# Patient Record
Sex: MALE | Race: OTHER | HISPANIC OR LATINO
[De-identification: names, ages, dates, MRNs, and addresses within clinical notes are randomized per-mention and may not be internally consistent; named-entity substitution may affect disease eponyms.]

---

## 2017-09-15 ENCOUNTER — APPOINTMENT (OUTPATIENT)
Dept: VASCULAR SURGERY | Facility: CLINIC | Age: 67
End: 2017-09-15
Payer: COMMERCIAL

## 2017-09-15 PROCEDURE — 93923 UPR/LXTR ART STDY 3+ LVLS: CPT

## 2017-09-15 PROCEDURE — 99213 OFFICE O/P EST LOW 20 MIN: CPT | Mod: 25

## 2017-09-27 NOTE — ASU PATIENT PROFILE, ADULT - PMH
CAD (coronary artery disease)    DM (diabetes mellitus)    Essential hypertension  Hypertension  PAD (peripheral artery disease)

## 2017-09-28 ENCOUNTER — APPOINTMENT (OUTPATIENT)
Dept: VASCULAR SURGERY | Facility: HOSPITAL | Age: 67
End: 2017-09-28

## 2017-09-28 ENCOUNTER — OUTPATIENT (OUTPATIENT)
Dept: OUTPATIENT SERVICES | Facility: HOSPITAL | Age: 67
LOS: 1 days | Discharge: HOME | End: 2017-09-28
Payer: COMMERCIAL

## 2017-09-28 VITALS
RESPIRATION RATE: 18 BRPM | SYSTOLIC BLOOD PRESSURE: 142 MMHG | HEART RATE: 58 BPM | OXYGEN SATURATION: 100 % | HEIGHT: 63 IN | DIASTOLIC BLOOD PRESSURE: 80 MMHG | TEMPERATURE: 98 F

## 2017-09-28 VITALS — DIASTOLIC BLOOD PRESSURE: 67 MMHG | RESPIRATION RATE: 19 BRPM | HEART RATE: 54 BPM | SYSTOLIC BLOOD PRESSURE: 122 MMHG

## 2017-09-28 DIAGNOSIS — Z41.9 ENCOUNTER FOR PROCEDURE FOR PURPOSES OTHER THAN REMEDYING HEALTH STATE, UNSPECIFIED: Chronic | ICD-10-CM

## 2017-09-28 DIAGNOSIS — Z98.89 OTHER SPECIFIED POSTPROCEDURAL STATES: Chronic | ICD-10-CM

## 2017-09-28 PROCEDURE — 76000 FLUOROSCOPY <1 HR PHYS/QHP: CPT

## 2017-09-28 PROCEDURE — C1894: CPT

## 2017-09-28 PROCEDURE — 36246 INS CATH ABD/L-EXT ART 2ND: CPT | Mod: 59,GC

## 2017-09-28 PROCEDURE — C1769: CPT

## 2017-09-28 PROCEDURE — C1887: CPT

## 2017-09-28 PROCEDURE — 75625 CONTRAST EXAM ABDOMINL AORTA: CPT | Mod: 26,GC

## 2017-09-28 PROCEDURE — 36247 INS CATH ABD/L-EXT ART 3RD: CPT | Mod: GC

## 2017-09-28 PROCEDURE — 75710 ARTERY X-RAYS ARM/LEG: CPT | Mod: 26,GC

## 2017-09-28 PROCEDURE — 36140 INTRO NDL ICATH UPR/LXTR ART: CPT | Mod: 59,GC

## 2017-09-28 PROCEDURE — 36246 INS CATH ABD/L-EXT ART 2ND: CPT | Mod: LT

## 2017-09-28 RX ORDER — VALSARTAN 80 MG/1
40 TABLET ORAL ONCE
Qty: 0 | Refills: 0 | Status: COMPLETED | OUTPATIENT
Start: 2017-09-28 | End: 2017-09-28

## 2017-09-28 RX ORDER — ACETAMINOPHEN 500 MG
650 TABLET ORAL EVERY 6 HOURS
Qty: 0 | Refills: 0 | Status: DISCONTINUED | OUTPATIENT
Start: 2017-09-28 | End: 2017-09-28

## 2017-09-28 RX ORDER — NIFEDIPINE 30 MG
30 TABLET, EXTENDED RELEASE 24 HR ORAL ONCE
Qty: 0 | Refills: 0 | Status: DISCONTINUED | OUTPATIENT
Start: 2017-09-28 | End: 2017-09-28

## 2017-09-28 RX ORDER — ONDANSETRON 8 MG/1
4 TABLET, FILM COATED ORAL ONCE
Qty: 0 | Refills: 0 | Status: DISCONTINUED | OUTPATIENT
Start: 2017-09-28 | End: 2017-09-28

## 2017-09-28 RX ORDER — HYDRALAZINE HCL 50 MG
10 TABLET ORAL ONCE
Qty: 0 | Refills: 0 | Status: COMPLETED | OUTPATIENT
Start: 2017-09-28 | End: 2017-09-28

## 2017-09-28 RX ADMIN — VALSARTAN 40 MILLIGRAM(S): 80 TABLET ORAL at 16:16

## 2017-09-28 RX ADMIN — Medication 10 MILLIGRAM(S): at 16:56

## 2017-09-28 NOTE — PROGRESS NOTE ADULT - SUBJECTIVE AND OBJECTIVE BOX
Sheath Pull    No heparin given. 5 F sheath pulled at 1217, direct pressure was held for a total of 40 minutes. Post pull there was no hematoma, no discoloration, no bleeding. BL palpable DP and PT pulses.

## 2017-09-28 NOTE — BRIEF OPERATIVE NOTE - OPERATION/FINDINGS
Left lower extremity angiogram with 5F sheath in R groin. Findings: Patent Aorta, patent bilateral JOHNATHAN/EIA/IIA. Patent L CFA and profunda femoris, L SFA occluded at its origin, reconstitutes at above knee popliteal artery (P1). P2, P3, AT, and TP trunk patent, but PT and peroneal occluded. Single vessel runoff (L AT) to foot. No intervention performed. Diagnostic left lower extremity angiogram with 5F sheath in R groin. Findings: Patent Aorta, patent bilateral JOHNATHAN/EIA/IIA. Patent L CFA and profunda femoris, L SFA occluded 1 cm distal to origin, reconstitutes at above knee popliteal artery. P2, P3, AT, TP trunk, and peroneal patent, but PT occluded. 2 vessel runoff (L AT and peroneal) to foot. No intervention performed.

## 2017-09-28 NOTE — BRIEF OPERATIVE NOTE - PROCEDURE
<<-----Click on this checkbox to enter Procedure Angiogram  09/28/2017  left lower extremity  Active  GANAND

## 2017-10-02 DIAGNOSIS — I70.212 ATHEROSCLEROSIS OF NATIVE ARTERIES OF EXTREMITIES WITH INTERMITTENT CLAUDICATION, LEFT LEG: ICD-10-CM

## 2017-10-02 DIAGNOSIS — I10 ESSENTIAL (PRIMARY) HYPERTENSION: ICD-10-CM

## 2017-10-02 DIAGNOSIS — F17.200 NICOTINE DEPENDENCE, UNSPECIFIED, UNCOMPLICATED: ICD-10-CM

## 2017-10-02 DIAGNOSIS — E11.9 TYPE 2 DIABETES MELLITUS WITHOUT COMPLICATIONS: ICD-10-CM

## 2017-11-01 NOTE — PATIENT PROFILE ADULT. - PSH
Elective surgery  right leg angiogram with bypass  july 2016  History of hernia repair  june 2014  S/P femoral-femoral bypass surgery  left 2016

## 2017-11-02 ENCOUNTER — INPATIENT (INPATIENT)
Facility: HOSPITAL | Age: 67
LOS: 0 days | Discharge: HOME | DRG: 254 | End: 2017-11-03
Attending: SURGERY | Admitting: SURGERY
Payer: COMMERCIAL

## 2017-11-02 ENCOUNTER — APPOINTMENT (OUTPATIENT)
Dept: VASCULAR SURGERY | Facility: HOSPITAL | Age: 67
End: 2017-11-02

## 2017-11-02 VITALS
SYSTOLIC BLOOD PRESSURE: 131 MMHG | HEART RATE: 64 BPM | OXYGEN SATURATION: 96 % | WEIGHT: 143.08 LBS | HEIGHT: 61 IN | RESPIRATION RATE: 18 BRPM | TEMPERATURE: 98 F | DIASTOLIC BLOOD PRESSURE: 61 MMHG

## 2017-11-02 DIAGNOSIS — I73.9 PERIPHERAL VASCULAR DISEASE, UNSPECIFIED: ICD-10-CM

## 2017-11-02 DIAGNOSIS — Z98.89 OTHER SPECIFIED POSTPROCEDURAL STATES: Chronic | ICD-10-CM

## 2017-11-02 DIAGNOSIS — Z95.828 PRESENCE OF OTHER VASCULAR IMPLANTS AND GRAFTS: Chronic | ICD-10-CM

## 2017-11-02 DIAGNOSIS — Z41.9 ENCOUNTER FOR PROCEDURE FOR PURPOSES OTHER THAN REMEDYING HEALTH STATE, UNSPECIFIED: Chronic | ICD-10-CM

## 2017-11-02 LAB
ANION GAP SERPL CALC-SCNC: 15 MMOL/L — SIGNIFICANT CHANGE UP (ref 5–17)
APTT BLD: 32.3 SEC — SIGNIFICANT CHANGE UP (ref 27.5–37.4)
BUN SERPL-MCNC: 17 MG/DL — SIGNIFICANT CHANGE UP (ref 7–23)
CALCIUM SERPL-MCNC: 8.8 MG/DL — SIGNIFICANT CHANGE UP (ref 8.4–10.5)
CHLORIDE SERPL-SCNC: 98 MMOL/L — SIGNIFICANT CHANGE UP (ref 96–108)
CO2 SERPL-SCNC: 24 MMOL/L — SIGNIFICANT CHANGE UP (ref 22–31)
CREAT SERPL-MCNC: 0.9 MG/DL — SIGNIFICANT CHANGE UP (ref 0.5–1.3)
GLUCOSE BLDC GLUCOMTR-MCNC: 113 MG/DL — HIGH (ref 70–99)
GLUCOSE BLDC GLUCOMTR-MCNC: 119 MG/DL — HIGH (ref 70–99)
GLUCOSE BLDC GLUCOMTR-MCNC: 122 MG/DL — HIGH (ref 70–99)
GLUCOSE BLDC GLUCOMTR-MCNC: 92 MG/DL — SIGNIFICANT CHANGE UP (ref 70–99)
GLUCOSE SERPL-MCNC: 132 MG/DL — HIGH (ref 70–99)
HCT VFR BLD CALC: 35.6 % — LOW (ref 39–50)
HGB BLD-MCNC: 12.1 G/DL — LOW (ref 13–17)
INR BLD: 1.13 — SIGNIFICANT CHANGE UP (ref 0.88–1.16)
MCHC RBC-ENTMCNC: 29.2 PG — SIGNIFICANT CHANGE UP (ref 27–34)
MCHC RBC-ENTMCNC: 34 G/DL — SIGNIFICANT CHANGE UP (ref 32–36)
MCV RBC AUTO: 86 FL — SIGNIFICANT CHANGE UP (ref 80–100)
PLATELET # BLD AUTO: 178 K/UL — SIGNIFICANT CHANGE UP (ref 150–400)
POTASSIUM SERPL-MCNC: 4.1 MMOL/L — SIGNIFICANT CHANGE UP (ref 3.5–5.3)
POTASSIUM SERPL-SCNC: 4.1 MMOL/L — SIGNIFICANT CHANGE UP (ref 3.5–5.3)
PROTHROM AB SERPL-ACNC: 12.6 SEC — SIGNIFICANT CHANGE UP (ref 9.8–12.7)
RBC # BLD: 4.14 M/UL — LOW (ref 4.2–5.8)
RBC # FLD: 13.8 % — SIGNIFICANT CHANGE UP (ref 10.3–16.9)
SODIUM SERPL-SCNC: 137 MMOL/L — SIGNIFICANT CHANGE UP (ref 135–145)
WBC # BLD: 17.5 K/UL — HIGH (ref 3.8–10.5)
WBC # FLD AUTO: 17.5 K/UL — HIGH (ref 3.8–10.5)

## 2017-11-02 PROCEDURE — 75710 ARTERY X-RAYS ARM/LEG: CPT | Mod: 26,GC,59

## 2017-11-02 PROCEDURE — 93010 ELECTROCARDIOGRAM REPORT: CPT

## 2017-11-02 PROCEDURE — 36140 INTRO NDL ICATH UPR/LXTR ART: CPT | Mod: 59,GC

## 2017-11-02 PROCEDURE — 35656 BPG FEMORAL-POPLITEAL: CPT | Mod: 80,GC

## 2017-11-02 PROCEDURE — 35656 BPG FEMORAL-POPLITEAL: CPT | Mod: GC

## 2017-11-02 RX ORDER — DEXTROSE 50 % IN WATER 50 %
25 SYRINGE (ML) INTRAVENOUS ONCE
Qty: 0 | Refills: 0 | Status: DISCONTINUED | OUTPATIENT
Start: 2017-11-02 | End: 2017-11-03

## 2017-11-02 RX ORDER — LABETALOL HCL 100 MG
200 TABLET ORAL
Qty: 0 | Refills: 0 | Status: DISCONTINUED | OUTPATIENT
Start: 2017-11-02 | End: 2017-11-03

## 2017-11-02 RX ORDER — NIFEDIPINE 30 MG
90 TABLET, EXTENDED RELEASE 24 HR ORAL AT BEDTIME
Qty: 0 | Refills: 0 | Status: DISCONTINUED | OUTPATIENT
Start: 2017-11-02 | End: 2017-11-03

## 2017-11-02 RX ORDER — DEXTROSE 50 % IN WATER 50 %
12.5 SYRINGE (ML) INTRAVENOUS ONCE
Qty: 0 | Refills: 0 | Status: DISCONTINUED | OUTPATIENT
Start: 2017-11-02 | End: 2017-11-03

## 2017-11-02 RX ORDER — CEFAZOLIN SODIUM 1 G
1000 VIAL (EA) INJECTION EVERY 8 HOURS
Qty: 0 | Refills: 0 | Status: DISCONTINUED | OUTPATIENT
Start: 2017-11-02 | End: 2017-11-03

## 2017-11-02 RX ORDER — DEXTROSE 50 % IN WATER 50 %
1 SYRINGE (ML) INTRAVENOUS ONCE
Qty: 0 | Refills: 0 | Status: DISCONTINUED | OUTPATIENT
Start: 2017-11-02 | End: 2017-11-03

## 2017-11-02 RX ORDER — DOCUSATE SODIUM 100 MG
100 CAPSULE ORAL THREE TIMES A DAY
Qty: 0 | Refills: 0 | Status: DISCONTINUED | OUTPATIENT
Start: 2017-11-02 | End: 2017-11-03

## 2017-11-02 RX ORDER — ASPIRIN/CALCIUM CARB/MAGNESIUM 324 MG
81 TABLET ORAL DAILY
Qty: 0 | Refills: 0 | Status: DISCONTINUED | OUTPATIENT
Start: 2017-11-02 | End: 2017-11-03

## 2017-11-02 RX ORDER — GLUCAGON INJECTION, SOLUTION 0.5 MG/.1ML
1 INJECTION, SOLUTION SUBCUTANEOUS ONCE
Qty: 0 | Refills: 0 | Status: DISCONTINUED | OUTPATIENT
Start: 2017-11-02 | End: 2017-11-03

## 2017-11-02 RX ORDER — ONDANSETRON 8 MG/1
4 TABLET, FILM COATED ORAL EVERY 6 HOURS
Qty: 0 | Refills: 0 | Status: DISCONTINUED | OUTPATIENT
Start: 2017-11-02 | End: 2017-11-03

## 2017-11-02 RX ORDER — MORPHINE SULFATE 50 MG/1
4 CAPSULE, EXTENDED RELEASE ORAL EVERY 4 HOURS
Qty: 0 | Refills: 0 | Status: DISCONTINUED | OUTPATIENT
Start: 2017-11-02 | End: 2017-11-03

## 2017-11-02 RX ORDER — SODIUM CHLORIDE 9 MG/ML
1000 INJECTION, SOLUTION INTRAVENOUS
Qty: 0 | Refills: 0 | Status: DISCONTINUED | OUTPATIENT
Start: 2017-11-02 | End: 2017-11-02

## 2017-11-02 RX ORDER — SODIUM CHLORIDE 9 MG/ML
1000 INJECTION, SOLUTION INTRAVENOUS
Qty: 0 | Refills: 0 | Status: DISCONTINUED | OUTPATIENT
Start: 2017-11-02 | End: 2017-11-03

## 2017-11-02 RX ORDER — INSULIN LISPRO 100/ML
VIAL (ML) SUBCUTANEOUS
Qty: 0 | Refills: 0 | Status: DISCONTINUED | OUTPATIENT
Start: 2017-11-02 | End: 2017-11-03

## 2017-11-02 RX ADMIN — SODIUM CHLORIDE 85 MILLILITER(S): 9 INJECTION, SOLUTION INTRAVENOUS at 13:06

## 2017-11-02 RX ADMIN — Medication 90 MILLIGRAM(S): at 21:58

## 2017-11-02 RX ADMIN — Medication 200 MILLIGRAM(S): at 19:44

## 2017-11-02 RX ADMIN — Medication 100 MILLIGRAM(S): at 21:45

## 2017-11-02 NOTE — H&P PST ADULT - HISTORY OF PRESENT ILLNESS
66 yo M with PMH HTN, HLD, DM, CAD s/p RLE fem-pop bypass in 2016 who presents with LLE claudication here for femoral to popliteal a. bypass.

## 2017-11-02 NOTE — H&P PST ADULT - PROBLEM SELECTOR PLAN 1
Admit to vascular  IVF   Pain control - Morphine PRN  Zofran PRN Nausea  continue home meds  F/u post op labs  neurovascular checks

## 2017-11-02 NOTE — BRIEF OPERATIVE NOTE - OPERATION/FINDINGS
Procedure: LLE CFA-AK Pop bypass with 6mm PTFE    Findings: Completion angio showed no proximal or distal technical defect in the anastamoses with AT runoff to foot    Triphasic DP signal at end of case

## 2017-11-02 NOTE — PROGRESS NOTE ADULT - SUBJECTIVE AND OBJECTIVE BOX
Procedure:   LLE CFA-AK pop bypass with 6mm PTFE      Diagnosis/Indication: PVD     Surgeon: Alfreda     S: Pt has no complaints. Denies lower extremity pain.  has not voided     O:  T(C): 36.7 (11-02-17 @ 11:30), Max: 36.7 (11-02-17 @ 11:30)  T(F): 98 (11-02-17 @ 11:30), Max: 98 (11-02-17 @ 11:30)  HR: 63 (11-02-17 @ 12:45) (60 - 84)  BP: 111/63 (11-02-17 @ 12:45) (107/63 - 114/73)  RR: 16 (11-02-17 @ 12:45) (16 - 18)  SpO2: 96% (11-02-17 @ 12:45) (96% - 100%)  Wt(kg): --                        12.1   17.5  )-----------( 178      ( 02 Nov 2017 11:48 )             35.6     11-02    137  |  98  |  17  ----------------------------<  132<H>  4.1   |  24  |  0.90    Ca    8.8      02 Nov 2017 11:48        Gen:  NAD AAO x3   C/V:  RRR  Pulm:  non-labored, CTABL   Abd: soft non -tender   Extremities:  LLE warm well perfused,   DP 2+  palpable,   PT monophasic   < from: 12 Lead ECG (11.02.17 @ 12:02) >    Ventricular Rate 65 BPM    Atrial Rate 65 BPM    P-R Interval 162 ms    QRS Duration 142 ms    Q-T Interval 448 ms    QTC Calculation(Bezet) 465 ms    P Axis 50 degrees    R Axis 46 degrees    T Axis 25 degrees    Diagnosis Line Normal sinus rhythm  Right bundle branch block    Confirmed by Brannon Thompson ([id number]) on 11/2/2017 1:08:30 PM    < end of copied text >      A/P: 67yMale s/p above procedure  Diet: Advance as tolerated    IV: LR maintenance   Pain/nausea control  DVT ppx: ASA  Dispo plan: OOB with PT POD#1

## 2017-11-03 ENCOUNTER — TRANSCRIPTION ENCOUNTER (OUTPATIENT)
Age: 67
End: 2017-11-03

## 2017-11-03 VITALS — TEMPERATURE: 99 F

## 2017-11-03 LAB
ANION GAP SERPL CALC-SCNC: 13 MMOL/L — SIGNIFICANT CHANGE UP (ref 5–17)
BUN SERPL-MCNC: 15 MG/DL — SIGNIFICANT CHANGE UP (ref 7–23)
CALCIUM SERPL-MCNC: 9 MG/DL — SIGNIFICANT CHANGE UP (ref 8.4–10.5)
CHLORIDE SERPL-SCNC: 93 MMOL/L — LOW (ref 96–108)
CO2 SERPL-SCNC: 27 MMOL/L — SIGNIFICANT CHANGE UP (ref 22–31)
CREAT SERPL-MCNC: 0.84 MG/DL — SIGNIFICANT CHANGE UP (ref 0.5–1.3)
GLUCOSE BLDC GLUCOMTR-MCNC: 132 MG/DL — HIGH (ref 70–99)
GLUCOSE BLDC GLUCOMTR-MCNC: 151 MG/DL — HIGH (ref 70–99)
GLUCOSE SERPL-MCNC: 151 MG/DL — HIGH (ref 70–99)
HBA1C BLD-MCNC: 6.2 % — HIGH (ref 4–5.6)
HCT VFR BLD CALC: 35 % — LOW (ref 39–50)
HGB BLD-MCNC: 11.9 G/DL — LOW (ref 13–17)
MAGNESIUM SERPL-MCNC: 1.8 MG/DL — SIGNIFICANT CHANGE UP (ref 1.6–2.6)
MCHC RBC-ENTMCNC: 29 PG — SIGNIFICANT CHANGE UP (ref 27–34)
MCHC RBC-ENTMCNC: 34 G/DL — SIGNIFICANT CHANGE UP (ref 32–36)
MCV RBC AUTO: 85.4 FL — SIGNIFICANT CHANGE UP (ref 80–100)
PHOSPHATE SERPL-MCNC: 3.6 MG/DL — SIGNIFICANT CHANGE UP (ref 2.5–4.5)
PLATELET # BLD AUTO: 187 K/UL — SIGNIFICANT CHANGE UP (ref 150–400)
POTASSIUM SERPL-MCNC: 3.6 MMOL/L — SIGNIFICANT CHANGE UP (ref 3.5–5.3)
POTASSIUM SERPL-SCNC: 3.6 MMOL/L — SIGNIFICANT CHANGE UP (ref 3.5–5.3)
RBC # BLD: 4.1 M/UL — LOW (ref 4.2–5.8)
RBC # FLD: 13.8 % — SIGNIFICANT CHANGE UP (ref 10.3–16.9)
SODIUM SERPL-SCNC: 133 MMOL/L — LOW (ref 135–145)
WBC # BLD: 14.4 K/UL — HIGH (ref 3.8–10.5)
WBC # FLD AUTO: 14.4 K/UL — HIGH (ref 3.8–10.5)

## 2017-11-03 PROCEDURE — 97161 PT EVAL LOW COMPLEX 20 MIN: CPT

## 2017-11-03 PROCEDURE — 85610 PROTHROMBIN TIME: CPT

## 2017-11-03 PROCEDURE — C1768: CPT

## 2017-11-03 PROCEDURE — 84100 ASSAY OF PHOSPHORUS: CPT

## 2017-11-03 PROCEDURE — 83735 ASSAY OF MAGNESIUM: CPT

## 2017-11-03 PROCEDURE — 85730 THROMBOPLASTIN TIME PARTIAL: CPT

## 2017-11-03 PROCEDURE — 36415 COLL VENOUS BLD VENIPUNCTURE: CPT

## 2017-11-03 PROCEDURE — 93005 ELECTROCARDIOGRAM TRACING: CPT

## 2017-11-03 PROCEDURE — 80048 BASIC METABOLIC PNL TOTAL CA: CPT

## 2017-11-03 PROCEDURE — 82962 GLUCOSE BLOOD TEST: CPT

## 2017-11-03 PROCEDURE — 83036 HEMOGLOBIN GLYCOSYLATED A1C: CPT

## 2017-11-03 PROCEDURE — 76000 FLUOROSCOPY <1 HR PHYS/QHP: CPT

## 2017-11-03 PROCEDURE — 85027 COMPLETE CBC AUTOMATED: CPT

## 2017-11-03 RX ORDER — POTASSIUM CHLORIDE 20 MEQ
40 PACKET (EA) ORAL ONCE
Qty: 0 | Refills: 0 | Status: COMPLETED | OUTPATIENT
Start: 2017-11-03 | End: 2017-11-03

## 2017-11-03 RX ORDER — DEXTROSE 50 % IN WATER 50 %
50 SYRINGE (ML) INTRAVENOUS
Qty: 0 | Refills: 0 | COMMUNITY
Start: 2017-11-03

## 2017-11-03 RX ORDER — ACETAMINOPHEN 500 MG
650 TABLET ORAL ONCE
Qty: 0 | Refills: 0 | Status: COMPLETED | OUTPATIENT
Start: 2017-11-03 | End: 2017-11-03

## 2017-11-03 RX ORDER — MAGNESIUM SULFATE 500 MG/ML
1 VIAL (ML) INJECTION ONCE
Qty: 0 | Refills: 0 | Status: COMPLETED | OUTPATIENT
Start: 2017-11-03 | End: 2017-11-03

## 2017-11-03 RX ORDER — DEXTROSE 50 % IN WATER 50 %
1 SYRINGE (ML) INTRAVENOUS
Qty: 0 | Refills: 0 | COMMUNITY
Start: 2017-11-03

## 2017-11-03 RX ORDER — ASPIRIN/CALCIUM CARB/MAGNESIUM 324 MG
1 TABLET ORAL
Qty: 30 | Refills: 0
Start: 2017-11-03 | End: 2017-12-03

## 2017-11-03 RX ORDER — DEXTROSE 50 % IN WATER 50 %
25 SYRINGE (ML) INTRAVENOUS
Qty: 0 | Refills: 0 | COMMUNITY
Start: 2017-11-03

## 2017-11-03 RX ORDER — HEPARIN SODIUM 5000 [USP'U]/ML
5000 INJECTION INTRAVENOUS; SUBCUTANEOUS EVERY 8 HOURS
Qty: 0 | Refills: 0 | Status: DISCONTINUED | OUTPATIENT
Start: 2017-11-03 | End: 2017-11-03

## 2017-11-03 RX ADMIN — Medication 2: at 06:30

## 2017-11-03 RX ADMIN — Medication 200 MILLIGRAM(S): at 05:55

## 2017-11-03 RX ADMIN — Medication 100 GRAM(S): at 08:33

## 2017-11-03 RX ADMIN — Medication 81 MILLIGRAM(S): at 12:31

## 2017-11-03 RX ADMIN — Medication 100 MILLIGRAM(S): at 05:56

## 2017-11-03 RX ADMIN — Medication 650 MILLIGRAM(S): at 13:19

## 2017-11-03 RX ADMIN — Medication 40 MILLIEQUIVALENT(S): at 08:33

## 2017-11-03 RX ADMIN — Medication 650 MILLIGRAM(S): at 14:30

## 2017-11-03 RX ADMIN — Medication 100 MILLIGRAM(S): at 05:55

## 2017-11-03 NOTE — PROGRESS NOTE ADULT - SUBJECTIVE AND OBJECTIVE BOX
o/n: passed TO 900c  11/2: s/p LLE fem-ak pop bypass, POC ok      66 yo M with PAD and left leg severe claudication, s/p left fem-AK pop bypass with PTFE  -Pain control  -Bedrest post op, OOB and PT eval on POD#1  -ASA  -Home meds  -consistent carb diet  -HSQ on POD#1  -AM labs o/n: passed   11/2: s/p LLE fem-ak pop bypass, POC ok    SUBJECTIVE: Patient seen and examined bedside by chief resident. No acute events overnight. Denies fever, chills, nausea, emesis, chest pain, dyspnea, abdominal pain.     aspirin enteric coated 81 milliGRAM(s) Oral daily  ceFAZolin   IVPB 1000 milliGRAM(s) IV Intermittent every 8 hours  heparin  Injectable 5000 Unit(s) SubCutaneous every 8 hours  labetalol 200 milliGRAM(s) Oral two times a day  NIFEdipine XL 90 milliGRAM(s) Oral at bedtime      Vital Signs Last 24 Hrs  T(C): 36.6 (03 Nov 2017 08:27), Max: 37.7 (03 Nov 2017 06:10)  T(F): 97.8 (03 Nov 2017 08:27), Max: 99.9 (03 Nov 2017 06:10)  HR: 64 (03 Nov 2017 08:30) (56 - 84)  BP: 117/58 (03 Nov 2017 08:30) (107/63 - 167/77)  BP(mean): --  RR: 16 (03 Nov 2017 08:30) (16 - 18)  SpO2: 97% (03 Nov 2017 08:30) (91% - 100%)  I&O's Detail    02 Nov 2017 07:01  -  03 Nov 2017 07:00  --------------------------------------------------------  IN:    IV PiggyBack: 100 mL    lactated ringers.: 765 mL    Oral Fluid: 120 mL  Total IN: 985 mL    OUT:    Voided: 1850 mL  Total OUT: 1850 mL    Total NET: -865 mL      03 Nov 2017 07:01  -  03 Nov 2017 08:45  --------------------------------------------------------  IN:    Oral Fluid: 180 mL  Total IN: 180 mL    OUT:  Total OUT: 0 mL    Total NET: 180 mL          General: NAD, resting comfortably in bed  Pulm: Nonlabored breathing, no respiratory distress  Extrem: WWP, no edema, L 2+DP, incision c/d/i        LABS:                        11.9   14.4  )-----------( 187      ( 03 Nov 2017 07:06 )             35.0     11-03    133<L>  |  93<L>  |  15  ----------------------------<  151<H>  3.6   |  27  |  0.84    Ca    9.0      03 Nov 2017 07:06  Phos  3.6     11-03  Mg     1.8     11-03      PT/INR - ( 02 Nov 2017 11:48 )   PT: 12.6 sec;   INR: 1.13          PTT - ( 02 Nov 2017 11:48 )  PTT:32.3 sec      RADIOLOGY & ADDITIONAL STUDIES:

## 2017-11-03 NOTE — DISCHARGE NOTE ADULT - MEDICATION SUMMARY - MEDICATIONS TO TAKE
I will START or STAY ON the medications listed below when I get home from the hospital:    Aspir 81 oral delayed release tablet  -- 1 tab(s) by mouth once a day  -- Indication: For circulation    metFORMIN 500 mg oral tablet  -- 1  by mouth 2 times a day  -- Indication: For DM    Edarbyclor 40 mg-12.5 mg oral tablet  -- 1 tab(s) by mouth once a day  -- Indication: For HTN    labetalol 200 mg oral tablet  -- 1 tab(s) by mouth 2 times a day  -- Indication: For HTN    NIFEdipine 30 mg oral tablet, extended release  -- 90 milligram(s) by mouth once a day (at bedtime)  -- Indication: For HTN

## 2017-11-03 NOTE — DISCHARGE NOTE ADULT - CARE PROVIDER_API CALL
Varsha Gant), Surgery; Vascular Surgery  [address]  [address]  [place], [place] [address]  Phone: [phone]  Fax: [phone]

## 2017-11-03 NOTE — PROGRESS NOTE ADULT - ASSESSMENT
68 yo M with PAD and left leg severe claudication, s/p left fem-AK pop bypass with PTFE  -Pain control  -Bedrest post op, OOB and PT eval on POD#1  -ASA  -HSQ  -Home meds  -consistent carb diet  -AM labs

## 2017-11-03 NOTE — DISCHARGE NOTE ADULT - PLAN OF CARE
Follow up with  in 1-2 weeks. Call the office at  to schedule your appointment. You may shower; soap and water over incision sites. Do not scrub. Pat dry when done. No tub bathing or swimming until cleared. Keep incision sites out of the sun as scars will darken. Ambulate as tolerated, but no heavy lifting (>10lbs) or strenuous exercise. You may resume diabetic diet. You should be urinating at least 3-4x per day. Call the office if you experience increasing pain, leg numbness/tingling, nausea, fever/chill.    Please start taking a baby Aspirin 81mg daily Incision healing, return to normal activity

## 2017-11-03 NOTE — DISCHARGE NOTE ADULT - PATIENT PORTAL LINK FT
“You can access the [employer] Patient Portal, offered by [facility], by registering with the following website: [email or web address]”

## 2017-11-06 DIAGNOSIS — I10 ESSENTIAL (PRIMARY) HYPERTENSION: ICD-10-CM

## 2017-11-06 DIAGNOSIS — Z86.73 PERSONAL HISTORY OF TRANSIENT ISCHEMIC ATTACK (TIA), AND CEREBRAL INFARCTION WITHOUT RESIDUAL DEFICITS: ICD-10-CM

## 2017-11-06 DIAGNOSIS — Z79.84 LONG TERM (CURRENT) USE OF ORAL HYPOGLYCEMIC DRUGS: ICD-10-CM

## 2017-11-06 DIAGNOSIS — I70.212 ATHEROSCLEROSIS OF NATIVE ARTERIES OF EXTREMITIES WITH INTERMITTENT CLAUDICATION, LEFT LEG: ICD-10-CM

## 2017-11-06 DIAGNOSIS — I25.10 ATHEROSCLEROTIC HEART DISEASE OF NATIVE CORONARY ARTERY WITHOUT ANGINA PECTORIS: ICD-10-CM

## 2017-11-06 DIAGNOSIS — E11.9 TYPE 2 DIABETES MELLITUS WITHOUT COMPLICATIONS: ICD-10-CM

## 2017-11-06 DIAGNOSIS — F17.210 NICOTINE DEPENDENCE, CIGARETTES, UNCOMPLICATED: ICD-10-CM

## 2017-11-17 ENCOUNTER — APPOINTMENT (OUTPATIENT)
Dept: VASCULAR SURGERY | Facility: CLINIC | Age: 67
End: 2017-11-17
Payer: COMMERCIAL

## 2017-11-17 PROCEDURE — 99024 POSTOP FOLLOW-UP VISIT: CPT

## 2017-11-17 PROCEDURE — 93926 LOWER EXTREMITY STUDY: CPT | Mod: 79

## 2018-05-25 ENCOUNTER — APPOINTMENT (OUTPATIENT)
Dept: VASCULAR SURGERY | Facility: CLINIC | Age: 68
End: 2018-05-25
Payer: COMMERCIAL

## 2018-05-25 PROCEDURE — 93925 LOWER EXTREMITY STUDY: CPT

## 2018-05-25 PROCEDURE — 99213 OFFICE O/P EST LOW 20 MIN: CPT | Mod: 25

## 2019-07-19 ENCOUNTER — APPOINTMENT (OUTPATIENT)
Dept: VASCULAR SURGERY | Facility: CLINIC | Age: 69
End: 2019-07-19
Payer: COMMERCIAL

## 2019-07-19 PROCEDURE — 93925 LOWER EXTREMITY STUDY: CPT

## 2019-07-19 PROCEDURE — 99213 OFFICE O/P EST LOW 20 MIN: CPT

## 2019-07-19 NOTE — HISTORY OF PRESENT ILLNESS
[FreeTextEntry1] : patient with severe PVD who had biliateral fem pop bypasses comes in for routine check\par he can walk as much as he wishes\par no claudicaiton \par no neurologic issues\par

## 2019-07-19 NOTE — PHYSICAL EXAM
[2+] : left 2+ [No Rash or Lesion] : No rash or lesion [Calm] : calm [JVD] : no jugular venous distention  [Right Carotid Bruit] : no bruit heard over the right carotid [Left Carotid Bruit] : no bruit heard over the left carotid [Ankle Swelling (On Exam)] : not present [Varicose Veins Of Lower Extremities] : not present [] : not present [de-identified] : pleasant

## 2020-03-13 ENCOUNTER — TRANSCRIPTION ENCOUNTER (OUTPATIENT)
Age: 70
End: 2020-03-13

## 2020-03-14 ENCOUNTER — INPATIENT (INPATIENT)
Facility: HOSPITAL | Age: 70
LOS: 2 days | Discharge: HOME | DRG: 253 | End: 2020-03-17
Attending: SURGERY | Admitting: SURGERY
Payer: COMMERCIAL

## 2020-03-14 VITALS
WEIGHT: 160.06 LBS | TEMPERATURE: 99 F | HEART RATE: 80 BPM | DIASTOLIC BLOOD PRESSURE: 86 MMHG | SYSTOLIC BLOOD PRESSURE: 183 MMHG | RESPIRATION RATE: 18 BRPM | OXYGEN SATURATION: 100 %

## 2020-03-14 DIAGNOSIS — Z95.828 PRESENCE OF OTHER VASCULAR IMPLANTS AND GRAFTS: Chronic | ICD-10-CM

## 2020-03-14 DIAGNOSIS — Z41.9 ENCOUNTER FOR PROCEDURE FOR PURPOSES OTHER THAN REMEDYING HEALTH STATE, UNSPECIFIED: Chronic | ICD-10-CM

## 2020-03-14 DIAGNOSIS — Z98.89 OTHER SPECIFIED POSTPROCEDURAL STATES: Chronic | ICD-10-CM

## 2020-03-14 LAB
ALBUMIN SERPL ELPH-MCNC: 4.3 G/DL — SIGNIFICANT CHANGE UP (ref 3.3–5)
ALP SERPL-CCNC: 118 U/L — SIGNIFICANT CHANGE UP (ref 40–120)
ALT FLD-CCNC: 13 U/L — SIGNIFICANT CHANGE UP (ref 10–45)
ANION GAP SERPL CALC-SCNC: 13 MMOL/L — SIGNIFICANT CHANGE UP (ref 5–17)
ANION GAP SERPL CALC-SCNC: 13 MMOL/L — SIGNIFICANT CHANGE UP (ref 5–17)
APTT BLD: 34.9 SEC — SIGNIFICANT CHANGE UP (ref 27.5–36.3)
APTT BLD: 41.8 SEC — HIGH (ref 27.5–36.3)
AST SERPL-CCNC: 14 U/L — SIGNIFICANT CHANGE UP (ref 10–40)
BASOPHILS # BLD AUTO: 0.1 K/UL — SIGNIFICANT CHANGE UP (ref 0–0.2)
BASOPHILS NFR BLD AUTO: 0.6 % — SIGNIFICANT CHANGE UP (ref 0–2)
BILIRUB SERPL-MCNC: 0.2 MG/DL — SIGNIFICANT CHANGE UP (ref 0.2–1.2)
BUN SERPL-MCNC: 11 MG/DL — SIGNIFICANT CHANGE UP (ref 7–23)
BUN SERPL-MCNC: 9 MG/DL — SIGNIFICANT CHANGE UP (ref 7–23)
CALCIUM SERPL-MCNC: 9.1 MG/DL — SIGNIFICANT CHANGE UP (ref 8.4–10.5)
CALCIUM SERPL-MCNC: 9.4 MG/DL — SIGNIFICANT CHANGE UP (ref 8.4–10.5)
CHLORIDE SERPL-SCNC: 98 MMOL/L — SIGNIFICANT CHANGE UP (ref 96–108)
CHLORIDE SERPL-SCNC: 99 MMOL/L — SIGNIFICANT CHANGE UP (ref 96–108)
CO2 SERPL-SCNC: 26 MMOL/L — SIGNIFICANT CHANGE UP (ref 22–31)
CO2 SERPL-SCNC: 27 MMOL/L — SIGNIFICANT CHANGE UP (ref 22–31)
CREAT SERPL-MCNC: 0.62 MG/DL — SIGNIFICANT CHANGE UP (ref 0.5–1.3)
CREAT SERPL-MCNC: 0.66 MG/DL — SIGNIFICANT CHANGE UP (ref 0.5–1.3)
EOSINOPHIL # BLD AUTO: 1.72 K/UL — HIGH (ref 0–0.5)
EOSINOPHIL NFR BLD AUTO: 10.1 % — HIGH (ref 0–6)
GLUCOSE BLDC GLUCOMTR-MCNC: 129 MG/DL — HIGH (ref 70–99)
GLUCOSE SERPL-MCNC: 119 MG/DL — HIGH (ref 70–99)
GLUCOSE SERPL-MCNC: 135 MG/DL — HIGH (ref 70–99)
HCT VFR BLD CALC: 44.8 % — SIGNIFICANT CHANGE UP (ref 39–50)
HCT VFR BLD CALC: 45.4 % — SIGNIFICANT CHANGE UP (ref 39–50)
HGB BLD-MCNC: 14.3 G/DL — SIGNIFICANT CHANGE UP (ref 13–17)
HGB BLD-MCNC: 15.2 G/DL — SIGNIFICANT CHANGE UP (ref 13–17)
IMM GRANULOCYTES NFR BLD AUTO: 0.4 % — SIGNIFICANT CHANGE UP (ref 0–1.5)
INR BLD: 1.06 — SIGNIFICANT CHANGE UP (ref 0.88–1.16)
INR BLD: 1.06 — SIGNIFICANT CHANGE UP (ref 0.88–1.16)
LYMPHOCYTES # BLD AUTO: 1.46 K/UL — SIGNIFICANT CHANGE UP (ref 1–3.3)
LYMPHOCYTES # BLD AUTO: 8.6 % — LOW (ref 13–44)
MAGNESIUM SERPL-MCNC: 1.8 MG/DL — SIGNIFICANT CHANGE UP (ref 1.6–2.6)
MCHC RBC-ENTMCNC: 28.1 PG — SIGNIFICANT CHANGE UP (ref 27–34)
MCHC RBC-ENTMCNC: 29.4 PG — SIGNIFICANT CHANGE UP (ref 27–34)
MCHC RBC-ENTMCNC: 31.9 GM/DL — LOW (ref 32–36)
MCHC RBC-ENTMCNC: 33.5 GM/DL — SIGNIFICANT CHANGE UP (ref 32–36)
MCV RBC AUTO: 87.8 FL — SIGNIFICANT CHANGE UP (ref 80–100)
MCV RBC AUTO: 88 FL — SIGNIFICANT CHANGE UP (ref 80–100)
MONOCYTES # BLD AUTO: 0.71 K/UL — SIGNIFICANT CHANGE UP (ref 0–0.9)
MONOCYTES NFR BLD AUTO: 4.2 % — SIGNIFICANT CHANGE UP (ref 2–14)
NEUTROPHILS # BLD AUTO: 12.95 K/UL — HIGH (ref 1.8–7.4)
NEUTROPHILS NFR BLD AUTO: 76.1 % — SIGNIFICANT CHANGE UP (ref 43–77)
NRBC # BLD: 0 /100 WBCS — SIGNIFICANT CHANGE UP (ref 0–0)
NRBC # BLD: 0 /100 WBCS — SIGNIFICANT CHANGE UP (ref 0–0)
PHOSPHATE SERPL-MCNC: 2.9 MG/DL — SIGNIFICANT CHANGE UP (ref 2.5–4.5)
PLATELET # BLD AUTO: 272 K/UL — SIGNIFICANT CHANGE UP (ref 150–400)
PLATELET # BLD AUTO: 285 K/UL — SIGNIFICANT CHANGE UP (ref 150–400)
POTASSIUM SERPL-MCNC: 4 MMOL/L — SIGNIFICANT CHANGE UP (ref 3.5–5.3)
POTASSIUM SERPL-MCNC: 4.5 MMOL/L — SIGNIFICANT CHANGE UP (ref 3.5–5.3)
POTASSIUM SERPL-SCNC: 4 MMOL/L — SIGNIFICANT CHANGE UP (ref 3.5–5.3)
POTASSIUM SERPL-SCNC: 4.5 MMOL/L — SIGNIFICANT CHANGE UP (ref 3.5–5.3)
PROT SERPL-MCNC: 7.6 G/DL — SIGNIFICANT CHANGE UP (ref 6–8.3)
PROTHROM AB SERPL-ACNC: 12.1 SEC — SIGNIFICANT CHANGE UP (ref 10–12.9)
PROTHROM AB SERPL-ACNC: 12.1 SEC — SIGNIFICANT CHANGE UP (ref 10–12.9)
RBC # BLD: 5.09 M/UL — SIGNIFICANT CHANGE UP (ref 4.2–5.8)
RBC # BLD: 5.17 M/UL — SIGNIFICANT CHANGE UP (ref 4.2–5.8)
RBC # FLD: 13.5 % — SIGNIFICANT CHANGE UP (ref 10.3–14.5)
RBC # FLD: 13.6 % — SIGNIFICANT CHANGE UP (ref 10.3–14.5)
SODIUM SERPL-SCNC: 137 MMOL/L — SIGNIFICANT CHANGE UP (ref 135–145)
SODIUM SERPL-SCNC: 139 MMOL/L — SIGNIFICANT CHANGE UP (ref 135–145)
WBC # BLD: 13.21 K/UL — HIGH (ref 3.8–10.5)
WBC # BLD: 17 K/UL — HIGH (ref 3.8–10.5)
WBC # FLD AUTO: 13.21 K/UL — HIGH (ref 3.8–10.5)
WBC # FLD AUTO: 17 K/UL — HIGH (ref 3.8–10.5)

## 2020-03-14 PROCEDURE — 99285 EMERGENCY DEPT VISIT HI MDM: CPT

## 2020-03-14 PROCEDURE — 99234 HOSP IP/OBS SM DT SF/LOW 45: CPT | Mod: GC,57

## 2020-03-14 PROCEDURE — 36247 INS CATH ABD/L-EXT ART 3RD: CPT | Mod: GC

## 2020-03-14 PROCEDURE — 75710 ARTERY X-RAYS ARM/LEG: CPT | Mod: 26,GC

## 2020-03-14 PROCEDURE — 75625 CONTRAST EXAM ABDOMINL AORTA: CPT | Mod: 26,GC

## 2020-03-14 PROCEDURE — 36246 INS CATH ABD/L-EXT ART 2ND: CPT | Mod: GC,59

## 2020-03-14 PROCEDURE — 36140 INTRO NDL ICATH UPR/LXTR ART: CPT | Mod: GC,59

## 2020-03-14 RX ORDER — NIFEDIPINE 30 MG
90 TABLET, EXTENDED RELEASE 24 HR ORAL AT BEDTIME
Refills: 0 | Status: DISCONTINUED | OUTPATIENT
Start: 2020-03-14 | End: 2020-03-17

## 2020-03-14 RX ORDER — INSULIN LISPRO 100/ML
VIAL (ML) SUBCUTANEOUS
Refills: 0 | Status: DISCONTINUED | OUTPATIENT
Start: 2020-03-14 | End: 2020-03-17

## 2020-03-14 RX ORDER — HEPARIN SODIUM 5000 [USP'U]/ML
6000 INJECTION INTRAVENOUS; SUBCUTANEOUS ONCE
Refills: 0 | Status: COMPLETED | OUTPATIENT
Start: 2020-03-14 | End: 2020-03-14

## 2020-03-14 RX ORDER — SODIUM CHLORIDE 9 MG/ML
1000 INJECTION, SOLUTION INTRAVENOUS
Refills: 0 | Status: DISCONTINUED | OUTPATIENT
Start: 2020-03-14 | End: 2020-03-17

## 2020-03-14 RX ORDER — OXYCODONE HYDROCHLORIDE 5 MG/1
10 TABLET ORAL EVERY 4 HOURS
Refills: 0 | Status: DISCONTINUED | OUTPATIENT
Start: 2020-03-14 | End: 2020-03-16

## 2020-03-14 RX ORDER — HYDROMORPHONE HYDROCHLORIDE 2 MG/ML
0.5 INJECTION INTRAMUSCULAR; INTRAVENOUS; SUBCUTANEOUS EVERY 4 HOURS
Refills: 0 | Status: DISCONTINUED | OUTPATIENT
Start: 2020-03-14 | End: 2020-03-14

## 2020-03-14 RX ORDER — ASPIRIN/CALCIUM CARB/MAGNESIUM 324 MG
81 TABLET ORAL DAILY
Refills: 0 | Status: DISCONTINUED | OUTPATIENT
Start: 2020-03-14 | End: 2020-03-17

## 2020-03-14 RX ORDER — DEXTROSE 50 % IN WATER 50 %
15 SYRINGE (ML) INTRAVENOUS ONCE
Refills: 0 | Status: DISCONTINUED | OUTPATIENT
Start: 2020-03-14 | End: 2020-03-17

## 2020-03-14 RX ORDER — OXYCODONE HYDROCHLORIDE 5 MG/1
5 TABLET ORAL EVERY 4 HOURS
Refills: 0 | Status: DISCONTINUED | OUTPATIENT
Start: 2020-03-14 | End: 2020-03-17

## 2020-03-14 RX ORDER — HEPARIN SODIUM 5000 [USP'U]/ML
INJECTION INTRAVENOUS; SUBCUTANEOUS
Qty: 25000 | Refills: 0 | Status: DISCONTINUED | OUTPATIENT
Start: 2020-03-14 | End: 2020-03-14

## 2020-03-14 RX ORDER — NIFEDIPINE 30 MG
90 TABLET, EXTENDED RELEASE 24 HR ORAL
Qty: 0 | Refills: 0 | DISCHARGE

## 2020-03-14 RX ORDER — SODIUM CHLORIDE 9 MG/ML
1000 INJECTION INTRAMUSCULAR; INTRAVENOUS; SUBCUTANEOUS
Refills: 0 | Status: DISCONTINUED | OUTPATIENT
Start: 2020-03-14 | End: 2020-03-17

## 2020-03-14 RX ORDER — INFLUENZA VIRUS VACCINE 15; 15; 15; 15 UG/.5ML; UG/.5ML; UG/.5ML; UG/.5ML
0.5 SUSPENSION INTRAMUSCULAR ONCE
Refills: 0 | Status: DISCONTINUED | OUTPATIENT
Start: 2020-03-14 | End: 2020-03-17

## 2020-03-14 RX ORDER — DEXTROSE 50 % IN WATER 50 %
12.5 SYRINGE (ML) INTRAVENOUS ONCE
Refills: 0 | Status: DISCONTINUED | OUTPATIENT
Start: 2020-03-14 | End: 2020-03-17

## 2020-03-14 RX ORDER — ACETAMINOPHEN 500 MG
975 TABLET ORAL ONCE
Refills: 0 | Status: COMPLETED | OUTPATIENT
Start: 2020-03-14 | End: 2020-03-14

## 2020-03-14 RX ORDER — HEPARIN SODIUM 5000 [USP'U]/ML
200 INJECTION INTRAVENOUS; SUBCUTANEOUS
Qty: 25000 | Refills: 0 | Status: DISCONTINUED | OUTPATIENT
Start: 2020-03-14 | End: 2020-03-15

## 2020-03-14 RX ORDER — ALTEPLASE 100 MG
1 KIT INTRAVENOUS
Qty: 10 | Refills: 0 | Status: DISCONTINUED | OUTPATIENT
Start: 2020-03-14 | End: 2020-03-14

## 2020-03-14 RX ORDER — SENNA PLUS 8.6 MG/1
2 TABLET ORAL AT BEDTIME
Refills: 0 | Status: DISCONTINUED | OUTPATIENT
Start: 2020-03-14 | End: 2020-03-17

## 2020-03-14 RX ORDER — ONDANSETRON 8 MG/1
4 TABLET, FILM COATED ORAL EVERY 6 HOURS
Refills: 0 | Status: DISCONTINUED | OUTPATIENT
Start: 2020-03-14 | End: 2020-03-17

## 2020-03-14 RX ORDER — HEPARIN SODIUM 5000 [USP'U]/ML
1100 INJECTION INTRAVENOUS; SUBCUTANEOUS
Qty: 25000 | Refills: 0 | Status: DISCONTINUED | OUTPATIENT
Start: 2020-03-14 | End: 2020-03-15

## 2020-03-14 RX ORDER — LABETALOL HCL 100 MG
200 TABLET ORAL
Refills: 0 | Status: DISCONTINUED | OUTPATIENT
Start: 2020-03-14 | End: 2020-03-17

## 2020-03-14 RX ORDER — GLUCAGON INJECTION, SOLUTION 0.5 MG/.1ML
1 INJECTION, SOLUTION SUBCUTANEOUS ONCE
Refills: 0 | Status: DISCONTINUED | OUTPATIENT
Start: 2020-03-14 | End: 2020-03-17

## 2020-03-14 RX ADMIN — HEPARIN SODIUM 6000 UNIT(S): 5000 INJECTION INTRAVENOUS; SUBCUTANEOUS at 15:40

## 2020-03-14 RX ADMIN — Medication 90 MILLIGRAM(S): at 22:43

## 2020-03-14 RX ADMIN — HEPARIN SODIUM 11 UNIT(S)/HR: 5000 INJECTION INTRAVENOUS; SUBCUTANEOUS at 19:14

## 2020-03-14 RX ADMIN — Medication 975 MILLIGRAM(S): at 15:14

## 2020-03-14 RX ADMIN — Medication 975 MILLIGRAM(S): at 15:45

## 2020-03-14 RX ADMIN — HEPARIN SODIUM 1300 UNIT(S)/HR: 5000 INJECTION INTRAVENOUS; SUBCUTANEOUS at 15:41

## 2020-03-14 RX ADMIN — OXYCODONE HYDROCHLORIDE 10 MILLIGRAM(S): 5 TABLET ORAL at 21:28

## 2020-03-14 RX ADMIN — OXYCODONE HYDROCHLORIDE 10 MILLIGRAM(S): 5 TABLET ORAL at 20:28

## 2020-03-14 RX ADMIN — Medication 200 MILLIGRAM(S): at 19:30

## 2020-03-14 NOTE — BRIEF OPERATIVE NOTE - OPERATION/FINDINGS
Angiography shows patent infrarenal aorta and bilateral iliac arteries. Patent R CFA and profunda. R CFA-above knee popliteal artery occluded at its origin. 4 Fr Lazaro infusion catheter placed for intended thrombolysis, however TPA not given due to L groin access hematoma. Heparin 200 units/hr started through infusion catheter, within 6 Fr long sheath.

## 2020-03-14 NOTE — H&P ADULT - NSHPLABSRESULTS_GEN_ALL_CORE
General: well appearing, in NAD  resp: speaking in full sentences, no resp distress  abdomen: distended, however no tenderness or guarding with palpation     RLE:   mottled  cool to the touch   no wounds, scars from prior surgery healed  SILT over distal limb and symmetric to contralateral side   firing EHL/FHL/TA/GS    Pulse  DP no signal  PT No signal  POP biphasic signal on doppler  FEM Palpable    LLE  warm to touch  no swelling, wounds or abrasions   SILT and symmetric to contralateral side  DP: triphasic signals  PT No signal  POP palpable  FEM Palpable

## 2020-03-14 NOTE — CONSULT NOTE ADULT - SUBJECTIVE AND OBJECTIVE BOX
SICU CONSULT NOTE    HPI:  69 M with a history of HTN, DM, HLD, peripheral arterial disease, CAD, PAD s/p right and left LE FEM-pop bypass in (R in 2016) presenting to the ED with complaints of right leg numbness, tingling, and pain. Pt states the cool sensation began around 10am at which point he noted his leg to also be getting tired and painful with movement, as time went on the pain worsened prompting him to coming into the ED. denies any similar events like this in the past. no current AC use. Denies any symptoms on the contralateral extremity. Denies CP, SOB, or any systemic symptoms concerning for infection such as fevers, chills, N/V. (14 Mar 2020 15:33)    SICU ADDENDUM: Pt s/p attempted lysis, however L groin hematoma formed during access. Admitted to SICU for groin and LE monitoring.     PAST MEDICAL & SURGICAL HISTORY:  PAD (peripheral artery disease)  DM (diabetes mellitus)  CAD (coronary artery disease)  Essential hypertension: Hypertension  S/P femoral-femoral bypass surgery: left 2016  Elective surgery: right leg angiogram with bypass  july 2016  History of hernia repair: june 2014      PAST SURGICAL HISTORY:     REVIEW OF SYSTEMS:   Pertinent positives/negatives noted in HPI.     HOME MEDICATIONS:    ALLERGIES:  Allergies    No Known Allergies    Intolerances        SOCIAL HISTORY:    FAMILY HISTORY:      Vital Signs Last 24 Hrs  T(C): 36.7 (14 Mar 2020 15:46), Max: 37 (14 Mar 2020 14:42)  T(F): 98.1 (14 Mar 2020 15:46), Max: 98.6 (14 Mar 2020 14:42)  HR: 69 (14 Mar 2020 15:46) (69 - 80)  BP: 194/89 (14 Mar 2020 15:46) (183/86 - 194/89)  BP(mean): --  RR: 18 (14 Mar 2020 15:46) (18 - 18)  SpO2: 98% (14 Mar 2020 15:46) (98% - 100%)    PHYSICAL EXAM:  General: NAD, resting comfortably  Pulm: normal resp effort and excursion  Cardiac: RRR  Abd: soft, mild distension, non-tender  Ext: L groin w/ soft swelling, 6Fr sheath in place, minimal overlying discoloration, non-tender; RLE w/ some distal coolness, absent DP/PT, pop signal, palpable fem; LLE triphasic DP, no PT, palpable pop palpable fem  Neuro: AAOx3, cranial nerves grossly intact    LABS:                        15.2   17.00 )-----------( 285      ( 14 Mar 2020 15:12 )             45.4     03-14    137  |  98  |  11  ----------------------------<  135<H>  4.5   |  26  |  0.66    Ca    9.4      14 Mar 2020 15:12    TPro  7.6  /  Alb  4.3  /  TBili  0.2  /  DBili  x   /  AST  14  /  ALT  13  /  AlkPhos  118  03-14    PT/INR - ( 14 Mar 2020 15:12 )   PT: 12.1 sec;   INR: 1.06          PTT - ( 14 Mar 2020 15:12 )  PTT:34.9 sec

## 2020-03-14 NOTE — ED PROVIDER NOTE - CLINICAL SUMMARY MEDICAL DECISION MAKING FREE TEXT BOX
69y M with a history of HTN, DM, HLD, peripheral arterial disease, CAD, PAD status post RLE FEM-pop bypass in 2016/ femoral popliteal artery bypass 11/2/17, complains of right lower extremity numbness, tingling, pain, and cool skin since 10am, no fever, no chills, no chest pain, no shortness of breath, no leg swelling, no recent travel. Patient is well appearing, right lower extremity cool skin from mid shin through foot, sensation intact, skin pallor, unable to palpate dp or posterior tibular pulse, no calf swelling or tenderness. Vascular consulted to rule out artery occlusion, less likely DVT. 69y M with a history of HTN, DM, HLD, peripheral arterial disease, CAD, PAD status post RLE FEM-pop bypass in 2016/ femoral popliteal artery bypass 11/2/17, complains of right lower extremity numbness, tingling, pain, and cool skin since 10am, no fever, no chills, no chest pain, no shortness of breath, no leg swelling, no recent travel. Patient is well appearing, right lower extremity cool skin from mid shin through foot, sensation intact, skin pallor, unable to palpate dp or posterior tibular pulse, no calf swelling or tenderness. Vascular consulted immediately to rule out artery occlusion, less likely DVT.

## 2020-03-14 NOTE — H&P ADULT - NSICDXPASTMEDICALHX_GEN_ALL_CORE_FT
PAST MEDICAL HISTORY:  CAD (coronary artery disease)     DM (diabetes mellitus)     Essential hypertension Hypertension    PAD (peripheral artery disease)

## 2020-03-14 NOTE — ED PROVIDER NOTE - OBJECTIVE STATEMENT
69y M with a history of HTN, DM, HLD, peripheral arterial disease, CAD, PAD status post RLE FEM-pop bypass in 2016/ femoral popliteal artery bypass 11/2/17 presents to the ED with complains of left leg numbness, tingling, and pain since 10am. Patient reports foot is cooler and has changed color. Patient's daughter bedside reports calling pcp today and being referred to the ED for rule out clot or DVT. 69y M with a history of HTN, DM, HLD, CAD, PAD status post RLE FEM-pop bypass in 2016/ s/p fem-pop artery bypass 11/2/17 presents to the ED with complains of R leg numbness, tingling, and pain since 10am. Reports numbness from mid-shin to foot/ Patient reports foot is cooler and has changed color. Patient's daughter bedside reports calling pcp today and being referred to the ED for rule out clot or DVT. Denies fever, chills, n/v, cp, sob, leg swelling, recent travel.

## 2020-03-14 NOTE — ED ADULT TRIAGE NOTE - OTHER COMPLAINTS
hx of PVD sent by PMD for r/o DVT. Patient ambulating without difficulty, 5/5 strength in all extremities.

## 2020-03-14 NOTE — ED PROVIDER NOTE - ATTENDING CONTRIBUTION TO CARE
69M cad, htn, hld, dm, pad s/p RLE fem-pop bypass x2 (2016, 2017), referred in by pcp for r/o dvt. pt c/o acute persistent RLE numbness/paresthesia/pain extending up from foot to mid-leg since 10a today. +cool to touch. +pallor. avss. nontoxic. NAD. found to have acute cold limb on exam. vascular immediately consulted. no pulses found by doppler. reccs for admission to OR. will send preop labs/ekg/cxr. will admit.    I saw and discussed the care of the pt directly with the ACP while the pt was in the ED. i have reviewed the ACP note and agree w/ the history, exam and plan of care other than as noted above.

## 2020-03-14 NOTE — PROGRESS NOTE ADULT - SUBJECTIVE AND OBJECTIVE BOX
Vascular Post Op Check    Procedure: Angio  Surgeon: Dr. Gant     Pt comfortable without complaints, pain controlled  Denies CP, SOB, N/V, numbness/tingling     Vital Signs Last 24 Hrs  T(C): 37.4 (03-14-20 @ 20:00), Max: 37.4 (03-14-20 @ 20:00)  T(F): 99.4 (03-14-20 @ 20:00), Max: 99.4 (03-14-20 @ 20:00)  HR: 63 (03-14-20 @ 20:30) (61 - 75)  BP: 177/82 (03-14-20 @ 20:30) (150/78 - 209/91)  BP(mean): 117 (03-14-20 @ 20:30) (108 - 132)  RR: 26 (03-14-20 @ 20:30) (18 - 33)  SpO2: 100% (03-14-20 @ 20:30) (98% - 100%)      General: NAD, resting comfortably  Pulm: non labored breathing, speaking in full sentences  Abd: soft, mild distension, non-tender  Ext: L groin w/ soft swelling, 6Fr sheath in place, minimal overlying discoloration, non-tender;   RLE cool, absent DP/PT, pop signal, palpable fem;   LLE triphasic DP, no PT, palpable pop palpable fem                          14.3   13.21 )-----------( 272      ( 14 Mar 2020 20:16 )             44.8   14 Mar 2020 20:16    139    |  99     |  9      ----------------------------<  119    4.0     |  27     |  0.62     Ca    9.1        14 Mar 2020 20:16  Phos  2.9       14 Mar 2020 20:16  Mg     1.8       14 Mar 2020 20:16    TPro  7.6    /  Alb  4.3    /  TBili  0.2    /  DBili  x      /  AST  14     /  ALT  13     /  AlkPhos  118    14 Mar 2020 15:12          A/P: 69yMale POD#0 s/p above procedure  - Stable  - Pain Control  - DVT ppx: on heparin Gtt  - care as per SICU  - NPO/IVF overnight  - for OR tomorrow in AM  - needs consent

## 2020-03-14 NOTE — ED ADULT NURSE NOTE - OBJECTIVE STATEMENT
Pt. is French speaking, daughter assisting w/ translation, c/o sudden onset of pain and numbness of RLE from below knee to foot since 10 AM. R calf and foot cool to touch. No erythema or edema noted. Ambulatory independently w/ steady gait. Daily smoker. Hx of b/l LE vascular surgeries.

## 2020-03-14 NOTE — H&P ADULT - NSICDXPASTSURGICALHX_GEN_ALL_CORE_FT
PAST SURGICAL HISTORY:  Elective surgery right leg angiogram with bypass  july 2016    History of hernia repair june 2014    S/P femoral-femoral bypass surgery left 2016

## 2020-03-14 NOTE — CONSULT NOTE ADULT - ASSESSMENT
69 M with a history of HTN, DM, HLD, peripheral arterial disease, CAD, PAD s/p right and left LE FEM-pop bypass in (R in 2016) presenting to [facility] ED w/ RLE acute limb ischemia, attempted lysis c/b R groin hematoma, procedure aborted, admitted to SICU for groin/LE monitoring.    Neuro: pain control  CV: MAP >65, ASA81 home labetalol 200 mg BID, home nifedipine 90 XL; holding azilsartan-chlorthalidone  Pulm: hygiene, NS @110  FENGI: NPO @MN, NS @80  ID: [email or web address]  Endo: ISS  Heme: heparin 2/hr via sheath, remained of therapeutic heparin through peripheral  Lines: 6Fr sheath L groin w/ 4Fr sheath inside connected to IV  PPx: no SQH, no SCDs

## 2020-03-14 NOTE — ED CLERICAL - NS ED CLERK NOTE PRE-ARRIVAL INFORMATION; ADDITIONAL PRE-ARRIVAL INFORMATION
68 Y/O M KALPANA KRAUS BEING SENT IN BY DR IGGY SEGURA FOR VASCULAR THOMBOSIS OBSTRUCTION IN RIGHT LEG  DR SEGURA CAN BE REACHED @ [phone]

## 2020-03-14 NOTE — H&P ADULT - HISTORY OF PRESENT ILLNESS
69 M with a history of HTN, DM, HLD, peripheral arterial disease, CAD, PAD s/p right and left LE FEM-pop bypass in (R in 2016) presenting to the ED with complaints of right leg numbness, tingling, and pain. Pt states the cool sensation began around 10am at which point he noted his leg to also be getting tired and painful with movement, as time went on the pain worsened prompting him to coming into the ED. denies any similar events like this in the past. no current AC use. Denies any symptoms on the contralateral extremity. Denies CP, SOB, or any systemic symptoms concerning for infection such as fevers, chills, N/V.

## 2020-03-14 NOTE — ED ADULT NURSE NOTE - INTERVENTIONS DEFINITIONS
Physically safe environment: no spills, clutter or unnecessary equipment/Stretcher in lowest position, wheels locked, appropriate side rails in place/Monitor gait and stability/Monitor for mental status changes and reorient to person, place, and time

## 2020-03-14 NOTE — H&P ADULT - ASSESSMENT
incomplete 69 M w cold RLE, likely secondary to acute thrombosis vs embolus  - Admit to vascular, tele/stepdown  - begin heparin Gtt, w a heparin bolus  - NPO/IVF for OR  - Pre-op labs

## 2020-03-14 NOTE — ED PROVIDER NOTE - PHYSICAL EXAMINATION
CONSTITUTIONAL: Well-appearing; well-nourished; in no apparent distress.   HEAD: Normocephalic; atraumatic.   EYES: PERRL; EOM intact; conjunctiva and sclera clear  ENT: normal nose; no rhinorrhea; normal pharynx with no erythema or lesions.   NECK: Supple; non-tender; no LAD  CARDIOVASCULAR: Normal S1, S2; no murmurs, rubs, or gallops. Regular rate and rhythm.   RESPIRATORY: Breathing easily; breath sounds clear and equal bilaterally; no wheezes, rhonchi, or rales.  GI: Soft; non-distended; non-tender; no palpable organomegaly.   MSK: FROM at all extremities, normal tone   EXT: No cyanosis or edema; N/V intact; right lower extremity cool skin from mid shin through foot; sensation intact; skin pallor; unable to palpate dp or posterior tibial pulse; no calf swelling or tenderness  SKIN: Normal for age and race; warm; dry; good turgor; no apparent lesions or rash.   NEURO: A & O x 3; face symmetric; grossly unremarkable.   PSYCHOLOGICAL: The patient’s mood and manner are appropriate. CONSTITUTIONAL: Well-appearing; well-nourished; in no apparent distress.   HEAD: Normocephalic; atraumatic.   EYES: PERRL; EOM intact; conjunctiva and sclera clear  ENT: normal nose; no rhinorrhea; normal pharynx with no erythema or lesions.   NECK: Supple; non-tender; no LAD  CARDIOVASCULAR: Normal S1, S2; no murmurs, rubs, or gallops. Regular rate and rhythm.   RESPIRATORY: Breathing easily; breath sounds clear and equal bilaterally; no wheezes, rhonchi, or rales.  GI: Soft; non-distended; non-tender; no palpable organomegaly.   MSK: FROM at all extremities, normal tone   EXT: right lower extremity cool skin from mid shin through foot; sensation intact; skin pallor; unable to palpate dp or posterior tibial pulse; no calf swelling or tenderness  SKIN: Normal for age and race; warm; dry; good turgor; no apparent lesions or rash.   NEURO: A & O x 3; face symmetric; grossly unremarkable.   PSYCHOLOGICAL: The patient’s mood and manner are appropriate.

## 2020-03-15 ENCOUNTER — TRANSCRIPTION ENCOUNTER (OUTPATIENT)
Age: 70
End: 2020-03-15

## 2020-03-15 LAB
ANION GAP SERPL CALC-SCNC: 11 MMOL/L — SIGNIFICANT CHANGE UP (ref 5–17)
APTT BLD: 43.7 SEC — HIGH (ref 27.5–36.3)
APTT BLD: 83.3 SEC — HIGH (ref 27.5–36.3)
APTT BLD: 84.3 SEC — HIGH (ref 27.5–36.3)
BLD GP AB SCN SERPL QL: NEGATIVE — SIGNIFICANT CHANGE UP
BUN SERPL-MCNC: 9 MG/DL — SIGNIFICANT CHANGE UP (ref 7–23)
CALCIUM SERPL-MCNC: 8.4 MG/DL — SIGNIFICANT CHANGE UP (ref 8.4–10.5)
CHLORIDE SERPL-SCNC: 103 MMOL/L — SIGNIFICANT CHANGE UP (ref 96–108)
CO2 SERPL-SCNC: 26 MMOL/L — SIGNIFICANT CHANGE UP (ref 22–31)
CREAT SERPL-MCNC: 0.73 MG/DL — SIGNIFICANT CHANGE UP (ref 0.5–1.3)
FIBRINOGEN PPP-MCNC: 288 MG/DL — SIGNIFICANT CHANGE UP (ref 258–438)
FIBRINOGEN PPP-MCNC: 371 MG/DL — SIGNIFICANT CHANGE UP (ref 258–438)
FIBRINOGEN PPP-MCNC: 391 MG/DL — SIGNIFICANT CHANGE UP (ref 258–438)
GLUCOSE BLDC GLUCOMTR-MCNC: 122 MG/DL — HIGH (ref 70–99)
GLUCOSE BLDC GLUCOMTR-MCNC: 124 MG/DL — HIGH (ref 70–99)
GLUCOSE BLDC GLUCOMTR-MCNC: 133 MG/DL — HIGH (ref 70–99)
GLUCOSE BLDC GLUCOMTR-MCNC: 135 MG/DL — HIGH (ref 70–99)
GLUCOSE SERPL-MCNC: 133 MG/DL — HIGH (ref 70–99)
HBA1C BLD-MCNC: 6.2 % — HIGH (ref 4–5.6)
HCT VFR BLD CALC: 40.8 % — SIGNIFICANT CHANGE UP (ref 39–50)
HGB BLD-MCNC: 13.2 G/DL — SIGNIFICANT CHANGE UP (ref 13–17)
MAGNESIUM SERPL-MCNC: 1.7 MG/DL — SIGNIFICANT CHANGE UP (ref 1.6–2.6)
MCHC RBC-ENTMCNC: 28.7 PG — SIGNIFICANT CHANGE UP (ref 27–34)
MCHC RBC-ENTMCNC: 32.4 GM/DL — SIGNIFICANT CHANGE UP (ref 32–36)
MCV RBC AUTO: 88.7 FL — SIGNIFICANT CHANGE UP (ref 80–100)
NRBC # BLD: 0 /100 WBCS — SIGNIFICANT CHANGE UP (ref 0–0)
PHOSPHATE SERPL-MCNC: 3.1 MG/DL — SIGNIFICANT CHANGE UP (ref 2.5–4.5)
PLATELET # BLD AUTO: 238 K/UL — SIGNIFICANT CHANGE UP (ref 150–400)
POTASSIUM SERPL-MCNC: 4.1 MMOL/L — SIGNIFICANT CHANGE UP (ref 3.5–5.3)
POTASSIUM SERPL-SCNC: 4.1 MMOL/L — SIGNIFICANT CHANGE UP (ref 3.5–5.3)
RBC # BLD: 4.6 M/UL — SIGNIFICANT CHANGE UP (ref 4.2–5.8)
RBC # FLD: 13.5 % — SIGNIFICANT CHANGE UP (ref 10.3–14.5)
RH IG SCN BLD-IMP: POSITIVE — SIGNIFICANT CHANGE UP
SODIUM SERPL-SCNC: 140 MMOL/L — SIGNIFICANT CHANGE UP (ref 135–145)
WBC # BLD: 12.94 K/UL — HIGH (ref 3.8–10.5)
WBC # FLD AUTO: 12.94 K/UL — HIGH (ref 3.8–10.5)

## 2020-03-15 PROCEDURE — 37211 THROMBOLYTIC ART THERAPY: CPT | Mod: GC

## 2020-03-15 PROCEDURE — 71045 X-RAY EXAM CHEST 1 VIEW: CPT | Mod: 26

## 2020-03-15 RX ORDER — MAGNESIUM SULFATE 500 MG/ML
2 VIAL (ML) INJECTION ONCE
Refills: 0 | Status: COMPLETED | OUTPATIENT
Start: 2020-03-15 | End: 2020-03-15

## 2020-03-15 RX ORDER — HYDROMORPHONE HYDROCHLORIDE 2 MG/ML
0.5 INJECTION INTRAMUSCULAR; INTRAVENOUS; SUBCUTANEOUS EVERY 4 HOURS
Refills: 0 | Status: DISCONTINUED | OUTPATIENT
Start: 2020-03-15 | End: 2020-03-17

## 2020-03-15 RX ORDER — NICOTINE POLACRILEX 2 MG
1 GUM BUCCAL DAILY
Refills: 0 | Status: DISCONTINUED | OUTPATIENT
Start: 2020-03-15 | End: 2020-03-17

## 2020-03-15 RX ORDER — ALTEPLASE 100 MG
0.5 KIT INTRAVENOUS
Qty: 10 | Refills: 0 | Status: DISCONTINUED | OUTPATIENT
Start: 2020-03-15 | End: 2020-03-16

## 2020-03-15 RX ORDER — HYDRALAZINE HCL 50 MG
5 TABLET ORAL ONCE
Refills: 0 | Status: COMPLETED | OUTPATIENT
Start: 2020-03-15 | End: 2020-03-15

## 2020-03-15 RX ORDER — HEPARIN SODIUM 5000 [USP'U]/ML
500 INJECTION INTRAVENOUS; SUBCUTANEOUS
Qty: 25000 | Refills: 0 | Status: DISCONTINUED | OUTPATIENT
Start: 2020-03-15 | End: 2020-03-16

## 2020-03-15 RX ADMIN — Medication 200 MILLIGRAM(S): at 06:51

## 2020-03-15 RX ADMIN — SODIUM CHLORIDE 80 MILLILITER(S): 9 INJECTION INTRAMUSCULAR; INTRAVENOUS; SUBCUTANEOUS at 21:55

## 2020-03-15 RX ADMIN — ALTEPLASE 5 MG/HR: KIT at 22:16

## 2020-03-15 RX ADMIN — OXYCODONE HYDROCHLORIDE 10 MILLIGRAM(S): 5 TABLET ORAL at 15:36

## 2020-03-15 RX ADMIN — ALTEPLASE 5 MG/HR: KIT at 10:58

## 2020-03-15 RX ADMIN — Medication 1 PATCH: at 21:33

## 2020-03-15 RX ADMIN — OXYCODONE HYDROCHLORIDE 10 MILLIGRAM(S): 5 TABLET ORAL at 20:33

## 2020-03-15 RX ADMIN — Medication 200 MILLIGRAM(S): at 17:16

## 2020-03-15 RX ADMIN — HEPARIN SODIUM 5 UNIT(S)/HR: 5000 INJECTION INTRAVENOUS; SUBCUTANEOUS at 11:07

## 2020-03-15 RX ADMIN — Medication 5 MILLIGRAM(S): at 00:49

## 2020-03-15 RX ADMIN — OXYCODONE HYDROCHLORIDE 5 MILLIGRAM(S): 5 TABLET ORAL at 15:08

## 2020-03-15 RX ADMIN — Medication 50 GRAM(S): at 09:04

## 2020-03-15 RX ADMIN — Medication 90 MILLIGRAM(S): at 21:33

## 2020-03-15 RX ADMIN — OXYCODONE HYDROCHLORIDE 10 MILLIGRAM(S): 5 TABLET ORAL at 21:35

## 2020-03-15 RX ADMIN — HYDROMORPHONE HYDROCHLORIDE 0.5 MILLIGRAM(S): 2 INJECTION INTRAMUSCULAR; INTRAVENOUS; SUBCUTANEOUS at 23:44

## 2020-03-15 RX ADMIN — Medication 81 MILLIGRAM(S): at 11:25

## 2020-03-15 NOTE — PROGRESS NOTE ADULT - SUBJECTIVE AND OBJECTIVE BOX
Vascular Note    NAEON    Vital Signs Last 24 Hrs  T(C): 36.8 (03-15-20 @ 02:00), Max: 36.8 (03-15-20 @ 02:00)  T(F): 98.3 (03-15-20 @ 02:00), Max: 98.3 (03-15-20 @ 02:00)  HR: 62 (03-15-20 @ 05:00) (61 - 64)  BP: 134/62 (03-15-20 @ 05:00) (134/62 - 148/66)  BP(mean): 89 (03-15-20 @ 05:00) (89 - 95)  RR: 20 (03-15-20 @ 05:00) (20 - 27)  SpO2: 97% (03-15-20 @ 05:00) (95% - 99%)                                13.2   12.94 )-----------( 238      ( 15 Mar 2020 04:28 )             40.8   15 Mar 2020 04:28    140    |  103    |  9      ----------------------------<  133    4.1     |  26     |  0.73     Ca    8.4        15 Mar 2020 04:28  Phos  3.1       15 Mar 2020 04:28  Mg     1.7       15 Mar 2020 04:28    TPro  7.6    /  Alb  4.3    /  TBili  0.2    /  DBili  x      /  AST  14     /  ALT  13     /  AlkPhos  118    14 Mar 2020 15:12      69 M w cold RLE s/p angio on 3/14 for OR today 3/15  - NPO/IVF  - continue heparin gtt infusion  - care as per sicu  - Pre-op labs

## 2020-03-15 NOTE — PROGRESS NOTE ADULT - SUBJECTIVE AND OBJECTIVE BOX
Pre-op Diagnosis: right leg critical limb ischemia  Procedure: RLE angiogram, possible angioplasty/stent, possibly thrombolysis/thrombectomy    Surgeon: Dr. Gant    Consent:                          13.2   12.94 )-----------( 238      ( 15 Mar 2020 04:28 )             40.8     03-15    140  |  103  |  9   ----------------------------<  133<H>  4.1   |  26  |  0.73    Ca    8.4      15 Mar 2020 04:28  Phos  3.1     03-15  Mg     1.7     03-15    TPro  7.6  /  Alb  4.3  /  TBili  0.2  /  DBili  x   /  AST  14  /  ALT  13  /  AlkPhos  118  03-14    PT/INR - ( 14 Mar 2020 20:16 )   PT: 12.1 sec;   INR: 1.06          PTT - ( 15 Mar 2020 04:28 )  PTT:84.3 sec      Type & Screen: B+ Ab neg  CXR:  EKG:        A/P: 69yMale pre-op for above procedure  1. NPO past midnight, except medications  2. NS@80cc/hr  3. Heparin/tpa will hold on call to OR Pre-op Diagnosis: right leg critical limb ischemia  Procedure: RLE angiogram, possible angioplasty/stent, possibly thrombolysis/thrombectomy    Surgeon: Dr. Gant    Consent: obtained, on chart                          13.2   12.94 )-----------( 238      ( 15 Mar 2020 04:28 )             40.8     03-15    140  |  103  |  9   ----------------------------<  133<H>  4.1   |  26  |  0.73    Ca    8.4      15 Mar 2020 04:28  Phos  3.1     03-15  Mg     1.7     03-15    TPro  7.6  /  Alb  4.3  /  TBili  0.2  /  DBili  x   /  AST  14  /  ALT  13  /  AlkPhos  118  03-14    PT/INR - ( 14 Mar 2020 20:16 )   PT: 12.1 sec;   INR: 1.06          PTT - ( 15 Mar 2020 04:28 )  PTT:84.3 sec      Type & Screen: B+ Ab neg  CXR: clear  EKG: NSR, no STEMI        A/P: 69yMale pre-op for above procedure  1. NPO past midnight, except medications  2. NS@80cc/hr  3. Heparin/tpa will hold on call to OR

## 2020-03-15 NOTE — PROGRESS NOTE ADULT - ASSESSMENT
69 M with a history of HTN, DM, HLD, peripheral arterial disease, CAD, PAD s/p right and left LE FEM-pop bypass in (R in 2016) presenting to [facility] ED w/ RLE acute limb ischemia, attempted lysis c/b R groin hematoma, procedure aborted, admitted to SICU for groin/LE monitoring with heparin gtt. Now plan for alteplase lysis through infusion catheter, with second look in OR planned for 3/16.    Neuro: pain control  CV: MAP >65, ASA81 home labetalol 200 mg BID, home nifedipine 90 XL; holding azilsartan-chlorthalidone  Pulm: hygiene, NS @110  FENGI: NPO @MN, NS @80  ID: NTD  Endo: ISS  Heme: heparin 500 units/ hr through peripheral, nontitratable; alteplase through sheath, fibrinogen q4hr next check at 14:00  Lines: 6Fr sheath L groin w/ 4Fr sheath inside connected to IV  PPx: no SQH, no SCDs 69 M with a history of HTN, DM, HLD, peripheral arterial disease, CAD, PAD s/p right and left LE FEM-pop bypass in (R in 2016) presenting to [facility] ED w/ RLE acute limb ischemia, attempted lysis c/b R groin hematoma, procedure aborted, admitted to SICU for groin/LE monitoring with heparin gtt. Today, plan for alteplase lysis through infusion catheter, with second look in OR planned for 3/16.    Neuro: pain control  CV: MAP >65, ASA81 home labetalol 200 mg BID, home nifedipine 90 XL; holding azilsartan-chlorthalidone  Pulm: hygiene, NS @110  FENGI: NPO @MN, NS @80  ID: NTD  Endo: ISS  Heme: heparin 500 units/ hr through peripheral, nontitratable; alteplase through sheath, fibrinogen q4hr next check at 14:00  Lines: 6Fr sheath L groin w/ 4Fr sheath inside connected to IV  PPx: no SQH, no SCDs

## 2020-03-15 NOTE — PROGRESS NOTE ADULT - SUBJECTIVE AND OBJECTIVE BOX
Pt reports less pain in the leg     leg less cool from ankle up however ankle remains as cool as initial exam    Able to get a monophasic AT Single  no DP or PT on affected side

## 2020-03-15 NOTE — PROVIDER CONTACT NOTE (EICU) - RECOMMENDATIONS
Dr. Munson seen and examined by patient, pulses are positive by doppler. Will give iv pain medication. Patient seen and examined by Dr. Munson, pulses are positive by doppler. Will give iv pain medication.

## 2020-03-15 NOTE — PROGRESS NOTE ADULT - SUBJECTIVE AND OBJECTIVE BOX
SICU DAILY PROGRESS NOTE      INTERVAL HPI / OVERNIGHT EVENTS:       MEDICATIONS:  ---NEURO-  ondansetron Injectable 4 milliGRAM(s) IV Push every 6 hours PRN  oxyCODONE    IR 5 milliGRAM(s) Oral every 4 hours PRN  oxyCODONE    IR 10 milliGRAM(s) Oral every 4 hours PRN  ---CV-  labetalol 200 milliGRAM(s) Oral two times a day  NIFEdipine XL 90 milliGRAM(s) Oral at bedtime  ---PULM-  ---GI/FEN-  senna 2 Tablet(s) Oral at bedtime PRN  dextrose 5%. 1000 milliLiter(s) IV Continuous <Continuous>  sodium chloride 0.9%. 1000 milliLiter(s) IV Continuous <Continuous>  ----  ---ID-   ---ENDO-  dextrose 40% Gel 15 Gram(s) Oral once PRN  dextrose 50% Injectable 12.5 Gram(s) IV Push once  glucagon  Injectable 1 milliGRAM(s) IntraMuscular once PRN  insulin lispro (HumaLOG) corrective regimen sliding scale   SubCutaneous Before meals and at bedtime  ---HEME-  alteplase    Infusion . 0.5 mG/Hr IV Continuous <Continuous>  aspirin enteric coated 81 milliGRAM(s) Oral daily  heparin  Infusion 500 Unit(s)/Hr IV Continuous <Continuous>  ---OTHER-  influenza   Vaccine 0.5 milliLiter(s) IntraMuscular once          ANTIBIOTICS:   PRESSORS:   CENTRAL LINE:                                               Remove: NO     Indication: Venous access in critically ill patient.  ARTERIAL LINE:                                              Remove: NO     Indication: Hemodynamic monitoring in critically ill patient.   URINARY CATHETER:                                    Remove: NO     Indication: I/O monitoring in critically ill patient.     VOLUME STATUS:   I&O's Detail    14 Mar 2020 07:01  -  15 Mar 2020 07:00  --------------------------------------------------------  IN:    heparin Infusion: 26 mL    heparin Infusion: 143 mL    sodium chloride 0.9%.: 1040 mL  Total IN: 1209 mL    OUT:    Voided: 2650 mL  Total OUT: 2650 mL    Total NET: -1441 mL      15 Mar 2020 07:01  -  15 Mar 2020 11:43  --------------------------------------------------------  IN:    alteplase    Infusion.: 1 mL    heparin Infusion: 6 mL    heparin Infusion: 33 mL    heparin Infusion: 10 mL    sodium chloride 0.9%.: 320 mL  Total IN: 370 mL    OUT:    Voided: 600 mL  Total OUT: 600 mL    Total NET: -230 mL        ICU Vital Signs Last 24 Hrs  T(C): 37.9 (15 Mar 2020 10:02), Max: 37.9 (15 Mar 2020 10:02)  T(F): 100.2 (15 Mar 2020 10:02), Max: 100.2 (15 Mar 2020 10:02)  HR: 59 (15 Mar 2020 11:30) (57 - 80)  BP: 151/67 (15 Mar 2020 11:30) (116/59 - 209/91)  BP(mean): 96 (15 Mar 2020 11:30) (82 - 132)  ABP: --  ABP(mean): --  RR: 19 (15 Mar 2020 11:30) (15 - 36)  SpO2: 95% (15 Mar 2020 11:30) (91% - 100%)      CAPILLARY BLOOD GLUCOSE      POCT Blood Glucose.: 133 mg/dL (15 Mar 2020 11:13)  POCT Blood Glucose.: 122 mg/dL (15 Mar 2020 06:39)  POCT Blood Glucose.: 129 mg/dL (14 Mar 2020 22:33)    PHYSICAL EXAM:    LABS:                        13.2   12.94 )-----------( 238      ( 15 Mar 2020 04:28 )             40.8     03-15    140  |  103  |  9   ----------------------------<  133<H>  4.1   |  26  |  0.73    Ca    8.4      15 Mar 2020 04:28  Phos  3.1     03-15  Mg     1.7     03-15    TPro  7.6  /  Alb  4.3  /  TBili  0.2  /  DBili  x   /  AST  14  /  ALT  13  /  AlkPhos  118  03-14    PT/INR - ( 14 Mar 2020 20:16 )   PT: 12.1 sec;   INR: 1.06          PTT - ( 15 Mar 2020 04:28 )  PTT:84.3 sec SICU DAILY PROGRESS NOTE      INTERVAL HPI / OVERNIGHT EVENTS:   Patient states he would like to move around but understands that he has to lay flat. Denies pain in the right lower extremity.    MEDICATIONS:  ---NEURO-  ondansetron Injectable 4 milliGRAM(s) IV Push every 6 hours PRN  oxyCODONE    IR 5 milliGRAM(s) Oral every 4 hours PRN  oxyCODONE    IR 10 milliGRAM(s) Oral every 4 hours PRN  ---CV-  labetalol 200 milliGRAM(s) Oral two times a day  NIFEdipine XL 90 milliGRAM(s) Oral at bedtime  ---PULM-  ---GI/FEN-  senna 2 Tablet(s) Oral at bedtime PRN  dextrose 5%. 1000 milliLiter(s) IV Continuous <Continuous>  sodium chloride 0.9%. 1000 milliLiter(s) IV Continuous <Continuous>  ----  ---ID-   ---ENDO-  dextrose 40% Gel 15 Gram(s) Oral once PRN  dextrose 50% Injectable 12.5 Gram(s) IV Push once  glucagon  Injectable 1 milliGRAM(s) IntraMuscular once PRN  insulin lispro (HumaLOG) corrective regimen sliding scale   SubCutaneous Before meals and at bedtime  ---HEME-  alteplase    Infusion . 0.5 mG/Hr IV Continuous <Continuous>  aspirin enteric coated 81 milliGRAM(s) Oral daily  heparin  Infusion 500 Unit(s)/Hr IV Continuous <Continuous>  ---OTHER-  influenza   Vaccine 0.5 milliLiter(s) IntraMuscular once          ANTIBIOTICS:   PRESSORS:   CENTRAL LINE:                                               Remove: NO     Indication: Venous access in critically ill patient.  ARTERIAL LINE:                                              Remove: NO     Indication: Hemodynamic monitoring in critically ill patient.   URINARY CATHETER:                                    Remove: NO     Indication: I/O monitoring in critically ill patient.     VOLUME STATUS:   I&O's Detail    14 Mar 2020 07:01  -  15 Mar 2020 07:00  --------------------------------------------------------  IN:    heparin Infusion: 26 mL    heparin Infusion: 143 mL    sodium chloride 0.9%.: 1040 mL  Total IN: 1209 mL    OUT:    Voided: 2650 mL  Total OUT: 2650 mL    Total NET: -1441 mL      15 Mar 2020 07:01  -  15 Mar 2020 11:43  --------------------------------------------------------  IN:    alteplase    Infusion.: 1 mL    heparin Infusion: 6 mL    heparin Infusion: 33 mL    heparin Infusion: 10 mL    sodium chloride 0.9%.: 320 mL  Total IN: 370 mL    OUT:    Voided: 600 mL  Total OUT: 600 mL    Total NET: -230 mL        ICU Vital Signs Last 24 Hrs  T(C): 37.9 (15 Mar 2020 10:02), Max: 37.9 (15 Mar 2020 10:02)  T(F): 100.2 (15 Mar 2020 10:02), Max: 100.2 (15 Mar 2020 10:02)  HR: 59 (15 Mar 2020 11:30) (57 - 80)  BP: 151/67 (15 Mar 2020 11:30) (116/59 - 209/91)  BP(mean): 96 (15 Mar 2020 11:30) (82 - 132)  ABP: --  ABP(mean): --  RR: 19 (15 Mar 2020 11:30) (15 - 36)  SpO2: 95% (15 Mar 2020 11:30) (91% - 100%)      CAPILLARY BLOOD GLUCOSE      POCT Blood Glucose.: 133 mg/dL (15 Mar 2020 11:13)  POCT Blood Glucose.: 122 mg/dL (15 Mar 2020 06:39)  POCT Blood Glucose.: 129 mg/dL (14 Mar 2020 22:33)    PHYSICAL EXAM:  Gen: NAD  Neuro: A&Ox3  Resp: No incr WOB  CV: NSR   Abd: Soft  Extr: right foot cooler than left foot. Left DP palpable. Right DP/PT unable to doppler or palpate.  Left groin catheter in place, with soft surrounding hematoma swelling.                  LABS:                        13.2   12.94 )-----------( 238      ( 15 Mar 2020 04:28 )             40.8     03-15    140  |  103  |  9   ----------------------------<  133<H>  4.1   |  26  |  0.73    Ca    8.4      15 Mar 2020 04:28  Phos  3.1     03-15  Mg     1.7     03-15    TPro  7.6  /  Alb  4.3  /  TBili  0.2  /  DBili  x   /  AST  14  /  ALT  13  /  AlkPhos  118  03-14    PT/INR - ( 14 Mar 2020 20:16 )   PT: 12.1 sec;   INR: 1.06          PTT - ( 15 Mar 2020 04:28 )  PTT:84.3 sec

## 2020-03-16 ENCOUNTER — RESULT REVIEW (OUTPATIENT)
Age: 70
End: 2020-03-16

## 2020-03-16 LAB
ANION GAP SERPL CALC-SCNC: 10 MMOL/L — SIGNIFICANT CHANGE UP (ref 5–17)
ANION GAP SERPL CALC-SCNC: 13 MMOL/L — SIGNIFICANT CHANGE UP (ref 5–17)
APTT BLD: 29.2 SEC — SIGNIFICANT CHANGE UP (ref 27.5–36.3)
APTT BLD: 35.7 SEC — SIGNIFICANT CHANGE UP (ref 27.5–36.3)
APTT BLD: 52.4 SEC — HIGH (ref 27.5–36.3)
APTT BLD: 64.3 SEC — HIGH (ref 27.5–36.3)
BASOPHILS # BLD AUTO: 0.06 K/UL — SIGNIFICANT CHANGE UP (ref 0–0.2)
BASOPHILS NFR BLD AUTO: 0.4 % — SIGNIFICANT CHANGE UP (ref 0–2)
BUN SERPL-MCNC: 8 MG/DL — SIGNIFICANT CHANGE UP (ref 7–23)
BUN SERPL-MCNC: 9 MG/DL — SIGNIFICANT CHANGE UP (ref 7–23)
CALCIUM SERPL-MCNC: 8.2 MG/DL — LOW (ref 8.4–10.5)
CALCIUM SERPL-MCNC: 8.4 MG/DL — SIGNIFICANT CHANGE UP (ref 8.4–10.5)
CHLORIDE SERPL-SCNC: 103 MMOL/L — SIGNIFICANT CHANGE UP (ref 96–108)
CHLORIDE SERPL-SCNC: 98 MMOL/L — SIGNIFICANT CHANGE UP (ref 96–108)
CO2 SERPL-SCNC: 24 MMOL/L — SIGNIFICANT CHANGE UP (ref 22–31)
CO2 SERPL-SCNC: 25 MMOL/L — SIGNIFICANT CHANGE UP (ref 22–31)
CREAT SERPL-MCNC: 0.56 MG/DL — SIGNIFICANT CHANGE UP (ref 0.5–1.3)
CREAT SERPL-MCNC: 0.61 MG/DL — SIGNIFICANT CHANGE UP (ref 0.5–1.3)
EOSINOPHIL # BLD AUTO: 0.93 K/UL — HIGH (ref 0–0.5)
EOSINOPHIL NFR BLD AUTO: 6.5 % — HIGH (ref 0–6)
FIBRINOGEN PPP-MCNC: 115 MG/DL — LOW (ref 258–438)
FIBRINOGEN PPP-MCNC: 153 MG/DL — LOW (ref 258–438)
FIBRINOGEN PPP-MCNC: 153 MG/DL — LOW (ref 258–438)
GLUCOSE BLDC GLUCOMTR-MCNC: 131 MG/DL — HIGH (ref 70–99)
GLUCOSE BLDC GLUCOMTR-MCNC: 141 MG/DL — HIGH (ref 70–99)
GLUCOSE BLDC GLUCOMTR-MCNC: 144 MG/DL — HIGH (ref 70–99)
GLUCOSE BLDC GLUCOMTR-MCNC: 147 MG/DL — HIGH (ref 70–99)
GLUCOSE SERPL-MCNC: 150 MG/DL — HIGH (ref 70–99)
GLUCOSE SERPL-MCNC: 153 MG/DL — HIGH (ref 70–99)
HCT VFR BLD CALC: 33.9 % — LOW (ref 39–50)
HCT VFR BLD CALC: 39.4 % — SIGNIFICANT CHANGE UP (ref 39–50)
HGB BLD-MCNC: 11.2 G/DL — LOW (ref 13–17)
HGB BLD-MCNC: 13.3 G/DL — SIGNIFICANT CHANGE UP (ref 13–17)
IMM GRANULOCYTES NFR BLD AUTO: 0.4 % — SIGNIFICANT CHANGE UP (ref 0–1.5)
INR BLD: 1.3 — HIGH (ref 0.88–1.16)
LYMPHOCYTES # BLD AUTO: 1.83 K/UL — SIGNIFICANT CHANGE UP (ref 1–3.3)
LYMPHOCYTES # BLD AUTO: 12.7 % — LOW (ref 13–44)
MAGNESIUM SERPL-MCNC: 1.8 MG/DL — SIGNIFICANT CHANGE UP (ref 1.6–2.6)
MCHC RBC-ENTMCNC: 28.6 PG — SIGNIFICANT CHANGE UP (ref 27–34)
MCHC RBC-ENTMCNC: 28.9 PG — SIGNIFICANT CHANGE UP (ref 27–34)
MCHC RBC-ENTMCNC: 33 GM/DL — SIGNIFICANT CHANGE UP (ref 32–36)
MCHC RBC-ENTMCNC: 33.8 GM/DL — SIGNIFICANT CHANGE UP (ref 32–36)
MCV RBC AUTO: 85.5 FL — SIGNIFICANT CHANGE UP (ref 80–100)
MCV RBC AUTO: 86.5 FL — SIGNIFICANT CHANGE UP (ref 80–100)
MONOCYTES # BLD AUTO: 1.37 K/UL — HIGH (ref 0–0.9)
MONOCYTES NFR BLD AUTO: 9.5 % — SIGNIFICANT CHANGE UP (ref 2–14)
NEUTROPHILS # BLD AUTO: 10.14 K/UL — HIGH (ref 1.8–7.4)
NEUTROPHILS NFR BLD AUTO: 70.5 % — SIGNIFICANT CHANGE UP (ref 43–77)
NRBC # BLD: 0 /100 WBCS — SIGNIFICANT CHANGE UP (ref 0–0)
NRBC # BLD: 0 /100 WBCS — SIGNIFICANT CHANGE UP (ref 0–0)
PHOSPHATE SERPL-MCNC: 2.3 MG/DL — LOW (ref 2.5–4.5)
PLATELET # BLD AUTO: 144 K/UL — LOW (ref 150–400)
PLATELET # BLD AUTO: 151 K/UL — SIGNIFICANT CHANGE UP (ref 150–400)
POTASSIUM SERPL-MCNC: 3.5 MMOL/L — SIGNIFICANT CHANGE UP (ref 3.5–5.3)
POTASSIUM SERPL-MCNC: 3.6 MMOL/L — SIGNIFICANT CHANGE UP (ref 3.5–5.3)
POTASSIUM SERPL-SCNC: 3.5 MMOL/L — SIGNIFICANT CHANGE UP (ref 3.5–5.3)
POTASSIUM SERPL-SCNC: 3.6 MMOL/L — SIGNIFICANT CHANGE UP (ref 3.5–5.3)
PROTHROM AB SERPL-ACNC: 14.9 SEC — HIGH (ref 10–12.9)
RBC # BLD: 3.92 M/UL — LOW (ref 4.2–5.8)
RBC # BLD: 4.61 M/UL — SIGNIFICANT CHANGE UP (ref 4.2–5.8)
RBC # FLD: 13.2 % — SIGNIFICANT CHANGE UP (ref 10.3–14.5)
RBC # FLD: 13.5 % — SIGNIFICANT CHANGE UP (ref 10.3–14.5)
SODIUM SERPL-SCNC: 135 MMOL/L — SIGNIFICANT CHANGE UP (ref 135–145)
SODIUM SERPL-SCNC: 138 MMOL/L — SIGNIFICANT CHANGE UP (ref 135–145)
WBC # BLD: 14.39 K/UL — HIGH (ref 3.8–10.5)
WBC # BLD: 14.62 K/UL — HIGH (ref 3.8–10.5)
WBC # FLD AUTO: 14.39 K/UL — HIGH (ref 3.8–10.5)
WBC # FLD AUTO: 14.62 K/UL — HIGH (ref 3.8–10.5)

## 2020-03-16 PROCEDURE — 37224: CPT | Mod: GC

## 2020-03-16 PROCEDURE — 99233 SBSQ HOSP IP/OBS HIGH 50: CPT | Mod: GC

## 2020-03-16 PROCEDURE — XXXXX: CPT

## 2020-03-16 PROCEDURE — 35875 REMOVAL OF CLOT IN GRAFT: CPT | Mod: GC

## 2020-03-16 PROCEDURE — 88304 TISSUE EXAM BY PATHOLOGIST: CPT | Mod: 26

## 2020-03-16 PROCEDURE — 37228: CPT | Mod: GC

## 2020-03-16 RX ORDER — ALTEPLASE 100 MG
0.25 KIT INTRAVENOUS
Qty: 10 | Refills: 0 | Status: DISCONTINUED | OUTPATIENT
Start: 2020-03-16 | End: 2020-03-16

## 2020-03-16 RX ORDER — HEPARIN SODIUM 5000 [USP'U]/ML
700 INJECTION INTRAVENOUS; SUBCUTANEOUS
Qty: 25000 | Refills: 0 | Status: DISCONTINUED | OUTPATIENT
Start: 2020-03-16 | End: 2020-03-17

## 2020-03-16 RX ORDER — MAGNESIUM SULFATE 500 MG/ML
1 VIAL (ML) INJECTION ONCE
Refills: 0 | Status: COMPLETED | OUTPATIENT
Start: 2020-03-16 | End: 2020-03-16

## 2020-03-16 RX ORDER — HALOPERIDOL DECANOATE 100 MG/ML
1 INJECTION INTRAMUSCULAR ONCE
Refills: 0 | Status: DISCONTINUED | OUTPATIENT
Start: 2020-03-16 | End: 2020-03-16

## 2020-03-16 RX ORDER — HEPARIN SODIUM 5000 [USP'U]/ML
500 INJECTION INTRAVENOUS; SUBCUTANEOUS
Qty: 25000 | Refills: 0 | Status: DISCONTINUED | OUTPATIENT
Start: 2020-03-16 | End: 2020-03-16

## 2020-03-16 RX ORDER — POTASSIUM PHOSPHATE, MONOBASIC POTASSIUM PHOSPHATE, DIBASIC 236; 224 MG/ML; MG/ML
30 INJECTION, SOLUTION INTRAVENOUS ONCE
Refills: 0 | Status: COMPLETED | OUTPATIENT
Start: 2020-03-16 | End: 2020-03-16

## 2020-03-16 RX ORDER — HALOPERIDOL DECANOATE 100 MG/ML
1 INJECTION INTRAMUSCULAR ONCE
Refills: 0 | Status: COMPLETED | OUTPATIENT
Start: 2020-03-16 | End: 2020-03-16

## 2020-03-16 RX ORDER — HYDRALAZINE HCL 50 MG
5 TABLET ORAL ONCE
Refills: 0 | Status: COMPLETED | OUTPATIENT
Start: 2020-03-16 | End: 2020-03-16

## 2020-03-16 RX ORDER — HEPARIN SODIUM 5000 [USP'U]/ML
1300 INJECTION INTRAVENOUS; SUBCUTANEOUS
Qty: 25000 | Refills: 0 | Status: DISCONTINUED | OUTPATIENT
Start: 2020-03-16 | End: 2020-03-16

## 2020-03-16 RX ORDER — HEPARIN SODIUM 5000 [USP'U]/ML
1400 INJECTION INTRAVENOUS; SUBCUTANEOUS
Qty: 25000 | Refills: 0 | Status: DISCONTINUED | OUTPATIENT
Start: 2020-03-16 | End: 2020-03-16

## 2020-03-16 RX ADMIN — HEPARIN SODIUM 13 UNIT(S)/HR: 5000 INJECTION INTRAVENOUS; SUBCUTANEOUS at 05:00

## 2020-03-16 RX ADMIN — HEPARIN SODIUM 5 UNIT(S)/HR: 5000 INJECTION INTRAVENOUS; SUBCUTANEOUS at 03:37

## 2020-03-16 RX ADMIN — HEPARIN SODIUM 7 UNIT(S)/HR: 5000 INJECTION INTRAVENOUS; SUBCUTANEOUS at 23:12

## 2020-03-16 RX ADMIN — Medication 200 MILLIGRAM(S): at 17:39

## 2020-03-16 RX ADMIN — POTASSIUM PHOSPHATE, MONOBASIC POTASSIUM PHOSPHATE, DIBASIC 83.33 MILLIMOLE(S): 236; 224 INJECTION, SOLUTION INTRAVENOUS at 13:10

## 2020-03-16 RX ADMIN — Medication 90 MILLIGRAM(S): at 21:19

## 2020-03-16 RX ADMIN — Medication 1 PATCH: at 06:18

## 2020-03-16 RX ADMIN — Medication 5 MILLIGRAM(S): at 02:55

## 2020-03-16 RX ADMIN — HYDROMORPHONE HYDROCHLORIDE 0.5 MILLIGRAM(S): 2 INJECTION INTRAMUSCULAR; INTRAVENOUS; SUBCUTANEOUS at 00:00

## 2020-03-16 RX ADMIN — HEPARIN SODIUM 5 UNIT(S)/HR: 5000 INJECTION INTRAVENOUS; SUBCUTANEOUS at 17:36

## 2020-03-16 RX ADMIN — Medication 1 PATCH: at 18:47

## 2020-03-16 RX ADMIN — OXYCODONE HYDROCHLORIDE 10 MILLIGRAM(S): 5 TABLET ORAL at 03:07

## 2020-03-16 RX ADMIN — Medication 1 PATCH: at 13:21

## 2020-03-16 RX ADMIN — Medication 81 MILLIGRAM(S): at 13:21

## 2020-03-16 RX ADMIN — OXYCODONE HYDROCHLORIDE 10 MILLIGRAM(S): 5 TABLET ORAL at 02:20

## 2020-03-16 RX ADMIN — SODIUM CHLORIDE 80 MILLILITER(S): 9 INJECTION INTRAMUSCULAR; INTRAVENOUS; SUBCUTANEOUS at 13:10

## 2020-03-16 RX ADMIN — Medication 100 GRAM(S): at 07:23

## 2020-03-16 RX ADMIN — Medication 200 MILLIGRAM(S): at 05:09

## 2020-03-16 NOTE — CHART NOTE - NSCHARTNOTEFT_GEN_A_CORE
6F sheath removed from left groin (CFA). Pressure held for 20 mins. Hemostasis achieved at 153-, groin soft, no hematoma. At 1730, if groins soft, start hep gtt at 500 U/hr, titrate up slowly by 200 U/hr every 4 hrs until therapeutic. Strict flat bedrest until 1930, then okay for head of bed up to 30 degrees, but remain in bed rest of the day.

## 2020-03-16 NOTE — BRIEF OPERATIVE NOTE - NSICDXBRIEFPOSTOP_GEN_ALL_CORE_FT
POST-OP DIAGNOSIS:  Ischemia of lower extremity 14-Mar-2020 17:32:40  Zuly Santos
POST-OP DIAGNOSIS:  Ischemia of lower extremity 14-Mar-2020 17:32:40  Zuly Santos

## 2020-03-16 NOTE — BRIEF OPERATIVE NOTE - OPERATION/FINDINGS
First, angiography was completed of right lower extremity using catheter that was in place in right groin. Angiography showed occlusion still present at right below knee popliteal artery. Cutdown at left femoral bypass was then completed, with arterectomy in order to complete Jayme balloon embolectomy. Clot was retrieved. Angiogram was again completed, showing still small stenotic lesion below knee popliteal. Angioplasty was then completed at this region, first with Lutonix 5.0 x 80 mm balloon and next by Cook 3 mm x 8 cm balloon. Completion angiogram revealed good runoff of AT. Cut down closed with vicryl and skin staples. Post op, biphasic doppler signal of AT above R ankle present. First, angiography was completed of right lower extremity using catheter that was in place in left groin. Angiography showed occlusion still present at right below knee popliteal artery. Cutdown at right femoral bypass was then completed, with arterectomy in order to complete Jayme balloon embolectomy. Clot was retrieved. Angiogram was again completed, showing still small stenotic lesion below knee popliteal. Angioplasty was then completed at this region, first with Lutonix 5.0 x 80 mm balloon and next by Cook 3 mm x 8 cm balloon. Completion angiogram revealed good runoff of AT. Cut down closed with vicryl and skin staples. Post op, biphasic doppler signal of AT above R ankle present.

## 2020-03-16 NOTE — PROGRESS NOTE ADULT - SUBJECTIVE AND OBJECTIVE BOX
Pt seen and examined at bedside, no acute event overnight, haemodynamically stable. Denies fever, chills, nausea, vomiting or diarrhoea. Denies pain in right leg.   MEDICATIONS  (STANDING):  aspirin enteric coated 81 milliGRAM(s) Oral daily  dextrose 5%. 1000 milliLiter(s) (50 mL/Hr) IV Continuous <Continuous>  dextrose 50% Injectable 12.5 Gram(s) IV Push once  heparin  Infusion. 1400 Unit(s)/Hr (14 mL/Hr) IV Continuous <Continuous>  influenza   Vaccine 0.5 milliLiter(s) IntraMuscular once  insulin lispro (HumaLOG) corrective regimen sliding scale   SubCutaneous Before meals and at bedtime  labetalol 200 milliGRAM(s) Oral two times a day  nicotine -   7 mG/24Hr(s) Patch 1 patch Transdermal daily  NIFEdipine XL 90 milliGRAM(s) Oral at bedtime  potassium phosphate IVPB 30 milliMole(s) IV Intermittent once  sodium chloride 0.9%. 1000 milliLiter(s) (80 mL/Hr) IV Continuous <Continuous>    MEDICATIONS  (PRN):  dextrose 40% Gel 15 Gram(s) Oral once PRN Blood Glucose LESS THAN 70 milliGRAM(s)/deciliter  glucagon  Injectable 1 milliGRAM(s) IntraMuscular once PRN Glucose LESS THAN 70 milligrams/deciliter  HYDROmorphone  Injectable 0.5 milliGRAM(s) IV Push every 4 hours PRN Severe Pain (7 - 10)  ondansetron Injectable 4 milliGRAM(s) IV Push every 6 hours PRN Nausea  oxyCODONE    IR 5 milliGRAM(s) Oral every 4 hours PRN Moderate Pain (4 - 6)  senna 2 Tablet(s) Oral at bedtime PRN Constipation      Parr:	  [ ] None	[ ] Daily Parr Order Placed	   Indication:	  [ ] Strict I and O's    [ ] Obstruction     [ ] Incontinence + Stage 3 or 4 Decubitus  Central Line:  [ ] None	   [ ]  Medication / TPN Administration     Drips:     ICU Vital Signs Last 24 Hrs  T(C): 37.1 (16 Mar 2020 09:00), Max: 38.1 (15 Mar 2020 20:35)  T(F): 98.7 (16 Mar 2020 09:00), Max: 100.6 (15 Mar 2020 20:35)  HR: 79 (16 Mar 2020 09:00) (57 - 92)  BP: 132/61 (16 Mar 2020 09:00) (132/61 - 194/85)  BP(mean): 88 (16 Mar 2020 09:00) (88 - 145)  ABP: --  ABP(mean): --  RR: 20 (16 Mar 2020 09:00) (17 - 38)  SpO2: 94% (16 Mar 2020 09:00) (90% - 100%)      PHYSICAL EXAM:  Gen: NAD  Neuro: A&Ox3  Resp: No incr WOB  CV: NSR   Abd: Soft  Extr: right foot cooler than left foot. Left DP palpable. Right DP/PT unable to doppler or palpate. AT biphasic.   Left groin catheter in place, with soft surrounding hematoma swelling.    Lines/tubes/drains:    Vent settings:      I&O's Summary    15 Mar 2020 07:01  -  16 Mar 2020 07:00  --------------------------------------------------------  IN: 1868.5 mL / OUT: 3800 mL / NET: -1931.5 mL    16 Mar 2020 07:01  -  16 Mar 2020 10:43  --------------------------------------------------------  IN: 193 mL / OUT: 0 mL / NET: 193 mL        LABS:                        13.3   14.62 )-----------( 151      ( 16 Mar 2020 05:49 )             39.4     03-16    135  |  98  |  8   ----------------------------<  153<H>  3.5   |  24  |  0.56    Ca    8.4      16 Mar 2020 05:49  Phos  2.3     03-16  Mg     1.8     03-16    TPro  7.6  /  Alb  4.3  /  TBili  0.2  /  DBili  x   /  AST  14  /  ALT  13  /  AlkPhos  118  03-14    PT/INR - ( 14 Mar 2020 20:16 )   PT: 12.1 sec;   INR: 1.06          PTT - ( 16 Mar 2020 08:39 )  PTT:52.4 sec    CAPILLARY BLOOD GLUCOSE      POCT Blood Glucose.: 141 mg/dL (16 Mar 2020 06:11)  POCT Blood Glucose.: 135 mg/dL (15 Mar 2020 21:25)  POCT Blood Glucose.: 124 mg/dL (15 Mar 2020 16:03)  POCT Blood Glucose.: 133 mg/dL (15 Mar 2020 11:13)    LIVER FUNCTIONS - ( 14 Mar 2020 15:12 )  Alb: 4.3 g/dL / Pro: 7.6 g/dL / ALK PHOS: 118 U/L / ALT: 13 U/L / AST: 14 U/L / GGT: x             Cultures:    RADIOLOGY & ADDITIONAL STUDIES:

## 2020-03-16 NOTE — PROGRESS NOTE ADULT - ASSESSMENT
69 M with a history of HTN, DM, HLD, peripheral arterial disease, CAD, PAD s/p right and left LE FEM-pop bypass in (R in 2016) presenting to [facility] ED w/ RLE acute limb ischemia, attempted lysis c/b R groin hematoma, procedure aborted, admitted to SICU for groin/LE monitoring with heparin gtt. Today, plan for alteplase lysis through infusion catheter, with second look in OR planned for 3/16.    Neuro: pain control  CV: MAP >65, ASA81 home labetalol 200 mg BID, home nifedipine 90 XL; holding azilsartan-chlorthalidone  Pulm: hygiene, NS @110  FENGI: NPO @MN, NS @80  ID: NTD  Endo: ISS  Heme: TPA stopped, heparin through sheath.   Lines: 6Fr sheath L groin w/ 4Fr sheath inside connected to IV  PPx: no SQH, no SCDs

## 2020-03-16 NOTE — BRIEF OPERATIVE NOTE - NSICDXBRIEFPROCEDURE_GEN_ALL_CORE_FT
PROCEDURES:  Vascular surgery procedure 14-Mar-2020 17:32:00 RLE angiogram and placement of infusion catheter Zuly Santos
PROCEDURES:  Angioplasty, femoropopliteal 16-Mar-2020 13:13:02 below knee popliteal Payton Nickerson  Thrombectomy, artery, popliteal 16-Mar-2020 13:12:05  Payton Nickerson  Angiogram, follow-up 16-Mar-2020 13:09:55  Payton Nickerson

## 2020-03-16 NOTE — PROVIDER CONTACT NOTE (OTHER) - RECOMMENDATIONS
Patient is assessed, sheathe is broken. Bleeding noted by MD. Vascular to come and reassess the patient.

## 2020-03-16 NOTE — PRE-OP CHECKLIST - RESPIRATORY RATE (BREATHS/MIN)
SUBJECTIVE:   Rosalio Odonnell is a 32 year old male who presents to clinic today for the following health issues:      Anxiety Follow-Up    Status since last visit: Worsened     Other associated symptoms: zap in the head     Complicating factors:   Significant life event: No   Current substance abuse: None  Depression symptoms: No  ANGELINA-7 SCORE 4/7/2017 4/13/2017 2/20/2018   Total Score 3 4 4       ANGELINA-7    Amount of exercise or physical activity: 6-7 days/week for an average of 30-45 minutes    Problems taking medications regularly: No    Medication side effects: none    Diet: regular (no restrictions)          Concern -  Dack green color Stool   Onset:  X 2 days         Generalized anxiety disorder  Paresthesias :saw show on glioblastoma then did weight lifting, started with left neck tightness then paresthesias right forehead on and off most of day, concern about glioblastoma then worry, insomnia.  Now not sleeping well despite 10mg melatonin plus diphenhydramine in setting of long-standing mostly controlled generalized anxiety.         Problem list, Medication list, Allergies, and Medical/Social/Surgical histories reviewed in [facility] and updated as appropriate.  Labs reviewed in [facility]  BP Readings from Last 3 Encounters:   09/18/18 156/78   02/20/18 139/78   04/07/17 130/84    Wt Readings from Last 3 Encounters:   09/18/18 212 lb (96.2 kg)   02/20/18 227 lb (103 kg)   04/07/17 223 lb (101.2 kg)                  Patient Active Problem List   Diagnosis     Generalized anxiety disorder     CARDIOVASCULAR SCREENING; LDL GOAL LESS THAN 160     History reviewed. No pertinent surgical history.    Social History   Substance Use Topics     Smoking status: Never Smoker     Smokeless tobacco: Never Used     Alcohol use Yes      Comment: rare     History reviewed. No pertinent family history.      Current Outpatient Prescriptions   Medication Sig Dispense Refill     atomoxetine (STRATTERA) 40 MG capsule Take 1 capsule (40 mg) by  mouth daily (Patient not taking: Reported on 9/18/2018) 14 capsule 0     atomoxetine (STRATTERA) 80 MG capsule Take 1 capsule (80 mg) by mouth daily (Patient not taking: Reported on 9/18/2018) 90 capsule 3     LORazepam (ATIVAN) 0.5 MG tablet 1-2 tablets daily as needed for anxiety and sleep 14 tablet 0     Allergies   Allergen Reactions     Amoxicillin Rash     Recent Labs   Lab Test  01/23/17   1649  11/11/15   1311  02/10/14   1514   LDL   --   69  79   HDL   --   60  59   TRIG   --   106  53   TSH  2.21   --   2.27        ROS:  Constitutional, HEENT, cardiovascular, pulmonary, GI, , musculoskeletal, neuro, skin, endocrine and psych systems are negative, except as otherwise noted.        OBJECTIVE:  /78  Pulse 108  Temp 98.2  F (36.8  C) (Oral)  Resp 18  Wt 212 lb (96.2 kg)  SpO2 99%  BMI 29.99 kg/m2    EXAM:  GENERAL APPEARANCE: healthy, alert and no distress  MENTAL STATUS EXAM  Appearance: appropriate  Attitude: cooperative  Behavior: normal  Eyre Contact: normal  Speech: normal  Orientation: oreinted to person , place, time and situation  Mood:  admits no sadness but anxiety most of time  Affect: Mood Congruient  Thought Process: clear  Suicidal Ideation: reports thoughts, no intention  Hallucination: no    Cn 2-12 observed/intact.   Symmetric arm/face movements and normal gait.     ASSESSMENT AND PLAN  1. Generalized anxiety disorder  Lorazepam 1-2 pills daily as needed for sleep and anxiety. Sedating, do not drive after taking or mix with alcohol in your system.  Reassurance given symptoms not consistent with any particular known disease.  No red flag symptoms for brain tumor (nausea, emesis, headache, focal deficit.     Follow up one week as planned for physical and can decide if we need to continue the lorazepam and/or do a brain scan.     - LORazepam (ATIVAN) 0.5 MG tablet; 1-2 tablets daily as needed for anxiety and sleep  Dispense: 14 tablet; Refill: 0    2. Paresthesias  Can occur with  "increased anxiety or stress or muscle tightness and considered \"normal.\"             MYCHART FOR ON-LINE CARE(VISITS), LABS, REFILLS, MESSAGING, ETC [email or web address] , [phone]    E-VISIT: click \"on-line care, then request e-visit\".  E-visits work well for following up on issues we have discussed in clinic previously which may need new prescriptions, new prescriptions or substantial discussion. These are always done by me (Dr. Wegener).     ONCARE VISIT:  [email or web address]  - we treat nearly 50 common conditions through on-care.  These are done in an hour by on-call staff.     RADIOLOGY:  [facility]:  [phone]   [facility]: [phone]    Mammogram and Colonoscopy [phone]    Smoking Cessation: [email or web address], [phone] ([phone])      CONSUMER PRICE LINE for estimates of test costs:  [phone]           " 20

## 2020-03-16 NOTE — PROGRESS NOTE ADULT - SUBJECTIVE AND OBJECTIVE BOX
Vascular Note    Pt comfortable without complaints, pain controlled  Denies CP, SOB, N/V, numbness/tingling     Vital Signs Last 24 Hrs  T(C): 37.1 (03-16-20 @ 05:00), Max: 37.1 (03-16-20 @ 05:00)  T(F): 98.7 (03-16-20 @ 05:00), Max: 98.7 (03-16-20 @ 05:00)  HR: 70 (03-16-20 @ 06:00) (70 - 90)  BP: 153/70 (03-16-20 @ 06:00) (153/70 - 194/85)  BP(mean): 100 (03-16-20 @ 06:00) (100 - 127)  RR: 27 (03-16-20 @ 06:00) (24 - 35)  SpO2: 92% (03-16-20 @ 06:00) (92% - 100%)    General: NAD, resting comfortably  Pulm: non labored breathing, speaking in full sentences  Abd: soft, mild distension, non-tender  Ext: L groin w/ soft swelling increase in size from previous exam, 6Fr sheath in place, minimal overlying discoloration, non-tender; some bleeding noted at access site   RLE cool, mono DP, biphasic AT, palpable fem;                           13.3   14.62 )-----------( 151      ( 16 Mar 2020 05:49 )             39.4   16 Mar 2020 05:49    135    |  98     |  8      ----------------------------<  153    3.5     |  24     |  0.56     Ca    8.4        16 Mar 2020 05:49  Phos  2.3       16 Mar 2020 05:49  Mg     1.8       16 Mar 2020 05:49    TPro  7.6    /  Alb  4.3    /  TBili  0.2    /  DBili  x      /  AST  14     /  ALT  13     /  AlkPhos  118    14 Mar 2020 15:12      69 M w cold RLE s/p angio on 3/14 for OR today 3/16  - NPO/IVF  - D/c tpa   - for OR this AM  - Pre-op lab Vascular Note    Pt comfortable without complaints, pain controlled  Denies CP, SOB, N/V, numbness/tingling     Vital Signs Last 24 Hrs  T(C): 37.1 (03-16-20 @ 05:00), Max: 37.1 (03-16-20 @ 05:00)  T(F): 98.7 (03-16-20 @ 05:00), Max: 98.7 (03-16-20 @ 05:00)  HR: 70 (03-16-20 @ 06:00) (70 - 90)  BP: 153/70 (03-16-20 @ 06:00) (153/70 - 194/85)  BP(mean): 100 (03-16-20 @ 06:00) (100 - 127)  RR: 27 (03-16-20 @ 06:00) (24 - 35)  SpO2: 92% (03-16-20 @ 06:00) (92% - 100%)    General: NAD, resting comfortably  Pulm: non labored breathing, speaking in full sentences  Abd: soft, mild distension, non-tender  Ext: L groin w/ soft swelling increase in size from previous exam, 6Fr sheath in place, minimal overlying discoloration, non-tender; some bleeding noted at access site   RLE cool, mono DP, biphasic AT, palpable fem;                           13.3   14.62 )-----------( 151      ( 16 Mar 2020 05:49 )             39.4   16 Mar 2020 05:49    135    |  98     |  8      ----------------------------<  153    3.5     |  24     |  0.56     Ca    8.4        16 Mar 2020 05:49  Phos  2.3       16 Mar 2020 05:49  Mg     1.8       16 Mar 2020 05:49    TPro  7.6    /  Alb  4.3    /  TBili  0.2    /  DBili  x      /  AST  14     /  ALT  13     /  AlkPhos  118    14 Mar 2020 15:12        ---------------------------------------------------------------------------  I personally reviewed: [  ]EKG   [  ]CXR    [  ] CT    [  ]Other  ---------------------------------------------------------------------------  PLEASE CHECK WHEN PRESENT:     [  ]Heart Failure     [  ] Acute     [  ] Acute on Chronic     [  ] Chronic  -------------------------------------------------------------------     [  ]Diastolic [HFpEF]     [  ]Systolic [HFrEF]     [  ]Combined [HFpEF & HFrEF]     [  ]Other:  -------------------------------------------------------------------  [ ] Respiratory failure  [ ] Acute cor pulmonale  [ ] Asthma/COPD Exacerbation  [ ] Pleural effusion  [ ] Aspiration pneumonia  -------------------------------------------------------------------  [  ]JAVID     [  ]ATN     [  ]Reneal Medullary Necrosis     [  ]Renal Cortical Necrosis     [  ]Other Pathological Lesions:    [  ]CKD 1  [  ]CKD 2  [  ]CKD 3  [  ]CKD 4  [  ]CKD 5  [  ]Other  -------------------------------------------------------------------  [x]Diabetes  [  ] Diabetic PVD Ulcer  [  ] Neuropathic ulcer to DM  [  ] Diabetes with Nephropathy  [  ] Osteomyelitis due to diabetes  --------------------------------------------------------------------  [  ]Malnutrition: See Nutrition Note  [  ]Cachexia  [  ]Other:   [  ]Supplement Ordered:  [  ]Morbid Obesity (BMI >=40]  ---------------------------------------------------------------------  [ ] Sepsis/severe sepsis/septic shock  [ ] UTI  [ ] Pneumonia  -----------------------------------------------------------------------  [ ] Acidosis/alkalosis  [ ] Fluid overload  [ ] Hypokalemia  [ ] Hyperkalemia  [ ] Hypomagnesemia  [ ] Hypophosphatemia  [ ] Hyperphosphatemia  ------------------------------------------------------------------------  [ ] Acute blood loss anemia  [ ] Post op blood loss anemia  [ ] Iron deficiency anemia  [ ] Anemia due to chronic disease  [ ] Hypercoagulable state  ----------------------------------------------------------------------  [ ] Cerebral infarction  [ ] Transient ischemia attack  [ ] Encephalopathy            69 M w cold RLE s/p angio on 3/14 for OR today 3/16  - NPO/IVF  - D/c tpa   - for OR this AM  - Pre-op lab Unna Boot Text: An Unna boot was placed to help immobilize the limb and facilitate more rapid healing.

## 2020-03-16 NOTE — PROVIDER CONTACT NOTE (OTHER) - REASON
left groin looks bigger, no bleeding, soft to touch and pulses are palpable. BP on the higher side. left groin looks bigger, no bleeding, soft to touch and pulses are palpable. BP on the higher side. patient is still restless intermittently

## 2020-03-16 NOTE — PROGRESS NOTE ADULT - PROVIDER SPECIALTY LIST ADULT
SICU Go for blood test as directed. Because your dose is based on the INR blood test it is very important that you get your blood tested on the date and time that you are scheduled and keep your healthcare providers appointments.   Please follow up with your doctor within 3 days of discharge to schedule your next blood test.

## 2020-03-17 ENCOUNTER — TRANSCRIPTION ENCOUNTER (OUTPATIENT)
Age: 70
End: 2020-03-17

## 2020-03-17 VITALS
SYSTOLIC BLOOD PRESSURE: 134 MMHG | HEART RATE: 74 BPM | RESPIRATION RATE: 21 BRPM | OXYGEN SATURATION: 97 % | DIASTOLIC BLOOD PRESSURE: 60 MMHG

## 2020-03-17 LAB
ANION GAP SERPL CALC-SCNC: 11 MMOL/L — SIGNIFICANT CHANGE UP (ref 5–17)
APTT BLD: 28.4 SEC — SIGNIFICANT CHANGE UP (ref 27.5–36.3)
APTT BLD: 61 SEC — HIGH (ref 27.5–36.3)
BUN SERPL-MCNC: 16 MG/DL — SIGNIFICANT CHANGE UP (ref 7–23)
CALCIUM SERPL-MCNC: 7.8 MG/DL — LOW (ref 8.4–10.5)
CHLORIDE SERPL-SCNC: 103 MMOL/L — SIGNIFICANT CHANGE UP (ref 96–108)
CO2 SERPL-SCNC: 23 MMOL/L — SIGNIFICANT CHANGE UP (ref 22–31)
CREAT SERPL-MCNC: 0.72 MG/DL — SIGNIFICANT CHANGE UP (ref 0.5–1.3)
GLUCOSE BLDC GLUCOMTR-MCNC: 140 MG/DL — HIGH (ref 70–99)
GLUCOSE BLDC GLUCOMTR-MCNC: 197 MG/DL — HIGH (ref 70–99)
GLUCOSE SERPL-MCNC: 108 MG/DL — HIGH (ref 70–99)
HCT VFR BLD CALC: 27.6 % — LOW (ref 39–50)
HCT VFR BLD CALC: 28.4 % — LOW (ref 39–50)
HGB BLD-MCNC: 9.1 G/DL — LOW (ref 13–17)
HGB BLD-MCNC: 9.3 G/DL — LOW (ref 13–17)
INR BLD: 1.21 — HIGH (ref 0.88–1.16)
INR BLD: 1.22 — HIGH (ref 0.88–1.16)
MAGNESIUM SERPL-MCNC: 2 MG/DL — SIGNIFICANT CHANGE UP (ref 1.6–2.6)
MCHC RBC-ENTMCNC: 28.9 PG — SIGNIFICANT CHANGE UP (ref 27–34)
MCHC RBC-ENTMCNC: 29.1 PG — SIGNIFICANT CHANGE UP (ref 27–34)
MCHC RBC-ENTMCNC: 32.7 GM/DL — SIGNIFICANT CHANGE UP (ref 32–36)
MCHC RBC-ENTMCNC: 33 GM/DL — SIGNIFICANT CHANGE UP (ref 32–36)
MCV RBC AUTO: 88.2 FL — SIGNIFICANT CHANGE UP (ref 80–100)
MCV RBC AUTO: 88.2 FL — SIGNIFICANT CHANGE UP (ref 80–100)
NRBC # BLD: 0 /100 WBCS — SIGNIFICANT CHANGE UP (ref 0–0)
NRBC # BLD: 0 /100 WBCS — SIGNIFICANT CHANGE UP (ref 0–0)
PHOSPHATE SERPL-MCNC: 2.8 MG/DL — SIGNIFICANT CHANGE UP (ref 2.5–4.5)
PLATELET # BLD AUTO: 112 K/UL — LOW (ref 150–400)
PLATELET # BLD AUTO: 113 K/UL — LOW (ref 150–400)
POTASSIUM SERPL-MCNC: 3.7 MMOL/L — SIGNIFICANT CHANGE UP (ref 3.5–5.3)
POTASSIUM SERPL-SCNC: 3.7 MMOL/L — SIGNIFICANT CHANGE UP (ref 3.5–5.3)
PROTHROM AB SERPL-ACNC: 13.9 SEC — HIGH (ref 10–12.9)
PROTHROM AB SERPL-ACNC: 14 SEC — HIGH (ref 10–12.9)
RBC # BLD: 3.13 M/UL — LOW (ref 4.2–5.8)
RBC # BLD: 3.22 M/UL — LOW (ref 4.2–5.8)
RBC # FLD: 13.6 % — SIGNIFICANT CHANGE UP (ref 10.3–14.5)
RBC # FLD: 13.7 % — SIGNIFICANT CHANGE UP (ref 10.3–14.5)
SODIUM SERPL-SCNC: 137 MMOL/L — SIGNIFICANT CHANGE UP (ref 135–145)
WBC # BLD: 12.15 K/UL — HIGH (ref 3.8–10.5)
WBC # BLD: 13.51 K/UL — HIGH (ref 3.8–10.5)
WBC # FLD AUTO: 12.15 K/UL — HIGH (ref 3.8–10.5)
WBC # FLD AUTO: 13.51 K/UL — HIGH (ref 3.8–10.5)

## 2020-03-17 RX ORDER — HEPARIN SODIUM 5000 [USP'U]/ML
1300 INJECTION INTRAVENOUS; SUBCUTANEOUS
Qty: 25000 | Refills: 0 | Status: DISCONTINUED | OUTPATIENT
Start: 2020-03-17 | End: 2020-03-17

## 2020-03-17 RX ORDER — HEPARIN SODIUM 5000 [USP'U]/ML
900 INJECTION INTRAVENOUS; SUBCUTANEOUS
Qty: 25000 | Refills: 0 | Status: DISCONTINUED | OUTPATIENT
Start: 2020-03-17 | End: 2020-03-17

## 2020-03-17 RX ORDER — RIVAROXABAN 15 MG-20MG
1 KIT ORAL
Qty: 42 | Refills: 0
Start: 2020-03-17 | End: 2020-04-06

## 2020-03-17 RX ORDER — SODIUM,POTASSIUM PHOSPHATES 278-250MG
2 POWDER IN PACKET (EA) ORAL ONCE
Refills: 0 | Status: COMPLETED | OUTPATIENT
Start: 2020-03-17 | End: 2020-03-17

## 2020-03-17 RX ORDER — CALCIUM CARBONATE 500(1250)
1 TABLET ORAL ONCE
Refills: 0 | Status: COMPLETED | OUTPATIENT
Start: 2020-03-17 | End: 2020-03-17

## 2020-03-17 RX ORDER — SODIUM,POTASSIUM PHOSPHATES 278-250MG
1 POWDER IN PACKET (EA) ORAL
Refills: 0 | Status: DISCONTINUED | OUTPATIENT
Start: 2020-03-17 | End: 2020-03-17

## 2020-03-17 RX ORDER — RIVAROXABAN 15 MG-20MG
15 KIT ORAL
Refills: 0 | Status: DISCONTINUED | OUTPATIENT
Start: 2020-03-17 | End: 2020-03-17

## 2020-03-17 RX ADMIN — HEPARIN SODIUM 13 UNIT(S)/HR: 5000 INJECTION INTRAVENOUS; SUBCUTANEOUS at 05:17

## 2020-03-17 RX ADMIN — Medication 200 MILLIGRAM(S): at 05:17

## 2020-03-17 RX ADMIN — Medication 1 TABLET(S): at 05:17

## 2020-03-17 RX ADMIN — Medication 1 PATCH: at 06:26

## 2020-03-17 RX ADMIN — RIVAROXABAN 15 MILLIGRAM(S): KIT at 10:45

## 2020-03-17 RX ADMIN — HEPARIN SODIUM 9 UNIT(S)/HR: 5000 INJECTION INTRAVENOUS; SUBCUTANEOUS at 03:58

## 2020-03-17 RX ADMIN — Medication 2: at 12:15

## 2020-03-17 RX ADMIN — Medication 1 PATCH: at 12:14

## 2020-03-17 RX ADMIN — Medication 81 MILLIGRAM(S): at 12:14

## 2020-03-17 RX ADMIN — Medication 2 PACKET(S): at 06:16

## 2020-03-17 RX ADMIN — Medication 1 PATCH: at 12:21

## 2020-03-17 NOTE — DISCHARGE NOTE PROVIDER - CARE PROVIDER_API CALL
Varsha Gant)  Surgery; Vascular Surgery  [address], [address] [place] [address]  Phone: [phone]  Fax: [phone]  Follow Up Time:

## 2020-03-17 NOTE — PROGRESS NOTE ADULT - SUBJECTIVE AND OBJECTIVE BOX
Pt seen and examined at bedside with attending, no acute event overnight, haemodynamically stable. Denies fever, chills, nausea, vomiting or diarrhoea. Denies pain in LE. Tolerating regular diet, passing flatus and stools.    MEDICATIONS  (STANDING):  aspirin enteric coated 81 milliGRAM(s) Oral daily  dextrose 5%. 1000 milliLiter(s) (50 mL/Hr) IV Continuous <Continuous>  dextrose 50% Injectable 12.5 Gram(s) IV Push once  heparin  Infusion 1300 Unit(s)/Hr (13 mL/Hr) IV Continuous <Continuous>  influenza   Vaccine 0.5 milliLiter(s) IntraMuscular once  insulin lispro (HumaLOG) corrective regimen sliding scale   SubCutaneous Before meals and at bedtime  labetalol 200 milliGRAM(s) Oral two times a day  nicotine -   7 mG/24Hr(s) Patch 1 patch Transdermal daily  NIFEdipine XL 90 milliGRAM(s) Oral at bedtime    MEDICATIONS  (PRN):  dextrose 40% Gel 15 Gram(s) Oral once PRN Blood Glucose LESS THAN 70 milliGRAM(s)/deciliter  glucagon  Injectable 1 milliGRAM(s) IntraMuscular once PRN Glucose LESS THAN 70 milligrams/deciliter  HYDROmorphone  Injectable 0.5 milliGRAM(s) IV Push every 4 hours PRN Severe Pain (7 - 10)  ondansetron Injectable 4 milliGRAM(s) IV Push every 6 hours PRN Nausea  oxyCODONE    IR 5 milliGRAM(s) Oral every 4 hours PRN Moderate Pain (4 - 6)  senna 2 Tablet(s) Oral at bedtime PRN Constipation      Parr:	  [ ] None	[ ] Daily Parr Order Placed	   Indication:	  [ ] Strict I and O's    [ ] Obstruction     [ ] Incontinence + Stage 3 or 4 Decubitus  Central Line:  [ ] None	   [ ]  Medication / TPN Administration     Drips:     ICU Vital Signs Last 24 Hrs  T(C): 37.1 (17 Mar 2020 05:34), Max: 37.5 (17 Mar 2020 01:02)  T(F): 98.7 (17 Mar 2020 05:34), Max: 99.5 (17 Mar 2020 01:02)  HR: 75 (17 Mar 2020 09:00) (66 - 88)  BP: 107/57 (17 Mar 2020 09:00) (107/57 - 164/66)  BP(mean): 75 (17 Mar 2020 09:00) (75 - 120)  ABP: --  ABP(mean): --  RR: 20 (17 Mar 2020 09:00) (10 - 40)  SpO2: 99% (17 Mar 2020 09:00) (91% - 99%)      Physical Exam:  General: NAD  HEENT: NC/AT, EOMI, PERRLA, normal hearing, no oral lesions, neck supple w/o LAD  Pulmonary: Nonlabored breathing, no respiratory distress, CTA-B  Cardiovascular: NSR, no murmurs  Abdominal: soft, NT/ND, +BS, no organomegaly  Extremities: RLE: DP triphasic, AT triphasic. warm. LLE: DP Palpable. warm. B/l groin: Haematoma unchanged .  Neuro: A/O x3, CNs II-XII grossly intact, normal motor/sensation, no focal deficits  Pulses: palpable distal pulses    Lines/tubes/drains:    Vent settings:      I&O's Summary    16 Mar 2020 07:01  -  17 Mar 2020 07:00  --------------------------------------------------------  IN: 2001 mL / OUT: 1200 mL / NET: 801 mL    17 Mar 2020 07:01  -  17 Mar 2020 09:39  --------------------------------------------------------  IN: 213 mL / OUT: 0 mL / NET: 213 mL        LABS:                        9.3    12.15 )-----------( 113      ( 17 Mar 2020 03:02 )             28.4     03-17    137  |  103  |  16  ----------------------------<  108<H>  3.7   |  23  |  0.72    Ca    7.8<L>      17 Mar 2020 03:02  Phos  2.8     03-17  Mg     2.0     03-17      PT/INR - ( 17 Mar 2020 03:02 )   PT: 14.0 sec;   INR: 1.22          PTT - ( 17 Mar 2020 03:02 )  PTT:28.4 sec    CAPILLARY BLOOD GLUCOSE      POCT Blood Glucose.: 140 mg/dL (17 Mar 2020 06:14)  POCT Blood Glucose.: 144 mg/dL (16 Mar 2020 21:18)  POCT Blood Glucose.: 131 mg/dL (16 Mar 2020 16:34)  POCT Blood Glucose.: 147 mg/dL (16 Mar 2020 13:09)        Cultures:    RADIOLOGY & ADDITIONAL STUDIES:

## 2020-03-17 NOTE — DISCHARGE NOTE PROVIDER - NSDCACTIVITY_GEN_ALL_CORE
Do not drive or operate machinery/No heavy lifting/straining/Sex allowed/Showering allowed/Walking - Outdoors allowed/Stairs allowed/Walking - Indoors allowed

## 2020-03-17 NOTE — PROGRESS NOTE ADULT - SUBJECTIVE AND OBJECTIVE BOX
SUBJECTIVE:   O/N: FRANSISCA, AFVSS, remains on heparin gtt, hemoglobin overnight 9.3 [11.2]  Patient examined at bedside. No issues overnight. No complaints of lower extremity pain, paresthesias, weakness, or sensory loss. Also denies chest pain, shortness of breath, dizziness, headaches, vision changes, fevers, chills, rigors, nausea, or vomiting.         Vitals - Range in last 12h  T(F): , Max: 99.5 (03-17-20 @ 01:02)  HR:  (66 - 87)  BP:  (107/57 - 144/66)  BP(mean):  (75 - 95)  ABP: --  ABP(mean): --  RR:  (10 - 23)  SpO2:  (92% - 99%)  CVP(mm Hg): --  CVP(cm H2O): --  CO: --  CI: --  PA: --  PA(mean): --  PA(direct): --  PCWP: --    Vitals - Most Recent  T(F): 98.7 (03-17-20 @ 05:34)  HR: 75 (03-17-20 @ 09:00)  BP: 107/57 (03-17-20 @ 09:00)  RR: 20 (03-17-20 @ 09:00)  SpO2: 99% (03-17-20 @ 09:00)  I&O's Detail    16 Mar 2020 07:01  -  17 Mar 2020 07:00  --------------------------------------------------------  IN:    heparin Infusion: 13 mL    heparin Infusion: 32 mL    heparin Infusion: 37 mL    heparin Infusion: 13 mL    heparin Infusion: 26 mL    IV PiggyBack: 600 mL    sodium chloride 0.9%: 1280 mL  Total IN: 2001 mL    OUT:    Voided: 1200 mL  Total OUT: 1200 mL    Total NET: 801 mL      17 Mar 2020 07:01  -  17 Mar 2020 10:07  --------------------------------------------------------  IN:    heparin Infusion: 13 mL    Oral Fluid: 200 mL  Total IN: 213 mL    OUT:  Total OUT: 0 mL    Total NET: 213 mL            Physical Exam  Gen:  NAD, resting comfortably  Pulm:  no respiratory distress, nonlabored breathing  Abd: soft, NT/ND, groins soft  Extrem: warm and well-perfused, no motor sensory deficits, non edematous  Vasc:   - RLE: triphasic DP (1+ palpable yesterday when sbp > 120)        LABS:                        9.3    12.15 )-----------( 113      ( 17 Mar 2020 03:02 )             28.4     03-17    137  |  103  |  16  ----------------------------<  108<H>  3.7   |  23  |  0.72    Ca    7.8<L>      17 Mar 2020 03:02  Phos  2.8     03-17  Mg     2.0     03-17        PT/INR - ( 17 Mar 2020 03:02 )   PT: 14.0 sec;   INR: 1.22          PTT - ( 17 Mar 2020 03:02 )  PTT:28.4 sec        MEDICATIONS  (STANDING):  aspirin enteric coated 81 milliGRAM(s) Oral daily  dextrose 5%. 1000 milliLiter(s) (50 mL/Hr) IV Continuous <Continuous>  dextrose 50% Injectable 12.5 Gram(s) IV Push once  heparin  Infusion 1300 Unit(s)/Hr (13 mL/Hr) IV Continuous <Continuous>  influenza   Vaccine 0.5 milliLiter(s) IntraMuscular once  insulin lispro (HumaLOG) corrective regimen sliding scale   SubCutaneous Before meals and at bedtime  labetalol 200 milliGRAM(s) Oral two times a day  nicotine -   7 mG/24Hr(s) Patch 1 patch Transdermal daily  NIFEdipine XL 90 milliGRAM(s) Oral at bedtime    MEDICATIONS  (PRN):  dextrose 40% Gel 15 Gram(s) Oral once PRN Blood Glucose LESS THAN 70 milliGRAM(s)/deciliter  glucagon  Injectable 1 milliGRAM(s) IntraMuscular once PRN Glucose LESS THAN 70 milligrams/deciliter  HYDROmorphone  Injectable 0.5 milliGRAM(s) IV Push every 4 hours PRN Severe Pain (7 - 10)  ondansetron Injectable 4 milliGRAM(s) IV Push every 6 hours PRN Nausea  oxyCODONE    IR 5 milliGRAM(s) Oral every 4 hours PRN Moderate Pain (4 - 6)  senna 2 Tablet(s) Oral at bedtime PRN Constipation      RADIOLOGY & ADDITIONAL STUDIES:

## 2020-03-17 NOTE — DISCHARGE NOTE PROVIDER - NSDCCPCAREPLAN_GEN_ALL_CORE_FT
PRINCIPAL DISCHARGE DIAGNOSIS  Diagnosis: Arterial occlusion  Assessment and Plan of Treatment:       SECONDARY DISCHARGE DIAGNOSES  Diagnosis: PAD (peripheral artery disease)  Assessment and Plan of Treatment:     Diagnosis: HTN (hypertension)  Assessment and Plan of Treatment:     Diagnosis: CAD in native artery  Assessment and Plan of Treatment:     Diagnosis: DM (diabetes mellitus)  Assessment and Plan of Treatment:

## 2020-03-17 NOTE — DISCHARGE NOTE PROVIDER - HOSPITAL COURSE
69 M with a history of HTN, DM, HLD, peripheral arterial disease, CAD, PAD s/p right and left LE FEM-pop bypass in (R in 2016) presented to the ED with complaints of acute onset right leg numbness, tingling, and pain, and taken to the OR urgently for acute limb ischemia, underwent RLE angiogram and found to have occluded CFA-AK pop bypass at the origin. Attempted thrombolysis but procedure complicated by L groin access hematoma, and tpa not given. Infused w/ heparin after aborted procedure and taken back to OR 3/16 for 2nd look, and underwent femoral cutdown and iram embolectomy, clot evacuated from bypass. Subsequent angiogram showed small stenotic lesion of the BK pop, and this area was balloon angioplastied x2 after which completion angio revealed good runoff of AT. Patient had lightly palpable DP of the R foot (triphasic when BPs soft < 120). Patient's post operative course was uneventful, he remained on heparin until HOD 3, after which he was transition to PO anticoagulation, Xarelto 15 BID.  He was tolerating diet, passing trial of void, and pain well-controlled. On day of discharge patient was stable to be d/c'd home.

## 2020-03-17 NOTE — PROGRESS NOTE ADULT - ASSESSMENT
69 M with a history of HTN, DM, HLD, peripheral arterial disease, CAD, PAD s/p right and left LE FEM-pop bypass in (R in 2016) presenting to [facility] ED w/ RLE acute limb ischemia, attempted lysis c/b R groin hematoma, procedure aborted, admitted to SICU for groin/LE monitoring with heparin gtt. POD1 s/p RLE thrombectomy.     Neuro: pain control  CV: MAP >65, ASA81 home labetalol 200 mg BID, home nifedipine 90 XL; holding azilsartan-chlorthalidone  Pulm: hygiene   FENGI: Regular diet  ID: NTD  Endo: ISS  Heme: Eliquis once heparin gtt therapeutic   PPx: no SCDs 69 M with a history of HTN, DM, HLD, peripheral arterial disease, CAD, PAD s/p right and left LE FEM-pop bypass in (R in 2016) presenting to [facility] ED w/ RLE acute limb ischemia, attempted lysis c/b R groin hematoma, procedure aborted, admitted to SICU for groin/LE monitoring with heparin gtt. POD1 s/p RLE thrombectomy.     Neuro: pain control  CV: MAP >65, ASA81 home labetalol 200 mg BID, home nifedipine 90 XL; holding azilsartan-chlorthalidone  Pulm: hygiene   FENGI: Regular diet  ID: NTD  Endo: ISS  Heme: xarelto once heparin gtt therapeutic   PPx: no SCDs

## 2020-03-17 NOTE — DISCHARGE NOTE PROVIDER - NSDCCPTREATMENT_GEN_ALL_CORE_FT
PRINCIPAL PROCEDURE  Procedure: Thrombectomy, artery, popliteal  Findings and Treatment:       SECONDARY PROCEDURE  Procedure: Angioplasty, femoropopliteal  Findings and Treatment: below knee popliteal

## 2020-03-17 NOTE — DISCHARGE NOTE NURSING/CASE MANAGEMENT/SOCIAL WORK - PATIENT PORTAL LINK FT
You can access the FollowMyHealth Patient Portal offered by [facility] by registering at the following website: [email or web address]. By joining [facility] FollowMyHealth portal, you will also be able to view your health information using other applications (apps) compatible with our system.

## 2020-03-17 NOTE — PROGRESS NOTE ADULT - REASON FOR ADMISSION
Right cold limb

## 2020-03-17 NOTE — DISCHARGE NOTE PROVIDER - NSDCMRMEDTOKEN_GEN_ALL_CORE_FT
aspirin 81 mg oral delayed release tablet: 1 tab(s) orally once a day  azilsartan-chlorthalidone 40 mg-12.5 mg oral tablet: 1 tab(s) orally once a day  Chantix: tab(s) orally once a day  labetalol 200 mg oral tablet: 1 tab(s) orally 2 times a day  metFORMIN 500 mg oral tablet: 1  orally 2 times a day  Multiple Vitamins oral tablet: 1 tab(s) orally once a day  NIFEdipine 90 mg oral tablet, extended release: 1 tab(s) orally once a day  rivaroxaban 15 mg oral tablet: 1 tab(s) orally 2 times a day (with meals) MDD:2

## 2020-03-17 NOTE — DISCHARGE NOTE PROVIDER - NSDCFUADDINST_GEN_ALL_CORE_FT
Follow-up with Dr. Gant in 1-2 weeks in office at [phone].     Wound care:  - Remove dressings to shower with soap and water. Dry well. Re apply dressing needed. After 48 hours, leave open to air. If incision has mild/moderate serous drainage, may apply dry gauze to incision with paper tape.     Please call your doctor if you have a fever of over 101.9 or swelling/bleeding at wound. Do NOT bathe, swim, or hot tub until you are cleared by your doctor.

## 2020-03-19 LAB — SURGICAL PATHOLOGY STUDY: SIGNIFICANT CHANGE UP

## 2020-03-24 PROCEDURE — 86850 RBC ANTIBODY SCREEN: CPT

## 2020-03-24 PROCEDURE — 83036 HEMOGLOBIN GLYCOSYLATED A1C: CPT

## 2020-03-24 PROCEDURE — 76000 FLUOROSCOPY <1 HR PHYS/QHP: CPT

## 2020-03-24 PROCEDURE — 84100 ASSAY OF PHOSPHORUS: CPT

## 2020-03-24 PROCEDURE — 85025 COMPLETE CBC W/AUTO DIFF WBC: CPT

## 2020-03-24 PROCEDURE — 83735 ASSAY OF MAGNESIUM: CPT

## 2020-03-24 PROCEDURE — C1769: CPT

## 2020-03-24 PROCEDURE — C1887: CPT

## 2020-03-24 PROCEDURE — 96374 THER/PROPH/DIAG INJ IV PUSH: CPT

## 2020-03-24 PROCEDURE — 86901 BLOOD TYPING SEROLOGIC RH(D): CPT

## 2020-03-24 PROCEDURE — 85730 THROMBOPLASTIN TIME PARTIAL: CPT

## 2020-03-24 PROCEDURE — C1894: CPT

## 2020-03-24 PROCEDURE — 80048 BASIC METABOLIC PNL TOTAL CA: CPT

## 2020-03-24 PROCEDURE — 85027 COMPLETE CBC AUTOMATED: CPT

## 2020-03-24 PROCEDURE — 82962 GLUCOSE BLOOD TEST: CPT

## 2020-03-24 PROCEDURE — 71045 X-RAY EXAM CHEST 1 VIEW: CPT

## 2020-03-24 PROCEDURE — C1757: CPT

## 2020-03-24 PROCEDURE — 80053 COMPREHEN METABOLIC PANEL: CPT

## 2020-03-24 PROCEDURE — 36415 COLL VENOUS BLD VENIPUNCTURE: CPT

## 2020-03-24 PROCEDURE — 88304 TISSUE EXAM BY PATHOLOGIST: CPT

## 2020-03-24 PROCEDURE — 85384 FIBRINOGEN ACTIVITY: CPT

## 2020-03-24 PROCEDURE — C2623: CPT

## 2020-03-24 PROCEDURE — 85610 PROTHROMBIN TIME: CPT

## 2020-03-24 PROCEDURE — C1751: CPT

## 2020-03-24 PROCEDURE — C1889: CPT

## 2020-03-24 PROCEDURE — C1725: CPT

## 2020-03-24 PROCEDURE — 99285 EMERGENCY DEPT VISIT HI MDM: CPT | Mod: 25

## 2020-03-31 DIAGNOSIS — E78.5 HYPERLIPIDEMIA, UNSPECIFIED: ICD-10-CM
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
## 2020-03-31 DIAGNOSIS — I70.301 UNSPECIFIED ATHEROSCLEROSIS OF UNSPECIFIED TYPE OF BYPASS GRAFT(S) OF THE EXTREMITIES, RIGHT LEG: ICD-10-CM

## 2020-03-31 DIAGNOSIS — I10 ESSENTIAL (PRIMARY) HYPERTENSION: ICD-10-CM

## 2020-03-31 DIAGNOSIS — T82.868A THROMBOSIS DUE TO VASCULAR PROSTHETIC DEVICES, IMPLANTS AND GRAFTS, INITIAL ENCOUNTER: ICD-10-CM

## 2020-03-31 DIAGNOSIS — Z79.82 LONG TERM (CURRENT) USE OF ASPIRIN: ICD-10-CM

## 2020-03-31 DIAGNOSIS — E83.39 OTHER DISORDERS OF PHOSPHORUS METABOLISM: ICD-10-CM

## 2020-03-31 DIAGNOSIS — I25.10 ATHEROSCLEROTIC HEART DISEASE OF NATIVE CORONARY ARTERY WITHOUT ANGINA PECTORIS: ICD-10-CM

## 2020-03-31 DIAGNOSIS — Z79.84 LONG TERM (CURRENT) USE OF ORAL HYPOGLYCEMIC DRUGS: ICD-10-CM
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
## 2020-03-31 DIAGNOSIS — E11.51 TYPE 2 DIABETES MELLITUS WITH DIABETIC PERIPHERAL ANGIOPATHY WITHOUT GANGRENE: ICD-10-CM
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
## 2020-03-31 DIAGNOSIS — L76.32 POSTPROCEDURAL HEMATOMA OF SKIN AND SUBCUTANEOUS TISSUE FOLLOWING OTHER PROCEDURE: ICD-10-CM

## 2020-03-31 DIAGNOSIS — I99.8 OTHER DISORDER OF CIRCULATORY SYSTEM: ICD-10-CM

## 2020-06-13 ENCOUNTER — INPATIENT (INPATIENT)
Facility: HOSPITAL | Age: 70
LOS: 15 days | Discharge: HOME | DRG: 329 | End: 2020-06-29
Attending: SURGERY | Admitting: SURGERY
Payer: MEDICARE

## 2020-06-13 VITALS
SYSTOLIC BLOOD PRESSURE: 170 MMHG | DIASTOLIC BLOOD PRESSURE: 70 MMHG | WEIGHT: 164.91 LBS | TEMPERATURE: 98 F | OXYGEN SATURATION: 100 % | RESPIRATION RATE: 18 BRPM | HEIGHT: 63 IN | HEART RATE: 108 BPM

## 2020-06-13 DIAGNOSIS — R79.89 OTHER SPECIFIED ABNORMAL FINDINGS OF BLOOD CHEMISTRY: ICD-10-CM

## 2020-06-13 DIAGNOSIS — I25.10 ATHEROSCLEROTIC HEART DISEASE OF NATIVE CORONARY ARTERY WITHOUT ANGINA PECTORIS: ICD-10-CM

## 2020-06-13 DIAGNOSIS — K62.5 HEMORRHAGE OF ANUS AND RECTUM: ICD-10-CM

## 2020-06-13 DIAGNOSIS — Z29.9 ENCOUNTER FOR PROPHYLACTIC MEASURES, UNSPECIFIED: ICD-10-CM

## 2020-06-13 DIAGNOSIS — Z98.89 OTHER SPECIFIED POSTPROCEDURAL STATES: Chronic | ICD-10-CM

## 2020-06-13 DIAGNOSIS — Z95.828 PRESENCE OF OTHER VASCULAR IMPLANTS AND GRAFTS: Chronic | ICD-10-CM

## 2020-06-13 DIAGNOSIS — I10 ESSENTIAL (PRIMARY) HYPERTENSION: ICD-10-CM

## 2020-06-13 DIAGNOSIS — D64.9 ANEMIA, UNSPECIFIED: ICD-10-CM

## 2020-06-13 DIAGNOSIS — I73.9 PERIPHERAL VASCULAR DISEASE, UNSPECIFIED: ICD-10-CM

## 2020-06-13 DIAGNOSIS — E11.9 TYPE 2 DIABETES MELLITUS WITHOUT COMPLICATIONS: ICD-10-CM

## 2020-06-13 DIAGNOSIS — Z41.9 ENCOUNTER FOR PROCEDURE FOR PURPOSES OTHER THAN REMEDYING HEALTH STATE, UNSPECIFIED: Chronic | ICD-10-CM

## 2020-06-13 DIAGNOSIS — R63.8 OTHER SYMPTOMS AND SIGNS CONCERNING FOOD AND FLUID INTAKE: ICD-10-CM

## 2020-06-13 LAB
APTT BLD: 28.2 SEC — SIGNIFICANT CHANGE UP (ref 27.5–36.3)
BLD GP AB SCN SERPL QL: NEGATIVE — SIGNIFICANT CHANGE UP
GLUCOSE BLDC GLUCOMTR-MCNC: 168 MG/DL — HIGH (ref 70–99)
INR BLD: 1.09 — SIGNIFICANT CHANGE UP (ref 0.88–1.16)
OB PNL STL: NEGATIVE — SIGNIFICANT CHANGE UP
PROCALCITONIN SERPL-MCNC: 0.06 NG/ML — SIGNIFICANT CHANGE UP (ref 0.02–0.1)
PROTHROM AB SERPL-ACNC: 12.5 SEC — SIGNIFICANT CHANGE UP (ref 10–12.9)
RH IG SCN BLD-IMP: POSITIVE — SIGNIFICANT CHANGE UP

## 2020-06-13 PROCEDURE — 99292 CRITICAL CARE ADDL 30 MIN: CPT | Mod: 25

## 2020-06-13 PROCEDURE — 70450 CT HEAD/BRAIN W/O DYE: CPT | Mod: 26,59

## 2020-06-13 PROCEDURE — 93010 ELECTROCARDIOGRAM REPORT: CPT

## 2020-06-13 PROCEDURE — 99291 CRITICAL CARE FIRST HOUR: CPT

## 2020-06-13 PROCEDURE — 0042T: CPT

## 2020-06-13 PROCEDURE — 70498 CT ANGIOGRAPHY NECK: CPT | Mod: 26

## 2020-06-13 PROCEDURE — 70496 CT ANGIOGRAPHY HEAD: CPT | Mod: 26

## 2020-06-13 PROCEDURE — 71045 X-RAY EXAM CHEST 1 VIEW: CPT | Mod: 26

## 2020-06-13 RX ORDER — PANTOPRAZOLE SODIUM 20 MG/1
40 TABLET, DELAYED RELEASE ORAL EVERY 12 HOURS
Refills: 0 | Status: DISCONTINUED | OUTPATIENT
Start: 2020-06-14 | End: 2020-06-21

## 2020-06-13 RX ORDER — INSULIN LISPRO 100/ML
VIAL (ML) SUBCUTANEOUS
Refills: 0 | Status: DISCONTINUED | OUTPATIENT
Start: 2020-06-13 | End: 2020-06-23

## 2020-06-13 RX ORDER — DEXTROSE 50 % IN WATER 50 %
12.5 SYRINGE (ML) INTRAVENOUS ONCE
Refills: 0 | Status: DISCONTINUED | OUTPATIENT
Start: 2020-06-13 | End: 2020-06-23

## 2020-06-13 RX ORDER — DEXTROSE 50 % IN WATER 50 %
25 SYRINGE (ML) INTRAVENOUS ONCE
Refills: 0 | Status: DISCONTINUED | OUTPATIENT
Start: 2020-06-13 | End: 2020-06-23

## 2020-06-13 RX ORDER — SODIUM CHLORIDE 9 MG/ML
1000 INJECTION INTRAMUSCULAR; INTRAVENOUS; SUBCUTANEOUS ONCE
Refills: 0 | Status: COMPLETED | OUTPATIENT
Start: 2020-06-13 | End: 2020-06-13

## 2020-06-13 RX ORDER — GLUCAGON INJECTION, SOLUTION 0.5 MG/.1ML
1 INJECTION, SOLUTION SUBCUTANEOUS ONCE
Refills: 0 | Status: DISCONTINUED | OUTPATIENT
Start: 2020-06-13 | End: 2020-06-23

## 2020-06-13 RX ORDER — DEXTROSE 50 % IN WATER 50 %
15 SYRINGE (ML) INTRAVENOUS ONCE
Refills: 0 | Status: DISCONTINUED | OUTPATIENT
Start: 2020-06-13 | End: 2020-06-23

## 2020-06-13 RX ORDER — SODIUM CHLORIDE 9 MG/ML
1000 INJECTION, SOLUTION INTRAVENOUS
Refills: 0 | Status: DISCONTINUED | OUTPATIENT
Start: 2020-06-13 | End: 2020-06-23

## 2020-06-13 RX ORDER — PANTOPRAZOLE SODIUM 20 MG/1
80 TABLET, DELAYED RELEASE ORAL ONCE
Refills: 0 | Status: COMPLETED | OUTPATIENT
Start: 2020-06-13 | End: 2020-06-13

## 2020-06-13 RX ADMIN — SODIUM CHLORIDE 1000 MILLILITER(S): 9 INJECTION INTRAMUSCULAR; INTRAVENOUS; SUBCUTANEOUS at 14:47

## 2020-06-13 RX ADMIN — PANTOPRAZOLE SODIUM 80 MILLIGRAM(S): 20 TABLET, DELAYED RELEASE ORAL at 16:08

## 2020-06-13 RX ADMIN — Medication 2: at 22:18

## 2020-06-13 NOTE — H&P ADULT - PROBLEM SELECTOR PLAN 1
Patient reported melanotic stool 3-4 days ago, though now with reported brown stool. Rectal exam performed on admission revealed brown stool in rectal vault. Hgb 4.3 on admission, likely secondary to GIB. GI consulted and plan for EGD/CS on Monday. NPO after midnight on 6/14.  - Maintain active T/S, two large bore IVs, PPI  - Transfuse pRBC, current plan is for transfusion of 3 units, has received 1 unit at time of writing  - Will escalate care to MICU if HD unstable or if Hgb fails to respond

## 2020-06-13 NOTE — ED PROVIDER NOTE - CARE PLAN
Principal Discharge DX:	Rectal bleeding  Secondary Diagnosis:	Anemia  Secondary Diagnosis:	Confusion

## 2020-06-13 NOTE — CONSULT NOTE ADULT - SUBJECTIVE AND OBJECTIVE BOX
ICU INITIAL CONSULT NOTE     69 M with PMH of HTN, DM, HLD,PAD, CAD, PAD s/p right and left LE FEM-pop bypass in (R in 2016), recent admission for occluded CFA-AK pop bypass s/p Jayme balloon embolectomy and on xarelto and aspirin (3/2020) who present today c/o cool hands, diarrhea, dizziness, weakness, and blood in stool for 4 days.  Pt is Syriac speaking, poor historian, repeating that diarrhea has resolved and fixated on his yellow hands. Was able to say had about two loose bowel movements with very dark bloody stool that filled up bowl. Upon arrival to ED, patient was found to be anemic w/ H/HCT 4.2/13.6 and, and during ED team's examination, patient's mental status changed, and stroke code was initiated for AMS. Last known normal was 14:41, CTH w/ no acute hemorrhage for stroke, but reveal chronic R basal ganglia lacunar infarct previously seen in 2014, NIHSS 0, and TPA not given due to current treatment on xarelto, and severe anemia. ICU INITIAL CONSULT NOTE     69 M with PMH of HTN, DM, HLD,PAD, CAD, PAD s/p right and left LE FEM-pop bypass in (R in 2016), recent admission for occluded CFA-AK pop bypass s/p Jayme balloon embolectomy and on xarelto and aspirin (3/2020) who present today c/o cool hands, diarrhea, dizziness, weakness, and blood in stool for 4 days, last three days before presentation.  Pt is Paraguayan speaking, poor historian, repeating that diarrhea has resolved and fixated on his yellow hands. Hx obtained via daughter Kerry ([phone] who lives with him). Was able to say had about two loose bowel movements with very dark bloody stool that filled up bowl, denied ever bleeding without BM).  Pt has possibly not been taking Xarelto for past several weeks because all his Xarelto bottles were empty. Upon arrival to ED, patient was found to be anemic Hb 4.2 (last Hb 9 in March 2020). During ED exam, found to be acutely lethargic and stroke code called for AMS. Per neuro note: last known normal 2:41pm, no acute changes in CTH, chronic R basal ganglia infarct, no TPA given. At time of my interview, Pt alert and oriented to self and hospital, not date, thinks Gerson is president. Daughter reports worsening memory last several years. Reports that his dizziness and weakness has resolved, never had nausea, vomiting or abdominal pain, changes to urine. Denies fevers, chills, cough, SoB, changes in exercise tolerance, no sick contacts.     EKG: NSR, RBBB (known) ST depressions in I, II, V1-V6 (new)     Meds  Metformin 500 BID  Edarbyclor 40/12.5  Nifedipiine 90 Labetolol 200   Xarelto 15 mg daily       Patient is a 70y old  Male who presents with a chief complaint of GIB (13 Jun 2020 17:36)      Home Medications:  aspirin 81 mg oral delayed release tablet: 1 tab(s) orally once a day (14 Mar 2020 16:20)  azilsartan-chlorthalidone 40 mg-12.5 mg oral tablet: 1 tab(s) orally once a day (14 Mar 2020 16:20)  Chantix: tab(s) orally once a day (14 Mar 2020 16:21)  labetalol 200 mg oral tablet: 1 tab(s) orally 2 times a day (14 Mar 2020 16:20)  metFORMIN 500 mg oral tablet: 1  orally 2 times a day (14 Mar 2020 16:20)  Multiple Vitamins oral tablet: 1 tab(s) orally once a day (14 Mar 2020 16:20)  NIFEdipine 90 mg oral tablet, extended release: 1 tab(s) orally once a day (14 Mar 2020 16:20)      SOCIAL HX:     Smoking          ETOH/Illicit drugs          Occupation    PAST MEDICAL & SURGICAL HISTORY:  PAD (peripheral artery disease)  DM (diabetes mellitus)  CAD (coronary artery disease)  Essential hypertension: Hypertension  S/P femoral-femoral bypass surgery: left 2016  Elective surgery: right leg angiogram with bypass  july 2016  History of hernia repair: june 2014      FAMILY HISTORY:  :    No known cardiovascular or pulmonary family history     ROS:  See HPI     PHYSICAL EXAM    ICU Vital Signs Last 24 Hrs  T(C): 36.7 (13 Jun 2020 13:46), Max: 36.7 (13 Jun 2020 13:46)  T(F): 98.1 (13 Jun 2020 13:46), Max: 98.1 (13 Jun 2020 13:46)  HR: 92 (13 Jun 2020 16:11) (92 - 108)  BP: 117/66 (13 Jun 2020 16:11) (117/66 - 170/70)  BP(mean): --  ABP: --  ABP(mean): --  RR: 18 (13 Jun 2020 16:11) (18 - 18)  SpO2: 100% (13 Jun 2020 16:11) (100% - 100%)      General: Not in distress  HEENT: pale sclera, slight pale, moist mucous membranes         Lymphatic system: No LN  Lungs: Bilateral BS  Cardiovascular: Regular  Gastrointestinal: obese, soft, nontender to palpation BSx4, no rebound or guarding no stool in rectal vault   Musculoskeletal: No clubbing.  Moves all extremities.    Skin: Warm.  Intact  Neurological: No motor or sensory deficit, strength 5/5 in upper and lower extremity, following all commands   Pulses 2+ in lower extremity and symmetric, warm, no edema        LABS:                          4.2    14.45 )-----------( 186      ( 13 Jun 2020 14:59 )             13.6                                               06-13    139  |  101  |  14  ----------------------------<  253<H>  4.1   |  21<L>  |  0.92    Ca    8.4      13 Jun 2020 14:59  Mg     1.8     06-13    TPro  5.9<L>  /  Alb  3.5  /  TBili  0.2  /  DBili  x   /  AST  15  /  ALT  11  /  AlkPhos  81  06-13      PT/INR - ( 13 Jun 2020 14:59 )   PT: 12.5 sec;   INR: 1.09          PTT - ( 13 Jun 2020 14:59 )  PTT:28.2 sec                                           CARDIAC MARKERS ( 13 Jun 2020 14:59 )  x     / 0.02 ng/mL / 78 U/L / x     / 4.6 ng/mL                                            LIVER FUNCTIONS - ( 13 Jun 2020 14:59 )  Alb: 3.5 g/dL / Pro: 5.9 g/dL / ALK PHOS: 81 U/L / ALT: 11 U/L / AST: 15 U/L / GGT: x                                                                                                                                           CXR:    ECHO:    MEDICATIONS  (STANDING):    MEDICATIONS  (PRN): ICU INITIAL CONSULT NOTE     69 M with PMH of HTN, DM, HLD,PAD, CAD, PAD s/p right and left LE FEM-pop bypass in (R in 2016), recent admission for occluded CFA-AK pop bypass s/p Jayme balloon embolectomy and on xarelto and aspirin (3/2020) who present today c/o cool hands, diarrhea, dizziness, weakness, and blood in stool for 4 days, last three days before presentation.  Pt is Panamanian speaking, poor historian, repeating that diarrhea has resolved and fixated on his yellow hands. Hx obtained via daughter Kerry ([phone] who lives with him). Was able to say had about two loose bowel movements with very dark bloody stool that filled up bowl, denied ever bleeding without BM).  Pt has possibly not been taking Xarelto for past several weeks because all his Xarelto bottles were empty. Upon arrival to ED, patient was found to be anemic Hb 4.2 (last Hb 9 in March 2020). During ED exam, found to be acutely lethargic and stroke code called for AMS. Per neuro note: last known normal 2:41pm, no acute changes in CTH, chronic R basal ganglia infarct, no TPA given. At time of my interview, Pt alert and oriented to self and hospital, not date, thinks Gerson is president. Daughter reports worsening memory last several years. Reports that his dizziness and weakness has resolved, never had CP/pressure, nausea, vomiting or abdominal pain, changes to urine. Denies fevers, chills, cough, SoB, changes in exercise tolerance, no sick contacts.     EKG: NSR, RBBB (known) ST depressions in I, II, V1-V6 (new); ST elevation in III    Meds  Metformin 500 BID  Edarbyclor 40/12.5  Nifedipiine 90 Labetolol 200   Xarelto 15 mg daily       Patient is a 70y old  Male who presents with a chief complaint of GIB (13 Jun 2020 17:36)      Home Medications:  aspirin 81 mg oral delayed release tablet: 1 tab(s) orally once a day (14 Mar 2020 16:20)  azilsartan-chlorthalidone 40 mg-12.5 mg oral tablet: 1 tab(s) orally once a day (14 Mar 2020 16:20)  Chantix: tab(s) orally once a day (14 Mar 2020 16:21)  labetalol 200 mg oral tablet: 1 tab(s) orally 2 times a day (14 Mar 2020 16:20)  metFORMIN 500 mg oral tablet: 1  orally 2 times a day (14 Mar 2020 16:20)  Multiple Vitamins oral tablet: 1 tab(s) orally once a day (14 Mar 2020 16:20)  NIFEdipine 90 mg oral tablet, extended release: 1 tab(s) orally once a day (14 Mar 2020 16:20)      SOCIAL HX:     Smoking          ETOH/Illicit drugs          Occupation    PAST MEDICAL & SURGICAL HISTORY:  PAD (peripheral artery disease)  DM (diabetes mellitus)  CAD (coronary artery disease)  Essential hypertension: Hypertension  S/P femoral-femoral bypass surgery: left 2016  Elective surgery: right leg angiogram with bypass  july 2016  History of hernia repair: june 2014      FAMILY HISTORY:  :    No known cardiovascular or pulmonary family history     ROS:  See HPI     PHYSICAL EXAM    ICU Vital Signs Last 24 Hrs  T(C): 36.7 (13 Jun 2020 13:46), Max: 36.7 (13 Jun 2020 13:46)  T(F): 98.1 (13 Jun 2020 13:46), Max: 98.1 (13 Jun 2020 13:46)  HR: 92 (13 Jun 2020 16:11) (92 - 108)  BP: 117/66 (13 Jun 2020 16:11) (117/66 - 170/70)  BP(mean): --  ABP: --  ABP(mean): --  RR: 18 (13 Jun 2020 16:11) (18 - 18)  SpO2: 100% (13 Jun 2020 16:11) (100% - 100%)      General: Not in distress  HEENT: pale sclera, slight pale, moist mucous membranes         Lymphatic system: No LN  Lungs: Bilateral BS  Cardiovascular: Regular  Gastrointestinal: obese, soft, nontender to palpation BSx4, no rebound or guarding no stool in rectal vault   Musculoskeletal: No clubbing.  Moves all extremities.    Skin: Warm.  Intact  Neurological: No motor or sensory deficit, strength 5/5 in upper and lower extremity, following all commands   Pulses 2+ in lower extremity and symmetric, warm, no edema        LABS:                          4.2    14.45 )-----------( 186      ( 13 Jun 2020 14:59 )             13.6                                               06-13    139  |  101  |  14  ----------------------------<  253<H>  4.1   |  21<L>  |  0.92    Ca    8.4      13 Jun 2020 14:59  Mg     1.8     06-13    TPro  5.9<L>  /  Alb  3.5  /  TBili  0.2  /  DBili  x   /  AST  15  /  ALT  11  /  AlkPhos  81  06-13      PT/INR - ( 13 Jun 2020 14:59 )   PT: 12.5 sec;   INR: 1.09          PTT - ( 13 Jun 2020 14:59 )  PTT:28.2 sec                                           CARDIAC MARKERS ( 13 Jun 2020 14:59 )  x     / 0.02 ng/mL / 78 U/L / x     / 4.6 ng/mL                                            LIVER FUNCTIONS - ( 13 Jun 2020 14:59 )  Alb: 3.5 g/dL / Pro: 5.9 g/dL / ALK PHOS: 81 U/L / ALT: 11 U/L / AST: 15 U/L / GGT: x                                                                                                                                           CXR:    ECHO:    MEDICATIONS  (STANDING):    MEDICATIONS  (PRN):

## 2020-06-13 NOTE — H&P ADULT - NSHPPHYSICALEXAM_GEN_ALL_CORE
VITAL SIGNS:  T(C): 36.7 (06-13-20 @ 13:46), Max: 36.7 (06-13-20 @ 13:46)  T(F): 98.1 (06-13-20 @ 13:46), Max: 98.1 (06-13-20 @ 13:46)  HR: 73 (06-13-20 @ 19:57) (73 - 108)  BP: 108/55 (06-13-20 @ 19:57) (108/55 - 170/70)  RR: 17 (06-13-20 @ 19:57) (17 - 18)  SpO2: 93% (06-13-20 @ 19:57) (93% - 100%)    PHYSICAL EXAM:  Constitutional: WDWN resting comfortably in bed; NAD  Head: NC/AT  Eyes: PERRL, EOMI, anicteric sclera  ENT: no nasal discharge; uvula midline, no oropharyngeal erythema or exudates; MMM  Neck: supple; no JVD or thyromegaly  Respiratory: CTA B/L; no W/R/R, no retractions  Cardiac: +S1/S2; RRR; no M/R/G; PMI non-displaced  Gastrointestinal: abdomen soft, NT/ND; normal BSx4 quadrants   Extremities: WWP, no clubbing or cyanosis; no peripheral edema  Musculoskeletal: NROM x4; no joint swelling, tenderness or erythema  Vascular: 2+ radial pulses  Dermatologic: skin warm, dry and intact; no rashes, wounds, or scars  Neurologic: AAOx3; CNII-XII grossly intact; no focal deficits  Psychiatric: affect and characteristics of appearance, verbalizations, behaviors are appropriate VITAL SIGNS:  T(C): 36.7 (06-13-20 @ 13:46), Max: 36.7 (06-13-20 @ 13:46)  T(F): 98.1 (06-13-20 @ 13:46), Max: 98.1 (06-13-20 @ 13:46)  HR: 73 (06-13-20 @ 19:57) (73 - 108)  BP: 108/55 (06-13-20 @ 19:57) (108/55 - 170/70)  RR: 17 (06-13-20 @ 19:57) (17 - 18)  SpO2: 93% (06-13-20 @ 19:57) (93% - 100%)    PHYSICAL EXAM:  Constitutional: WDWN resting comfortably in bed; NAD  Head: NC/AT  Eyes: PERRL, EOMI, anicteric sclera, pale eye lids  ENT: no nasal discharge; uvula midline, no oropharyngeal erythema or exudates; MMM  Neck: supple; no JVD or thyromegaly  Respiratory: CTA B/L; no W/R/R, no retractions  Cardiac: +S1/S2; RRR; no M/R/G; PMI non-displaced  Gastrointestinal: abdomen soft, NT/ND; normal BSx4 quadrants   Extremities: WWP, no clubbing or cyanosis; no peripheral edema  Musculoskeletal: NROM x4; no joint swelling, tenderness or erythema  Vascular: 2+ radial pulses, good cap refill  Dermatologic: skin warm, dry and intact; no rashes, wounds, or scars.  Mild pallor of palms  Neurologic: AAOx1-2 (self and hospital), poor memory; CNII-XII grossly intact; no focal deficits  Psychiatric: affect and characteristics of appearance, verbalizations, behaviors are appropriate

## 2020-06-13 NOTE — H&P ADULT - NSHPLABSRESULTS_GEN_ALL_CORE
.  LABS:                         4.2    14.45 )-----------( 186      ( 13 Jun 2020 14:59 )             13.6     06-13    139  |  101  |  14  ----------------------------<  253<H>  4.1   |  21<L>  |  0.92    Ca    8.4      13 Jun 2020 14:59  Mg     1.8     06-13    TPro  5.9<L>  /  Alb  3.5  /  TBili  0.2  /  DBili  x   /  AST  15  /  ALT  11  /  AlkPhos  81  06-13    PT/INR - ( 13 Jun 2020 14:59 )   PT: 12.5 sec;   INR: 1.09          PTT - ( 13 Jun 2020 14:59 )  PTT:28.2 sec    CARDIAC MARKERS ( 13 Jun 2020 14:59 )  x     / 0.02 ng/mL / 78 U/L / x     / 4.6 ng/mL            RADIOLOGY, EKG & ADDITIONAL TESTS: CT head with and without contrast performed and reviewed. EKG reviewed LABS:                         4.2    14.45 )-----------( 186      ( 13 Jun 2020 14:59 )             13.6     06-13    139  |  101  |  14  ----------------------------<  253<H>  4.1   |  21<L>  |  0.92    Ca    8.4      13 Jun 2020 14:59  Mg     1.8     06-13    TPro  5.9<L>  /  Alb  3.5  /  TBili  0.2  /  DBili  x   /  AST  15  /  ALT  11  /  AlkPhos  81  06-13    PT/INR - ( 13 Jun 2020 14:59 )   PT: 12.5 sec;   INR: 1.09          PTT - ( 13 Jun 2020 14:59 )  PTT:28.2 sec    CARDIAC MARKERS ( 13 Jun 2020 14:59 )  x     / 0.02 ng/mL / 78 U/L / x     / 4.6 ng/mL            RADIOLOGY, EKG & ADDITIONAL TESTS: CT head with and without contrast performed and reviewed. EKG reviewed

## 2020-06-13 NOTE — ED ADULT NURSE NOTE - CHIEF COMPLAINT QUOTE
Patient complaining of diarrhea, weakness, dizziness, and BRB in stool for four days.  Patient denies any CP, SOB, N/V, cough, fevers or any other complaints at this time.  PMD Dr. Mcgrath [phone].

## 2020-06-13 NOTE — CONSULT NOTE ADULT - ASSESSMENT
69 M with PMH of HTN, DM, HLD,PAD, CAD, PAD s/p right and left LE FEM-pop bypass in (R in 2016), recent admission for occluded CFA-AK pop bypass s/p Jayme balloon embolectomy and on xarelto and aspirin (3/2020) who present today with symptomatic anemia with Hb 4.2. VSS but may be masked by labetolol. Found to have new diffuse ST depressions with Trop T0.02 without complaints of chest pain, low suspicion for ACS. Admitted GIB in the setting of Xarelto.     #Symptomatic Anemia   Pt presents with Hb 4.2 (last Hb 9 in March 2020 after bypass surgery, baseline 11-15) with complaints of dizziness (denies CP/pressure/SoB but likely poor history). Extremely poor historian and difficult to distinguish upper vs lower GIB but likely describing melena, no stool in rectal vault    Prep for case on Monday   Clears today   Start split dose golytlely sunday night for Procedure tomorrow   Covid swab now for procedure (low suspicion) 69 M with PMH of HTN, DM, HLD, CAD, PAD s/p right and left LE FEM-pop bypass in (R in 2016), recent admission for occluded CFA-AK pop bypass s/p Jayme balloon embolectomy and on xarelto and aspirin (3/2020) who present today with symptomatic anemia with Hb 4.2. VSS but may be masked by labetolol. Found to have new diffuse ST depressions with Trop T0.02 without complaints of chest pain, low suspicion for ACS. Admitted GIB in the setting of Xarelto.   NEUROLOGY   #AMS   Brief episode of AMS, stroke code called. Now resolved. Suspect 2/2 anemia. Currently AOx2-3, alert and interactive but poor historian Suspect dementia (daugher reports sundowning)   -monitor neuro status q4hr for AMS/sundowning. Hold sedating agents     CARDIOVASCULAR   #Symptomatic Anemia   Pt presents with Hb 4.2 (last Hb 9 in March 2020 after bypass surgery, baseline 11-15) with complaints of dizziness (denies CP/pressure/SoB but likely poor historian). Difficult to distinguish upper vs lower GIB but likely describing melena, no stool in rectal vault, VSS but suspect masked by labetolol. Lethargy resolved.  S/p 1L NS and plan 3u pRBC   -Trend Hb after 3u pRBC, goal is Hb >8 due to known CAD/PAD    #Demand ischemia and troponinemia   EKG with diffuse ST depressions in anterolateral leads, Trop 0.02 in setting of acute/subacute GIB. Denies CP.   -EKG in AM for resolution of ST depressions     #HTN   Soft pressures in ED.   -Hold labetolol and nifedipine for now     #PAD/CAD  S/p right and left LE FEM-pop bypass in (R in 2016), recent admission for occluded CFA-AK pop bypass s/p Jayme balloon embolectomy and on xarelto and aspirin (3/2020), however is possible Pt has not been taking his Xarelto for past weeks. LE with grafts intact, pulses 2+ and warm  -Consult Vascular surgery in AM for AC recs in setting of GIB  -Inquire why not on statin     GASTROINTESTINAL   #Lower GIB   Suspect lower GIB (melena, denies n/v/epigastric pain) however very poor historian. Dr Arroyo for GI consulted in ED. COVID negative and cleared for procedure (low suspicion)   -Clears today, plan for split dose golytly Sunday evening and NPO at midnight for scope Monday   -Protonix 40mg IVP BID    NEPHROLOGY   #No active issues. BUN 14/.9 (baseline .56-.72). Denies changes to UOP  -Trend BMP and UOP     ENDOCRINE   #NIDDM   -On metformin at home. MISS while admitted     HEMATOLOGY   #Chronic leukocytosis 14.45 (previously 23-17). Denies fevers or localizing complaints. Does not meet SIRS. Doubt infected.   -F/u pro-allyson     Fluids: s/p 1L in ED   DVT proph: hold  Code: FULL  Dispo: non covid tele 69 M with PMH of HTN, DM, HLD, CAD, PAD s/p right and left LE FEM-pop bypass in (R in 2016), recent admission for occluded CFA-AK pop bypass s/p Jayme balloon embolectomy and on xarelto and aspirin (3/2020) who present today with symptomatic anemia with Hb 4.2. VSS but may be masked by labetolol. Found to have new diffuse ST depressions with Trop T0.02 without complaints of chest pain, low suspicion for ACS. Admitted GIB in the setting of Xarelto.   NEUROLOGY   #AMS   Brief episode of AMS, stroke code called. Now resolved. Suspect 2/2 anemia. Currently AOx2-3, alert and interactive but poor historian Suspect dementia (daugher reports sundowning)   -monitor neuro status q4hr for AMS/sundowning. Hold sedating agents     CARDIOVASCULAR   #Symptomatic Anemia   Pt presents with Hb 4.2 (last Hb 9 in March 2020 after bypass surgery, baseline 11-15) with complaints of dizziness (denies CP/pressure/SoB but likely poor historian). Difficult to distinguish upper vs lower GIB but likely describing melena, no stool in rectal vault, VSS but suspect masked by labetolol. Lethargy resolved.  S/p 1L NS and plan 3u pRBC   -Trend Hb after 3u pRBC, goal is Hb >8 due to known CAD/PAD    #Demand ischemia and troponinemia   EKG with diffuse ST depressions in anterolateral leads, Trop 0.02 in setting of acute/subacute GIB. Denies CP.   -EKG in AM for resolution of ST depressions   -Trend Cardiac enz to peak    #HTN   Soft pressures in ED.   -Hold labetolol and nifedipine for now     #PAD/CAD  S/p right and left LE FEM-pop bypass in (R in 2016), recent admission for occluded CFA-AK pop bypass s/p Jayme balloon embolectomy and on xarelto and aspirin (3/2020), however is possible Pt has not been taking his Xarelto for past weeks. LE with grafts intact, pulses 2+ and warm  -Consult Vascular surgery in AM for AC recs in setting of GIB  -Inquire why not on statin     GASTROINTESTINAL   #Lower GIB   Suspect lower GIB (melena, denies n/v/epigastric pain) however very poor historian. Dr Arroyo for GI consulted in ED. COVID negative and cleared for procedure (low suspicion)   -Clears today, plan for split dose golytly Sunday evening and NPO at midnight for scope Monday   -Protonix 40mg IVP BID    NEPHROLOGY   #No active issues. BUN 14/.9 (baseline .56-.72). Denies changes to UOP  -Trend BMP and UOP     ENDOCRINE   #NIDDM   -On metformin at home. MISS while admitted     HEMATOLOGY   #Chronic leukocytosis 14.45 (previously 23-17). Denies fevers or localizing complaints. Does not meet SIRS. Doubt infected.   -F/u pro-allyson     Fluids: s/p 1L in ED   DVT proph: hold  Code: FULL  Dispo: non covid tele 69 M with PMH of HTN, DM, HLD, CAD, PAD s/p right and left LE FEM-pop bypass in (R in 2016), recent admission for occluded CFA-AK pop bypass s/p Jayme balloon embolectomy and on xarelto and aspirin (3/2020) who present today with symptomatic anemia with Hb 4.2. VSS but may be masked by labetolol. Found to have new diffuse ST depressions with Trop T0.02 without complaints of chest pain, low suspicion for ACS. Admitted GIB in the setting of Xarelto.   NEUROLOGY   #AMS   Brief episode of AMS, stroke code called. Now resolved. Suspect 2/2 anemia. Currently AOx2-3, alert and interactive but poor historian Suspect dementia (anueler reports sundowning)   -monitor neuro status q4hr for AMS/sundowning. Hold sedating agents   -F/u stroke consult recs     CARDIOVASCULAR   #Symptomatic Anemia   Pt presents with Hb 4.2 (last Hb 9 in March 2020 after bypass surgery, baseline 11-15) with complaints of dizziness (denies CP/pressure/SoB but likely poor historian). Difficult to distinguish upper vs lower GIB but likely describing melena, no stool in rectal vault, VSS but suspect masked by labetolol. Lethargy resolved.  S/p 1L NS and plan 3u pRBC   -Trend Hb after 3u pRBC, goal is Hb >8 due to known CAD/PAD    #Demand ischemia and troponinemia   EKG with diffuse ST depressions in anterolateral leads, Trop 0.02 in setting of acute/subacute GIB. Denies CP.   -EKG in AM for resolution of ST depressions   -Trend Cardiac enz to peak    #HTN   Soft pressures in ED.   -Hold labetolol and nifedipine for now     #PAD/CAD  S/p right and left LE FEM-pop bypass in (R in 2016), recent admission for occluded CFA-AK pop bypass s/p Jayme balloon embolectomy and on xarelto and aspirin (3/2020), however is possible Pt has not been taking his Xarelto for past weeks. LE with grafts intact, pulses 2+ and warm  -Consult Vascular surgery in AM for AC recs in setting of GIB  -Inquire why not on statin     GASTROINTESTINAL   #Lower GIB   Suspect lower GIB (melena, denies n/v/epigastric pain) however very poor historian. Dr Arroyo for GI consulted in ED. COVID negative and cleared for procedure (low suspicion)   -Clears today, plan for split dose golytly Sunday evening and NPO at midnight for scope Monday   -Protonix 40mg IVP BID    NEPHROLOGY   #No active issues. BUN 14/.9 (baseline .56-.72). Denies changes to UOP  -Trend BMP and UOP, apply condom cath for strict i/o    ENDOCRINE   #NIDDM   -On metformin at home. MISS while admitted     HEMATOLOGY   #Chronic leukocytosis 14.45 (previously 23-17). Denies fevers or localizing complaints. Does not meet SIRS. Doubt infected.   -F/u pro-allyson     Fluids: s/p 1L in ED   DVT proph: hold  Code: FULL  Dispo: non covid tele

## 2020-06-13 NOTE — CONSULT NOTE ADULT - SUBJECTIVE AND OBJECTIVE BOX
Neurology Stroke Progress Note:    Review of Systems:    MEDICATIONS  (STANDING):  pantoprazole  Injectable 80 milliGRAM(s) IV Push Once    Allergies  No Known Allergies    Vital Signs Last 24 Hrs  T(C): 36.7 (13 Jun 2020 13:46), Max: 36.7 (13 Jun 2020 13:46)  T(F): 98.1 (13 Jun 2020 13:46), Max: 98.1 (13 Jun 2020 13:46)  HR: 108 (13 Jun 2020 13:46) (108 - 108)  BP: 170/70 (13 Jun 2020 13:46) (170/70 - 170/70)  BP(mean): --  RR: 18 (13 Jun 2020 13:46) (18 - 18)  SpO2: 100% (13 Jun 2020 13:46) (100% - 100%)    Physical exam:      LABS:                        4.2    14.45 )-----------( 186      ( 13 Jun 2020 14:59 )             13.6     06-13    139  |  101  |  14  ----------------------------<  253<H>  4.1   |  21<L>  |  0.92    Ca    8.4      13 Jun 2020 14:59  Mg     1.8     06-13    TPro  5.9<L>  /  Alb  3.5  /  TBili  0.2  /  DBili  x   /  AST  15  /  ALT  11  /  AlkPhos  81  06-13    PT/INR - ( 13 Jun 2020 14:59 )   PT: 12.5 sec;   INR: 1.09       PTT - ( 13 Jun 2020 14:59 )  PTT:28.2 sec Neurology Stroke Progress Note:  69 M with PMH of HTN, DM, HLD,PAD, CAD, PAD s/p right and left LE FEM-pop bypass in (R in 2016), recent admission for occluded CFA-AK pop bypass s/p Jayme balloon embolectomy and on xarelto (3/2020) who present today c/o cool hands, diarrhea, dizziness, weakness, and blood in stool for 4 days.  Upon arrival to ED, patient was found to be anemic w/ H/HCT 4.2/13.6 and, and during ED team's examination, patient's mental status changed, and stroke code was initiated for AMS. Last known normal was 14:41, CTH w/ no acute hemorrhage for stroke, but reveal chronic R basal ganglia lacunar infarct previously seen in 2014. NIHSS 1 (visual loss).     Review of Systems:  CARDIOVASCULAR:  No chest pain, chest pressure or chest discomfort. No palpitations or edema.  RESPIRATORY:  No shortness of breath, cough or sputum.  GASTROINTESTINAL:  +blood in stool for 4 days.  GENITOURINARY: No Burning on urination.   NEUROLOGICAL:  see HPI    MEDICATIONS  (STANDING):  pantoprazole  Injectable 80 milliGRAM(s) IV Push Once    Allergies  No Known Allergies    Vital Signs Last 24 Hrs  T(C): 36.7 (13 Jun 2020 13:46), Max: 36.7 (13 Jun 2020 13:46)  T(F): 98.1 (13 Jun 2020 13:46), Max: 98.1 (13 Jun 2020 13:46)  HR: 108 (13 Jun 2020 13:46) (108 - 108)  BP: 170/70 (13 Jun 2020 13:46) (170/70 - 170/70)  BP(mean): --  RR: 18 (13 Jun 2020 13:46) (18 - 18)  SpO2: 100% (13 Jun 2020 13:46) (100% - 100%)    Physical exam:  Neurologic:     -Mental Status: Alert and oriented to name, and hospital Disoriented to date. Speech is fluent. Unable to name or repeat phrases repetition, and comprehension, no dysarthria. Recent and remote memory intact. Follows commands. Attention/concentration intact. Fund of knowledge appropriate.     -Cranial Nerves:          II: Visual fields are full to confrontation.          III, IV, VI: EOMI without nystagmus. PERRL b/l          V:  Facial sensation V1-V3 equal and intact           VII: Face is symmetric with normal eye closure and smile          VIII: Hearing is bilaterally intact to finger rub          IX, X: Uvula is midline and soft palate rises symmetrically          XI: Head turning and shoulder shrug are intact.          XII: Tongue protrudes midline     -Motor: Normal bulk and tone. Strength bilateral upper extremity 5/5, bilateral lower extremities 5/5.                     No pronator drift. Rapid alternating movements intact and symmetric     -Sensation: Intact to light touch bilaterally. No neglect or extinction on double simultaneous testing.     -Coordination: No dysmetria on finger-to-nose and heel-to-shin bilaterally     -Reflexes: Downgoing toes bilaterally      -Gait: Narrow gait and steady    LABS:                        4.2    14.45 )-----------( 186      ( 13 Jun 2020 14:59 )             13.6     06-13    139  |  101  |  14  ----------------------------<  253<H>  4.1   |  21<L>  |  0.92    Ca    8.4      13 Jun 2020 14:59  Mg     1.8     06-13    TPro  5.9<L>  /  Alb  3.5  /  TBili  0.2  /  DBili  x   /  AST  15  /  ALT  11  /  AlkPhos  81  06-13    PT/INR - ( 13 Jun 2020 14:59 )   PT: 12.5 sec;   INR: 1.09       PTT - ( 13 Jun 2020 14:59 )  PTT:28.2 sec Neurology Stroke Progress Note:  69 M with PMH of HTN, DM, HLD,PAD, CAD, PAD s/p right and left LE FEM-pop bypass in (R in 2016), recent admission for occluded CFA-AK pop bypass s/p Jayme balloon embolectomy and on xarelto (3/2020) who present today c/o cool hands, diarrhea, dizziness, weakness, and blood in stool for 4 days.  Upon arrival to ED, patient was found to be anemic w/ H/HCT 4.2/13.6 and, and during ED team's examination, patient's mental status changed, and stroke code was initiated for AMS. Last known normal was 14:41, CTH w/ no acute hemorrhage for stroke, but reveal chronic R basal ganglia lacunar infarct previously seen in 2014, NIHSS 0, and TPA not given due to current treatment on xarelto, and severe anemia.     Review of Systems:  CARDIOVASCULAR:  No chest pain, chest pressure or chest discomfort. No palpitations or edema.  RESPIRATORY:  No shortness of breath, cough or sputum.  GASTROINTESTINAL:  +blood in stool for 4 days.  GENITOURINARY: No Burning on urination.   NEUROLOGICAL:  see HPI    MEDICATIONS  (STANDING):  pantoprazole  Injectable 80 milliGRAM(s) IV Push Once    Allergies  No Known Allergies    Vital Signs Last 24 Hrs  T(C): 36.7 (13 Jun 2020 13:46), Max: 36.7 (13 Jun 2020 13:46)  T(F): 98.1 (13 Jun 2020 13:46), Max: 98.1 (13 Jun 2020 13:46)  HR: 108 (13 Jun 2020 13:46) (108 - 108)  BP: 170/70 (13 Jun 2020 13:46) (170/70 - 170/70)  BP(mean): --  RR: 18 (13 Jun 2020 13:46) (18 - 18)  SpO2: 100% (13 Jun 2020 13:46) (100% - 100%)    Physical exam:  Neurologic:     -Mental Status: Alert and oriented to name, and hospital, Disoriented to date. Pleasantly confused. Speech is fluent, unable to name or repeat phrases and comprehension not intact. Follow some simple commands intermittently.      -Cranial Nerves:          III, IV, VI: EOMI without nystagmus. PERRL b/l          V:  Does not follow during exam          VII: No facial asymmetry at rest          VIII: Hearing is bilaterally intact to finger rub          IX, X: Uvula is midline and soft palate rises symmetrically          XI: Head turning and shoulder shrug are intact.          XII: Tongue protrudes midline     -Motor: Normal bulk and tone. Strength bilateral upper extremity 5/5, bilateral lower extremities 5/5.                     No pronator drift.      -Sensation: Unable to assess     -Coordination: No dysmetria on finger-to-nose bilaterally    LABS:                        4.2    14.45 )-----------( 186      ( 13 Jun 2020 14:59 )             13.6     06-13    139  |  101  |  14  ----------------------------<  253<H>  4.1   |  21<L>  |  0.92    Ca    8.4      13 Jun 2020 14:59  Mg     1.8     06-13    TPro  5.9<L>  /  Alb  3.5  /  TBili  0.2  /  DBili  x   /  AST  15  /  ALT  11  /  AlkPhos  81  06-13    PT/INR - ( 13 Jun 2020 14:59 )   PT: 12.5 sec;   INR: 1.09       PTT - ( 13 Jun 2020 14:59 )  PTT:28.2 sec

## 2020-06-13 NOTE — H&P ADULT - PROBLEM SELECTOR PLAN 8
DVT ppx - Holding given possible GIB  GI ppx - PPI Patient with history of HTN  - Holding labatalol and CCB given suspected GIB  - Restart as indicated

## 2020-06-13 NOTE — ED PROVIDER NOTE - CLINICAL SUMMARY MEDICAL DECISION MAKING FREE TEXT BOX
60y M with a history of HTN, diabetes, CAD, PAD with complains of generalized weakness, fatigue, and bloody diarrhea 4x days, no fever, no chills, no nausea, no vomiting. Patient is pale otherwise normal vital signs, abdomen soft nontender, no yusuf blood on rectal exam. Plan to send occult blood, check labs, and reassess. 60y M with a history of HTN, diabetes, CAD, PAD with complains of generalized weakness, fatigue, and bloody diarrhea 4x days, no fever, no chills, no nausea, no vomiting. Patient is pale otherwise normal vital signs, abdomen soft nontender, no yusuf blood on rectal exam. Plan to send occult blood, check labs, and reassess, r/o GI bleed r/o anemia 70y M with a history of HTN, diabetes, CAD, PAD with complains of generalized weakness, fatigue, and bloody diarrhea 4x days, no fever, no chills, no nausea, no vomiting. Patient is pale otherwise normal vital signs, abdomen soft nontender, no yusuf blood on rectal exam. Plan to send occult blood, check labs, and reassess, r/o GI bleed r/o anemia

## 2020-06-13 NOTE — H&P ADULT - HISTORY OF PRESENT ILLNESS
69M PMH of HTN, DM, HLD, PAD, CAD, PAD s/p right and left LE FEM-pop bypass in (R in 2016), recent admission for occluded CFA-AK pop bypass s/p Jayme balloon embolectomy (on xarelto and aspirin (3/2020)) who present today c/o cool hands, diarrhea, dizziness, weakness, and blood in stool for 4 days, last three days before presentation.  Pt is Scottish speaking, poor historian, repeating that diarrhea has resolved and fixated on new onset of yellow discoloration of his hands. Hx obtained via daughter Kerry ([phone] lives with pt). Was able to say had about two loose bowel movements with very dark bloody stool that filled up bowl, denied ever bleeding without BM).  Pt has possibly not been taking Xarelto for past several weeks because all his Xarelto bottles were empty. Upon arrival to ED, patient was found to be anemic Hb 4.2 (last Hb 9 in March 2020). During ED exam, found to be acutely lethargic and stroke code called for AMS. Per neuro note: last known normal 2:41pm, no acute changes in CTH, chronic R basal ganglia infarct, no TPA given. Upon CC team exam, pt alert and oriented to self and hospital, not date, thinks Gerson is president. Daughter reports worsening memory last several years. Reports that his dizziness and weakness has resolved, never had CP/pressure, nausea, vomiting or abdominal pain, changes to urine. Denies fevers, chills, cough, SoB, changes in exercise tolerance, no sick contacts.     ED course: /70, , RR 18, T 98.1, SpO2 100% ORA. Labs WBC 14.45, Hgb 4.2, Hct 13.6 Plt 186. On BMP Na 139, K 4.1, Cl 101, CO2 21, bicarb 17, BUN 14, Cr 0.92, . CT w/w/o contrast performed and revealed no intracranial pathology. EKG: NSR, RBBB (known) ST depressions in I, II, V1-V6 (new); ST elevation in III. COVID negative. [facility] and [facility].

## 2020-06-13 NOTE — ED ADULT NURSE NOTE - NSIMPLEMENTINTERV_GEN_ALL_ED
Implemented All Universal Safety Interventions:  [place] to call system. Call bell, personal items and telephone within reach. Instruct patient to call for assistance. Room bathroom lighting operational. Non-slip footwear when patient is off stretcher. Physically safe environment: no spills, clutter or unnecessary equipment. Stretcher in lowest position, wheels locked, appropriate side rails in place.

## 2020-06-13 NOTE — ED PROVIDER NOTE - PHYSICAL EXAMINATION
CONSTITUTIONAL: Well-appearing; well-nourished; in no apparent distress.   HEAD: Normocephalic; atraumatic.   EYES: PERRL; EOM intact; conjunctiva and sclera clear  ENT: normal nose; no rhinorrhea; normal pharynx with no erythema or lesions.   NECK: Supple; non-tender; no LAD  CARDIOVASCULAR: Normal S1, S2; no murmurs, rubs, or gallops. Regular rate and rhythm.   RESPIRATORY: Breathing easily; breath sounds clear and equal bilaterally; no wheezes, rhonchi, or rales.  GI: Soft; non-distended; non-tender; no palpable organomegaly.   RECTAL: scan brown stool, no yusuf blood.  MSK: FROM at all extremities, normal tone   EXT: No cyanosis or edema; N/V intact  SKIN: Normal for age and race; warm; dry; good turgor; no apparent lesions or rash. Pale coloring.  NEURO: A & O x 3; face symmetric; grossly unremarkable.   PSYCHOLOGICAL: The patient’s mood and manner are appropriate. CONSTITUTIONAL: Well-appearing; well-nourished; in no apparent distress.   HEAD: Normocephalic; atraumatic.   EYES: PERRL; EOM intact; conjunctiva and sclera clear  ENT: normal nose; no rhinorrhea; normal pharynx with no erythema or lesions.   NECK: Supple; non-tender; no LAD  CARDIOVASCULAR: Normal S1, S2; no murmurs, rubs, or gallops. Regular rate and rhythm.   RESPIRATORY: Breathing easily; breath sounds clear and equal bilaterally; no wheezes, rhonchi, or rales.  GI: Soft; non-distended; non-tender; no palpable organomegaly.   RECTAL: scant brown stool, no yusuf blood.  MSK: FROM at all extremities, normal tone   EXT: No cyanosis or edema; N/V intact  SKIN: Normal for age and race; warm; dry; good turgor; no apparent lesions or rash. Pale coloring.  NEURO: A & O x 3; face symmetric; grossly unremarkable.   PSYCHOLOGICAL: The patient’s mood and manner are appropriate.

## 2020-06-13 NOTE — H&P ADULT - ATTENDING COMMENTS
see CCM consult.    Severe anemia presumed blood loss GI bleed --- GI consult pending    Admit to telemetry for cardiac monitoring of apparent demand ischemia with elevated troponin and ST depressions on ekg

## 2020-06-13 NOTE — H&P ADULT - PROBLEM SELECTOR PLAN 9
F - currently receiving pRBCs  E - replete PRN  N - clears today, NPO after midnight tomorrow for c-scope/EGD on monday  A - Tele/stepdown    FULL CODE DVT ppx - Holding given possible GIB  GI ppx - PPI

## 2020-06-13 NOTE — H&P ADULT - PROBLEM SELECTOR PLAN 2
Management as above, anemia likely represents anemia of chronic disease with superimposed GIB  - Mgmt as above Management as above, anemia likely represents anemia of chronic disease with superimposed GIB  - Mgmt as above  - goal Hgb > 8 given CAD

## 2020-06-13 NOTE — H&P ADULT - PROBLEM SELECTOR PLAN 10
F - currently receiving pRBCs  E - replete PRN  N - clears today, NPO after midnight tomorrow for c-scope/EGD on monday  A - Tele/stepdown    FULL CODE

## 2020-06-13 NOTE — ED ADULT TRIAGE NOTE - CHIEF COMPLAINT QUOTE
Patient complaining of diarrhea, weakness, dizziness, and BRB in stool for four days.  Patient denies any CP, SOB, N/V, cough, fevers or any other complaints at this time. Patient complaining of diarrhea, weakness, dizziness, and BRB in stool for four days.  Patient denies any CP, SOB, N/V, cough, fevers or any other complaints at this time.  PMD Dr. Mcgrath [phone].

## 2020-06-13 NOTE — CONSULT NOTE ADULT - ASSESSMENT
69 M with PMH of HTN, DM, HLD,PAD, CAD, PAD s/p right and left LE FEM-pop bypass in (R in 2016), recent admission for occluded CFA-AK pop bypass s/p Jayme balloon embolectomy and on xarelto (3/2020) who present today c/o cool hands, diarrhea, dizziness, weakness, and blood in stool for 4 days. Stroke code initiated for acute change in mental status, CTH with no acute hemorrhage, infarct or mass effect (chronic R BG stroke 2014). Most likely acute change in mental status due to hemodynamic instability (H/HCT 4.2/13.6).    Recommendations:  - Manage anemia (hydration and transfusion)  - Will sign off, please call with questions

## 2020-06-13 NOTE — H&P ADULT - PROBLEM SELECTOR PLAN 4
Longstanding history of DM, on metformin at home, though unclear if patient is compliant with meds at home  - While hospitalized, ISS and diabetic diet, will add basal bolus as indicated  - A1c likely inaccurate in setting of recent GIB and subsequent transfusion, therefore would not obtain this test at this time Mildly elevated troponin 0.02, EKG with RBBB (known) ST depressions in I, II, V1-V6 (new); ST elevation in III. Likely demand ischemia in the setting of anemia and volume depletion  - no active chest pain  - trend trops and repeat EKG Mildly elevated troponin 0.02, EKG with RBBB (known) ST depressions in I, II, V1-V6 (new); ST elevation in III. Likely demand ischemia in the setting of anemia and volume depletion  - no active chest pain  - trend trops and repeat EKG    #Leukocytosis  - WBC 14 on admission, could be stress response to GI bleed but unclear etiology.  Afebrile and no clear infectious source.  Patient has had chronically elevated WBC on prior admissions with baseline 13-17.  - trend CBC

## 2020-06-13 NOTE — H&P ADULT - ASSESSMENT
69M PMH of HTN, DM, HLD, PAD, CAD, PAD s/p right and left LE FEM-pop bypass in (R in 2016), recent admission for occluded CFA-AK pop bypass s/p Jayme balloon embolectomy (on xarelto and aspirin (3/2020)) who present today c/o cool hands, diarrhea, dizziness, weakness, and blood in stool for 4 days, last three days before presentation.

## 2020-06-13 NOTE — ED PROVIDER NOTE - PROGRESS NOTE DETAILS
Patient's hemoglobin came back 4.2, spoke with daughter and she said there is no known history of anemia, and has never had a transfusion. States patient can consent on his own, nurse rojas asked consent and patient was confused and was a&o 1x, which was acutely different from previous encounter. Daughter spoke with patient and said he sounded different and not himself and is usually orientated and knows where he lives. Finger stick of 184 and stroke code was called. Patient is confused and could not consent for blood, called the daughter edward back and consented on the patient's behalf. spoke with  Dr. Mcgrath pts pmd, requesting Dr. Arroyo for GI. Spoke with Dr. Arroyo, aware of patient. ICU consulted.

## 2020-06-13 NOTE — H&P ADULT - PROBLEM SELECTOR PLAN 7
Patient with history of HTN  - Holding labatalol and CCB given suspected GIB  - Restart as indicated H/o PAD w. bypass procedure, takes NOAC, ASA and labatalol at home  - Hold all meds given recent GIB with subsequent severe anemia  - Restart PRN following improvement of symptoms  - Consult vascular in AM

## 2020-06-13 NOTE — ED ADULT NURSE NOTE - OBJECTIVE STATEMENT
Pt is a 69 y/o male came in to ED for evaluation of diarrhea with dark stool. Pt is aox4, Mongolian speaking. Pt reports having diarrhea a few days ago with dark stool that have now improved, Pt expressed concern that his hands are very pale.

## 2020-06-13 NOTE — H&P ADULT - PROBLEM SELECTOR PLAN 3
AMS prompted CVA evaluation upon admission. CT w/w/o contrast of the head and neck performed with no pathology noted.  - NTD at this time

## 2020-06-13 NOTE — H&P ADULT - NSHPREVIEWOFSYSTEMS_GEN_ALL_CORE
REVIEW OF SYSTEMS    General:	Appears at normal state, no acute complaints  Skin/Breast: Endorses discoloration of hands, denies coolness or numbness in extremities  Ophthalmologic: Denies visual changes  ENMT: Denies rhinorrhea, denies sore throat, denies cough  Respiratory and Thorax: Denies SOB, denies tachypnea, denies disproportionate PERERA  Cardiovascular: Denies chest pain or palpitations, denies syncope or near syncopal episodes  Gastrointestinal: Denies nausea or vomiting, denies diarrhea or constipation, reports several days of melanotic stool, however denies BRBPR  Genitourinary: Denies dysuria or hematuria  Musculoskeletal: Denies weakness or fatigue, denies myalgia  Neurological: Denies syncope, denies loss of coordination, denies recent falls  Psychiatric: Denies altered mood, denies depression  Hematology/Lymphatics: Denies recent bruising or overt blood loss  Endocrine: Denies polydipsia, polyuria or polyphagia, denies flushing  Allergic/Immunologic: Denies rhinorrhea or pruritis

## 2020-06-13 NOTE — H&P ADULT - PROBLEM SELECTOR PLAN 5
H/o CAD, takes NOAC, ASA and labatalol at home  - Hold all meds given recent GIB with subsequent severe anemia  - Restart PRN following improvement of symptoms Longstanding history of DM, on metformin at home, though unclear if patient is compliant with meds at home  - While hospitalized, ISS and diabetic diet, will add basal bolus as indicated  - A1c likely inaccurate in setting of recent GIB and subsequent transfusion, therefore would not obtain this test at this time

## 2020-06-13 NOTE — H&P ADULT - PROBLEM SELECTOR PLAN 6
H/o PAD w. bypass procedure, takes NOAC, ASA and labatalol at home  - Hold all meds given recent GIB with subsequent severe anemia  - Restart PRN following improvement of symptoms  - Consult vascular in AM H/o CAD, takes NOAC, ASA and labatalol at home  - Hold all meds given recent GIB with subsequent severe anemia  - Restart PRN following improvement of symptoms

## 2020-06-13 NOTE — ED PROVIDER NOTE - OBJECTIVE STATEMENT
70y M translation provided by daughter over the phone, with a history of hypertension, diabetes, hyperlipidemia, CAD, status post right and left lower extremity fem pop artery bypass, status post embolectomy march 2020 from excluded CFA presents with complains of 4x days of generalized weakness and diarrhea. Patient reports 2x episodes of diarrhea per day and bright red blood in stool, no diarrhea this morning and patient feels better. Patient is complaining of feeling weak and has "white color on his hands" as per daughter edward ([phone]) patient has had off coloring and more weakness over the past few days. Denies abdominal pain, nausea, vomiting, rectal pain, fever, chills, chest pain, shortness of breath, or dizziness. Patient is on daily aspirin and Xarelto. Denies daily alcohol or NSAID use. 70y M translation provided by daughter over the phone, with a history of hypertension, diabetes, hyperlipidemia, CAD, status post right and left lower extremity fem pop artery bypass, status post embolectomy march 2020 from excluded CFA presents with complains of 4x days of generalized weakness and diarrhea. Patient reports 2x episodes of diarrhea per day and bright red blood in stool, no diarrhea this morning and patient states he feels better. Patient is complaining of feeling weak and has "white color on his hands" as per daughter edward ([phone]) patient has had off coloring and more weakness over the past few days. Denies abdominal pain, nausea, vomiting, rectal pain, fever, chills, chest pain, shortness of breath, or dizziness. Patient is on daily aspirin and Xarelto but pts daughter thinks he has not been taking it for 2 weeks because her ran out. Denies daily alcohol or NSAID use.

## 2020-06-13 NOTE — ED PROVIDER NOTE - ATTENDING CONTRIBUTION TO CARE
70y M translation provided by daughter over the phone, with a history of hypertension, diabetes, hyperlipidemia, CAD, status post right and left lower extremity fem pop artery bypass, status post embolectomy march 2020 from excluded CFA presents with complains of 4x days of generalized weakness and diarrhea. Patient reports 2x episodes of diarrhea per day and bright red blood in stool, no diarrhea this morning and patient states he feels better. Patient is complaining of feeling weak and has "white color on his hands" as per daughter edward ([phone]) patient has had off coloring and more weakness over the past few days. Denies abdominal pain, nausea, vomiting, rectal pain, fever, chills, chest pain, shortness of breath, or dizziness. Patient is on daily aspirin and Xarelto but pts daughter thinks he has not been taking it for 2 weeks because he ran out. Denies daily alcohol or NSAID use.  VSS. Plan to send occult blood, check labs, and reassess, r/o GI bleed r/o anemia. Pt pale, AAO, NAD, RRR, CTA b/l, abd: soft and NT, rectal exam with no yusuf blood per PA exam. Labs/ studies noted.   Patient's hemoglobin came back 4.2, spoke with daughter and she said there is no known history of anemia, and has never had a transfusion. States patient can consent on his own.   While in ED patient was acutely confused and was a&o 1x, which was acutely different from previous encounter. Daughter spoke with patient and said he sounded different and not himself and is usually orientated and knows where he lives. Finger stick of 184 and stroke code was called. CTs noted and with no acute findings. Per stroke team pt should be hydrated and transfused. Blood transfusion initiated in ED. MICU consulted and pt admitted to [facility].

## 2020-06-14 LAB
ANION GAP SERPL CALC-SCNC: 10 MMOL/L — SIGNIFICANT CHANGE UP (ref 5–17)
BASOPHILS # BLD AUTO: 0.09 K/UL — SIGNIFICANT CHANGE UP (ref 0–0.2)
BASOPHILS NFR BLD AUTO: 0.8 % — SIGNIFICANT CHANGE UP (ref 0–2)
BUN SERPL-MCNC: 10 MG/DL — SIGNIFICANT CHANGE UP (ref 7–23)
CALCIUM SERPL-MCNC: 8.2 MG/DL — LOW (ref 8.4–10.5)
CHLORIDE SERPL-SCNC: 104 MMOL/L — SIGNIFICANT CHANGE UP (ref 96–108)
CK MB CFR SERPL CALC: 3 NG/ML — SIGNIFICANT CHANGE UP (ref 0–6.7)
CK MB CFR SERPL CALC: 3.6 NG/ML — SIGNIFICANT CHANGE UP (ref 0–6.7)
CK SERPL-CCNC: 73 U/L — SIGNIFICANT CHANGE UP (ref 30–200)
CK SERPL-CCNC: 77 U/L — SIGNIFICANT CHANGE UP (ref 30–200)
CO2 SERPL-SCNC: 26 MMOL/L — SIGNIFICANT CHANGE UP (ref 22–31)
CREAT SERPL-MCNC: 0.83 MG/DL — SIGNIFICANT CHANGE UP (ref 0.5–1.3)
EOSINOPHIL # BLD AUTO: 0.4 K/UL — SIGNIFICANT CHANGE UP (ref 0–0.5)
EOSINOPHIL NFR BLD AUTO: 3.6 % — SIGNIFICANT CHANGE UP (ref 0–6)
GLUCOSE BLDC GLUCOMTR-MCNC: 135 MG/DL — HIGH (ref 70–99)
GLUCOSE BLDC GLUCOMTR-MCNC: 137 MG/DL — HIGH (ref 70–99)
GLUCOSE BLDC GLUCOMTR-MCNC: 143 MG/DL — HIGH (ref 70–99)
GLUCOSE BLDC GLUCOMTR-MCNC: 144 MG/DL — HIGH (ref 70–99)
GLUCOSE BLDC GLUCOMTR-MCNC: 156 MG/DL — HIGH (ref 70–99)
GLUCOSE SERPL-MCNC: 132 MG/DL — HIGH (ref 70–99)
HCT VFR BLD CALC: 22.8 % — LOW (ref 39–50)
HCT VFR BLD CALC: 23.7 % — LOW (ref 39–50)
HCT VFR BLD CALC: 27.6 % — LOW (ref 39–50)
HCV AB S/CO SERPL IA: 0.09 S/CO — SIGNIFICANT CHANGE UP
HCV AB SERPL-IMP: SIGNIFICANT CHANGE UP
HGB BLD-MCNC: 7.6 G/DL — LOW (ref 13–17)
HGB BLD-MCNC: 7.8 G/DL — LOW (ref 13–17)
HGB BLD-MCNC: 8.9 G/DL — LOW (ref 13–17)
IMM GRANULOCYTES NFR BLD AUTO: 0.6 % — SIGNIFICANT CHANGE UP (ref 0–1.5)
LYMPHOCYTES # BLD AUTO: 1.6 K/UL — SIGNIFICANT CHANGE UP (ref 1–3.3)
LYMPHOCYTES # BLD AUTO: 14.4 % — SIGNIFICANT CHANGE UP (ref 13–44)
MAGNESIUM SERPL-MCNC: 1.8 MG/DL — SIGNIFICANT CHANGE UP (ref 1.6–2.6)
MCHC RBC-ENTMCNC: 28.3 PG — SIGNIFICANT CHANGE UP (ref 27–34)
MCHC RBC-ENTMCNC: 29 PG — SIGNIFICANT CHANGE UP (ref 27–34)
MCHC RBC-ENTMCNC: 29.1 PG — SIGNIFICANT CHANGE UP (ref 27–34)
MCHC RBC-ENTMCNC: 32.2 GM/DL — SIGNIFICANT CHANGE UP (ref 32–36)
MCHC RBC-ENTMCNC: 32.9 GM/DL — SIGNIFICANT CHANGE UP (ref 32–36)
MCHC RBC-ENTMCNC: 33.3 GM/DL — SIGNIFICANT CHANGE UP (ref 32–36)
MCV RBC AUTO: 87.4 FL — SIGNIFICANT CHANGE UP (ref 80–100)
MCV RBC AUTO: 87.9 FL — SIGNIFICANT CHANGE UP (ref 80–100)
MCV RBC AUTO: 88.1 FL — SIGNIFICANT CHANGE UP (ref 80–100)
MONOCYTES # BLD AUTO: 0.96 K/UL — HIGH (ref 0–0.9)
MONOCYTES NFR BLD AUTO: 8.6 % — SIGNIFICANT CHANGE UP (ref 2–14)
NEUTROPHILS # BLD AUTO: 8 K/UL — HIGH (ref 1.8–7.4)
NEUTROPHILS NFR BLD AUTO: 72 % — SIGNIFICANT CHANGE UP (ref 43–77)
NRBC # BLD: 0 /100 WBCS — SIGNIFICANT CHANGE UP (ref 0–0)
PHOSPHATE SERPL-MCNC: 2.8 MG/DL — SIGNIFICANT CHANGE UP (ref 2.5–4.5)
PLATELET # BLD AUTO: 163 K/UL — SIGNIFICANT CHANGE UP (ref 150–400)
PLATELET # BLD AUTO: 168 K/UL — SIGNIFICANT CHANGE UP (ref 150–400)
PLATELET # BLD AUTO: 178 K/UL — SIGNIFICANT CHANGE UP (ref 150–400)
POTASSIUM SERPL-MCNC: 3.5 MMOL/L — SIGNIFICANT CHANGE UP (ref 3.5–5.3)
POTASSIUM SERPL-SCNC: 3.5 MMOL/L — SIGNIFICANT CHANGE UP (ref 3.5–5.3)
RBC # BLD: 2.61 M/UL — LOW (ref 4.2–5.8)
RBC # BLD: 2.69 M/UL — LOW (ref 4.2–5.8)
RBC # BLD: 3.14 M/UL — LOW (ref 4.2–5.8)
RBC # FLD: 15 % — HIGH (ref 10.3–14.5)
RBC # FLD: 15.4 % — HIGH (ref 10.3–14.5)
RBC # FLD: 15.8 % — HIGH (ref 10.3–14.5)
SODIUM SERPL-SCNC: 140 MMOL/L — SIGNIFICANT CHANGE UP (ref 135–145)
TROPONIN T SERPL-MCNC: 0.04 NG/ML — CRITICAL HIGH (ref 0–0.01)
TROPONIN T SERPL-MCNC: 0.05 NG/ML — CRITICAL HIGH (ref 0–0.01)
TROPONIN T SERPL-MCNC: 0.05 NG/ML — CRITICAL HIGH (ref 0–0.01)
WBC # BLD: 10.53 K/UL — HIGH (ref 3.8–10.5)
WBC # BLD: 11.12 K/UL — HIGH (ref 3.8–10.5)
WBC # BLD: 13 K/UL — HIGH (ref 3.8–10.5)
WBC # FLD AUTO: 10.53 K/UL — HIGH (ref 3.8–10.5)
WBC # FLD AUTO: 11.12 K/UL — HIGH (ref 3.8–10.5)
WBC # FLD AUTO: 13 K/UL — HIGH (ref 3.8–10.5)

## 2020-06-14 PROCEDURE — 71045 X-RAY EXAM CHEST 1 VIEW: CPT | Mod: 26

## 2020-06-14 PROCEDURE — 93010 ELECTROCARDIOGRAM REPORT: CPT

## 2020-06-14 RX ORDER — POTASSIUM CHLORIDE 20 MEQ
40 PACKET (EA) ORAL ONCE
Refills: 0 | Status: COMPLETED | OUTPATIENT
Start: 2020-06-14 | End: 2020-06-14

## 2020-06-14 RX ORDER — MAGNESIUM SULFATE 500 MG/ML
2 VIAL (ML) INJECTION ONCE
Refills: 0 | Status: COMPLETED | OUTPATIENT
Start: 2020-06-14 | End: 2020-06-14

## 2020-06-14 RX ORDER — SOD SULF/SODIUM/NAHCO3/KCL/PEG
4000 SOLUTION, RECONSTITUTED, ORAL ORAL ONCE
Refills: 0 | Status: COMPLETED | OUTPATIENT
Start: 2020-06-14 | End: 2020-06-14

## 2020-06-14 RX ADMIN — PANTOPRAZOLE SODIUM 40 MILLIGRAM(S): 20 TABLET, DELAYED RELEASE ORAL at 05:24

## 2020-06-14 RX ADMIN — Medication 4000 MILLILITER(S): at 18:00

## 2020-06-14 RX ADMIN — Medication 50 GRAM(S): at 16:07

## 2020-06-14 RX ADMIN — PANTOPRAZOLE SODIUM 40 MILLIGRAM(S): 20 TABLET, DELAYED RELEASE ORAL at 18:00

## 2020-06-14 RX ADMIN — Medication 40 MILLIEQUIVALENT(S): at 16:07

## 2020-06-14 RX ADMIN — Medication 2: at 12:14

## 2020-06-14 NOTE — PROGRESS NOTE ADULT - PROBLEM SELECTOR PLAN 10
F - currently receiving pRBCs  E - replete PRN  N - clears today, NPO after midnight tomorrow for c-scope/EGD on monday  A - Tele/stepdown    FULL CODE F - none  E - replete PRN  N - clears today, NPO after midnight tomorrow for c-scope/EGD on monday  A - Tele/stepdown    FULL CODE

## 2020-06-14 NOTE — CONSULT NOTE ADULT - SUBJECTIVE AND OBJECTIVE BOX
poor historian  part of history from chart    69M PMH of HTN, DM, HLD, PAD, CAD, PAD s/p right and left LE FEM-pop bypass in (R in 2016), recent admission for occluded CFA-AK pop bypass s/p Jayme balloon embolectomy (on xarelto and aspirin (3/2020)) who present today c/o cool hands, diarrhea, dizziness, weakness, and blood in stool for 4 days, last three days before presentation.. Was able to say had about two loose bowel movements with very dark bloody stool that filled up bowl, denied ever bleeding without BM).  Pt has possibly not been taking Xarelto for past several weeks because all his Xarelto bottles were empty. Upon arrival to ED, patient was found to be anemic Hb 4.2 (last Hb 9 in March 2020). During ED exam, found to be acutely lethargic and stroke code called for AMS. Per neuro note: last known normal 2:41pm, no acute changes in CTH, chronic R basal ganglia infarct, no TPA given.     REVIEW OF SYSTEMS:  Constitutional: No fever, weight loss or fatigue  ENMT:  No difficulty hearing, tinnitus, vertigo; No sinus or throat pain  Respiratory: No cough, wheezing, chills or hemoptysis  Cardiovascular: No chest pain, palpitations, dizziness or leg swelling  Gastrointestinal: No abdominal or epigastric pain. No nausea, vomiting or hematemesis; No diarrhea or constipation. No melena or hematochezia.  Skin: No itching, burning, rashes or lesions   Musculoskeletal: No joint pain or swelling; No muscle, back or extremity pain    PAST MEDICAL & SURGICAL HISTORY:  PAD (peripheral artery disease)  DM (diabetes mellitus)  CAD (coronary artery disease)  Essential hypertension: Hypertension  S/P femoral-femoral bypass surgery: left 2016  Elective surgery: right leg angiogram with bypass  july 2016  History of hernia repair: june 2014      FAMILY HISTORY:      SOCIAL HISTORY:  Smoking Status: [ ] Current, [ ] Former, [ ] Never  Pack Years:    MEDICATIONS:  MEDICATIONS  (STANDING):  dextrose 5%. 1000 milliLiter(s) (50 mL/Hr) IV Continuous <Continuous>  dextrose 50% Injectable 12.5 Gram(s) IV Push once  dextrose 50% Injectable 25 Gram(s) IV Push once  dextrose 50% Injectable 25 Gram(s) IV Push once  insulin lispro (HumaLOG) corrective regimen sliding scale   SubCutaneous Before meals and at bedtime  magnesium sulfate  IVPB 2 Gram(s) IV Intermittent once  pantoprazole  Injectable 40 milliGRAM(s) IV Push every 12 hours  polyethylene glycol/electrolyte Solution. 4000 milliLiter(s) Oral once  potassium chloride   Powder 40 milliEquivalent(s) Oral once    MEDICATIONS  (PRN):  dextrose 40% Gel 15 Gram(s) Oral once PRN Blood Glucose LESS THAN 70 milliGRAM(s)/deciliter  glucagon  Injectable 1 milliGRAM(s) IntraMuscular once PRN Glucose LESS THAN 70 milligrams/deciliter      Allergies    No Known Allergies    Intolerances        Vital Signs Last 24 Hrs  T(C): 36.8 (14 Jun 2020 12:10), Max: 37.3 (13 Jun 2020 22:05)  T(F): 98.2 (14 Jun 2020 12:10), Max: 99.1 (13 Jun 2020 22:05)  HR: 59 (14 Jun 2020 12:10) (59 - 92)  BP: 117/57 (14 Jun 2020 12:10) (106/55 - 145/62)  BP(mean): 82 (14 Jun 2020 12:10) (74 - 89)  RR: 22 (14 Jun 2020 12:10) (16 - 32)  SpO2: 94% (14 Jun 2020 12:10) (90% - 100%)    06-13 @ 07:01  -  06-14 @ 07:00  --------------------------------------------------------  IN: 1 mL / OUT: 575 mL / NET: -574 mL          PHYSICAL EXAM:    General: Well developed; well nourished; in no acute distress  HEENT: MMM, conjunctiva and sclera clear  Gastrointestinal: Soft, non-tender non-distended; Normal bowel sounds; No rebound or guarding  Extremities: Normal range of motion, No clubbing, cyanosis or edema, staples in groin  Neurological: Alert and oriented x2  Skin: Warm and dry. No obvious rash      LABS:                        7.6    11.12 )-----------( 168      ( 14 Jun 2020 07:36 )             22.8     06-14    140  |  104  |  10  ----------------------------<  132<H>  3.5   |  26  |  0.83    Ca    8.2<L>      14 Jun 2020 07:36  Phos  2.8     06-14  Mg     1.8     06-14    TPro  5.9<L>  /  Alb  3.5  /  TBili  0.2  /  DBili  x   /  AST  15  /  ALT  11  /  AlkPhos  81  06-13          RADIOLOGY & ADDITIONAL STUDIES:

## 2020-06-14 NOTE — PROGRESS NOTE ADULT - PROBLEM SELECTOR PLAN 4
Mildly elevated troponin 0.02, EKG with RBBB (known) ST depressions in I, II, V1-V6 (new); ST elevation in III. Likely demand ischemia in the setting of anemia and volume depletion  - no active chest pain  - trend trops and repeat EKG    #Leukocytosis  - WBC 14 on admission, could be stress response to GI bleed but unclear etiology.  Afebrile and no clear infectious source.  Patient has had chronically elevated WBC on prior admissions with baseline 13-17.  - trend CBC Mildly elevated troponin 0.02, EKG with RBBB (known) ST depressions in I, II, V1-V6 (new); ST elevation in III. Likely demand ischemia in the setting of anemia and volume depletion.  - no active chest pain  - trend trops and repeat EKG  - troponins still increasing form 0.02 to 0.04, will trend     #Leukocytosis  - WBC 14 on admission, could be stress response to GI bleed but unclear etiology.  Afebrile and no clear infectious source.  Patient has had chronically elevated WBC on prior admissions with baseline 13-17.  - trend CBC

## 2020-06-14 NOTE — PROGRESS NOTE ADULT - SUBJECTIVE AND OBJECTIVE BOX
OVERNIGHT EVENTS:    SUBJECTIVE / INTERVAL HPI: Patient seen and examined at bedside.     VITAL SIGNS:  Vital Signs Last 24 Hrs  T(C): 36.8 (14 Jun 2020 07:01), Max: 37.3 (13 Jun 2020 22:05)  T(F): 98.2 (14 Jun 2020 07:01), Max: 99.1 (13 Jun 2020 22:05)  HR: 61 (14 Jun 2020 08:35) (60 - 108)  BP: 113/57 (14 Jun 2020 08:35) (106/55 - 170/70)  BP(mean): 80 (14 Jun 2020 08:35) (74 - 80)  RR: 21 (14 Jun 2020 08:35) (16 - 21)  SpO2: 96% (14 Jun 2020 08:35) (91% - 100%)    06-13-20 @ 07:01  -  06-14-20 @ 07:00  --------------------------------------------------------  IN: 1 mL / OUT: 575 mL / NET: -574 mL        PHYSICAL EXAM:    General: WDWN  HEENT: NC/AT; PERRL, anicteric sclera; MMM  Neck: supple  Cardiovascular: +S1/S2; RRR  Respiratory: CTA B/L; no W/R/R  Gastrointestinal: soft, NT/ND; +BSx4  Extremities: WWP; no edema, clubbing or cyanosis  Vascular: 2+ radial, DP/PT pulses B/L  Neurological: AAOx3; no focal deficits    MEDICATIONS:  MEDICATIONS  (STANDING):  dextrose 5%. 1000 milliLiter(s) (50 mL/Hr) IV Continuous <Continuous>  dextrose 50% Injectable 12.5 Gram(s) IV Push once  dextrose 50% Injectable 25 Gram(s) IV Push once  dextrose 50% Injectable 25 Gram(s) IV Push once  insulin lispro (HumaLOG) corrective regimen sliding scale   SubCutaneous Before meals and at bedtime  magnesium sulfate  IVPB 2 Gram(s) IV Intermittent once  pantoprazole  Injectable 40 milliGRAM(s) IV Push every 12 hours  potassium chloride   Powder 40 milliEquivalent(s) Oral once    MEDICATIONS  (PRN):  dextrose 40% Gel 15 Gram(s) Oral once PRN Blood Glucose LESS THAN 70 milliGRAM(s)/deciliter  glucagon  Injectable 1 milliGRAM(s) IntraMuscular once PRN Glucose LESS THAN 70 milligrams/deciliter      ALLERGIES:  Allergies    No Known Allergies    Intolerances        LABS:                        7.6    11.12 )-----------( 168      ( 14 Jun 2020 07:36 )             22.8     06-14    140  |  104  |  10  ----------------------------<  132<H>  3.5   |  26  |  0.83    Ca    8.2<L>      14 Jun 2020 07:36  Phos  2.8     06-14  Mg     1.8     06-14    TPro  5.9<L>  /  Alb  3.5  /  TBili  0.2  /  DBili  x   /  AST  15  /  ALT  11  /  AlkPhos  81  06-13    PT/INR - ( 13 Jun 2020 14:59 )   PT: 12.5 sec;   INR: 1.09          PTT - ( 13 Jun 2020 14:59 )  PTT:28.2 sec    CAPILLARY BLOOD GLUCOSE  184 (13 Jun 2020 15:49)      POCT Blood Glucose.: 137 mg/dL (14 Jun 2020 07:07)        RADIOLOGY & ADDITIONAL TESTS: Reviewed.    ASSESSMENT:    PLAN: OVERNIGHT EVENTS:  Admitted overnight.     SUBJECTIVE / INTERVAL HPI:   Patient seen and examined at bedside. Denies any medical complaints at this time, but does point to staples in his R groin and expresses some discomfort. Is unsure as to when the staples were supposed to be removed.     VITAL SIGNS:  Vital Signs Last 24 Hrs  T(C): 36.8 (14 Jun 2020 07:01), Max: 37.3 (13 Jun 2020 22:05)  T(F): 98.2 (14 Jun 2020 07:01), Max: 99.1 (13 Jun 2020 22:05)  HR: 61 (14 Jun 2020 08:35) (60 - 108)  BP: 113/57 (14 Jun 2020 08:35) (106/55 - 170/70)  BP(mean): 80 (14 Jun 2020 08:35) (74 - 80)  RR: 21 (14 Jun 2020 08:35) (16 - 21)  SpO2: 96% (14 Jun 2020 08:35) (91% - 100%)    06-13-20 @ 07:01  -  06-14-20 @ 07:00  --------------------------------------------------------  IN: 1 mL / OUT: 575 mL / NET: -574 mL        PHYSICAL EXAM:    General: WDWN  HEENT: NC/AT; PERRL, anicteric sclera; MMM  Neck: supple  Cardiovascular: +S1/S2; RRR  Respiratory: CTA B/L; no W/R/R  Gastrointestinal: soft, NT/ND; +BSx4  Extremities: WWP; no edema, clubbing or cyanosis  Vascular: 2+ radial, DP/PT pulses B/L  Neurological: AAOx3; no focal deficits    MEDICATIONS:  MEDICATIONS  (STANDING):  dextrose 5%. 1000 milliLiter(s) (50 mL/Hr) IV Continuous <Continuous>  dextrose 50% Injectable 12.5 Gram(s) IV Push once  dextrose 50% Injectable 25 Gram(s) IV Push once  dextrose 50% Injectable 25 Gram(s) IV Push once  insulin lispro (HumaLOG) corrective regimen sliding scale   SubCutaneous Before meals and at bedtime  magnesium sulfate  IVPB 2 Gram(s) IV Intermittent once  pantoprazole  Injectable 40 milliGRAM(s) IV Push every 12 hours  potassium chloride   Powder 40 milliEquivalent(s) Oral once    MEDICATIONS  (PRN):  dextrose 40% Gel 15 Gram(s) Oral once PRN Blood Glucose LESS THAN 70 milliGRAM(s)/deciliter  glucagon  Injectable 1 milliGRAM(s) IntraMuscular once PRN Glucose LESS THAN 70 milligrams/deciliter      ALLERGIES:  Allergies    No Known Allergies    Intolerances        LABS:                        7.6    11.12 )-----------( 168      ( 14 Jun 2020 07:36 )             22.8     06-14    140  |  104  |  10  ----------------------------<  132<H>  3.5   |  26  |  0.83    Ca    8.2<L>      14 Jun 2020 07:36  Phos  2.8     06-14  Mg     1.8     06-14    TPro  5.9<L>  /  Alb  3.5  /  TBili  0.2  /  DBili  x   /  AST  15  /  ALT  11  /  AlkPhos  81  06-13    PT/INR - ( 13 Jun 2020 14:59 )   PT: 12.5 sec;   INR: 1.09          PTT - ( 13 Jun 2020 14:59 )  PTT:28.2 sec    CAPILLARY BLOOD GLUCOSE  184 (13 Jun 2020 15:49)      POCT Blood Glucose.: 137 mg/dL (14 Jun 2020 07:07)        RADIOLOGY & ADDITIONAL TESTS: Reviewed.    ASSESSMENT:    PLAN: OVERNIGHT EVENTS:  Admitted overnight.     SUBJECTIVE / INTERVAL HPI:   Patient seen and examined at bedside. Denies any medical complaints at this time, but does point to staples in his R groin and expresses some discomfort. Is unsure as to when the staples were supposed to be removed. Vascular called for possible suture removal.       VITAL SIGNS:  Vital Signs Last 24 Hrs  T(C): 36.8 (14 Jun 2020 07:01), Max: 37.3 (13 Jun 2020 22:05)  T(F): 98.2 (14 Jun 2020 07:01), Max: 99.1 (13 Jun 2020 22:05)  HR: 61 (14 Jun 2020 08:35) (60 - 108)  BP: 113/57 (14 Jun 2020 08:35) (106/55 - 170/70)  BP(mean): 80 (14 Jun 2020 08:35) (74 - 80)  RR: 21 (14 Jun 2020 08:35) (16 - 21)  SpO2: 96% (14 Jun 2020 08:35) (91% - 100%)    06-13-20 @ 07:01  -  06-14-20 @ 07:00  --------------------------------------------------------  IN: 1 mL / OUT: 575 mL / NET: -574 mL        PHYSICAL EXAM:  	Constitutional: WDWN resting comfortably in bed; NAD  	Head: NC/AT  	Eyes: PERRL, EOMI, anicteric sclera, pale eye lids  	ENT: no nasal discharge; uvula midline, no oropharyngeal erythema or exudates; MMM  	Neck: supple; no JVD or thyromegaly  	Respiratory: CTA B/L; no W/R/R, no retractions  	Cardiac: +S1/S2; RRR; no M/R/G; PMI non-displaced  	Gastrointestinal: abdomen soft, NT/ND; normal BSx4 quadrants   	Extremities: WWP, no clubbing or cyanosis; no peripheral edema  	Musculoskeletal: NROM x4; no joint swelling, tenderness or erythema  	Vascular: 2+ radial pulses, good cap refill  	Dermatologic: skin warm, dry and intact; no rashes, wounds, or scars.  Mild pallor of palms  	Neurologic: AAOx1-2 (self and hospital), poor memory; CNII-XII grossly intact; no focal deficits    Psychiatric: affect and characteristics of appearance, verbalizations, behaviors are appropriate      MEDICATIONS:  MEDICATIONS  (STANDING):  dextrose 5%. 1000 milliLiter(s) (50 mL/Hr) IV Continuous <Continuous>  dextrose 50% Injectable 12.5 Gram(s) IV Push once  dextrose 50% Injectable 25 Gram(s) IV Push once  dextrose 50% Injectable 25 Gram(s) IV Push once  insulin lispro (HumaLOG) corrective regimen sliding scale   SubCutaneous Before meals and at bedtime  magnesium sulfate  IVPB 2 Gram(s) IV Intermittent once  pantoprazole  Injectable 40 milliGRAM(s) IV Push every 12 hours  potassium chloride   Powder 40 milliEquivalent(s) Oral once    MEDICATIONS  (PRN):  dextrose 40% Gel 15 Gram(s) Oral once PRN Blood Glucose LESS THAN 70 milliGRAM(s)/deciliter  glucagon  Injectable 1 milliGRAM(s) IntraMuscular once PRN Glucose LESS THAN 70 milligrams/deciliter      ALLERGIES:  Allergies    No Known Allergies    Intolerances        LABS:                        7.6    11.12 )-----------( 168      ( 14 Jun 2020 07:36 )             22.8     06-14    140  |  104  |  10  ----------------------------<  132<H>  3.5   |  26  |  0.83    Ca    8.2<L>      14 Jun 2020 07:36  Phos  2.8     06-14  Mg     1.8     06-14    TPro  5.9<L>  /  Alb  3.5  /  TBili  0.2  /  DBili  x   /  AST  15  /  ALT  11  /  AlkPhos  81  06-13    PT/INR - ( 13 Jun 2020 14:59 )   PT: 12.5 sec;   INR: 1.09          PTT - ( 13 Jun 2020 14:59 )  PTT:28.2 sec    CAPILLARY BLOOD GLUCOSE  184 (13 Jun 2020 15:49)      POCT Blood Glucose.: 137 mg/dL (14 Jun 2020 07:07)        RADIOLOGY & ADDITIONAL TESTS: Reviewed.    ASSESSMENT:    PLAN: OVERNIGHT EVENTS:  Admitted overnight.     SUBJECTIVE / INTERVAL HPI:   Patient seen and examined at bedside. Denies any medical complaints at this time, but does point to staples in his R groin and expresses some discomfort. Is unsure as to when the staples were supposed to be removed. Vascular called for possible suture removal.       VITAL SIGNS:  Vital Signs Last 24 Hrs  T(C): 36.8 (14 Jun 2020 07:01), Max: 37.3 (13 Jun 2020 22:05)  T(F): 98.2 (14 Jun 2020 07:01), Max: 99.1 (13 Jun 2020 22:05)  HR: 61 (14 Jun 2020 08:35) (60 - 108)  BP: 113/57 (14 Jun 2020 08:35) (106/55 - 170/70)  BP(mean): 80 (14 Jun 2020 08:35) (74 - 80)  RR: 21 (14 Jun 2020 08:35) (16 - 21)  SpO2: 96% (14 Jun 2020 08:35) (91% - 100%)    06-13-20 @ 07:01  -  06-14-20 @ 07:00  --------------------------------------------------------  IN: 1 mL / OUT: 575 mL / NET: -574 mL        PHYSICAL EXAM:  	Constitutional: WDWN resting comfortably in bed; NAD  	Head: NC/AT  	Eyes: PERRL, EOMI, anicteric sclera, pale eye lids  	ENT: no nasal discharge; uvula midline, no oropharyngeal erythema or exudates; MMM  	Neck: supple; no JVD or thyromegaly  	Respiratory: CTA B/L; no W/R/R, no retractions  	Cardiac: +S1/S2; RRR; no M/R/G; PMI non-displaced  	Gastrointestinal: abdomen soft, NT/ND; normal BSx4 quadrants   	Extremities: WWP, no clubbing or cyanosis; no peripheral edema  	Musculoskeletal: NROM x4; no joint swelling, tenderness or erythema  	Vascular: 2+ radial pulses, good cap refill  	Dermatologic: skin warm, dry and intact; no rashes, wounds, or scars.  Mild pallor of palms  	Neurologic: AAOx1-2 (self and hospital), poor memory; CNII-XII grossly intact; no focal deficits    Psychiatric: affect and characteristics of appearance, verbalizations, behaviors are appropriate      MEDICATIONS:  MEDICATIONS  (STANDING):  dextrose 5%. 1000 milliLiter(s) (50 mL/Hr) IV Continuous <Continuous>  dextrose 50% Injectable 12.5 Gram(s) IV Push once  dextrose 50% Injectable 25 Gram(s) IV Push once  dextrose 50% Injectable 25 Gram(s) IV Push once  insulin lispro (HumaLOG) corrective regimen sliding scale   SubCutaneous Before meals and at bedtime  magnesium sulfate  IVPB 2 Gram(s) IV Intermittent once  pantoprazole  Injectable 40 milliGRAM(s) IV Push every 12 hours  potassium chloride   Powder 40 milliEquivalent(s) Oral once    MEDICATIONS  (PRN):  dextrose 40% Gel 15 Gram(s) Oral once PRN Blood Glucose LESS THAN 70 milliGRAM(s)/deciliter  glucagon  Injectable 1 milliGRAM(s) IntraMuscular once PRN Glucose LESS THAN 70 milligrams/deciliter      ALLERGIES:  Allergies    No Known Allergies    Intolerances        LABS:                        7.6    11.12 )-----------( 168      ( 14 Jun 2020 07:36 )             22.8     06-14    140  |  104  |  10  ----------------------------<  132<H>  3.5   |  26  |  0.83    Ca    8.2<L>      14 Jun 2020 07:36  Phos  2.8     06-14  Mg     1.8     06-14    TPro  5.9<L>  /  Alb  3.5  /  TBili  0.2  /  DBili  x   /  AST  15  /  ALT  11  /  AlkPhos  81  06-13    PT/INR - ( 13 Jun 2020 14:59 )   PT: 12.5 sec;   INR: 1.09          PTT - ( 13 Jun 2020 14:59 )  PTT:28.2 sec    CAPILLARY BLOOD GLUCOSE  184 (13 Jun 2020 15:49)      POCT Blood Glucose.: 137 mg/dL (14 Jun 2020 07:07)      RADIOLOGY & ADDITIONAL TESTS:   Reviewed.  < from: Xray Chest 1 View- PORTABLE-Routine (06.13.20 @ 20:12) >    INTERPRETATION:  Clinical History: Preprocedure    Portable examination of chest demonstrates mild cardiomegaly. Bilateral infiltrates. Mild degenerative changes thoracic spine. Calcification aortic knob.    Impression: Bilateral infiltrates    < end of copied text >

## 2020-06-14 NOTE — PROGRESS NOTE ADULT - PROBLEM SELECTOR PLAN 2
Management as above, anemia likely represents anemia of chronic disease with superimposed GIB  - Mgmt as above  - goal Hgb > 8 given CAD Management as above, anemia likely represents anemia of chronic disease with superimposed GIB.   - c/w management as above  - goal Hgb > 8 given CAD  - now s/p 3U pRBCs

## 2020-06-14 NOTE — CONSULT NOTE ADULT - ASSESSMENT
anemia history of GI bleed: s/p transfusionm : prep for colonoscopy tomorrow  staples in right groin ( contact vascular for removal)

## 2020-06-14 NOTE — PROGRESS NOTE ADULT - PROBLEM SELECTOR PLAN 6
H/o CAD, takes NOAC, ASA and labatalol at home  - Hold all meds given recent GIB with subsequent severe anemia  - Restart PRN following improvement of symptoms H/o CAD, takes NOAC, ASA and labatalol at home  - hold all meds given recent GIB with subsequent severe anemia  - restart PRN following improvement of symptoms

## 2020-06-14 NOTE — PROGRESS NOTE ADULT - PROBLEM SELECTOR PLAN 8
Patient with history of HTN  - Holding labatalol and CCB given suspected GIB  - Restart as indicated Patient with history of HTN  - c/w holding labatalol and CCB given suspected GIB  - restart as indicated

## 2020-06-14 NOTE — PROGRESS NOTE ADULT - PROBLEM SELECTOR PLAN 1
Patient reported melanotic stool 3-4 days ago, though now with reported brown stool. Rectal exam performed on admission revealed brown stool in rectal vault. Hgb 4.3 on admission, likely secondary to GIB. GI consulted and plan for EGD/CS on Monday. NPO after midnight on 6/14.  - Maintain active T/S, two large bore IVs, PPI  - Transfuse pRBC, current plan is for transfusion of 3 units, has received 1 unit at time of writing  - Will escalate care to MICU if HD unstable or if Hgb fails to respond Patient reported melanotic stool 3-4 days ago, though now with reported brown stool. Rectal exam performed on admission revealed brown stool in rectal vault. Hgb 4.3 on admission, likely secondary to GIB. GI consulted and plan for EGD/CS on Monday. NPO after midnight on 6/14.  - maintain active T/S, two large bore IVs  - c/w PPI IV BID   - s/p 3U of pRBCs   - plan for scope on Monday, so split bowel prep   - clear liquids today and NPO after midnight   - will escalate care to MICU if HD unstable or if Hgb fails to respond  - can give Lasix if over-loaded s/p transfusions  - c/w holding home anti-hypertensives in the setting of GI bleed

## 2020-06-14 NOTE — PROGRESS NOTE ADULT - PROBLEM SELECTOR PLAN 5
Longstanding history of DM, on metformin at home, though unclear if patient is compliant with meds at home  - While hospitalized, ISS and diabetic diet, will add basal bolus as indicated  - A1c likely inaccurate in setting of recent GIB and subsequent transfusion, therefore would not obtain this test at this time Longstanding history of DM, on metformin at home, though unclear if patient is compliant with meds at home  - while hospitalized, ISS and diabetic diet, will add basal bolus as indicated  - HbA1c likely inaccurate in setting of recent GIB and subsequent transfusion, therefore would not obtain this test at this time

## 2020-06-14 NOTE — PROGRESS NOTE ADULT - PROBLEM SELECTOR PLAN 7
H/o PAD w. bypass procedure, takes NOAC, ASA and labatalol at home  - Hold all meds given recent GIB with subsequent severe anemia  - Restart PRN following improvement of symptoms  - Consult vascular in AM H/o PAD w. bypass procedure, takes NOAC, ASA and labatalol at home  - hold all meds given recent GIB with subsequent severe anemia  - restart PRN following improvement of symptoms  - consult vascular in AM as patient still has staples in R groin; vascular said they would come to remove them

## 2020-06-15 ENCOUNTER — RESULT REVIEW (OUTPATIENT)
Age: 70
End: 2020-06-15

## 2020-06-15 LAB
ANION GAP SERPL CALC-SCNC: 11 MMOL/L — SIGNIFICANT CHANGE UP (ref 5–17)
BUN SERPL-MCNC: 9 MG/DL — SIGNIFICANT CHANGE UP (ref 7–23)
CALCIUM SERPL-MCNC: 8.4 MG/DL — SIGNIFICANT CHANGE UP (ref 8.4–10.5)
CHLORIDE SERPL-SCNC: 106 MMOL/L — SIGNIFICANT CHANGE UP (ref 96–108)
CO2 SERPL-SCNC: 25 MMOL/L — SIGNIFICANT CHANGE UP (ref 22–31)
CREAT SERPL-MCNC: 0.82 MG/DL — SIGNIFICANT CHANGE UP (ref 0.5–1.3)
GLUCOSE BLDC GLUCOMTR-MCNC: 119 MG/DL — HIGH (ref 70–99)
GLUCOSE BLDC GLUCOMTR-MCNC: 124 MG/DL — HIGH (ref 70–99)
GLUCOSE BLDC GLUCOMTR-MCNC: 125 MG/DL — HIGH (ref 70–99)
GLUCOSE BLDC GLUCOMTR-MCNC: 132 MG/DL — HIGH (ref 70–99)
GLUCOSE SERPL-MCNC: 136 MG/DL — HIGH (ref 70–99)
HCT VFR BLD CALC: 29 % — LOW (ref 39–50)
HGB BLD-MCNC: 9.5 G/DL — LOW (ref 13–17)
MAGNESIUM SERPL-MCNC: 2.1 MG/DL — SIGNIFICANT CHANGE UP (ref 1.6–2.6)
MCHC RBC-ENTMCNC: 28.4 PG — SIGNIFICANT CHANGE UP (ref 27–34)
MCHC RBC-ENTMCNC: 32.8 GM/DL — SIGNIFICANT CHANGE UP (ref 32–36)
MCV RBC AUTO: 86.6 FL — SIGNIFICANT CHANGE UP (ref 80–100)
NRBC # BLD: 0 /100 WBCS — SIGNIFICANT CHANGE UP (ref 0–0)
PLATELET # BLD AUTO: 195 K/UL — SIGNIFICANT CHANGE UP (ref 150–400)
POTASSIUM SERPL-MCNC: 3.9 MMOL/L — SIGNIFICANT CHANGE UP (ref 3.5–5.3)
POTASSIUM SERPL-SCNC: 3.9 MMOL/L — SIGNIFICANT CHANGE UP (ref 3.5–5.3)
RBC # BLD: 3.35 M/UL — LOW (ref 4.2–5.8)
RBC # FLD: 15.4 % — HIGH (ref 10.3–14.5)
SODIUM SERPL-SCNC: 142 MMOL/L — SIGNIFICANT CHANGE UP (ref 135–145)
WBC # BLD: 11.15 K/UL — HIGH (ref 3.8–10.5)
WBC # FLD AUTO: 11.15 K/UL — HIGH (ref 3.8–10.5)

## 2020-06-15 PROCEDURE — 99233 SBSQ HOSP IP/OBS HIGH 50: CPT | Mod: GC

## 2020-06-15 PROCEDURE — 88305 TISSUE EXAM BY PATHOLOGIST: CPT | Mod: 26

## 2020-06-15 PROCEDURE — 93306 TTE W/DOPPLER COMPLETE: CPT | Mod: 26

## 2020-06-15 RX ORDER — MULTIVIT WITH MIN/MFOLATE/K2 340-15/3 G
1 POWDER (GRAM) ORAL ONCE
Refills: 0 | Status: COMPLETED | OUTPATIENT
Start: 2020-06-15 | End: 2020-06-15

## 2020-06-15 RX ORDER — NIFEDIPINE 30 MG
90 TABLET, EXTENDED RELEASE 24 HR ORAL DAILY
Refills: 0 | Status: DISCONTINUED | OUTPATIENT
Start: 2020-06-15 | End: 2020-06-23

## 2020-06-15 RX ADMIN — Medication 90 MILLIGRAM(S): at 18:16

## 2020-06-15 RX ADMIN — PANTOPRAZOLE SODIUM 40 MILLIGRAM(S): 20 TABLET, DELAYED RELEASE ORAL at 17:29

## 2020-06-15 RX ADMIN — PANTOPRAZOLE SODIUM 40 MILLIGRAM(S): 20 TABLET, DELAYED RELEASE ORAL at 03:03

## 2020-06-15 NOTE — PROGRESS NOTE ADULT - PROBLEM SELECTOR PLAN 1
Patient reported melanotic stool 3-4 days prior to admission, though now with reported brown stool. Rectal exam performed on admission revealed brown stool in rectal vault. Hgb 4.3 on admission, likely secondary to GIB.   - GI following, Dr. Arroyo recommendations appreciated   - colonoscopy found evidence of a small ulcerated mass in cecum concerning for malignancy, f/u pathology report   - maintain active T/S, two large bore IVs  - s/p 3U of pRBCs and Hb continues to be >8    - c/w PPI IV BID   - holding home anti-hypertensives in the setting of GI bleed; resume as clinically appropriate

## 2020-06-15 NOTE — PROGRESS NOTE ADULT - PROBLEM SELECTOR PLAN 7
Patient with history of HTN  - restarting nifedipine given recent hypertension s/p transfusions; resume other home meds as tolerated

## 2020-06-15 NOTE — PROGRESS NOTE ADULT - SUBJECTIVE AND OBJECTIVE BOX
Pt seen and examined   colonoscopy today    REVIEW OF SYSTEMS:  Constitutional: No fever, weight loss or fatigue  Cardiovascular: No chest pain, palpitations, dizziness or leg swelling  Gastrointestinal: No abdominal or epigastric pain. No nausea, vomiting or hematemesis; No diarrhea or constipation. No melena or hematochezia.  Skin: No itching, burning, rashes or lesions       MEDICATIONS:  MEDICATIONS  (STANDING):  dextrose 5%. 1000 milliLiter(s) (50 mL/Hr) IV Continuous <Continuous>  dextrose 50% Injectable 12.5 Gram(s) IV Push once  dextrose 50% Injectable 25 Gram(s) IV Push once  dextrose 50% Injectable 25 Gram(s) IV Push once  insulin lispro (HumaLOG) corrective regimen sliding scale   SubCutaneous Before meals and at bedtime  pantoprazole  Injectable 40 milliGRAM(s) IV Push every 12 hours    MEDICATIONS  (PRN):  dextrose 40% Gel 15 Gram(s) Oral once PRN Blood Glucose LESS THAN 70 milliGRAM(s)/deciliter  glucagon  Injectable 1 milliGRAM(s) IntraMuscular once PRN Glucose LESS THAN 70 milligrams/deciliter      Allergies    No Known Allergies    Intolerances        Vital Signs Last 24 Hrs  T(C): 36.7 (15 Fam 2020 08:55), Max: 37.3 (14 Jun 2020 17:23)  T(F): 98 (15 Fam 2020 08:55), Max: 99.2 (14 Jun 2020 17:23)  HR: 60 (15 Fam 2020 10:14) (59 - 67)  BP: 157/74 (15 Fam 2020 10:14) (117/60 - 164/66)  BP(mean): 106 (15 Fam 2020 10:14) (81 - 106)  RR: 17 (15 Fam 2020 10:14) (10 - 20)  SpO2: 98% (15 Fam 2020 10:14) (92% - 98%)    06-14 @ 07:01  -  06-15 @ 07:00  --------------------------------------------------------  IN: 720 mL / OUT: 0 mL / NET: 720 mL        PHYSICAL EXAM:    General: Well developed; well nourished; in no acute distress  HEENT: MMM, conjunctiva and sclera clear  Gastrointestinal: Soft non-tender non-distended; Normal bowel sounds; No hepatosplenomegaly  Skin: Warm and dry. No obvious rash    LABS:      CBC Full  -  ( 15 Fam 2020 06:33 )  WBC Count : 11.15 K/uL  RBC Count : 3.35 M/uL  Hemoglobin : 9.5 g/dL  Hematocrit : 29.0 %  Platelet Count - Automated : 195 K/uL  Mean Cell Volume : 86.6 fl  Mean Cell Hemoglobin : 28.4 pg  Mean Cell Hemoglobin Concentration : 32.8 gm/dL  Auto Neutrophil # : x  Auto Lymphocyte # : x  Auto Monocyte # : x  Auto Eosinophil # : x  Auto Basophil # : x  Auto Neutrophil % : x  Auto Lymphocyte % : x  Auto Monocyte % : x  Auto Eosinophil % : x  Auto Basophil % : x    06-15    142  |  106  |  9   ----------------------------<  136<H>  3.9   |  25  |  0.82    Ca    8.4      15 Fam 2020 06:33  Phos  2.8     06-14  Mg     2.1     06-15    TPro  5.9<L>  /  Alb  3.5  /  TBili  0.2  /  DBili  x   /  AST  15  /  ALT  11  /  AlkPhos  81  06-13    PT/INR - ( 13 Jun 2020 14:59 )   PT: 12.5 sec;   INR: 1.09          PTT - ( 13 Jun 2020 14:59 )  PTT:28.2 sec                  RADIOLOGY & ADDITIONAL STUDIES (The following images were personally reviewed):

## 2020-06-15 NOTE — PROGRESS NOTE ADULT - PROBLEM SELECTOR PLAN 1
Patient reported melanotic stool 3-4 days ago, though now with reported brown stool. Rectal exam performed on admission revealed brown stool in rectal vault. Hgb 4.3 on admission, likely secondary to GIB. GI consulted and plan for EGD/CS on Monday. NPO after midnight on 6/14.  - GI following, Dr. Arroyo recommendations appreciated   - colonoscopy found evidence of a small ulcerated mass in cecum concerning for malignancy, f/u pathology report   - maintain active T/S, two large bore IVs  - s/p 3U of pRBCs and Hb continues to be >7    - c/w PPI IV BID   - c/w holding home anti-hypertensives in the setting of GI bleed

## 2020-06-15 NOTE — PROGRESS NOTE ADULT - ASSESSMENT
69M PMH of HTN, DM, HLD, PAD, CAD, PAD s/p right and left LE FEM-pop bypass in (R in 2016), recent admission for occluded CFA-AK pop bypass s/p Jayme balloon embolectomy (on xarelto and aspirin (3/2020), presented with anemia, admitted for symptomatic anemia with a Hb of 4.2, now s/p 3U pRBCs and colonoscopy showing small ulcerated mass in the cecum suspicious for malignancy.

## 2020-06-15 NOTE — PROGRESS NOTE ADULT - PROBLEM SELECTOR PLAN 6
H/o PAD w. bypass procedure, takes NOAC, ASA and labatalol at home  - hold all meds given recent GIB with subsequent severe anemia  - restart PRN following improvement of symptoms  - staples in R groin now removed by vascular H/o PAD w. bypass procedure, takes NOAC, ASA and labetalol at home  - hold all meds given recent GIB with subsequent severe anemia  - restart PRN following improvement of symptoms  - staples in R groin now removed by vascular

## 2020-06-15 NOTE — PROGRESS NOTE ADULT - PROBLEM SELECTOR PLAN 4
Mildly elevated troponin 0.02, EKG with RBBB (known) ST depressions in I, II, V1-V6 (new); ST elevation in III. Likely demand ischemia in the setting of anemia and volume depletion.  - no active chest pain  - EKG now without ST depressions in I, II, V1-V6  - troponin trended to plateau   - troponins still increasing form 0.02 to 0.04, will trend     #Leukocytosis  - WBC 14 on admission, could be stress response to GI bleed but unclear etiology.  Afebrile and no clear infectious source.  Patient has had chronically elevated WBC on prior admissions with baseline 13-17.  - trend CBC

## 2020-06-15 NOTE — PROGRESS NOTE ADULT - ATTENDING COMMENTS
Patient seen and examined with house-staff during bedside rounds.  Resident note read, including vitals, physical findings, laboratory data, and radiological reports.   Revisions included below.  Direct personal management at bed side and extensive interpretation of the data.  Plan was outlined and discussed in details with the housestaff.  Decision making of high complexity  Action taken for acute disease activity to reflect the level of care provided:  - medication reconciliation  - review laboratory data  The hemoglobin is stable. No evidence of acute GI bleed. Patient is for upper endoscopy. Patient was transfused. The hemoglobin is stable no evidence of active bleeding the patient can be transferred to the floor. Her cardiopulmonary status is stable

## 2020-06-15 NOTE — PROGRESS NOTE ADULT - PROBLEM SELECTOR PLAN 5
H/o CAD, takes NOAC, ASA and labatalol at home  - hold all meds given recent GIB with subsequent severe anemia  - restart PRN following improvement of symptoms H/o CAD, takes NOAC, ASA and labetalol at home  - hold all meds given recent GIB with subsequent severe anemia  - restart PRN following improvement of symptoms

## 2020-06-15 NOTE — PROGRESS NOTE ADULT - PROBLEM SELECTOR PLAN 4
Longstanding history of DM, on Metformin at home, though unclear if patient is compliant with meds at home.  - while hospitalized, ISS and diabetic diet, will add basal bolus as indicated  - HbA1c likely inaccurate in setting of recent GIB and subsequent transfusion, therefore would not obtain this test at this time  - continue to monitor FSGs

## 2020-06-15 NOTE — PROGRESS NOTE ADULT - SUBJECTIVE AND OBJECTIVE BOX
OVERNIGHT EVENTS:    SUBJECTIVE / INTERVAL HPI: Patient seen and examined at bedside.     VITAL SIGNS:  Vital Signs Last 24 Hrs  T(C): 36.7 (15 Fam 2020 08:55), Max: 37.3 (14 Jun 2020 17:23)  T(F): 98 (15 Fam 2020 08:55), Max: 99.2 (14 Jun 2020 17:23)  HR: 60 (15 Fam 2020 10:14) (59 - 67)  BP: 157/74 (15 Fam 2020 10:14) (117/57 - 164/66)  BP(mean): 106 (15 Fam 2020 10:14) (81 - 106)  RR: 17 (15 Fam 2020 10:14) (10 - 22)  SpO2: 98% (15 Fam 2020 10:14) (92% - 98%)    06-14-20 @ 07:01  -  06-15-20 @ 07:00  --------------------------------------------------------  IN: 720 mL / OUT: 0 mL / NET: 720 mL        PHYSICAL EXAM:    General: WDWN  HEENT: NC/AT; PERRL, anicteric sclera; MMM  Neck: supple  Cardiovascular: +S1/S2; RRR  Respiratory: CTA B/L; no W/R/R  Gastrointestinal: soft, NT/ND; +BSx4  Extremities: WWP; no edema, clubbing or cyanosis  Vascular: 2+ radial, DP/PT pulses B/L  Neurological: AAOx3; no focal deficits    MEDICATIONS:  MEDICATIONS  (STANDING):  dextrose 5%. 1000 milliLiter(s) (50 mL/Hr) IV Continuous <Continuous>  dextrose 50% Injectable 12.5 Gram(s) IV Push once  dextrose 50% Injectable 25 Gram(s) IV Push once  dextrose 50% Injectable 25 Gram(s) IV Push once  insulin lispro (HumaLOG) corrective regimen sliding scale   SubCutaneous Before meals and at bedtime  pantoprazole  Injectable 40 milliGRAM(s) IV Push every 12 hours    MEDICATIONS  (PRN):  dextrose 40% Gel 15 Gram(s) Oral once PRN Blood Glucose LESS THAN 70 milliGRAM(s)/deciliter  glucagon  Injectable 1 milliGRAM(s) IntraMuscular once PRN Glucose LESS THAN 70 milligrams/deciliter      ALLERGIES:  Allergies    No Known Allergies    Intolerances        LABS:                        9.5    11.15 )-----------( 195      ( 15 Fam 2020 06:33 )             29.0     06-15    142  |  106  |  9   ----------------------------<  136<H>  3.9   |  25  |  0.82    Ca    8.4      15 Fam 2020 06:33  Phos  2.8     06-14  Mg     2.1     06-15    TPro  5.9<L>  /  Alb  3.5  /  TBili  0.2  /  DBili  x   /  AST  15  /  ALT  11  /  AlkPhos  81  06-13    PT/INR - ( 13 Jun 2020 14:59 )   PT: 12.5 sec;   INR: 1.09          PTT - ( 13 Jun 2020 14:59 )  PTT:28.2 sec    CAPILLARY BLOOD GLUCOSE  184 (13 Jun 2020 15:49)      POCT Blood Glucose.: 119 mg/dL (15 Fam 2020 11:21)        RADIOLOGY & ADDITIONAL TESTS: Reviewed.    ASSESSMENT:    PLAN: OVERNIGHT EVENTS:  No acute events overnight.     SUBJECTIVE / INTERVAL HPI:   Patient seen and examined at bedside. States that he feels great and has no complaints at this time. Finished the bowel prep. Is ambulating in room without O2.       VITAL SIGNS:  Vital Signs Last 24 Hrs  T(C): 36.7 (15 Fam 2020 08:55), Max: 37.3 (14 Jun 2020 17:23)  T(F): 98 (15 Fam 2020 08:55), Max: 99.2 (14 Jun 2020 17:23)  HR: 60 (15 Fam 2020 10:14) (59 - 67)  BP: 157/74 (15 Fam 2020 10:14) (117/57 - 164/66)  BP(mean): 106 (15 Fam 2020 10:14) (81 - 106)  RR: 17 (15 Fam 2020 10:14) (10 - 22)  SpO2: 98% (15 Fam 2020 10:14) (92% - 98%)    06-14-20 @ 07:01  -  06-15-20 @ 07:00  --------------------------------------------------------  IN: 720 mL / OUT: 0 mL / NET: 720 mL        PHYSICAL EXAM:  	Constitutional: WDWN resting comfortably in bed; NAD  	Head: NC/AT  	Eyes: PERRL, EOMI, anicteric sclera, pale eye lids  	ENT: no nasal discharge; uvula midline, no oropharyngeal erythema or exudates; MMM  	Neck: supple; no JVD or thyromegaly  	Respiratory: CTA B/L; no W/R/R, no retractions  	Cardiac: +S1/S2; +appreciable crescendo-decrescendo murmur, PMI non-displaced  	Gastrointestinal: abdomen soft, NT/ND; normal BSx4 quadrants   	Extremities: WWP, no clubbing or cyanosis; no peripheral edema  	Musculoskeletal: NROM x4; no joint swelling, tenderness or erythema  	Vascular: 2+ radial pulses, good cap refill  	Dermatologic: skin warm, dry and intact; no rashes, wounds, or scars.  Mild pallor of palms  	Neurologic: AAOx3 poor historian; CNII-XII grossly intact; no focal deficits, ambulating without difficulty     Psychiatric: affect and characteristics of appearance, verbalizations, behaviors are appropriate      MEDICATIONS:  MEDICATIONS  (STANDING):  dextrose 5%. 1000 milliLiter(s) (50 mL/Hr) IV Continuous <Continuous>  dextrose 50% Injectable 12.5 Gram(s) IV Push once  dextrose 50% Injectable 25 Gram(s) IV Push once  dextrose 50% Injectable 25 Gram(s) IV Push once  insulin lispro (HumaLOG) corrective regimen sliding scale   SubCutaneous Before meals and at bedtime  pantoprazole  Injectable 40 milliGRAM(s) IV Push every 12 hours    MEDICATIONS  (PRN):  dextrose 40% Gel 15 Gram(s) Oral once PRN Blood Glucose LESS THAN 70 milliGRAM(s)/deciliter  glucagon  Injectable 1 milliGRAM(s) IntraMuscular once PRN Glucose LESS THAN 70 milligrams/deciliter      ALLERGIES:  Allergies    No Known Allergies    Intolerances        LABS:                        9.5    11.15 )-----------( 195      ( 15 Fam 2020 06:33 )             29.0     06-15    142  |  106  |  9   ----------------------------<  136<H>  3.9   |  25  |  0.82    Ca    8.4      15 Fam 2020 06:33  Phos  2.8     06-14  Mg     2.1     06-15    TPro  5.9<L>  /  Alb  3.5  /  TBili  0.2  /  DBili  x   /  AST  15  /  ALT  11  /  AlkPhos  81  06-13    PT/INR - ( 13 Jun 2020 14:59 )   PT: 12.5 sec;   INR: 1.09          PTT - ( 13 Jun 2020 14:59 )  PTT:28.2 sec    CAPILLARY BLOOD GLUCOSE  184 (13 Jun 2020 15:49)      POCT Blood Glucose.: 119 mg/dL (15 Fam 2020 11:21)      RADIOLOGY & ADDITIONAL TESTS:   Reviewed. HOSPITAL COURSE:   69 [place]er-speaking M with PMH of HTN, DM (on Metformin), HLD, PAD, CAD, PAD s/p right and left LE FEM-pop bypass in (R in 2016), recent admission for occluded CFA-AK pop bypass s/p Jayme balloon embolectomy (on xarelto and aspirin (3/2020), presented with weakness/dizziness and admitted for symptomatic anemia with a Hb of 4.2. Patient's anemia was treated with 3U pRBCs. Colonoscopy was performed on 06/15 and showed a small ulcerated mass in the cecum suspicious for malignancy. Biopsies were taken, awaiting pathology report. GI recommending further work-up with a CEA level and CT abdomen/pelvis to identify any other malignancy. Patient's home anticoagulation was held in the setting of bleeding as were his home anti-hypertensives. Home nifedipine was resumed today given that patient was hypertensive to the 190s and his remaining medications Labetalol and Azilsartan should be resumed gradually). Still awaiting final GI recommendations on whether or not patient can resume home AC given his significant PAD. Vascular saw patient while in the hospital to remove R groin staples that had been left in when patient missed follow-up appointment. Patient has had no further episodes of melena, hematochezia, or hematemesis since admission and is now medically optimized for step-down to [facility] with the plan to further work-up possible malignancy.       OVERNIGHT EVENTS:  No acute events overnight.     SUBJECTIVE / INTERVAL HPI:   Patient seen and examined at bedside. States that he feels great and has no complaints at this time. Finished the bowel prep. Is ambulating in room without O2.       VITAL SIGNS:  Vital Signs Last 24 Hrs  T(C): 36.7 (15 Fam 2020 08:55), Max: 37.3 (14 Jun 2020 17:23)  T(F): 98 (15 Fam 2020 08:55), Max: 99.2 (14 Jun 2020 17:23)  HR: 60 (15 Fam 2020 10:14) (59 - 67)  BP: 157/74 (15 Fam 2020 10:14) (117/57 - 164/66)  BP(mean): 106 (15 Fam 2020 10:14) (81 - 106)  RR: 17 (15 Fam 2020 10:14) (10 - 22)  SpO2: 98% (15 Fam 2020 10:14) (92% - 98%)    06-14-20 @ 07:01  -  06-15-20 @ 07:00  --------------------------------------------------------  IN: 720 mL / OUT: 0 mL / NET: 720 mL        PHYSICAL EXAM:  	Constitutional: WDWN resting comfortably in bed; NAD  	Head: NC/AT  	Eyes: PERRL, EOMI, anicteric sclera, pale eye lids  	ENT: no nasal discharge; uvula midline, no oropharyngeal erythema or exudates; MMM  	Neck: supple; no JVD or thyromegaly  	Respiratory: CTA B/L; no W/R/R, no retractions  	Cardiac: +S1/S2; +appreciable crescendo-decrescendo murmur, PMI non-displaced  	Gastrointestinal: abdomen soft, NT/ND; normal BSx4 quadrants   	Extremities: WWP, no clubbing or cyanosis; no peripheral edema  	Musculoskeletal: NROM x4; no joint swelling, tenderness or erythema  	Vascular: 2+ radial pulses, good cap refill  	Dermatologic: skin warm, dry and intact; no rashes, wounds, or scars.  Mild pallor of palms  	Neurologic: AAOx3 poor historian; CNII-XII grossly intact; no focal deficits, ambulating without difficulty     Psychiatric: affect and characteristics of appearance, verbalizations, behaviors are appropriate      MEDICATIONS:  MEDICATIONS  (STANDING):  dextrose 5%. 1000 milliLiter(s) (50 mL/Hr) IV Continuous <Continuous>  dextrose 50% Injectable 12.5 Gram(s) IV Push once  dextrose 50% Injectable 25 Gram(s) IV Push once  dextrose 50% Injectable 25 Gram(s) IV Push once  insulin lispro (HumaLOG) corrective regimen sliding scale   SubCutaneous Before meals and at bedtime  pantoprazole  Injectable 40 milliGRAM(s) IV Push every 12 hours    MEDICATIONS  (PRN):  dextrose 40% Gel 15 Gram(s) Oral once PRN Blood Glucose LESS THAN 70 milliGRAM(s)/deciliter  glucagon  Injectable 1 milliGRAM(s) IntraMuscular once PRN Glucose LESS THAN 70 milligrams/deciliter      ALLERGIES:  Allergies    No Known Allergies    Intolerances        LABS:                        9.5    11.15 )-----------( 195      ( 15 Fam 2020 06:33 )             29.0     06-15    142  |  106  |  9   ----------------------------<  136<H>  3.9   |  25  |  0.82    Ca    8.4      15 Fam 2020 06:33  Phos  2.8     06-14  Mg     2.1     06-15    TPro  5.9<L>  /  Alb  3.5  /  TBili  0.2  /  DBili  x   /  AST  15  /  ALT  11  /  AlkPhos  81  06-13    PT/INR - ( 13 Jun 2020 14:59 )   PT: 12.5 sec;   INR: 1.09          PTT - ( 13 Jun 2020 14:59 )  PTT:28.2 sec    CAPILLARY BLOOD GLUCOSE  184 (13 Jun 2020 15:49)      POCT Blood Glucose.: 119 mg/dL (15 Fam 2020 11:21)      RADIOLOGY & ADDITIONAL TESTS:   Reviewed. HOSPITAL COURSE:   69 [place]an-speaking M with PMH of HTN, DM (on Metformin), HLD, PAD, CAD, PAD s/p right and left LE FEM-pop bypass in (R in 2016), recent admission for occluded CFA-AK pop bypass s/p Jayme balloon embolectomy (on xarelto and aspirin (3/2020), presented with weakness/dizziness and admitted for symptomatic anemia with a Hb of 4.2. Patient's anemia was treated with 3U pRBCs. Colonoscopy was performed on 06/15 and showed a small ulcerated mass in the cecum suspicious for malignancy. Biopsies were taken, awaiting pathology report. GI recommending further work-up with a CEA level and CT abdomen/pelvis to identify any other malignancy. Patient's home anticoagulation was held in the setting of bleeding as were his home anti-hypertensives. Home nifedipine was resumed today given that patient was hypertensive to the 190s and his remaining medications (Labetalol and Azilsartan) should be resumed gradually. Still awaiting final GI recommendations on whether or not patient can resume home AC given his significant PAD. Vascular saw patient while in the hospital to remove R groin staples that had been left in when patient missed follow-up appointment. Patient has had no further episodes of melena, hematochezia, or hematemesis since admission and is now medically optimized for step-down to [facility] with the plan to further work-up possible malignancy.       OVERNIGHT EVENTS:  No acute events overnight.     SUBJECTIVE / INTERVAL HPI:   Patient seen and examined at bedside. States that he feels great and has no complaints at this time. Finished the bowel prep. Is ambulating in room without O2.       VITAL SIGNS:  Vital Signs Last 24 Hrs  T(C): 36.7 (15 Fam 2020 08:55), Max: 37.3 (14 Jun 2020 17:23)  T(F): 98 (15 Fam 2020 08:55), Max: 99.2 (14 Jun 2020 17:23)  HR: 60 (15 Fam 2020 10:14) (59 - 67)  BP: 157/74 (15 Fam 2020 10:14) (117/57 - 164/66)  BP(mean): 106 (15 Fam 2020 10:14) (81 - 106)  RR: 17 (15 Fam 2020 10:14) (10 - 22)  SpO2: 98% (15 Fam 2020 10:14) (92% - 98%)    06-14-20 @ 07:01  -  06-15-20 @ 07:00  --------------------------------------------------------  IN: 720 mL / OUT: 0 mL / NET: 720 mL        PHYSICAL EXAM:  	Constitutional: WDWN resting comfortably in bed; NAD  	Head: NC/AT  	Eyes: PERRL, EOMI, anicteric sclera, pale eye lids  	ENT: no nasal discharge; uvula midline, no oropharyngeal erythema or exudates; MMM  	Neck: supple; no JVD or thyromegaly  	Respiratory: CTA B/L; no W/R/R, no retractions  	Cardiac: +S1/S2; +appreciable crescendo-decrescendo murmur, PMI non-displaced  	Gastrointestinal: abdomen soft, NT/ND; normal BSx4 quadrants   	Extremities: WWP, no clubbing or cyanosis; no peripheral edema  	Musculoskeletal: NROM x4; no joint swelling, tenderness or erythema  	Vascular: 2+ radial pulses, good cap refill  	Dermatologic: skin warm, dry and intact; no rashes, wounds, or scars.  Mild pallor of palms  	Neurologic: AAOx3 poor historian; CNII-XII grossly intact; no focal deficits, ambulating without difficulty     Psychiatric: affect and characteristics of appearance, verbalizations, behaviors are appropriate      MEDICATIONS:  MEDICATIONS  (STANDING):  dextrose 5%. 1000 milliLiter(s) (50 mL/Hr) IV Continuous <Continuous>  dextrose 50% Injectable 12.5 Gram(s) IV Push once  dextrose 50% Injectable 25 Gram(s) IV Push once  dextrose 50% Injectable 25 Gram(s) IV Push once  insulin lispro (HumaLOG) corrective regimen sliding scale   SubCutaneous Before meals and at bedtime  pantoprazole  Injectable 40 milliGRAM(s) IV Push every 12 hours    MEDICATIONS  (PRN):  dextrose 40% Gel 15 Gram(s) Oral once PRN Blood Glucose LESS THAN 70 milliGRAM(s)/deciliter  glucagon  Injectable 1 milliGRAM(s) IntraMuscular once PRN Glucose LESS THAN 70 milligrams/deciliter      ALLERGIES:  Allergies    No Known Allergies    Intolerances        LABS:                        9.5    11.15 )-----------( 195      ( 15 Fam 2020 06:33 )             29.0     06-15    142  |  106  |  9   ----------------------------<  136<H>  3.9   |  25  |  0.82    Ca    8.4      15 Fam 2020 06:33  Phos  2.8     06-14  Mg     2.1     06-15    TPro  5.9<L>  /  Alb  3.5  /  TBili  0.2  /  DBili  x   /  AST  15  /  ALT  11  /  AlkPhos  81  06-13    PT/INR - ( 13 Jun 2020 14:59 )   PT: 12.5 sec;   INR: 1.09          PTT - ( 13 Jun 2020 14:59 )  PTT:28.2 sec    CAPILLARY BLOOD GLUCOSE  184 (13 Jun 2020 15:49)      POCT Blood Glucose.: 119 mg/dL (15 Fam 2020 11:21)      RADIOLOGY & ADDITIONAL TESTS:   Reviewed.

## 2020-06-15 NOTE — PROGRESS NOTE ADULT - ASSESSMENT
colonoscopy = diffuse diverticulosis,  a small ulcerated mass in area of cecum biopsies done  await biopsy  CT of abdomen and pelvis, CEA

## 2020-06-15 NOTE — PROGRESS NOTE ADULT - PROBLEM SELECTOR PLAN 7
H/o PAD w. bypass procedure, takes NOAC, ASA and labatalol at home  - hold all meds given recent GIB with subsequent severe anemia  - restart PRN following improvement of symptoms  - staples in R groin now removed by vascular

## 2020-06-15 NOTE — PROGRESS NOTE ADULT - SUBJECTIVE AND OBJECTIVE BOX
TRANSFER FROM  TO [facility]     HOSPITAL COURSE:   69 Peruvian-speaking M with PMH of HTN, DM (on Metformin), HLD, PAD, CAD, PAD s/p right and left LE FEM-pop bypass in (R in 2016), recent admission for occluded CFA-AK pop bypass s/p Jayme balloon embolectomy (on xarelto and aspirin (3/2020), presented with weakness/dizziness and admitted for symptomatic anemia with a Hb of 4.2. Patient's anemia was treated with 3U pRBCs. Colonoscopy was performed on 06/15 and showed a small ulcerated mass in the cecum suspicious for malignancy. Biopsies were taken, awaiting pathology report. GI recommending further work-up with a CEA level and CT abdomen/pelvis to identify any other malignancy. Patient's home anticoagulation was held in the setting of bleeding as were his home anti-hypertensives. Home nifedipine was resumed today given that patient was hypertensive to the 190s and his remaining medications (Labetalol and Azilsartan) should be resumed gradually. Still awaiting final GI recommendations on whether or not patient can resume home AC given his significant PAD. Vascular saw patient while in the hospital to remove R groin staples that had been left in when patient missed follow-up appointment. Patient has had no further episodes of melena, hematochezia, or hematemesis since admission and is now medically optimized for step-down to [facility] with the plan to further work-up possible malignancy.       OVERNIGHT EVENTS:  No acute events overnight.     SUBJECTIVE / INTERVAL HPI:   Patient seen and examined at bedside. States that he feels great and has no complaints at this time. Finished the bowel prep. Is ambulating in room without O2.       VITAL SIGNS:  Vital Signs Last 24 Hrs  T(C): 36.7 (15 Fam 2020 08:55), Max: 37.3 (14 Jun 2020 17:23)  T(F): 98 (15 Fam 2020 08:55), Max: 99.2 (14 Jun 2020 17:23)  HR: 60 (15 Fam 2020 10:14) (59 - 67)  BP: 157/74 (15 Fam 2020 10:14) (117/57 - 164/66)  BP(mean): 106 (15 Fam 2020 10:14) (81 - 106)  RR: 17 (15 Fam 2020 10:14) (10 - 22)  SpO2: 98% (15 Fam 2020 10:14) (92% - 98%)    06-14-20 @ 07:01  -  06-15-20 @ 07:00  --------------------------------------------------------  IN: 720 mL / OUT: 0 mL / NET: 720 mL        PHYSICAL EXAM:  	Constitutional: WDWN resting comfortably in bed; NAD  	Head: NC/AT  	Eyes: PERRL, EOMI, anicteric sclera, pale eye lids  	ENT: no nasal discharge; uvula midline, no oropharyngeal erythema or exudates; MMM  	Neck: supple; no JVD or thyromegaly  	Respiratory: CTA B/L; no W/R/R, no retractions  	Cardiac: +S1/S2; +appreciable crescendo-decrescendo murmur, PMI non-displaced  	Gastrointestinal: abdomen soft, NT/ND; normal BSx4 quadrants   	Extremities: WWP, no clubbing or cyanosis; no peripheral edema  	Musculoskeletal: NROM x4; no joint swelling, tenderness or erythema  	Vascular: 2+ radial pulses, good cap refill  	Dermatologic: skin warm, dry and intact; no rashes, wounds, or scars.  Mild pallor of palms  	Neurologic: AAOx3 poor historian; CNII-XII grossly intact; no focal deficits, ambulating without difficulty     Psychiatric: affect and characteristics of appearance, verbalizations, behaviors are appropriate      MEDICATIONS:  MEDICATIONS  (STANDING):  dextrose 5%. 1000 milliLiter(s) (50 mL/Hr) IV Continuous <Continuous>  dextrose 50% Injectable 12.5 Gram(s) IV Push once  dextrose 50% Injectable 25 Gram(s) IV Push once  dextrose 50% Injectable 25 Gram(s) IV Push once  insulin lispro (HumaLOG) corrective regimen sliding scale   SubCutaneous Before meals and at bedtime  pantoprazole  Injectable 40 milliGRAM(s) IV Push every 12 hours    MEDICATIONS  (PRN):  dextrose 40% Gel 15 Gram(s) Oral once PRN Blood Glucose LESS THAN 70 milliGRAM(s)/deciliter  glucagon  Injectable 1 milliGRAM(s) IntraMuscular once PRN Glucose LESS THAN 70 milligrams/deciliter      ALLERGIES:  Allergies    No Known Allergies    Intolerances        LABS:                        9.5    11.15 )-----------( 195      ( 15 Fam 2020 06:33 )             29.0     06-15    142  |  106  |  9   ----------------------------<  136<H>  3.9   |  25  |  0.82    Ca    8.4      15 Fam 2020 06:33  Phos  2.8     06-14  Mg     2.1     06-15    TPro  5.9<L>  /  Alb  3.5  /  TBili  0.2  /  DBili  x   /  AST  15  /  ALT  11  /  AlkPhos  81  06-13    PT/INR - ( 13 Jun 2020 14:59 )   PT: 12.5 sec;   INR: 1.09          PTT - ( 13 Jun 2020 14:59 )  PTT:28.2 sec    CAPILLARY BLOOD GLUCOSE  184 (13 Jun 2020 15:49)      POCT Blood Glucose.: 119 mg/dL (15 Fam 2020 11:21)      RADIOLOGY & ADDITIONAL TESTS:   Reviewed.

## 2020-06-15 NOTE — PROGRESS NOTE ADULT - PROBLEM SELECTOR PLAN 6
H/o CAD, takes NOAC, ASA and labatalol at home  - hold all meds given recent GIB with subsequent severe anemia  - restart PRN following improvement of symptoms

## 2020-06-15 NOTE — PROGRESS NOTE ADULT - PROBLEM SELECTOR PLAN 3
Mildly elevated troponin 0.02, EKG with RBBB (known) ST depressions in I, II, V1-V6 (new); ST elevation in III. Likely demand ischemia in the setting of anemia and volume depletion.  - no active chest pain  - EKG now without ST depressions in I, II, V1-V6  - troponin trended to plateau   - troponins still increasing form 0.02 to 0.04, will trend     #Leukocytosis  - WBC 14 on admission, could be stress response to GI bleed but unclear etiology.  Afebrile and no clear infectious source.  Patient has had chronically elevated WBC on prior admissions with baseline 13-17.  - trend CBC; currently downtrending to ~11 Mildly elevated troponin 0.02, EKG with RBBB (known) ST depressions in I, II, V1-V6 (new); ST elevation in III. Likely demand ischemia in the setting of anemia and volume depletion.  - no active chest pain  - EKG now without ST depressions in I, II, V1-V6  - troponin trended to plateau   - troponins still increasing form 0.02 to 0.04, peaked at .05  - consider cardiology consult given significant ischemic changes on admission EKG     #Leukocytosis  - WBC 14 on admission, could be stress response to GI bleed but unclear etiology.  Afebrile and no clear infectious source.  Patient has had chronically elevated WBC on prior admissions with baseline 13-17.  - trend CBC; currently downtrending to ~11

## 2020-06-15 NOTE — PROGRESS NOTE ADULT - ATTENDING COMMENTS
Plan  -c/w w/u for malignancy  -f/u GI recommendations  -f/u lipid profile, then start high intensity statin  -consider cardiology consult vs outpatient f/u  -maintain active T/S

## 2020-06-15 NOTE — PROGRESS NOTE ADULT - PROBLEM SELECTOR PLAN 2
Management as above, anemia likely represents anemia of chronic disease with superimposed GIB.   - c/w management as above  - goal Hgb > 8 given CAD  - now s/p 3U pRBCs  - CVA workup during admission (negative) likely more attributable to symptomatic anemia versus itnracranial process (CT head and angio negative) Management as above, anemia likely represents anemia of chronic disease with superimposed GIB.   - c/w management as above  - goal Hgb > 8 given CAD  - now s/p 3U pRBCs  - CVA workup during admission (negative) likely more attributable to symptomatic anemia versus intracranial process (CT head and angio negative)

## 2020-06-15 NOTE — PROGRESS NOTE ADULT - PROBLEM SELECTOR PLAN 8
Patient with history of HTN  - c/w holding Labetalol and CCB given suspected GIB  - restart as indicated

## 2020-06-15 NOTE — PROGRESS NOTE ADULT - PROBLEM SELECTOR PLAN 2
Management as above, anemia likely represents anemia of chronic disease with superimposed GIB.   - c/w management as above  - goal Hgb > 8 given CAD  - now s/p 3U pRBCs

## 2020-06-15 NOTE — PROGRESS NOTE ADULT - ASSESSMENT
69M PMH of HTN, DM, HLD, PAD, CAD, PAD s/p right and left LE FEM-pop bypass in (R in 2016), recent admission for occluded CFA-AK pop bypass s/p Jayme balloon embolectomy (on xarelto and aspirin (3/2020), presented with anemia, admitted for symptomatic anemia with a Hb of 4.2, now s/p 3U pRBCs and colonoscopy showing small ulcerated mass in the cecum suspicious for malignancy. Transferred to the [facility] for further malignancy workup.

## 2020-06-15 NOTE — PROGRESS NOTE ADULT - PROBLEM SELECTOR PLAN 5
Longstanding history of DM, on Metformin at home, though unclear if patient is compliant with meds at home.  - while hospitalized, ISS and diabetic diet, will add basal bolus as indicated  - HbA1c likely inaccurate in setting of recent GIB and subsequent transfusion, therefore would not obtain this test at this time

## 2020-06-16 LAB
ANION GAP SERPL CALC-SCNC: 10 MMOL/L — SIGNIFICANT CHANGE UP (ref 5–17)
BUN SERPL-MCNC: 8 MG/DL — SIGNIFICANT CHANGE UP (ref 7–23)
CALCIUM SERPL-MCNC: 8.4 MG/DL — SIGNIFICANT CHANGE UP (ref 8.4–10.5)
CEA SERPL-MCNC: 1.5 NG/ML — SIGNIFICANT CHANGE UP (ref 0–3.8)
CHLORIDE SERPL-SCNC: 102 MMOL/L — SIGNIFICANT CHANGE UP (ref 96–108)
CHOLEST SERPL-MCNC: 109 MG/DL — SIGNIFICANT CHANGE UP (ref 10–199)
CO2 SERPL-SCNC: 28 MMOL/L — SIGNIFICANT CHANGE UP (ref 22–31)
CREAT SERPL-MCNC: 0.84 MG/DL — SIGNIFICANT CHANGE UP (ref 0.5–1.3)
GLUCOSE BLDC GLUCOMTR-MCNC: 155 MG/DL — HIGH (ref 70–99)
GLUCOSE BLDC GLUCOMTR-MCNC: 159 MG/DL — HIGH (ref 70–99)
GLUCOSE BLDC GLUCOMTR-MCNC: 180 MG/DL — HIGH (ref 70–99)
GLUCOSE SERPL-MCNC: 145 MG/DL — HIGH (ref 70–99)
HCT VFR BLD CALC: 30 % — LOW (ref 39–50)
HDLC SERPL-MCNC: 35 MG/DL — LOW
HGB BLD-MCNC: 9.8 G/DL — LOW (ref 13–17)
LIPID PNL WITH DIRECT LDL SERPL: 54 MG/DL — SIGNIFICANT CHANGE UP
MAGNESIUM SERPL-MCNC: 1.8 MG/DL — SIGNIFICANT CHANGE UP (ref 1.6–2.6)
MCHC RBC-ENTMCNC: 28.3 PG — SIGNIFICANT CHANGE UP (ref 27–34)
MCHC RBC-ENTMCNC: 32.7 GM/DL — SIGNIFICANT CHANGE UP (ref 32–36)
MCV RBC AUTO: 86.7 FL — SIGNIFICANT CHANGE UP (ref 80–100)
NRBC # BLD: 0 /100 WBCS — SIGNIFICANT CHANGE UP (ref 0–0)
PHOSPHATE SERPL-MCNC: 2.9 MG/DL — SIGNIFICANT CHANGE UP (ref 2.5–4.5)
PLATELET # BLD AUTO: 257 K/UL — SIGNIFICANT CHANGE UP (ref 150–400)
POTASSIUM SERPL-MCNC: 4 MMOL/L — SIGNIFICANT CHANGE UP (ref 3.5–5.3)
POTASSIUM SERPL-SCNC: 4 MMOL/L — SIGNIFICANT CHANGE UP (ref 3.5–5.3)
RBC # BLD: 3.46 M/UL — LOW (ref 4.2–5.8)
RBC # FLD: 15.2 % — HIGH (ref 10.3–14.5)
SODIUM SERPL-SCNC: 140 MMOL/L — SIGNIFICANT CHANGE UP (ref 135–145)
SURGICAL PATHOLOGY STUDY: SIGNIFICANT CHANGE UP
TOTAL CHOLESTEROL/HDL RATIO MEASUREMENT: 3.1 RATIO — LOW (ref 3.4–9.6)
TRIGL SERPL-MCNC: 100 MG/DL — SIGNIFICANT CHANGE UP (ref 10–149)
TSH SERPL-MCNC: 4.06 UIU/ML — SIGNIFICANT CHANGE UP (ref 0.35–4.94)
WBC # BLD: 12.05 K/UL — HIGH (ref 3.8–10.5)
WBC # FLD AUTO: 12.05 K/UL — HIGH (ref 3.8–10.5)

## 2020-06-16 PROCEDURE — 99233 SBSQ HOSP IP/OBS HIGH 50: CPT | Mod: GC

## 2020-06-16 PROCEDURE — 99223 1ST HOSP IP/OBS HIGH 75: CPT

## 2020-06-16 PROCEDURE — 99232 SBSQ HOSP IP/OBS MODERATE 35: CPT

## 2020-06-16 PROCEDURE — 99253 IP/OBS CNSLTJ NEW/EST LOW 45: CPT

## 2020-06-16 PROCEDURE — 74177 CT ABD & PELVIS W/CONTRAST: CPT | Mod: 26

## 2020-06-16 RX ORDER — IOHEXOL 300 MG/ML
30 INJECTION, SOLUTION INTRAVENOUS ONCE
Refills: 0 | Status: COMPLETED | OUTPATIENT
Start: 2020-06-16 | End: 2020-06-16

## 2020-06-16 RX ORDER — LABETALOL HCL 100 MG
200 TABLET ORAL EVERY 12 HOURS
Refills: 0 | Status: DISCONTINUED | OUTPATIENT
Start: 2020-06-16 | End: 2020-06-23

## 2020-06-16 RX ORDER — ATORVASTATIN CALCIUM 80 MG/1
80 TABLET, FILM COATED ORAL AT BEDTIME
Refills: 0 | Status: DISCONTINUED | OUTPATIENT
Start: 2020-06-16 | End: 2020-06-23

## 2020-06-16 RX ORDER — MAGNESIUM SULFATE 500 MG/ML
1 VIAL (ML) INJECTION ONCE
Refills: 0 | Status: COMPLETED | OUTPATIENT
Start: 2020-06-16 | End: 2020-06-16

## 2020-06-16 RX ORDER — IOHEXOL 300 MG/ML
30 INJECTION, SOLUTION INTRAVENOUS ONCE
Refills: 0 | Status: DISCONTINUED | OUTPATIENT
Start: 2020-06-16 | End: 2020-06-16

## 2020-06-16 RX ADMIN — Medication 200 MILLIGRAM(S): at 18:11

## 2020-06-16 RX ADMIN — Medication 100 GRAM(S): at 08:56

## 2020-06-16 RX ADMIN — IOHEXOL 30 MILLILITER(S): 300 INJECTION, SOLUTION INTRAVENOUS at 11:40

## 2020-06-16 RX ADMIN — Medication 90 MILLIGRAM(S): at 06:10

## 2020-06-16 RX ADMIN — Medication 2: at 08:50

## 2020-06-16 RX ADMIN — PANTOPRAZOLE SODIUM 40 MILLIGRAM(S): 20 TABLET, DELAYED RELEASE ORAL at 16:02

## 2020-06-16 RX ADMIN — Medication 2: at 22:45

## 2020-06-16 RX ADMIN — ATORVASTATIN CALCIUM 80 MILLIGRAM(S): 80 TABLET, FILM COATED ORAL at 22:01

## 2020-06-16 RX ADMIN — PANTOPRAZOLE SODIUM 40 MILLIGRAM(S): 20 TABLET, DELAYED RELEASE ORAL at 03:52

## 2020-06-16 NOTE — PROGRESS NOTE ADULT - PROBLEM SELECTOR PLAN 3
Mildly elevated troponin 0.02, EKG with RBBB (known) ST depressions in I, II, V1-V6 (new); ST elevation in III. Likely demand ischemia in the setting of anemia and volume depletion.  - no active chest pain  - EKG now without ST depressions in I, II, V1-V6  - troponin trended to plateau   - troponins still increasing form 0.02 to 0.04, peaked at .05  - consider cardiology consult given significant ischemic changes on admission EKG     #Leukocytosis  - WBC 14 on admission, could be stress response to GI bleed but unclear etiology.  Afebrile and no clear infectious source.  Patient has had chronically elevated WBC on prior admissions with baseline 13-17.  - trend CBC; currently downtrending to ~11 Mildly elevated troponin 0.02, EKG with RBBB (known) ST depressions in I, II, V1-V6 (new); ST elevation in III. Likely demand ischemia in the setting of anemia and volume depletion.  - no active chest pain  - EKG now without ST depressions in I, II, V1-V6  - troponin trended to plateau   - troponin still increasing form 0.02 to 0.04, peaked at .05  - consider cardiology consult given significant ischemic changes on admission EKG     #Leukocytosis  - WBC 14 on admission, could be stress response to GI bleed but unclear etiology.  Afebrile and no clear infectious source.  Patient has had chronically elevated WBC on prior admissions with baseline 13-17.  - trend CBC; currently increased to ~12 Mildly elevated troponin 0.02, EKG with RBBB (known) ST depressions in I, II, V1-V6 (new); ST elevation in III. Likely demand ischemia in the setting of anemia and volume depletion.  - no active chest pain  - EKG now without ST depressions in I, II, V1-V6  - troponin trended to plateau   - troponin still increasing form 0.02 to 0.04, peaked at .05  - Cardiology consulted, f/u recs      #Leukocytosis  - WBC 14 on admission, could be stress response to GI bleed but unclear etiology.  Afebrile and no clear infectious source.  Patient has had chronically elevated WBC on prior admissions with baseline 13-17.  - trend CBC; currently increased to ~12

## 2020-06-16 NOTE — CONSULT NOTE ADULT - SUBJECTIVE AND OBJECTIVE BOX
HPI:  69M PMH of HTN, DM, HLD, PAD, CAD, PAD s/p right and left LE FEM-pop bypass in (R in 2016), recent admission for occluded CFA-AK pop bypass s/p Jayme balloon embolectomy (on xarelto and aspirin (3/2020)) who present today c/o cool hands, diarrhea, dizziness, weakness, and blood in stool for 4 days, last three days before presentation.  Pt is Cambodian speaking, poor historian, repeating that diarrhea has resolved and fixated on new onset of yellow discoloration of his hands. Hx obtained via daughter Kerry ([phone] lives with pt). Was able to say had about two loose bowel movements with very dark bloody stool that filled up bowl, denied ever bleeding without BM).  Pt has possibly not been taking Xarelto for past several weeks because all his Xarelto bottles were empty. Upon arrival to ED, patient was found to be anemic Hb 4.2 (last Hb 9 in 2020). During ED exam, found to be acutely lethargic and stroke code called for AMS. Per neuro note: last known normal 2:41pm, no acute changes in CTH, chronic R basal ganglia infarct, no TPA given. Upon CC team exam, pt alert and oriented to self and hospital, not date, thinks Gerson is president. Daughter reports worsening memory last several years. Reports that his dizziness and weakness has resolved, never had CP/pressure, nausea, vomiting or abdominal pain, changes to urine. Denies fevers, chills, cough, SoB, changes in exercise tolerance, no sick contacts.     ED course: /70, , RR 18, T 98.1, SpO2 100% ORA. Labs WBC 14.45, Hgb 4.2, Hct 13.6 Plt 186. On BMP Na 139, K 4.1, Cl 101, CO2 21, bicarb 17, BUN 14, Cr 0.92, . CT w/w/o contrast performed and revealed no intracranial pathology. EKG: NSR, RBBB (known) ST depressions in I, II, V1-V6 (new); ST elevation in III. COVID negative. [facility]. (2020 19:52)    Cardiology consulted for severe noted on TTE. The patient was seen and examined at bedside. Denies any chest pain, syncope, chest tightness, difficulty breathing either currently or in the past.   Does not follow with cardiology as an outpatient.    ROS: A 10-point review of systems was otherwise negative.    PAST MEDICAL & SURGICAL HISTORY:  PAD (peripheral artery disease)  DM (diabetes mellitus)  CAD (coronary artery disease)  Essential hypertension: Hypertension  S/P femoral-femoral bypass surgery: left 2016  Elective surgery: right leg angiogram with bypass  2016  History of hernia repair: 2014      SOCIAL HISTORY: Former smoker 2PPD smoker, stopped 6 months ago. Smoked for a prolonged time, cannot quantify. No alcohol use.  FAMILY HISTORY: Denies FHx of early heart disease, HTN, cancer.      ALLERGIES: 	  No Known Allergies            MEDICATIONS:  dextrose 40% Gel 15 Gram(s) Oral once PRN  dextrose 5%. 1000 milliLiter(s) IV Continuous <Continuous>  dextrose 50% Injectable 12.5 Gram(s) IV Push once  dextrose 50% Injectable 25 Gram(s) IV Push once  dextrose 50% Injectable 25 Gram(s) IV Push once  glucagon  Injectable 1 milliGRAM(s) IntraMuscular once PRN  insulin lispro (HumaLOG) corrective regimen sliding scale   SubCutaneous Before meals and at bedtime  labetalol 200 milliGRAM(s) Oral every 12 hours  NIFEdipine XL 90 milliGRAM(s) Oral daily  pantoprazole  Injectable 40 milliGRAM(s) IV Push every 12 hours      PHYSICAL EXAM:  T(C): 36.7 (20 @ 08:18), Max: 37.2 (06-15-20 @ 14:38)  HR: 80 (20 @ 08:18) (60 - 96)  BP: 145/76 (20 @ 08:18) (135/75 - 187/86)  RR: 18 (20 @ 08:18) (17 - 20)  SpO2: 96% (20 @ 08:18) (95% - 97%)  Wt(kg): --    20 @ 07:01  -  20 @ 13:59  --------------------------------------------------------  IN: 100 mL / OUT: 0 mL / NET: 100 mL        GEN: Awake, comfortable. NAD.   HEENT: NCAT, PERRL, EOMI. Mucosa moist. No JVD.   RESP: CTA b/l  CV: RRR, normal s1/s2. III/VI LEVI w/radiation to carotids.  ABD: Soft, NTND. BS+  EXT: Warm. No edema, clubbing, or cyanosis.   NEURO: AAOx3. No focal deficits.    I&O's Summary    2020 07:01  -  2020 13:59  --------------------------------------------------------  IN: 100 mL / OUT: 0 mL / NET: 100 mL        	  LABS:	 	    CARDIAC MARKERS:                                  9.8    12.05 )-----------( 257      ( 2020 06:44 )             30.0         140  |  102  |  8   ----------------------------<  145<H>  4.0   |  28  |  0.84    Ca    8.4      2020 06:44  Phos  2.9       Mg     1.8           proBNP:   Lipid Profile:   HgA1c:   TSH: Thyroid Stimulating Hormone, Serum: 4.063 uIU/mL ( @ 06:44)      TELEMETRY: No tele  EC/14: Sinus bradycardia, RBBB 	  RADIOLOGY:   ECHO:  < from: TTE Echo Complete w/o Contrast w/ Doppler (06.15.20 @ 15:46) >  CONCLUSIONS:     1. The aortic valve is thickened. There is probably severe aortic stenosis. The peak transvalvular velocity is 3.80 m/s, the mean transvalvular gradient is 29.00 mmHg, and the LVOT/AV velocity ratio is 0.23. The peak transaortic gradient is 57.76 mmHg. The aortic valve area (estimated via the continuity method) is 0.9 cm². There is mild aortic regurgitation.   2. Normal left and right ventricular size and systolic function.   3. Structurally normal mitral valve with normal leaflet excursion. There is probably moderate eccentric mitral regurgitation.   4. No pericardial effusion.    < end of copied text >    STRESS:  CATH:

## 2020-06-16 NOTE — PROGRESS NOTE ADULT - SUBJECTIVE AND OBJECTIVE BOX
Pt seen and examined   no BMs  no complaints  tolerating diet    REVIEW OF SYSTEMS:  Constitutional: No fever, weight loss or fatigue  Cardiovascular: No chest pain, palpitations, dizziness or leg swelling  Gastrointestinal: No abdominal or epigastric pain. No nausea, vomiting or hematemesis  Skin: No itching, burning, rashes or lesions       MEDICATIONS:  MEDICATIONS  (STANDING):  dextrose 5%. 1000 milliLiter(s) (50 mL/Hr) IV Continuous <Continuous>  dextrose 50% Injectable 12.5 Gram(s) IV Push once  dextrose 50% Injectable 25 Gram(s) IV Push once  dextrose 50% Injectable 25 Gram(s) IV Push once  insulin lispro (HumaLOG) corrective regimen sliding scale   SubCutaneous Before meals and at bedtime  iohexol 300 mG (iodine)/mL Oral Solution 30 milliLiter(s) Oral once  labetalol 200 milliGRAM(s) Oral every 12 hours  NIFEdipine XL 90 milliGRAM(s) Oral daily  pantoprazole  Injectable 40 milliGRAM(s) IV Push every 12 hours    MEDICATIONS  (PRN):  dextrose 40% Gel 15 Gram(s) Oral once PRN Blood Glucose LESS THAN 70 milliGRAM(s)/deciliter  glucagon  Injectable 1 milliGRAM(s) IntraMuscular once PRN Glucose LESS THAN 70 milligrams/deciliter      Allergies    No Known Allergies    Intolerances        Vital Signs Last 24 Hrs  T(C): 36.7 (16 Jun 2020 08:18), Max: 37.2 (15 Fam 2020 14:38)  T(F): 98 (16 Jun 2020 08:18), Max: 99 (15 Fam 2020 14:38)  HR: 80 (16 Jun 2020 08:18) (60 - 96)  BP: 145/76 (16 Jun 2020 08:18) (135/75 - 187/86)  BP(mean): 96 (15 Fam 2020 20:59) (96 - 124)  RR: 18 (16 Jun 2020 08:18) (17 - 20)  SpO2: 96% (16 Jun 2020 08:18) (95% - 97%)    06-16 @ 07:01  -  06-16 @ 11:34  --------------------------------------------------------  IN: 100 mL / OUT: 0 mL / NET: 100 mL        PHYSICAL EXAM:    General: Well developed; well nourished; in no acute distress  HEENT: MMM, conjunctiva and sclera clear  Gastrointestinal: Soft non-tender non-distended; Normal bowel sounds; No hepatosplenomegaly  Skin: Warm and dry. No obvious rash    LABS:      CBC Full  -  ( 16 Jun 2020 06:44 )  WBC Count : 12.05 K/uL  RBC Count : 3.46 M/uL  Hemoglobin : 9.8 g/dL  Hematocrit : 30.0 %  Platelet Count - Automated : 257 K/uL  Mean Cell Volume : 86.7 fl  Mean Cell Hemoglobin : 28.3 pg  Mean Cell Hemoglobin Concentration : 32.7 gm/dL  Auto Neutrophil # : x  Auto Lymphocyte # : x  Auto Monocyte # : x  Auto Eosinophil # : x  Auto Basophil # : x  Auto Neutrophil % : x  Auto Lymphocyte % : x  Auto Monocyte % : x  Auto Eosinophil % : x  Auto Basophil % : x    06-16    140  |  102  |  8   ----------------------------<  145<H>  4.0   |  28  |  0.84    Ca    8.4      16 Jun 2020 06:44  Phos  2.9     06-16  Mg     1.8     06-16                        RADIOLOGY & ADDITIONAL STUDIES (The following images were personally reviewed):

## 2020-06-16 NOTE — CONSULT NOTE ADULT - ASSESSMENT
A/P: 69M w/CAD ,  PMH of HTN, DM, HLD, PAD, CAD, PAD s/p right and left LE FEM-pop bypass in (R in 2016), recent admission for occluded CFA-AK pop bypass s/p Jayme balloon embolectomy (on xarelto and aspirin (3/2020), presented with anemia, admitted for symptomatic anemia with a Hb of 4.2, now s/p 3U pRBCs and colonoscopy showing small ulcerated mass in the cecum suspicious for malignancy. Transferred to the [facility] for further malignancy workup. A/P: 69M w/CAD, PAD s/p right and left LE FEM-pop bypass in (R in 2016), HTN, DM, HLD, recent admission for occluded CFA-AK pop bypass s/p Jayme balloon embolectomy (on xarelto and aspirin (3/2020), presented on 6/13 with anemia, admitted for symptomatic anemia with a Hb of 4.2, now s/p 3U pRBCs and colonoscopy showing small ulcerated mass in the cecum suspicious for malignancy. Transferred to the [facility] for further malignancy workup. Cardiology consulted for severe AS noted on echocardiogram.    #Severe AS - patient currently asymptomatic w/echo findings of severe AS.   - f/u structural heart recommendations for decision on surgical vs percutaneous interventions    #HTN  - c/w labetalol 200mg bid  - c/w Nifedipine-XL 90mg QD  - will hold patient's home ARB for now    #HLD  - please start lipitor 80mg PO QD    #CAD  - start statin  - reintroduce ASA 81 when safe from a bleeding perpsective  - will hold ARB for now  - please obtain outpatient collateral on patient's prior ischemic workup    --  Bernabe Velez MD  Cardiology PGY4

## 2020-06-16 NOTE — PROGRESS NOTE ADULT - PROBLEM SELECTOR PLAN 9
F - none  E - replete PRN  N - DASH / TLC / CC  D - RMF    FULL CODE
F - none  E - replete PRN  N - DASH / TLC / CC  D - RMF    FULL CODE
DVT ppx - Holding given possible GIB  GI ppx - PPI
DVT ppx - Holding given possible GIB  GI ppx - PPI

## 2020-06-16 NOTE — CONSULT NOTE ADULT - ATTENDING COMMENTS
69yo M with PVD, CAD, GI bleed pending colonoscopy/biopsy results, diagnosed with diastolic HF and moderate-severe AS. Indication for further testing and AV replacement therapy if AS is severe. Consider waiting for biopsy results and stabilization of clinical picture before proceeding with catheterization and invasive measurement of AV gradients.
See CVD Fellow note written above, for details. I reviewed the fellow's documentation. I have personally seen and examined this patient. I have reviewed vitals, labs, medications, cardiac studies and additional imaging.  I agree with the findings and plans as written above with the following additions/amendments:  69M with history of  Essential HTN, Type II DM, HLD, CAD, extensive PAD s/p right and left LE FEM-pop bypass c/b occluded CFA-AK pop bypass s/p balloon embolectomy on Xarelto and aspirin who presented with symptomatic acute blood loss anemia in context of GI. Patient s/p 3U PRBC and  colonoscopy notable for mass in the cecum suspicious for malignancy. TTE with incidental finding of severe aortic stenosis. Cardiology consulted for evaluation of severe aortic stenosis.     ROS: Patient denies cardiopulmonary symptoms. Reports he feels "great!" and has no complaints  Physical Exam notable for: elderly male laying in bed in NAD, flat neck veins, RRR, III/VI systolic murmur radiating to both carotids and apex, clear lungs, no pretibial pitting edema, no ankle edema, skin WWP throughout, A&Ox3    Assessment and Recommendations as follows:  Case discussed with CTSx Structural team for evaluation of open surgical or non invasive AVR  -->CTSx Dr. Perez to evaluate this patient   Recommend initiation of Atorvastatin 80 qHS  Continue home labetalol 200mg BID  Continue home Nifedipine 90 daily  Hold home ARB pending GI work up and surgical evaluation  Resume home ASA daily when safe from GI bleeding perspective  JAVIER Lockett.  Cardiology Attending
severe anemia with elevated cardiac enzymes and abnormal ekg with worsening ST depressions suggesting cardiac demand ischemia --- admitting to telemetry for blood volume resuscitation.  GI eval pending

## 2020-06-16 NOTE — PROGRESS NOTE ADULT - PROBLEM SELECTOR PLAN 7
Patient with history of HTN  - restarting nifedipine given recent hypertension s/p transfusions; resume other home meds as tolerated Patient with history of HTN  - restart Nifedipine 90mg qd and Labetalol 200mg BID     #Aortic Stenosis   Echo showing severe AS, mod MR, EF 64%  -Per pt's PMD Dr. Mcgrath pt has a known hx of AS  -Cardiology consulted, f/u recs

## 2020-06-16 NOTE — PROGRESS NOTE ADULT - PROBLEM SELECTOR PLAN 6
H/o PAD w. bypass procedure, takes NOAC, ASA and labetalol at home  - hold all meds given recent GIB with subsequent severe anemia  - restart PRN following improvement of symptoms  - staples in R groin now removed by vascular H/o PAD w. bypass procedure, takes NOAC, ASA and labetalol at home  - hold all meds given recent GIB with subsequent severe anemia  - restart PRN following improvement of symptoms  - staples in R groin now removed by vascular  - consult vascular about restarting anticoagulation medication H/o PAD w. bypass procedure, takes NOAC, ASA and labetalol at home  - restart Nifedipine 90mg and Labetalol 200mg BID   - staples in R groin now removed by vascular  - consult vascular about restarting anticoagulation medication

## 2020-06-16 NOTE — PROGRESS NOTE ADULT - PROBLEM SELECTOR PLAN 1
Patient reported melanotic stool 3-4 days prior to admission, though now with reported brown stool. Rectal exam performed on admission revealed brown stool in rectal vault. Hgb 4.3 on admission, likely secondary to GIB.   - GI following, Dr. Arroyo recommendations appreciated   - colonoscopy found evidence of a small ulcerated mass in cecum concerning for malignancy, f/u pathology report   - maintain active T/S, two large bore IVs  - s/p 3U of pRBCs and Hb continues to be >8    - c/w PPI IV BID   - holding home anti-hypertensives in the setting of GI bleed; resume as clinically appropriate Patient reported melanotic stool 3-4 days prior to admission, though now with reported brown stool. Rectal exam performed on admission revealed brown stool in rectal vault. Hgb 4.3 on admission, likely secondary to GIB.   - GI following, Dr. Arroyo recommendations appreciated   - colonoscopy found evidence of a small ulcerated mass in cecum concerning for malignancy, biopsy was done; f/u pathology report   - maintain active T/S, two large bore IVs  - s/p 3U of pRBCs and Hb continues to be >8    - c/w PPI IV BID   - have restarted home Nifedipine for hypertension; continuing to hold labetalol in the setting of GI bleed; resume as clinically appropriate Patient reported melanotic stool 3-4 days prior to admission, though now with reported brown stool. Rectal exam performed on admission revealed brown stool in rectal vault. Hgb 4.3 on admission, likely secondary to GIB.   - GI following, Dr. Arroyo recommendations appreciated   - colonoscopy found evidence of a small ulcerated mass in cecum concerning for malignancy, biopsy was done; f/u pathology report   - maintain active T/S, two large bore IVs  - s/p 3U of pRBCs and Hb continues to be >8    - c/w PPI IV BID

## 2020-06-16 NOTE — PROGRESS NOTE ADULT - PROBLEM SELECTOR PLAN 2
Management as above, anemia likely represents anemia of chronic disease with superimposed GIB.   - c/w management as above  - goal Hgb > 8 given CAD  - now s/p 3U pRBCs  - CVA workup during admission (negative) likely more attributable to symptomatic anemia versus intracranial process (CT head and angio negative)

## 2020-06-16 NOTE — PROGRESS NOTE ADULT - PROBLEM SELECTOR PROBLEM 9
Nutrition, metabolism, and development symptoms
Nutrition, metabolism, and development symptoms
Need for prophylactic measure
Need for prophylactic measure

## 2020-06-16 NOTE — PROGRESS NOTE ADULT - PROBLEM SELECTOR PLAN 5
H/o CAD, takes NOAC, ASA and labetalol at home  - hold all meds given recent GIB with subsequent severe anemia  - restart PRN following improvement of symptoms H/o CAD, takes NOAC, ASA and labetalol at home  - hold all meds given recent GIB with subsequent severe anemia  - restart PRN following improvement of symptoms  - consult vascular about restarting anticoagulation medication H/o CAD, takes NOAC, ASA and labetalol at home  - hold all meds given recent GIB with subsequent severe anemia  - restart Labetalol 200mg BID   - consult vascular about restarting anticoagulation medication

## 2020-06-16 NOTE — PROGRESS NOTE ADULT - PROBLEM SELECTOR PLAN 8
DVT ppx - Holding given GIB  GI ppx - PPI F: none  E: replete K <4, Mg <2  N: NPO for CT abd/pelvis   GI Ppx: Protonix   DVT Ppx: None in setting of GIB   Code status: FULL   Dispo: [facility]    1) PCP Contacted on Admission: (Y/N) --> Name & Phone #: Dr. Priyank Mcgrath [phone]  2) Date of Contact with PCP: 6/16/20  3) PCP Contacted at Discharge: (Y/N, N/A)  4) Summary of Handoff Given to PCP:   5) Post-Discharge Appointment Date and Location:

## 2020-06-16 NOTE — PROGRESS NOTE ADULT - SUBJECTIVE AND OBJECTIVE BOX
OVERNIGHT EVENTS:  SUBJECTIVE / INTERVAL HPI:   VITAL SIGNS:   vitals and i&os      PHYSICAL EXAM:  Constitutional: WDWN resting comfortably in bed; NAD  HEENT: NC/AT, PERRL, EOMI, anicteric sclera  Neck: supple; no JVD  Respiratory: CTA B/L; no W/R/R, no retractions  Cardiac: +S1/S2; +crescendo-decrescendo murmur  Gastrointestinal: abdomen soft, NT/ND; normal BSx4 quadrants   Extremities: WWP, no clubbing or cyanosis; no peripheral edema  Musculoskeletal: NROM x4; no joint swelling, tenderness or erythema  Vascular: 2+ radial pulses, good cap refill  Dermatologic: skin warm, dry and intact; no rashes, wounds, or scars.  Mild pallor of palms  Neurologic: AAOx3 poor historian; CNII-XII grossly intact; no focal deficits, ambulating without difficulty   Psychiatric: affect and characteristics of appearance, verbalizations, behaviors are appropriate        MEDICATIONS:   standing and prn   ALLERGIES:  LABS:   cbc, chem, pt/inr/ptt   CAPILLARY BLOOD GLUCOSE  RADIOLOGY & ADDITIONAL TESTS: Reviewed. OVERNIGHT EVENTS: none     SUBJECTIVE / INTERVAL HPI:   Patient was assessed at bedside. He is in no acute distress. He states that overnight someone was mean to him and this makes him want to go home. He believes that he is here because the Chinese food he ate a few days ago broccoli with brown sauce) gave him diarrhea and caused the blood. He had a BM yesterday; he reports no pain.   He is eager to shower and shave; states that if he cant do these things he wants to go home.   Denies dizziness, abdominal pain; fatigue.       VITAL SIGNS:   Vital Signs Last 24 Hrs  T(C): 36.7 (16 Jun 2020 08:18), Max: 37.2 (15 Fam 2020 14:38)  T(F): 98 (16 Jun 2020 08:18), Max: 99 (15 Fam 2020 14:38)  HR: 80 (16 Jun 2020 08:18) (60 - 96)  BP: 145/76 (16 Jun 2020 08:18) (135/75 - 187/86)  BP(mean): 96 (15 Fam 2020 20:59) (96 - 124)  RR: 18 (16 Jun 2020 08:18) (17 - 20)  SpO2: 96% (16 Jun 2020 08:18) (95% - 97%)    I&O's Summary    16 Jun 2020 07:01  -  16 Jun 2020 10:44  --------------------------------------------------------  IN: 100 mL / OUT: 0 mL / NET: 100 mL      PHYSICAL EXAM:  Constitutional: WDWN resting comfortably in bed; NAD  HEENT: NC/AT, PERRL, EOMI, anicteric sclera  Neck: supple; no JVD  Respiratory: CTA B/L; no W/R/R, no retractions  Cardiac: +S1/S2; +crescendo-decrescendo murmur  Gastrointestinal: abdomen soft, NT/ND; normal BSx4 quadrants   Extremities: WWP, no peripheral edema; no tenderness or erythema; pulses present   Neurologic: AAOx3 poor historian; CNII-XII grossly intact; no focal deficits      MEDICATIONS:   MEDICATIONS  (STANDING):  dextrose 5%. 1000 milliLiter(s) (50 mL/Hr) IV Continuous <Continuous>  dextrose 50% Injectable 12.5 Gram(s) IV Push once  dextrose 50% Injectable 25 Gram(s) IV Push once  dextrose 50% Injectable 25 Gram(s) IV Push once  insulin lispro (HumaLOG) corrective regimen sliding scale   SubCutaneous Before meals and at bedtime  NIFEdipine XL 90 milliGRAM(s) Oral daily  pantoprazole  Injectable 40 milliGRAM(s) IV Push every 12 hours    MEDICATIONS  (PRN):  dextrose 40% Gel 15 Gram(s) Oral once PRN Blood Glucose LESS THAN 70 milliGRAM(s)/deciliter  glucagon  Injectable 1 milliGRAM(s) IntraMuscular once PRN Glucose LESS THAN 70 milligrams/deciliter      ALLERGIES:  Allergies    No Known Allergies    Intolerances      LABS:                       9.8    12.05 )-----------( 257      ( 16 Jun 2020 06:44 )             30.0     06-16    140  |  102  |  8   ----------------------------<  145<H>  4.0   |  28  |  0.84    Ca    8.4      16 Jun 2020 06:44  Phos  2.9     06-16  Mg     1.8     06-16        CAPILLARY BLOOD GLUCOSE    POCT Blood Glucose.: 155 mg/dL (16 Jun 2020 08:31)        RADIOLOGY & ADDITIONAL TESTS: Reviewed. OVERNIGHT EVENTS: none     SUBJECTIVE / INTERVAL HPI:   Patient was assessed at bedside. He is in no acute distress. He states that overnight someone was mean to him and this makes him want to go home. He had a BM yesterday; he reports no pain. He believes that he is here because the Chinese food he ate a few days ago broccoli with brown sauce) gave him diarrhea and caused the blood. Despite repeated conversations about the possible implications of his colonoscopy findings, the patient does not seem to understand the severity of the situation.   He is eager to shower and shave; states that if he cant do these things he wants to go home.   Denies dizziness, abdominal pain; fatigue.       VITAL SIGNS:   Vital Signs Last 24 Hrs  T(C): 36.7 (16 Jun 2020 08:18), Max: 37.2 (15 Fam 2020 14:38)  T(F): 98 (16 Jun 2020 08:18), Max: 99 (15 Fam 2020 14:38)  HR: 80 (16 Jun 2020 08:18) (60 - 96)  BP: 145/76 (16 Jun 2020 08:18) (135/75 - 187/86)  BP(mean): 96 (15 Fam 2020 20:59) (96 - 124)  RR: 18 (16 Jun 2020 08:18) (17 - 20)  SpO2: 96% (16 Jun 2020 08:18) (95% - 97%)    I&O's Summary    16 Jun 2020 07:01  -  16 Jun 2020 10:44  --------------------------------------------------------  IN: 100 mL / OUT: 0 mL / NET: 100 mL      PHYSICAL EXAM:  Constitutional: WDWN resting comfortably in bed; NAD  HEENT: NC/AT, PERRL, EOMI, anicteric sclera  Neck: supple; no JVD  Respiratory: CTA B/L; no W/R/R, no retractions  Cardiac: +S1/S2; +crescendo-decrescendo murmur  Gastrointestinal: abdomen soft, NT/ND; normal BSx4 quadrants   Extremities: WWP, no peripheral edema; no tenderness or erythema; pulses present   Neurologic: AAOx3 poor historian; CNII-XII grossly intact; no focal deficits      MEDICATIONS:   MEDICATIONS  (STANDING):  dextrose 5%. 1000 milliLiter(s) (50 mL/Hr) IV Continuous <Continuous>  dextrose 50% Injectable 12.5 Gram(s) IV Push once  dextrose 50% Injectable 25 Gram(s) IV Push once  dextrose 50% Injectable 25 Gram(s) IV Push once  insulin lispro (HumaLOG) corrective regimen sliding scale   SubCutaneous Before meals and at bedtime  NIFEdipine XL 90 milliGRAM(s) Oral daily  pantoprazole  Injectable 40 milliGRAM(s) IV Push every 12 hours    MEDICATIONS  (PRN):  dextrose 40% Gel 15 Gram(s) Oral once PRN Blood Glucose LESS THAN 70 milliGRAM(s)/deciliter  glucagon  Injectable 1 milliGRAM(s) IntraMuscular once PRN Glucose LESS THAN 70 milligrams/deciliter      ALLERGIES:  Allergies    No Known Allergies    Intolerances      LABS:                       9.8    12.05 )-----------( 257      ( 16 Jun 2020 06:44 )             30.0     06-16    140  |  102  |  8   ----------------------------<  145<H>  4.0   |  28  |  0.84    Ca    8.4      16 Jun 2020 06:44  Phos  2.9     06-16  Mg     1.8     06-16        CAPILLARY BLOOD GLUCOSE    POCT Blood Glucose.: 155 mg/dL (16 Jun 2020 08:31)        RADIOLOGY & ADDITIONAL TESTS: Reviewed. OVERNIGHT EVENTS: none     SUBJECTIVE / INTERVAL HPI:   Patient was assessed at bedside. He is in no acute distress. He states that overnight someone was mean to him and this makes him want to go home. He had a BM yesterday; he reports no pain. He believes that he is here because the Chinese food he ate a few days ago broccoli with brown sauce) gave him diarrhea and caused the blood. Despite repeated conversations about the possible implications of his colonoscopy findings, the patient does not seem to understand the severity of the situation.   He is eager to shower and shave; states that if he cant do these things he wants to go home.   Denies dizziness, abdominal pain; fatigue.     Talked to his daughter Kerry Carlos ([phone]) over the phone; she states the patient keeps calling her and saying he "will put on his clothes and walk out." Talked to daughter about colonoscopy and findings. She will be visiting today and will talk to her father about the situation as well.       VITAL SIGNS:   Vital Signs Last 24 Hrs  T(C): 36.7 (16 Jun 2020 08:18), Max: 37.2 (15 Fam 2020 14:38)  T(F): 98 (16 Jun 2020 08:18), Max: 99 (15 Fam 2020 14:38)  HR: 80 (16 Jun 2020 08:18) (60 - 96)  BP: 145/76 (16 Jun 2020 08:18) (135/75 - 187/86)  BP(mean): 96 (15 Fam 2020 20:59) (96 - 124)  RR: 18 (16 Jun 2020 08:18) (17 - 20)  SpO2: 96% (16 Jun 2020 08:18) (95% - 97%)    I&O's Summary    16 Jun 2020 07:01  -  16 Jun 2020 10:44  --------------------------------------------------------  IN: 100 mL / OUT: 0 mL / NET: 100 mL      PHYSICAL EXAM:  Constitutional: WDWN resting comfortably in bed; NAD  HEENT: NC/AT, PERRL, EOMI, anicteric sclera  Neck: supple; no JVD  Respiratory: CTA B/L; no W/R/R, no retractions  Cardiac: +S1/S2; +crescendo-decrescendo murmur  Gastrointestinal: abdomen soft, NT/ND; normal BSx4 quadrants   Extremities: WWP, no peripheral edema; no tenderness or erythema; pulses present   Neurologic: AAOx3 poor historian; CNII-XII grossly intact; no focal deficits      MEDICATIONS:   MEDICATIONS  (STANDING):  dextrose 5%. 1000 milliLiter(s) (50 mL/Hr) IV Continuous <Continuous>  dextrose 50% Injectable 12.5 Gram(s) IV Push once  dextrose 50% Injectable 25 Gram(s) IV Push once  dextrose 50% Injectable 25 Gram(s) IV Push once  insulin lispro (HumaLOG) corrective regimen sliding scale   SubCutaneous Before meals and at bedtime  NIFEdipine XL 90 milliGRAM(s) Oral daily  pantoprazole  Injectable 40 milliGRAM(s) IV Push every 12 hours    MEDICATIONS  (PRN):  dextrose 40% Gel 15 Gram(s) Oral once PRN Blood Glucose LESS THAN 70 milliGRAM(s)/deciliter  glucagon  Injectable 1 milliGRAM(s) IntraMuscular once PRN Glucose LESS THAN 70 milligrams/deciliter      ALLERGIES:  Allergies    No Known Allergies    Intolerances      LABS:                       9.8    12.05 )-----------( 257      ( 16 Jun 2020 06:44 )             30.0     06-16    140  |  102  |  8   ----------------------------<  145<H>  4.0   |  28  |  0.84    Ca    8.4      16 Jun 2020 06:44  Phos  2.9     06-16  Mg     1.8     06-16        CAPILLARY BLOOD GLUCOSE    POCT Blood Glucose.: 155 mg/dL (16 Jun 2020 08:31)        RADIOLOGY & ADDITIONAL TESTS: Reviewed. OVERNIGHT EVENTS: none     SUBJECTIVE / INTERVAL HPI:   Patient was assessed at bedside. He is in no acute distress. He states that overnight someone was mean to him and this makes him want to go home. He had a BM yesterday; he reports no pain. He believes that he is here because the Chinese food he ate a few days ago (broccoli with brown sauce) gave him diarrhea and caused the blood. Despite repeated conversations about the possible implications of his colonoscopy findings, the patient does not seem to understand the severity of the situation.   He is eager to shower and shave; states that if he cant do these things he wants to go home.   Denies dizziness, abdominal pain; fatigue.     Talked to his daughter Kerry Carlos ([phone]) over the phone; she states the patient keeps calling her and saying he "will put on his clothes and walk out." Talked to daughter about colonoscopy and findings. She will be visiting today and will talk to her father about the situation as well.       VITAL SIGNS:   Vital Signs Last 24 Hrs  T(C): 36.7 (16 Jun 2020 08:18), Max: 37.2 (15 Fam 2020 14:38)  T(F): 98 (16 Jun 2020 08:18), Max: 99 (15 Fam 2020 14:38)  HR: 80 (16 Jun 2020 08:18) (60 - 96)  BP: 145/76 (16 Jun 2020 08:18) (135/75 - 187/86)  BP(mean): 96 (15 Fam 2020 20:59) (96 - 124)  RR: 18 (16 Jun 2020 08:18) (17 - 20)  SpO2: 96% (16 Jun 2020 08:18) (95% - 97%)    I&O's Summary    16 Jun 2020 07:01  -  16 Jun 2020 10:44  --------------------------------------------------------  IN: 100 mL / OUT: 0 mL / NET: 100 mL      PHYSICAL EXAM:  Constitutional: WDWN resting comfortably in bed; NAD  HEENT: NC/AT, PERRL, EOMI, anicteric sclera  Neck: supple; no JVD  Respiratory: CTA B/L; no W/R/R, no retractions  Cardiac: +S1/S2; +crescendo-decrescendo murmur  Gastrointestinal: abdomen soft, NT/ND; normal BSx4 quadrants   Extremities: WWP, no peripheral edema; no tenderness or erythema; pulses present   Neurologic: AAOx3 poor historian; CNII-XII grossly intact; no focal deficits      MEDICATIONS:   MEDICATIONS  (STANDING):  dextrose 5%. 1000 milliLiter(s) (50 mL/Hr) IV Continuous <Continuous>  dextrose 50% Injectable 12.5 Gram(s) IV Push once  dextrose 50% Injectable 25 Gram(s) IV Push once  dextrose 50% Injectable 25 Gram(s) IV Push once  insulin lispro (HumaLOG) corrective regimen sliding scale   SubCutaneous Before meals and at bedtime  NIFEdipine XL 90 milliGRAM(s) Oral daily  pantoprazole  Injectable 40 milliGRAM(s) IV Push every 12 hours    MEDICATIONS  (PRN):  dextrose 40% Gel 15 Gram(s) Oral once PRN Blood Glucose LESS THAN 70 milliGRAM(s)/deciliter  glucagon  Injectable 1 milliGRAM(s) IntraMuscular once PRN Glucose LESS THAN 70 milligrams/deciliter      ALLERGIES:  Allergies    No Known Allergies    Intolerances      LABS:                       9.8    12.05 )-----------( 257      ( 16 Jun 2020 06:44 )             30.0     06-16    140  |  102  |  8   ----------------------------<  145<H>  4.0   |  28  |  0.84    Ca    8.4      16 Jun 2020 06:44  Phos  2.9     06-16  Mg     1.8     06-16        CAPILLARY BLOOD GLUCOSE    POCT Blood Glucose.: 155 mg/dL (16 Jun 2020 08:31)        RADIOLOGY & ADDITIONAL TESTS: Reviewed.

## 2020-06-16 NOTE — CONSULT NOTE ADULT - ASSESSMENT
Assesment:  70y Male with a PMHx of HTN, HLD, PAD (s/p right and left LE FEM-pop bypass in 2016), CAD, and recent admission for occluded CFA-AK pop bypass s/p Jayme balloon embolectomy (on Xarelto and ASA 3/2020) presented to [facility] on 6/13 complaining of cool hands, weakness, dizziness, diarrhea with blood in stool for 4 days. Upon arrival to the ED the patient was found to be anemic with Hb 4.2 (received 3UPRBC). A stroke code was called for AM, CT head negative for acute findings. On 6/15 the patient underwent a colonoscopy which found diffuse diverticulosis, a small ulcerated mass in area of cecum (biopsies sent, suspicious for malignancy). A TTE preformed 6/15 found severe aortic stenosis with ANDRZEJ 0.9 cm2 and Structural Heart (Dr. Perez) was consulted.     Plan:  Problem 1: Aortic stenosis  - Patient seen and case discussed with Dr. Perez  - TTE 6/15: severe aortic stenosis with ANDRZEJ 0.9 cm2  - Given pending GI studies with concern for malignancy will hold off cardiac intervention at this time  - Will follow for pathology results from colonoscopy  - If pursuing intervention for aortic stenosis, given patient's age and prior vascular surgeries in R groin surgical AVR likely best option  - Will need cardiac catheterization prior to cardiac intervention to evaluate coronaries     Problem 2: Rectal bleeding   - Pt admitted with diarrhea and melena for 4 days, found to have Hgb 4.2 s/p transfusion with 3U PRBC  - GI following, colonoscopy revealing small ulcerated mass in cecum concerning for malignancy  - Biopsies sent, f/u results  - Management per primary team/ GI   - Continue to monitor H&H and transfuse as necessary       Problem 3: Hx of CAD  - Pt has history of CAD per medical record  - Will need cardiac catheterization to further clarify disease prior to any cardiac intervention  - Continue management per primary team     Problem 4: PAD  - Pt has severe PAD and is s/p bypass and balloon embolectomy   - On xarelto at home  - AC currently held given patient's active GIB  - Management per primary team/vascular surgery     I have reviewed clinical labs tests and reports, radiology tests and reports, as well as old patient medical records, and discussed with the refering physician.

## 2020-06-16 NOTE — CONSULT NOTE ADULT - SUBJECTIVE AND OBJECTIVE BOX
Surgeon: Dr. Perez    Requesting Physician: Dr. Cruz    HISTORY OF PRESENT ILLNESS:  70y Male with a PMHx of HTN, HLD, PAD (s/p right and left LE FEM-pop bypass in 2016), CAD, and recent admission for occluded CFA-AK pop bypass s/p Jayme balloon embolectomy (on Xarelto and ASA 3/2020) presented to [facility] on 6/13 complaining of cool hands, weakness, dizziness, diarrhea with blood in stool for 4 days. Upon arrival to the ED the patient was found to be anemic with Hb 4.2 (received 3UPRBC). A stroke code was called for AM, CT head negative for acute findings. On 6/15 the patient underwent a colonoscopy which found diffuse diverticulosis, a small ulcerated mass in area of cecum (biopsies sent, suspicious for malignancy). A TTE preformed 6/15 found severe aortic stenosis with ANDRZEJ 0.9 cm2 and Structural Heart (Dr. Perez) was consulted.     PAST MEDICAL & SURGICAL HISTORY:  PAD (peripheral artery disease)  DM (diabetes mellitus)  CAD (coronary artery disease)  Essential hypertension: Hypertension  S/P femoral-femoral bypass surgery: left 2016  Elective surgery: right leg angiogram with bypass  july 2016  History of hernia repair: june 2014      MEDICATIONS  (STANDING):  dextrose 5%. 1000 milliLiter(s) (50 mL/Hr) IV Continuous <Continuous>  dextrose 50% Injectable 12.5 Gram(s) IV Push once  dextrose 50% Injectable 25 Gram(s) IV Push once  dextrose 50% Injectable 25 Gram(s) IV Push once  insulin lispro (HumaLOG) corrective regimen sliding scale   SubCutaneous Before meals and at bedtime  labetalol 200 milliGRAM(s) Oral every 12 hours  NIFEdipine XL 90 milliGRAM(s) Oral daily  pantoprazole  Injectable 40 milliGRAM(s) IV Push every 12 hours    MEDICATIONS  (PRN):  dextrose 40% Gel 15 Gram(s) Oral once PRN Blood Glucose LESS THAN 70 milliGRAM(s)/deciliter  glucagon  Injectable 1 milliGRAM(s) IntraMuscular once PRN Glucose LESS THAN 70 milligrams/deciliter      Allergies    No Known Allergies    Intolerances    SOCIAL HISTORY: Unknown, pt declined   Assisted device use (Cane / Walker): No  Live with: daughter Kerry     FAMILY HISTORY:      Review of Systems:  CONSTITUTIONAL: Endorses weakness, dizziness per HPI. Denies fevers / chills, sweats, weight loss, weight gain                                       NEURO:  Denies parathesias, seizures, syncope, confusion                                                                                  EYES:  Denies blurry vision, discharge, pain, loss of vision                                                                                    ENMT:  Denies difficulty hearing, vertigo, dysphagia, epistaxis, recent dental work                                       CV:  Denies chest pain, palpitations, PERERA, orthopnea                                                                                           RESPIRATORY:  Denies wheezing, SOB, cough / sputum, hemoptysis                                                               GI:  Endorses diarrhea with melena per HPI. Denies nausea, vomiting                                                                        : Denies hematuria, dysuria, urgency, incontinence                                                                                          MUSKULOSKELETAL:  Denies arthritis, joint swelling, muscle weakness                                                             SKIN/BREAST:  Denies rash, itching, hair loss, masses                                                                                              PSYCH:  Denies depression, anxiety, suicidal ideation                                                                                                HEME/LYMPH:  Denies bruises easily, enlarged lymph nodes, tender lymph nodes                                          ENDOCRINE:  Denies cold intolerance, heat intolerance, polydipsia                                                                      Vital Signs Last 24 Hrs  T(C): 36.7 (16 Jun 2020 08:18), Max: 37.2 (15 Fam 2020 14:38)  T(F): 98 (16 Jun 2020 08:18), Max: 99 (15 Fam 2020 14:38)  HR: 80 (16 Jun 2020 08:18) (60 - 96)  BP: 145/76 (16 Jun 2020 08:18) (135/75 - 187/86)  BP(mean): 96 (15 Fam 2020 20:59) (96 - 124)  RR: 18 (16 Jun 2020 08:18) (17 - 20)  SpO2: 96% (16 Jun 2020 08:18) (95% - 97%)    Physical Exam  CONSTITUTIONAL: Well appearing male in NAD assessed laying comfortably in bed  NEURO: No focal deficits noted. Moving bilateral upper and lower extremities.                       EYES: WNL  ENMT: WNL  CV: RRR, +murmur, no rubs/gallops  RESPIRATORY: CTA bilateral posterior lung fields   GI: +BS, NT/ND  : No soni  MUSKULOSKELETAL: No peripheral edema. Incision seen on right leg from prior PAD bypass and right groin incision from recent balloon embolectomy   SKIN / BREAST: WNL                                                          LABS:                        9.8    12.05 )-----------( 257      ( 16 Jun 2020 06:44 )             30.0     06-16    140  |  102  |  8   ----------------------------<  145<H>  4.0   |  28  |  0.84    Ca    8.4      16 Jun 2020 06:44  Phos  2.9     06-16  Mg     1.8     06-16          CARDIAC MARKERS ( 14 Jun 2020 20:22 )  x     / 0.05 ng/mL / 73 U/L / x     / 3.0 ng/mL  CARDIAC MARKERS ( 14 Jun 2020 15:08 )  x     / 0.05 ng/mL / 77 U/L / x     / 3.6 ng/mL        RADIOLOGY & ADDITIONAL STUDIES:  CXR:< from: Xray Chest 1 View- PORTABLE-Routine (06.13.20 @ 20:12) >    INTERPRETATION:  Clinical History: Preprocedure    Portable examination of chest demonstrates mild cardiomegaly. Bilateral infiltrates. Mild degenerative changes thoracic spine. Calcification aortic knob.    Impression: Bilateral infiltrates    CT Scan:  < from: CT Angio Neck w/ IV Cont (06.13.20 @ 16:06) >  FINDINGS: The CTA examination demonstrates the right common carotid artery to be normal in caliber. There is a normal bifurcation into the right internal and external carotid arteries. There is no hemodynamically significant stenosis.     The left common carotid artery is normal in caliber. There is a normal bifurcation into the left internal and external carotid arteries. There is no hemodynamically significant stenosis.     The right vertebral artery is clearly seen from its origin to the proximal foraminal segment. There is an attenuated caliber to the remaining vertebral artery at approximately the C4 level. This may be secondary to a combination of underdevelopment and/or proximal proximal occlusion. The left vertebral artery appears intact.    The aortic arch appears intact without narrowing of the origin of the great vessels.    IMPRESSION: Poor visualization of the right vertebral artery which may be secondary to a combination of underdevelopment and/or proximal occlusion.    < from: CT Angio Head w/ IV Cont (06.13.20 @ 16:05) >  IMPRESSION: Poor visualization of the right vertebral artery which may be secondary to a combination of underdevelopment and/or proximal occlusion.    EKG:  < from: 12 Lead ECG (06.14.20 @ 14:35) >  Ventricular Rate 59 BPM    Atrial Rate 59 BPM    P-R Interval 146 ms    QRS Duration 136 ms    Q-T Interval 474 ms    QTC Calculation(Bezet) 469 ms    P Axis 48 degrees    R Axis 33 degrees    T Axis 41 degrees      TTE / ELIZABETH:  < from: TTE Echo Complete w/o Contrast w/ Doppler (06.15.20 @ 15:46) >  CONCLUSIONS:     1. The aortic valve is thickened. There is probably severe aortic stenosis. The peak transvalvular velocity is 3.80 m/s, the mean transvalvular gradient is 29.00 mmHg, and the LVOT/AV velocity ratio is 0.23. The peak transaortic gradient is 57.76 mmHg. The aortic valve area (estimated via the continuity method) is 0.9 cm². There is mild aortic regurgitation.   2. Normal left and right ventricular size and systolic function.   3. Structurally normal mitral valve with normal leaflet excursion. There is probably moderate eccentric mitral regurgitation.   4. No pericardial effusion.

## 2020-06-16 NOTE — PROGRESS NOTE ADULT - ATTENDING COMMENTS
Patient was seen and examined with the resident team today.  I agree with the above assessment and plan with the following exceptions/additions:     Briefly, this is a 70yo Mexican-speaking gentleman with a PMH of HTN, HLD, NIDDM2 (A1c 6.2%), CAD, PAD s/p right and left LE FEM-pop bypass (2016) and recent admission for occluded CFA-AK pop bypass s/p Jayme balloon embolectomy (on Xarelto and ASA, 3/2020) who p/w symptomatic anemia i/s/o hematochezia.  C-scope from 6/15 showing an ulcerated cecal mass c/f malignancy.  Remainder of hospital course notable for new  severe aortic stenosis.      -- CTAP with IV and PO contrast   -- f/u path from c-scope   -- f/u CEA level   -- will discuss with GI and Vascular restarting A/C  -- BP control   -- Cardiology, RE: severe AS as may need further interventions for mass  -- DVT PPx - A/C as per above  -- Dispo - TBD     America Cruz  [phone]

## 2020-06-16 NOTE — PROGRESS NOTE ADULT - ASSESSMENT
69M PMH of HTN, DM, HLD, PAD, CAD, PAD s/p right and left LE FEM-pop bypass in (R in 2016), recent admission for occluded CFA-AK pop bypass s/p Jayme balloon embolectomy (on xarelto and aspirin (3/2020), presented with anemia, admitted for symptomatic anemia with a Hb of 4.2, now s/p 3U pRBCs and colonoscopy showing small ulcerated mass in the cecum suspicious for malignancy. Transferred to the [facility] for further malignancy workup.

## 2020-06-17 LAB
A1C WITH ESTIMATED AVERAGE GLUCOSE RESULT: 5.5 % — SIGNIFICANT CHANGE UP (ref 4–5.6)
ALBUMIN SERPL ELPH-MCNC: 3.7 G/DL — SIGNIFICANT CHANGE UP (ref 3.3–5)
ALP SERPL-CCNC: 114 U/L — SIGNIFICANT CHANGE UP (ref 40–120)
ALT FLD-CCNC: 11 U/L — SIGNIFICANT CHANGE UP (ref 10–45)
ANION GAP SERPL CALC-SCNC: 14 MMOL/L — SIGNIFICANT CHANGE UP (ref 5–17)
AST SERPL-CCNC: 14 U/L — SIGNIFICANT CHANGE UP (ref 10–40)
BILIRUB SERPL-MCNC: 0.3 MG/DL — SIGNIFICANT CHANGE UP (ref 0.2–1.2)
BUN SERPL-MCNC: 10 MG/DL — SIGNIFICANT CHANGE UP (ref 7–23)
CALCIUM SERPL-MCNC: 8.9 MG/DL — SIGNIFICANT CHANGE UP (ref 8.4–10.5)
CHLORIDE SERPL-SCNC: 101 MMOL/L — SIGNIFICANT CHANGE UP (ref 96–108)
CO2 SERPL-SCNC: 27 MMOL/L — SIGNIFICANT CHANGE UP (ref 22–31)
CREAT SERPL-MCNC: 0.89 MG/DL — SIGNIFICANT CHANGE UP (ref 0.5–1.3)
ESTIMATED AVERAGE GLUCOSE: 111 MG/DL — SIGNIFICANT CHANGE UP (ref 68–114)
FERRITIN SERPL-MCNC: 29 NG/ML — LOW (ref 30–400)
GLUCOSE BLDC GLUCOMTR-MCNC: 137 MG/DL — HIGH (ref 70–99)
GLUCOSE BLDC GLUCOMTR-MCNC: 160 MG/DL — HIGH (ref 70–99)
GLUCOSE BLDC GLUCOMTR-MCNC: 161 MG/DL — HIGH (ref 70–99)
GLUCOSE BLDC GLUCOMTR-MCNC: 175 MG/DL — HIGH (ref 70–99)
GLUCOSE BLDC GLUCOMTR-MCNC: 176 MG/DL — HIGH (ref 70–99)
GLUCOSE BLDC GLUCOMTR-MCNC: 181 MG/DL — HIGH (ref 70–99)
GLUCOSE SERPL-MCNC: 126 MG/DL — HIGH (ref 70–99)
HCT VFR BLD CALC: 31.7 % — LOW (ref 39–50)
HGB BLD-MCNC: 10.2 G/DL — LOW (ref 13–17)
IRON SATN MFR SERPL: 11 UG/DL — LOW (ref 45–165)
IRON SATN MFR SERPL: 3 % — LOW (ref 16–55)
MAGNESIUM SERPL-MCNC: 2.1 MG/DL — SIGNIFICANT CHANGE UP (ref 1.6–2.6)
MCHC RBC-ENTMCNC: 28.1 PG — SIGNIFICANT CHANGE UP (ref 27–34)
MCHC RBC-ENTMCNC: 32.2 GM/DL — SIGNIFICANT CHANGE UP (ref 32–36)
MCV RBC AUTO: 87.3 FL — SIGNIFICANT CHANGE UP (ref 80–100)
NRBC # BLD: 0 /100 WBCS — SIGNIFICANT CHANGE UP (ref 0–0)
PLATELET # BLD AUTO: 314 K/UL — SIGNIFICANT CHANGE UP (ref 150–400)
POTASSIUM SERPL-MCNC: 3.8 MMOL/L — SIGNIFICANT CHANGE UP (ref 3.5–5.3)
POTASSIUM SERPL-SCNC: 3.8 MMOL/L — SIGNIFICANT CHANGE UP (ref 3.5–5.3)
PROT SERPL-MCNC: 6.6 G/DL — SIGNIFICANT CHANGE UP (ref 6–8.3)
RBC # BLD: 3.63 M/UL — LOW (ref 4.2–5.8)
RBC # FLD: 15 % — HIGH (ref 10.3–14.5)
SARS-COV-2 RNA SPEC QL NAA+PROBE: SIGNIFICANT CHANGE UP
SODIUM SERPL-SCNC: 142 MMOL/L — SIGNIFICANT CHANGE UP (ref 135–145)
TIBC SERPL-MCNC: 329 UG/DL — SIGNIFICANT CHANGE UP (ref 220–430)
UIBC SERPL-MCNC: 318 UG/DL — SIGNIFICANT CHANGE UP (ref 110–370)
WBC # BLD: 9.94 K/UL — SIGNIFICANT CHANGE UP (ref 3.8–10.5)
WBC # FLD AUTO: 9.94 K/UL — SIGNIFICANT CHANGE UP (ref 3.8–10.5)

## 2020-06-17 PROCEDURE — 75573 CT HRT C+ STRUX CGEN HRT DS: CPT | Mod: 26

## 2020-06-17 PROCEDURE — 99233 SBSQ HOSP IP/OBS HIGH 50: CPT | Mod: GC

## 2020-06-17 PROCEDURE — 99233 SBSQ HOSP IP/OBS HIGH 50: CPT

## 2020-06-17 PROCEDURE — 74174 CTA ABD&PLVS W/CONTRAST: CPT | Mod: 26

## 2020-06-17 RX ORDER — POTASSIUM CHLORIDE 20 MEQ
20 PACKET (EA) ORAL ONCE
Refills: 0 | Status: COMPLETED | OUTPATIENT
Start: 2020-06-17 | End: 2020-06-17

## 2020-06-17 RX ORDER — RIVAROXABAN 15 MG-20MG
15 KIT ORAL
Refills: 0 | Status: DISCONTINUED | OUTPATIENT
Start: 2020-06-17 | End: 2020-06-17

## 2020-06-17 RX ORDER — CHLORHEXIDINE GLUCONATE 213 G/1000ML
10 SOLUTION TOPICAL ONCE
Refills: 0 | Status: DISCONTINUED | OUTPATIENT
Start: 2020-06-17 | End: 2020-06-17

## 2020-06-17 RX ORDER — RIVAROXABAN 15 MG-20MG
20 KIT ORAL
Refills: 0 | Status: DISCONTINUED | OUTPATIENT
Start: 2020-06-17 | End: 2020-06-17

## 2020-06-17 RX ORDER — CHLORHEXIDINE GLUCONATE 213 G/1000ML
1 SOLUTION TOPICAL ONCE
Refills: 0 | Status: DISCONTINUED | OUTPATIENT
Start: 2020-06-17 | End: 2020-06-17

## 2020-06-17 RX ORDER — CHLORHEXIDINE GLUCONATE 213 G/1000ML
1 SOLUTION TOPICAL ONCE
Refills: 0 | Status: COMPLETED | OUTPATIENT
Start: 2020-06-17 | End: 2020-06-17

## 2020-06-17 RX ORDER — IRON SUCROSE 20 MG/ML
200 INJECTION, SOLUTION INTRAVENOUS EVERY 24 HOURS
Refills: 0 | Status: COMPLETED | OUTPATIENT
Start: 2020-06-17 | End: 2020-06-21

## 2020-06-17 RX ADMIN — ATORVASTATIN CALCIUM 80 MILLIGRAM(S): 80 TABLET, FILM COATED ORAL at 21:57

## 2020-06-17 RX ADMIN — RIVAROXABAN 20 MILLIGRAM(S): KIT at 16:25

## 2020-06-17 RX ADMIN — Medication 2: at 11:40

## 2020-06-17 RX ADMIN — IRON SUCROSE 110 MILLIGRAM(S): 20 INJECTION, SOLUTION INTRAVENOUS at 16:26

## 2020-06-17 RX ADMIN — Medication 200 MILLIGRAM(S): at 06:16

## 2020-06-17 RX ADMIN — Medication 200 MILLIGRAM(S): at 18:26

## 2020-06-17 RX ADMIN — Medication 90 MILLIGRAM(S): at 06:16

## 2020-06-17 RX ADMIN — Medication 2: at 16:51

## 2020-06-17 RX ADMIN — Medication 20 MILLIEQUIVALENT(S): at 11:41

## 2020-06-17 RX ADMIN — PANTOPRAZOLE SODIUM 40 MILLIGRAM(S): 20 TABLET, DELAYED RELEASE ORAL at 03:16

## 2020-06-17 RX ADMIN — Medication 2: at 08:28

## 2020-06-17 RX ADMIN — PANTOPRAZOLE SODIUM 40 MILLIGRAM(S): 20 TABLET, DELAYED RELEASE ORAL at 16:25

## 2020-06-17 NOTE — PROGRESS NOTE ADULT - SUBJECTIVE AND OBJECTIVE BOX
INTERVAL HPI/OVERNIGHT EVENTS: Patient seen and examined at bedside. No acute events overnight.    VITAL SIGNS:  T(F): 98 (06-17-20 @ 06:01)  HR: 64 (06-17-20 @ 06:01)  BP: 133/68 (06-17-20 @ 06:01)  RR: 18 (06-17-20 @ 07:04)  SpO2: 94% (06-17-20 @ 07:04)  Wt(kg): --    06-16-20 @ 07:01  -  06-17-20 @ 07:00  --------------------------------------------------------  IN: 100 mL / OUT: 0 mL / NET: 100 mL        PHYSICAL EXAM:    Constitutional: WDWN resting comfortably in bed; NAD  Head: NC/AT  Eyes: PERRL, EOMI, anicteric sclera  ENT: no oropharyngeal erythema or exudates; MMM  Neck: supple; no JVD  Respiratory: CTA B/L; no W/R/R, no retractions  Cardiac: +S1/S2; RRR; no M/R/G; PMI non-displaced  Gastrointestinal: soft, NT/ND; no rebound or guarding; +BSx4  Genitourinary: normal external genitalia  Back: spine midline, no bony tenderness or step-offs; no CVAT B/L  Extremities: WWP, no clubbing or cyanosis; no peripheral edema  Musculoskeletal: NROM x4; no joint swelling, tenderness or erythema  Vascular: 2+ radial, DP/PT pulses B/L  Lymphatic: no submandibular or cervical LAD  Neurologic: AAOx3; CNII-XII grossly intact; no focal deficits  Psychiatric: affect and characteristics of appearance, verbalizations, behaviors are appropriate    MEDICATIONS  (STANDING):  atorvastatin 80 milliGRAM(s) Oral at bedtime  dextrose 5%. 1000 milliLiter(s) (50 mL/Hr) IV Continuous <Continuous>  dextrose 50% Injectable 12.5 Gram(s) IV Push once  dextrose 50% Injectable 25 Gram(s) IV Push once  dextrose 50% Injectable 25 Gram(s) IV Push once  insulin lispro (HumaLOG) corrective regimen sliding scale   SubCutaneous Before meals and at bedtime  labetalol 200 milliGRAM(s) Oral every 12 hours  NIFEdipine XL 90 milliGRAM(s) Oral daily  pantoprazole  Injectable 40 milliGRAM(s) IV Push every 12 hours  potassium chloride   Powder 20 milliEquivalent(s) Oral once    MEDICATIONS  (PRN):  dextrose 40% Gel 15 Gram(s) Oral once PRN Blood Glucose LESS THAN 70 milliGRAM(s)/deciliter  glucagon  Injectable 1 milliGRAM(s) IntraMuscular once PRN Glucose LESS THAN 70 milligrams/deciliter      Allergies    No Known Allergies    Intolerances        LABS:                        10.2   9.94  )-----------( 314      ( 17 Jun 2020 05:51 )             31.7     06-17    142  |  101  |  10  ----------------------------<  126<H>  3.8   |  27  |  0.89    Ca    8.9      17 Jun 2020 05:51  Phos  2.9     06-16  Mg     2.1     06-17    TPro  6.6  /  Alb  3.7  /  TBili  0.3  /  DBili  x   /  AST  14  /  ALT  11  /  AlkPhos  114  06-17              EKG:    Echo:    Cath:    NST:    RADIOLOGY & ADDITIONAL TESTS: INTERVAL HPI/OVERNIGHT EVENTS: Patient seen and examined at bedside. No acute events overnight. No chest pain, tightness, palpitations, SOB.    VITAL SIGNS:  T(F): 98 (06-17-20 @ 06:01)  HR: 64 (06-17-20 @ 06:01)  BP: 133/68 (06-17-20 @ 06:01)  RR: 18 (06-17-20 @ 07:04)  SpO2: 94% (06-17-20 @ 07:04)  Wt(kg): --    06-16-20 @ 07:01  -  06-17-20 @ 07:00  --------------------------------------------------------  IN: 100 mL / OUT: 0 mL / NET: 100 mL        PHYSICAL EXAM:    GEN: Awake, comfortable. NAD.   HEENT: NCAT, PERRL, EOMI. Mucosa moist. No JVD.   RESP: CTA b/l  CV: RRR, normal s1/s2. III/VI LEVI w/radiation to carotids.  ABD: Soft, NTND. BS+  EXT: Warm. No edema, clubbing, or cyanosis.   NEURO: AAOx3. No focal deficits    MEDICATIONS  (STANDING):  atorvastatin 80 milliGRAM(s) Oral at bedtime  dextrose 5%. 1000 milliLiter(s) (50 mL/Hr) IV Continuous <Continuous>  dextrose 50% Injectable 12.5 Gram(s) IV Push once  dextrose 50% Injectable 25 Gram(s) IV Push once  dextrose 50% Injectable 25 Gram(s) IV Push once  insulin lispro (HumaLOG) corrective regimen sliding scale   SubCutaneous Before meals and at bedtime  labetalol 200 milliGRAM(s) Oral every 12 hours  NIFEdipine XL 90 milliGRAM(s) Oral daily  pantoprazole  Injectable 40 milliGRAM(s) IV Push every 12 hours  potassium chloride   Powder 20 milliEquivalent(s) Oral once    MEDICATIONS  (PRN):  dextrose 40% Gel 15 Gram(s) Oral once PRN Blood Glucose LESS THAN 70 milliGRAM(s)/deciliter  glucagon  Injectable 1 milliGRAM(s) IntraMuscular once PRN Glucose LESS THAN 70 milligrams/deciliter      Allergies    No Known Allergies    Intolerances        LABS:                        10.2   9.94  )-----------( 314      ( 17 Jun 2020 05:51 )             31.7     06-17    142  |  101  |  10  ----------------------------<  126<H>  3.8   |  27  |  0.89    Ca    8.9      17 Jun 2020 05:51  Phos  2.9     06-16  Mg     2.1     06-17    TPro  6.6  /  Alb  3.7  /  TBili  0.3  /  DBili  x   /  AST  14  /  ALT  11  /  AlkPhos  114  06-17              EKG: no new ECG    Echo:  < from: TTE Echo Complete w/o Contrast w/ Doppler (06.15.20 @ 15:46) >  CONCLUSIONS:     1. The aortic valve is thickened. There is probably severe aortic stenosis. The peak transvalvular velocity is 3.80 m/s, the mean transvalvular gradient is 29.00 mmHg, and the LVOT/AV velocity ratio is 0.23. The peak transaortic gradient is 57.76 mmHg. The aortic valve area (estimated via the continuity method) is 0.9 cm². There is mild aortic regurgitation.   2. Normal left and right ventricular size and systolic function.   3. Structurally normal mitral valve with normal leaflet excursion. There is probably moderate eccentric mitral regurgitation.   4. No pericardial effusion.    < end of copied text >    Cath:    NST:    RADIOLOGY & ADDITIONAL TESTS:

## 2020-06-17 NOTE — CONSULT NOTE ADULT - SUBJECTIVE AND OBJECTIVE BOX
Attending: Dave    HPI: 70M PMHx HTN, DM, CAD, PAD s/p bilateral fem-pop bypasses (2016) s/p R embolectomy (3/2020), on Xarelto but may be noncompliant (found with empty pill bottles) was admitted last week with bloody diarrhea x 4 days associated with dizziness and weakness. In the ER, tachycardic to 108, normotensive, exam with lethargy and decreased memory. Anemic to 4.2 (baseline 9 in 3/2020). Stroke code called for altered mental status; CT head negative. Given 3U pRBC and responded to Hgb 9. EKG with NSR, RBBB (known), ST depressions in I, II, V1-V6 (new). Admitted to medicine telemetry for workup. 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 Cardiology and structural heart consulted, recommend surgical intervention; per medicine resident, CT surgery planning cardiac catheterization and aortic valve balloon angioplasty tomorrow.    Last colonoscopy: unknown    PMH:  PSH:  Meds:  Allerg:  SH: Former smoker 2PPD smoker, stopped 6 months ago. Smoked for a prolonged time, cannot quantify. No alcohol use.  FH:     T(C): 37.1 HR: 65 BP: 144/70 RR: 18 SpO2: 95%     Physical Exam:  General: NAD, resting comfortably in bed  HEENT: MMM  Pulmonary: nonlabored breathing, normal resp effort  Cardiovascular: RRR  Abdominal: soft, nontender, nondistended  Extremities: WWP, no edema, no calf tenderness  Neuro: no focal deficits  Psych: pleasant    LABS:                        10.2   9.94  )-----------( 314      ( 17 Jun 2020 05:51 )             31.7     06-17    142  |  101  |  10  ----------------------------<  126<H>  3.8   |  27  |  0.89    Ca    8.9      17 Jun 2020 05:51  Phos  2.9     06-16  Mg     2.1     06-17    TPro  6.6  /  Alb  3.7  /  TBili  0.3  /  DBili  x   /  AST  14  /  ALT  11  /  AlkPhos  114  06-17      LIVER FUNCTIONS - ( 17 Jun 2020 05:51 )  Alb: 3.7 g/dL / Pro: 6.6 g/dL / ALK PHOS: 114 U/L / ALT: 11 U/L / AST: 14 U/L / GGT: x             RADIOLOGY & ADDITIONAL STUDIES:      Assessment: 70y    Recommendations:  -   - discussed with chief on call, attending surgeon Attending: Dave    HPI: 70M PMHx HTN, DM, CAD, PAD s/p bilateral fem-pop bypasses (2016) s/p R embolectomy (3/2020), on Xarelto but may be noncompliant (found with empty pill bottles) was admitted last week with bloody diarrhea x 4 days associated with dizziness and weakness. In the ER, tachycardic to 108, normotensive, exam with lethargy and decreased memory. Anemic to 4.2 (baseline 9 in 3/2020). Stroke code called for altered mental status; CT head negative. Given 3U pRBC and responded to Hgb 9. EKG with NSR, RBBB (known), ST depressions in I, II, V1-V6 (new). Admitted to medicine telemetry for workup. 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 Cardiology and structural heart consulted, recommend surgical intervention; per medicine resident, CT surgery planning cardiac catheterization and aortic valve balloon angioplasty tomorrow. Today, tolerating regular diet, passing flatus, having normal, nonbloody BMs.    Last colonoscopy: unknown    PMH: as above  PSH: as above  Meds:   · 	rivaroxaban 15 mg oral tablet: 1 tab(s) orally 2 times a day (with meals) MDD:2  · 	Chantix: tab(s) orally once a day  · 	metFORMIN 500 mg oral tablet: 1  orally 2 times a day  · 	labetalol 200 mg oral tablet: 1 tab(s) orally 2 times a day  · 	aspirin 81 mg oral delayed release tablet: 1 tab(s) orally once a day  · 	azilsartan-chlorthalidone 40 mg-12.5 mg oral tablet: 1 tab(s) orally once a day  · 	NIFEdipine 90 mg oral tablet, extended release: 1 tab(s) orally once a day  · 	Multiple Vitamins oral tablet: 1 tab(s) orally once a day    Allerg: NKDA  SH: Former smoker 2PPD smoker, stopped 6 months ago. Smoked for a prolonged time, cannot quantify. No alcohol use.  FH: noncontributory    T(C): 37.1 HR: 65 BP: 144/70 RR: 18 SpO2: 95%     Physical Exam:  General: NAD, resting comfortably in bed  HEENT: MMM  Pulmonary: nonlabored breathing, normal resp effort  Cardiovascular: RRR  Abdominal: soft, nontender, nondistended  Extremities: WWP, no edema, no calf tenderness  Neuro: no focal deficits  Psych: pleasant    LABS:                        10.2   9.94  )-----------( 314               31.7         LIVER FUNCTIONS   Alb: 3.7 g/dL / Pro: 6.6 g/dL / ALK PHOS: 114 U/L / ALT: 11 U/L / AST: 14 U/L / GGT: x             RADIOLOGY & ADDITIONAL STUDIES:  < from: CT Abdomen and Pelvis w/ Oral Cont and w/ IV Cont (06.16.20 @ 19:55) >   There is lipomatous masslike infiltration of the ileocecal valve.     < end of copied text >      Assessment: 70M PMHx HTN, DM, CAD, PAD s/p bilateral fem-pop bypasses (2016) s/p R embolectomy (3/2020), on Xarelto but may be noncompliant (found with empty pill bottles) was admitted last week with bloody diarrhea x 4 days, colonoscopy with "at least intramural adenocarcinoma", CT chest pending. CEA 1.6. Severe aortic stenosis on echo; CT surgery planning intervention tomorrow. No signs for emergent intervention in cecal cancer, eg no bleeding, no obstruction.    Recommendations:  - Will follow until CT chest results for staging, CT surgery intervention tomorrow  - after intervention, please reconsult cardiology to discuss preoperative risk stratification  - if he does not require dual antiplatelet therapy and cardiology agrees that he is optimized, could plan for right hemicolectomy next week  - Surgery Team 1C will continue to follow. Please page Team 1 with questions/clinical changes. [phone]  - discussed with chief on call, attending surgeon

## 2020-06-17 NOTE — PROGRESS NOTE ADULT - PROBLEM SELECTOR PLAN 3
Mildly elevated troponin 0.02, EKG with RBBB (known) ST depressions in I, II, V1-V6 (new); ST elevation in III. Likely demand ischemia in the setting of anemia and volume depletion.  - no active chest pain  - EKG now without ST depressions in I, II, V1-V6  - troponin trended to plateau   - troponin still increasing form 0.02 to 0.04, peaked at .05  - Cardiology consulted, f/u recs      #Leukocytosis  - WBC 14 on admission, could be stress response to GI bleed but unclear etiology.  Afebrile and no clear infectious source.  Patient has had chronically elevated WBC on prior admissions with baseline 13-17.  - trend CBC; currently increased to ~12 Mildly elevated troponin 0.02, EKG with RBBB (known) ST depressions in I, II, V1-V6 (new); ST elevation in III. Likely demand ischemia in the setting of anemia and volume depletion.  - no active chest pain  - EKG now without ST depressions in I, II, V1-V6  - troponin trended to plateau   - troponin still increasing form 0.02 to 0.04, peaked at .05  - Cardiology consulted, f/u recs      #Leukocytosis RESOLVED   - WBC 14 on admission, could be stress response to GI bleed but unclear etiology.  Afebrile and no clear infectious source.  Patient has had chronically elevated WBC on prior admissions with baseline 13-17.  - trend CBC, now wnl

## 2020-06-17 NOTE — PROGRESS NOTE ADULT - PROBLEM SELECTOR PLAN 7
Patient with history of HTN  - restart Nifedipine 90mg qd and Labetalol 200mg BID     #Aortic Stenosis   Echo showing severe AS, mod MR, EF 64%  -Per pt's PMD Dr. Mcgrath pt has a known hx of AS  -Cardiology consulted, f/u recs Patient with history of HTN  - C/w Nifedipine 90mg qd and Labetalol 200mg BID     #Aortic Stenosis   Echo showing severe AS, mod MR, EF 64%  -Per pt's PMD Dr. Mcgrath pt has a known hx of AS  -Cardiology consulted, f/u recs Patient with history of HTN  - C/w Nifedipine 90mg qd and Labetalol 200mg BID     #Aortic Stenosis   Echo showing severe AS, mod MR, EF 64%  -Per pt's PMD Dr. Mcgrath pt has a known hx of AS  -Cardiology consulted, f/u recs  -CTS consulted- recommending CT heart TAVR protocol

## 2020-06-17 NOTE — CONSULT NOTE ADULT - ASSESSMENT
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 CT abdomen from 6/16 without intra-abdominal metastasis. Echocardiogram found to have severe AS, for which structural heart is consulted.    Colon adenocarcinoma - thus far appears to be localized, though unclear exact size of lesion on C-scope, but cannot complete staging until surgical pathology available.   Obtain CT chest with IV contrast to complete staging. Obtain colorectal surgery consult, will need resection. CEA normal 1.6.  Anemia - likely SUZIE. Obtain iron panel now. Once confirmed SUZIE give Venofer 200 mg IV x 5 days.  Severe AS - procedure per structural heart.      Seen and examined with .    [facility] primary team.

## 2020-06-17 NOTE — DIETITIAN INITIAL EVALUATION ADULT. - ENERGY NEEDS
Height: 63" Weight: 142lbs, IBW 124lbs+/-10%, %%, BMI 25.2,  ABW used for calculations as pt between % of IBW, adjusted for age, adenocarcinoma

## 2020-06-17 NOTE — PROGRESS NOTE ADULT - ASSESSMENT
called PCP Dr. Priyank Mcgrath  and Cardiologist Dr Braden Carpenter  Request Dr David Acosta to evaluate for laparoscopic right hemicolectomy  results of biopsy was discussed with patient and Kerry his daughter

## 2020-06-17 NOTE — PROGRESS NOTE ADULT - ATTENDING COMMENTS
Patient was seen and examined with the resident team today.  I agree with the above assessment and plan with the following exceptions/additions:     Briefly, this is a 70yo Venezuelan-speaking gentleman with a PMH of HTN, HLD, NIDDM2 (A1c 6.2%), CAD, severe aortic stenosis, PAD s/p right and left LE FEM-pop bypass (2016) and recent admission for occluded CFA-AK pop bypass s/p Jayme balloon embolectomy (on Xarelto and ASA, 3/2020) who p/w symptomatic anemia i/s/o hematochezia.  C-scope from 6/15 revealed a cecal mass c/f malignancy; adenocarcinoma per prelim path.  CTAP w/o mets.  Now in discussion with Oncology, GI and Cardiology given potential need for resection i/s/o multiple, severe co-morbidities.      -- Colorectal surgery consult for possible resection   -- CT chest with IV contrast as per Oncology  -- Imaging as per the Structural Cardiology team given his severe AS  -- Cardiac optimization as per Cardiology; on BB, CCB and statin  -- need to discuss timing of reinitiation of Eliquis and ASA with consulting services     -- f/u final path from c-scope   -- DVT PPx - SCDs; however, if anemia continues to be stable and work-up to be extensive, may need Lovenox  -- Dispo - TBD     America Cruz  [phone]

## 2020-06-17 NOTE — DIETITIAN INITIAL EVALUATION ADULT. - PROBLEM SELECTOR PLAN 5
Longstanding history of DM, on metformin at home, though unclear if patient is compliant with meds at home  - While hospitalized, ISS and diabetic diet, will add basal bolus as indicated  - A1c likely inaccurate in setting of recent GIB and subsequent transfusion, therefore would not obtain this test at this time

## 2020-06-17 NOTE — DIETITIAN INITIAL EVALUATION ADULT. - OTHER INFO
69M PMH of HTN, DM, HLD, PAD, CAD, PAD s/p right and left LE FEM-pop bypass in (R in 2016), recent admission for occluded CFA-AK pop bypass s/p Jayme balloon embolectomy (on xarelto and aspirin (3/2020), presented with anemia, admitted for symptomatic anemia with a Hb of 4.2, now s/p 3U pRBCs and colonoscopy showing small ulcerated mass now confirmed to be adenocarcinoma that has likely not metastasized. Hgb now 10.2. Plan for NPO for imaging, previously DASH, consistent carbohydrate. Pt seen in room, limited hx obtained 2/2 poor historian. Reports eating well PTA, unable to report wt hx. Pt reports eating >75% of meals, question accuracy of reported intake given pt reporting that "everything is fine" and "nothing is wrong with me." Pt denies N/V/D/C, per flowsheet GI wdl. Skin intact. NFPE unremarkable at this time. Diet education deferred 2/2 pt status. Please see recommendations below, will continue to follow per RD protocol.

## 2020-06-17 NOTE — PROGRESS NOTE ADULT - PROBLEM SELECTOR PLAN 1
Patient reported melanotic stool 3-4 days prior to admission, though now with reported brown stool. Rectal exam performed on admission revealed brown stool in rectal vault. Hgb 4.3 on admission, likely secondary to GIB.   - GI following, Dr. Arroyo recommendations appreciated   - colonoscopy found evidence of a small ulcerated mass in cecum concerning for malignancy, biopsy was done; pathology report confirms adenocarcinoma   - maintain active T/S, two large bore IVs  - s/p 3U of pRBCs and Hb continues to be >8    - c/w PPI IV BID Patient reported melanotic stool 3-4 days prior to admission, though now with reported brown stool. Rectal exam performed on admission revealed brown stool in rectal vault. Hgb 4.3 on admission, likely secondary to GIB.   - GI following, Dr. Arroyo recommendations appreciated   - colonoscopy found evidence of a small ulcerated mass in cecum concerning for malignancy, biopsy was done; pathology report confirms adenocarcinoma   - maintain active T/S, two large bore IVs  - s/p 3U of pRBCs and Hb continues to be >8    - c/w PPI IV BID    #Adenocarcinoma of ileocecal valve   Colonoscopy findings and path report as above. CT ab/pelvis showing mass but no signs of mets  -Heme/onc consulted. F/u recs   -Colorectal surgery. F/u recs   -CT chest IV contrast for complete staging

## 2020-06-17 NOTE — PROGRESS NOTE ADULT - PROBLEM SELECTOR PLAN 5
H/o CAD, takes NOAC, ASA and labetalol at home  - hold all meds given recent GIB with subsequent severe anemia  - restart Labetalol 200mg BID   - consult vascular about restarting anticoagulation medication  - cardio agree with restarting labetalol and nifedipine; want Lipitor 80mg PO QD and statin added to his medication regimen H/o CAD, takes NOAC, ASA and labetalol at home  - hold Xarelto and Aspirin given recent GIB until cleared by GI to restart   - C/w Labetalol 200mg BID   - cards consulted, f/u recs. C/w labetalol and nifedipine. Start Lipitor 80mg PO QD H/o CAD, takes NOAC, ASA and labetalol at home  - Per GI- Restart Xarelto, continue to hold Aspirin   - C/w Labetalol 200mg BID   - cards consulted, f/u recs. C/w labetalol and nifedipine. Start Lipitor 80mg PO QD

## 2020-06-17 NOTE — PROGRESS NOTE ADULT - SUBJECTIVE AND OBJECTIVE BOX
Pt seen and examined   no complaints  path = adenocarcinoma    REVIEW OF SYSTEMS:  Constitutional: No fever, weight loss or fatigue  Cardiovascular: No chest pain, palpitations, dizziness or leg swelling  Gastrointestinal: No abdominal or epigastric pain. No nausea, no vomiting ; . No melena   Skin: No itching, burning, rashes or lesions       MEDICATIONS:  MEDICATIONS  (STANDING):  atorvastatin 80 milliGRAM(s) Oral at bedtime  dextrose 5%. 1000 milliLiter(s) (50 mL/Hr) IV Continuous <Continuous>  dextrose 50% Injectable 12.5 Gram(s) IV Push once  dextrose 50% Injectable 25 Gram(s) IV Push once  dextrose 50% Injectable 25 Gram(s) IV Push once  insulin lispro (HumaLOG) corrective regimen sliding scale   SubCutaneous Before meals and at bedtime  labetalol 200 milliGRAM(s) Oral every 12 hours  NIFEdipine XL 90 milliGRAM(s) Oral daily  pantoprazole  Injectable 40 milliGRAM(s) IV Push every 12 hours  potassium chloride   Powder 20 milliEquivalent(s) Oral once    MEDICATIONS  (PRN):  dextrose 40% Gel 15 Gram(s) Oral once PRN Blood Glucose LESS THAN 70 milliGRAM(s)/deciliter  glucagon  Injectable 1 milliGRAM(s) IntraMuscular once PRN Glucose LESS THAN 70 milligrams/deciliter      Allergies    No Known Allergies    Intolerances        Vital Signs Last 24 Hrs  T(C): 37.1 (17 Jun 2020 08:37), Max: 37.1 (17 Jun 2020 08:37)  T(F): 98.7 (17 Jun 2020 08:37), Max: 98.7 (17 Jun 2020 08:37)  HR: 65 (17 Jun 2020 08:37) (64 - 82)  BP: 144/70 (17 Jun 2020 08:37) (131/69 - 144/70)  BP(mean): --  RR: 18 (17 Jun 2020 08:37) (18 - 18)  SpO2: 95% (17 Jun 2020 08:37) (92% - 97%)    06-16 @ 07:01  -  06-17 @ 07:00  --------------------------------------------------------  IN: 100 mL / OUT: 0 mL / NET: 100 mL        PHYSICAL EXAM:    General: Well developed; well nourished; in no acute distress  HEENT: MMM, conjunctiva and sclera clear  Gastrointestinal: Soft non-tender non-distended; Normal bowel sounds; No hepatosplenomegaly  Skin: Warm and dry. No obvious rash    LABS:      CBC Full  -  ( 17 Jun 2020 05:51 )  WBC Count : 9.94 K/uL  RBC Count : 3.63 M/uL  Hemoglobin : 10.2 g/dL  Hematocrit : 31.7 %  Platelet Count - Automated : 314 K/uL  Mean Cell Volume : 87.3 fl  Mean Cell Hemoglobin : 28.1 pg  Mean Cell Hemoglobin Concentration : 32.2 gm/dL  Auto Neutrophil # : x  Auto Lymphocyte # : x  Auto Monocyte # : x  Auto Eosinophil # : x  Auto Basophil # : x  Auto Neutrophil % : x  Auto Lymphocyte % : x  Auto Monocyte % : x  Auto Eosinophil % : x  Auto Basophil % : x    06-17    142  |  101  |  10  ----------------------------<  126<H>  3.8   |  27  |  0.89    Ca    8.9      17 Jun 2020 05:51  Phos  2.9     06-16  Mg     2.1     06-17    TPro  6.6  /  Alb  3.7  /  TBili  0.3  /  DBili  x   /  AST  14  /  ALT  11  /  AlkPhos  114  06-17                      RADIOLOGY & ADDITIONAL STUDIES (The following images were personally reviewed):< from: CT Abdomen and Pelvis w/ Oral Cont and w/ IV Cont (06.16.20 @ 19:55) >  CT ABDOMEN AND PELVIS OC     < end of copied text >  < from: CT Abdomen and Pelvis w/ Oral Cont and w/ IV Cont (06.16.20 @ 19:55) >  IMPRESSION:    Masslike lipomatosis of the ileocecal valve.    Small bilateral pleural effusions    No evidence ofmetastasis.    < end of copied text >

## 2020-06-17 NOTE — CONSULT NOTE ADULT - SUBJECTIVE AND OBJECTIVE BOX
HEMATOLOGY ONCOLOGY CONSULT NOTE  69M PMH of HTN, DM, HLD, PAD, CAD, PAD s/p right and left LE FEM-pop bypass in (R in 2016), recent admission for occluded CFA-AK pop bypass s/p Jayme balloon embolectomy (on xarelto and aspirin (3/2020)) who present today c/o cool hands, diarrhea, dizziness, weakness, and blood in stool for 4 days, last three days before presentation.  During ED exam, found to be acutely lethargic and stroke code called for AMS. Per neuro note: last known normal 2:41pm, no acute changes in CTH, chronic R basal ganglia infarct, no TPA given. Upon CC team exam, pt alert and oriented to self and hospital, not date, thinks Gerson is president. Daughter reports worsening memory last several years. Reports that his dizziness and weakness has resolved, never had CP/pressure, nausea, vomiting or abdominal pain, changes to urine. Denies fevers, chills, cough, SoB, changes in exercise tolerance, no sick contacts.     ED course: /70, , RR 18, T 98.1, SpO2 100% ORA. Labs WBC 14.45, Hgb 4.2, Hct 13.6 Plt 186. On BMP Na 139, K 4.1, Cl 101, CO2 21, bicarb 17, BUN 14, Cr 0.92, . CT w/w/o contrast performed and revealed no intracranial pathology. EKG: NSR, RBBB (known) ST depressions in I, II, V1-V6 (new); ST elevation in III. COVID negative. [facility]     Due to anemia patient received multiple PRBC transfusions and underwent GI work up with a colonoscopy which showed a mass at the ileocecal valve, biopsy of which confirmed intramucosal adenocarcinoma. Oncology is being consulted for further recommendations.    ECOG PS: 1      Allergies    No Known Allergies    Intolerances      12-point ROS negative except as stated above.    MEDICATIONS  (STANDING):  atorvastatin 80 milliGRAM(s) Oral at bedtime  dextrose 5%. 1000 milliLiter(s) (50 mL/Hr) IV Continuous <Continuous>  dextrose 50% Injectable 12.5 Gram(s) IV Push once  dextrose 50% Injectable 25 Gram(s) IV Push once  dextrose 50% Injectable 25 Gram(s) IV Push once  insulin lispro (HumaLOG) corrective regimen sliding scale   SubCutaneous Before meals and at bedtime  labetalol 200 milliGRAM(s) Oral every 12 hours  NIFEdipine XL 90 milliGRAM(s) Oral daily  pantoprazole  Injectable 40 milliGRAM(s) IV Push every 12 hours  potassium chloride   Powder 20 milliEquivalent(s) Oral once    MEDICATIONS  (PRN):  dextrose 40% Gel 15 Gram(s) Oral once PRN Blood Glucose LESS THAN 70 milliGRAM(s)/deciliter  glucagon  Injectable 1 milliGRAM(s) IntraMuscular once PRN Glucose LESS THAN 70 milligrams/deciliter    Vital Signs Last 24 Hrs  T(C): 37.1 (06-17-20 @ 08:37), Max: 37.1 (06-17-20 @ 08:37)  T(F): 98.7 (06-17-20 @ 08:37), Max: 98.7 (06-17-20 @ 08:37)  HR: 65 (06-17-20 @ 08:37) (64 - 82)  BP: 144/70 (06-17-20 @ 08:37) (131/69 - 144/70)  BP(mean): --  RR: 18 (06-17-20 @ 08:37) (18 - 18)  SpO2: 95% (06-17-20 @ 08:37) (92% - 97%)    PHYSICAL EXAM:  Constitutional: NAD, well-groomed, well-developed  HEENT: PERRLA, EOMI, Normal Hearing, MMM  Neck: No LAD, No JVD  Back: Normal spine flexure, No CVA tenderness  Respiratory: CTAB  Cardiovascular: S1 and S2, RRR, no M/G/R  Gastrointestinal: BS+, soft, NT/ND  Extremities: No peripheral edema  Vascular: 2+ peripheral pulses  Neurological: A/O x 3, no focal deficits  Psychiatric: Normal mood, normal affect  Musculoskeletal: 5/5 strength b/l upper and lower extremities  Skin: No rashes        CBC Full  -  ( 17 Jun 2020 05:51 )  WBC Count : 9.94 K/uL  RBC Count : 3.63 M/uL  Hemoglobin : 10.2 g/dL  Hematocrit : 31.7 %  Platelet Count - Automated : 314 K/uL  Mean Cell Volume : 87.3 fl  Mean Cell Hemoglobin : 28.1 pg  Mean Cell Hemoglobin Concentration : 32.2 gm/dL  Auto Neutrophil # : x  Auto Lymphocyte # : x  Auto Monocyte # : x  Auto Eosinophil # : x  Auto Basophil # : x  Auto Neutrophil % : x  Auto Lymphocyte % : x  Auto Monocyte % : x  Auto Eosinophil % : x  Auto Basophil % : x        Carcinoembryonic Antigen: 1.5 ng/mL (06-16-20 @ 11:15)      06-17    142  |  101  |  10  ----------------------------<  126<H>  3.8   |  27  |  0.89    Ca    8.9      17 Jun 2020 05:51  Phos  2.9     06-16  Mg     2.1     06-17    TPro  6.6  /  Alb  3.7  /  TBili  0.3  /  DBili  x   /  AST  14  /  ALT  11  /  AlkPhos  114  06-17    Pathology:  Surgical Pathology Report (06.15.20 @ 13:20)    Surgical Pathology Report:   ACCESSION No:  [id number]    KALPANA MCNEAL                1        Surgical Final Report          Final Diagnosis  Cecum; biopsy:  - Superficial fragments of at least intramucosal  adenocarcinoma    The case was shown at Intradepartmental QA Conference in  accordance with departmental policy.    Verified by: Veronica Ko M.D.  (Electronic Signature)  Reported on: 06/16/20 13:22 EDT, [facility], [address] [place] [address]  Phone: [phone]   Fax: [phone]  _________________________________________________________________    Clinical History  Rectal bleeding    Specimen(s) Submitted  Cecum    Gross Description  The specimen is received in formalin, labeled with the patient's  identification and "become." It consists of three irregular  fragments of tan, soft tissue measuring 0.2-0.3 cm in greatest  dimension. The specimen is entirely submitted  in one cassette,  1A.  ELOY [facility] 06/15/2020 18:22

## 2020-06-17 NOTE — PROGRESS NOTE ADULT - ATTENDING COMMENTS
See CVD Fellow note written above, for details. I reviewed the fellow's documentation. I have personally seen and examined this patient. I have reviewed vitals, labs, medications, cardiac studies and additional imaging.  I agree with the findings and plans as written above with the following additions/amendments:  69M with history of Essential HTN, Type II DM, HLD, CAD, extensive PAD s/p right and left LE FEM-pop bypass c/b occluded CFA-AK pop bypass s/p balloon embolectomy on Xarelto and aspirin who presented with symptomatic acute blood loss anemia in context of GI mass, diagnosed with colonic adenocarcinoma. TTE with incidental finding of severe aortic stenosis. Cardiology consulted for evaluation of severe aortic stenosis.     ROS: Patient denies cardiopulmonary symptoms again today. Reports he feels "great!" and has no complaints  Physical Exam notable for: elderly male sitting up in bed in NAD, flat neck veins, RRR, III/VI systolic murmur radiating to both carotids and apex, clear lungs with deep breathing, no pretibial pitting edema, no ankle edema, skin WWP throughout, A&Ox3    Assessment and Recommendations as follows:  Given upcoming potential colonic resection, case updates discussed with Dr. Perez and consideration for BAV as temporizing measure before GI surgery. Dr. Perez to coordinate with primary team   Recommend Atorvastatin 80 qHS  Home labetalol 200mg BID  Home Nifedipine 90 daily  Continue to hold home ARB pending GI work up and surgical evaluation also hold ARB in context of severe AS  Resume home ASA daily when safe from GI bleeding perspective and surgical standpoint  Cardiology will sign off. [facility] Structural service will continue to follow with you for definitive management of aortic stenosis. Please recall if additional questions.  JAVIER Lockett.  Cardiology Attending

## 2020-06-17 NOTE — PROGRESS NOTE ADULT - ASSESSMENT
69M PMH of HTN, DM, HLD, PAD, CAD, PAD s/p right and left LE FEM-pop bypass in (R in 2016), recent admission for occluded CFA-AK pop bypass s/p Jayme balloon embolectomy (on xarelto and aspirin (3/2020), presented with anemia, admitted for symptomatic anemia with a Hb of 4.2, now s/p 3U pRBCs and colonoscopy showing small ulcerated mass now confirmed to be adenocarcinoma that has not metastasized. 69M PMH of HTN, DM, HLD, PAD, CAD, PAD s/p right and left LE FEM-pop bypass in (R in 2016), recent admission for occluded CFA-AK pop bypass s/p Jayme balloon embolectomy (on xarelto and aspirin (3/2020), presented with anemia, admitted for symptomatic anemia with a Hb of 4.2, now s/p 3U pRBCs and colonoscopy showing small ulcerated mass now confirmed to be adenocarcinoma that has likely not metastasized. Will obtain CT chest to complete staging.

## 2020-06-17 NOTE — DIETITIAN INITIAL EVALUATION ADULT. - PROBLEM SELECTOR PLAN 4
Mildly elevated troponin 0.02, EKG with RBBB (known) ST depressions in I, II, V1-V6 (new); ST elevation in III. Likely demand ischemia in the setting of anemia and volume depletion  - no active chest pain  - trend trops and repeat EKG    #Leukocytosis  - WBC 14 on admission, could be stress response to GI bleed but unclear etiology.  Afebrile and no clear infectious source.  Patient has had chronically elevated WBC on prior admissions with baseline 13-17.  - trend CBC

## 2020-06-17 NOTE — PROGRESS NOTE ADULT - ASSESSMENT
Plan for BAV/cardiac cath tomorrow pending CTA structural today or tomorrow am.    KEEP NPO after midnight regardless.

## 2020-06-17 NOTE — DIETITIAN INITIAL EVALUATION ADULT. - ADD RECOMMEND
1. Continue NPO as medically appropriate, advance as tolerated to DASH/consistent carbohydrate. 2. Monitor % PO intake, if <50% recommend add Glucerna BID (each 220kcal/10gpro). 3. Monitor lytes and replete prn. 4. Trend wts weekly

## 2020-06-17 NOTE — DIETITIAN INITIAL EVALUATION ADULT. - PROBLEM SELECTOR PLAN 7
H/o PAD w. bypass procedure, takes NOAC, ASA and labatalol at home  - Hold all meds given recent GIB with subsequent severe anemia  - Restart PRN following improvement of symptoms  - Consult vascular in AM

## 2020-06-17 NOTE — DIETITIAN INITIAL EVALUATION ADULT. - PROBLEM SELECTOR PLAN 2
Management as above, anemia likely represents anemia of chronic disease with superimposed GIB  - Mgmt as above  - goal Hgb > 8 given CAD

## 2020-06-17 NOTE — PROGRESS NOTE ADULT - PROBLEM SELECTOR PLAN 8
F: none  E: replete K <4, Mg <2  N: NPO for CT abd/pelvis   GI Ppx: Protonix   DVT Ppx: None in setting of GIB   Code status: FULL   Dispo: RMF    1) PCP Contacted on Admission: (Y/N) --> Name & Phone #: Dr. Priyank Mcgrath [phone]  2) Date of Contact with PCP: 6/16/20  3) PCP Contacted at Discharge: (Y/N, N/A)  4) Summary of Handoff Given to PCP:   5) Post-Discharge Appointment Date and Location: F: none  E: replete K <4, Mg <2  N: NPO for imaging  GI Ppx: Protonix   DVT Ppx: None in setting of GIB   Code status: FULL   Dispo: RMF    1) PCP Contacted on Admission: (Y/N) --> Name & Phone #: Dr. Priyank Mcgrath [phone]  2) Date of Contact with PCP: 6/16/20  3) PCP Contacted at Discharge: (Y/N, N/A)  4) Summary of Handoff Given to PCP:   5) Post-Discharge Appointment Date and Location: F: none  E: replete K <4, Mg <2  N: NPO for imaging  GI Ppx: Protonix   DVT Ppx: Xarelto   Code status: FULL   Dispo: RMF    1) PCP Contacted on Admission: (Y/N) --> Name & Phone #: Dr. Priyank Mcgrath [phone]  2) Date of Contact with PCP: 6/16/20  3) PCP Contacted at Discharge: (Y/N, N/A)  4) Summary of Handoff Given to PCP:   5) Post-Discharge Appointment Date and Location:

## 2020-06-17 NOTE — PROGRESS NOTE ADULT - ASSESSMENT
A/P: 69M w/CAD, PAD s/p right and left LE FEM-pop bypass in (R in 2016), HTN, DM, HLD, recent admission for occluded CFA-AK pop bypass s/p Jayme balloon embolectomy (on xarelto and aspirin (3/2020), presented on 6/13 with anemia, admitted for symptomatic anemia with a Hb of 4.2, now s/p 3U pRBCs and colonoscopy showing small ulcerated mass in the cecum suspicious for malignancy. Transferred to the [facility] for further malignancy workup. Cardiology consulted for severe AS noted on echocardiogram.    #Severe AS - patient currently asymptomatic w/echo findings of severe AS.   - f/u structural heart recommendations for decision on intervention     #HTN  - c/w labetalol 200mg bid  - c/w Nifedipine-XL 90mg QD  - hold ARB    #HLD  - please start lipitor 80mg PO QD    #CAD  - c/w atorvastatin 80  - reintroduce ASA 81 when safe from a bleeding perpsective  - please obtain outpatient collateral on patient's prior ischemic workup    Cardiology consult service to sign off. Please reconsult with further questions.    If patient does not have outpatient cardiology f/u, he can f/u with Dr. Paulino as an outpt. Please call [phone] to schedule an appointment.    --  Bernabe Velez MD  Cardiology PGY4

## 2020-06-17 NOTE — PROGRESS NOTE ADULT - SUBJECTIVE AND OBJECTIVE BOX
OVERNIGHT EVENTS: none     SUBJECTIVE / INTERVAL HPI:   Patient was assessed at bedside. He is in no acute distress. He states that he had a good night and was able to rest. He reports no new pain. His explanatory model for his hospitalization has changed; he now believes that he is here because of a strawberry milkshake with red/brown sauce that he drank a few days ago, which caused his bloody stools. He does not seem to recall the conversations that were had about the colonoscopy findings, and says that he is ready to go home. He remembers his daughter visiting him, but does not recall the specifics of what they talked about. Patient denies dizziness, abdominal pain; fatigue.   We have received the path report which shows adenocarcinoma. CT abdomen and pelvis show no metastasis.       VITAL SIGNS:   Vital Signs Last 24 Hrs  T(C): 37.1 (17 Jun 2020 08:37), Max: 37.1 (17 Jun 2020 08:37)  T(F): 98.7 (17 Jun 2020 08:37), Max: 98.7 (17 Jun 2020 08:37)  HR: 65 (17 Jun 2020 08:37) (64 - 82)  BP: 144/70 (17 Jun 2020 08:37) (131/69 - 144/70)  BP(mean): --  RR: 18 (17 Jun 2020 08:37) (18 - 18)  SpO2: 95% (17 Jun 2020 08:37) (92% - 97%)    I&O's Summary    16 Jun 2020 07:01  -  17 Jun 2020 07:00  --------------------------------------------------------  IN: 100 mL / OUT: 0 mL / NET: 100 mL        PHYSICAL EXAM:  Constitutional: WDWN resting comfortably in bed; NAD  HEENT: NC/AT, PERRL, EOMI, anicteric sclera  Neck: supple; no JVD  Respiratory: CTA B/L; no W/R/R, no retractions  Cardiac: +S1/S2; +crescendo-decrescendo murmur  Gastrointestinal: abdomen soft, NT, normal BSx4 quadrants; patient puffing up his abdomen during exam  Extremities: WWP, no peripheral edema; no tenderness or erythema; pulses present   Neurologic: AAOx3 poor historian; CNII-XII grossly intact; no focal deficits      MEDICATIONS:   MEDICATIONS  (STANDING):  atorvastatin 80 milliGRAM(s) Oral at bedtime  dextrose 5%. 1000 milliLiter(s) (50 mL/Hr) IV Continuous <Continuous>  dextrose 50% Injectable 12.5 Gram(s) IV Push once  dextrose 50% Injectable 25 Gram(s) IV Push once  dextrose 50% Injectable 25 Gram(s) IV Push once  insulin lispro (HumaLOG) corrective regimen sliding scale   SubCutaneous Before meals and at bedtime  labetalol 200 milliGRAM(s) Oral every 12 hours  NIFEdipine XL 90 milliGRAM(s) Oral daily  pantoprazole  Injectable 40 milliGRAM(s) IV Push every 12 hours  potassium chloride   Powder 20 milliEquivalent(s) Oral once    MEDICATIONS  (PRN):  dextrose 40% Gel 15 Gram(s) Oral once PRN Blood Glucose LESS THAN 70 milliGRAM(s)/deciliter  glucagon  Injectable 1 milliGRAM(s) IntraMuscular once PRN Glucose LESS THAN 70 milligrams/deciliter      ALLERGIES:  Allergies    No Known Allergies    Intolerances      LABS:                        10.2   9.94  )-----------( 314      ( 17 Jun 2020 05:51 )             31.7     06-17    142  |  101  |  10  ----------------------------<  126<H>  3.8   |  27  |  0.89    Ca    8.9      17 Jun 2020 05:51  Phos  2.9     06-16  Mg     2.1     06-17    TPro  6.6  /  Alb  3.7  /  TBili  0.3  /  DBili  x   /  AST  14  /  ALT  11  /  AlkPhos  114  06-17        CAPILLARY BLOOD GLUCOSE    POCT Blood Glucose.: 161 mg/dL (17 Jun 2020 08:25)        RADIOLOGY & ADDITIONAL TESTS: Reviewed.

## 2020-06-17 NOTE — DIETITIAN INITIAL EVALUATION ADULT. - PROBLEM SELECTOR PLAN 8
Patient with history of HTN  - Holding labatalol and CCB given suspected GIB  - Restart as indicated

## 2020-06-17 NOTE — PROGRESS NOTE ADULT - SUBJECTIVE AND OBJECTIVE BOX
Planned Date of Surgery:  2020                                                                                                                Surgeon: Dr. Perez    Procedure: Cardiac cath & BAV    HPI:  70y Male with a PMHx of HTN, HLD, PAD (s/p right and left LE FEM-pop bypass in 2016), CAD, and recent admission for occluded CFA-AK pop bypass s/p Jayme balloon embolectomy (on Xarelto and ASA 3) presented to [facility] on 20 complaining of cool hands, weakness, dizziness, diarrhea with blood in stool for 4 days. Upon arrival to the ED the patient was found to be anemic with Hb 4.2 (received 3UPRBC). A stroke code was called for AMS, CT head negative for acute findings. On 6/15/20 the patient underwent a colonoscopy which found diffuse diverticulosis, a small ulcerated mass in area of cecum which was biopsied and turned out to be malignant.  Heme/Onc is involved and patient will likely need colon resection in the near future.  TTE on 6/15/20 found severe aortic stenosis with ANDRZEJ 0.9 cm2.  We are planning a cardiac cath & BAV tomorrow (after further work-up of his groin given his history).  Currently he denies CP, SOB, dizziness, N/V/D, fever, chills or cough.         PAST MEDICAL & SURGICAL HISTORY:  PAD (peripheral artery disease)  DM (diabetes mellitus)  CAD (coronary artery disease)  Essential hypertension: Hypertension  S/P femoral-femoral bypass surgery: left 2016  Elective surgery: right leg angiogram with bypass  2016  History of hernia repair: 2014      No Known Allergies      Physical Exam  T(C): 37.1 (20 @ 08:37), Max: 37.1 (20 @ 08:37)  HR: 65 (20 @ 08:37) (64 - 82)  BP: 144/70 (20 @ 08:37) (131/69 - 144/70)  RR: 18 (20 @ 08:37) (18 - 18)  SpO2: 95% (20 @ 08:37) (92% - 97%)    GEN: NAD, looks comfortable  Psych: Mood appropriate  Neuro: A&Ox3.  No focal deficits.  Moving all extremities.   HEENT: No obvious abnormalities  CV: S1S2, regular +systolic murmur.  No carotid bruits.  No JVD  Lungs: Clear B/L.  No wheezing, rales or rhonchi  ABD: Soft, non-tender, non-distended.  +Bowel sounds  EXT: Warm and well perfused.  No peripheral edema noted  Musculoskeletal: Moving all extremities with normal ROM, no joint swelling  PV: Pedal pulses palpable    MEDICATIONS  (STANDING):  atorvastatin 80 milliGRAM(s) Oral at bedtime  chlorhexidine 0.12% Liquid 10 milliLiter(s) Swish and Spit once  chlorhexidine 4% Liquid 1 Application(s) Topical once  chlorhexidine 4% Liquid 1 Application(s) Topical once  dextrose 5%. 1000 milliLiter(s) (50 mL/Hr) IV Continuous <Continuous>  dextrose 50% Injectable 12.5 Gram(s) IV Push once  dextrose 50% Injectable 25 Gram(s) IV Push once  dextrose 50% Injectable 25 Gram(s) IV Push once  insulin lispro (HumaLOG) corrective regimen sliding scale   SubCutaneous Before meals and at bedtime  labetalol 200 milliGRAM(s) Oral every 12 hours  NIFEdipine XL 90 milliGRAM(s) Oral daily  pantoprazole  Injectable 40 milliGRAM(s) IV Push every 12 hours    MEDICATIONS  (PRN):  dextrose 40% Gel 15 Gram(s) Oral once PRN Blood Glucose LESS THAN 70 milliGRAM(s)/deciliter  glucagon  Injectable 1 milliGRAM(s) IntraMuscular once PRN Glucose LESS THAN 70 milligrams/deciliter      On Beta Blocker? YES     Labs:                        10.2   9.94  )-----------( 314      ( 2020 05:51 )             31.7     -    142  |  101  |  10  ----------------------------<  126<H>  3.8   |  27  |  0.89    Ca    8.9      2020 05:51  Phos  2.9     -  Mg     2.1         TPro  6.6  /  Alb  3.7  /  TBili  0.3  /  DBili  x   /  AST  14  /  ALT  11  /  AlkPhos  114          ABO Interpretation: B (20 @ 05:30)          Hgb A1C: Added on    EK SB, RBBB    CXR: < from: Xray Chest 1 View- PORTABLE-Routine (20 @ 20:12) >  Portable examination of chest demonstrates mild cardiomegaly. Bilateral infiltrates. Mild degenerative changes thoracic spine. Calcification aortic knob.    Impression: Bilateral infiltrates    < end of copied text >      CT Scans: CT cardiac structure PENDING today or tomorrow.   < from: CT Abdomen and Pelvis w/ Oral Cont and w/ IV Cont (20 @ 19:55) >      IMPRESSION:    Masslike lipomatosis of the ileocecal valve.    Small bilateral pleural effusions    No evidence ofmetastasis.    < end of copied text >  ]< from: CT Angio Neck w/ IV Cont (20 @ 16:06) >  FINDINGS: The CTA examination demonstrates the right common carotid artery to be normal in caliber. There is a normal bifurcation into the right internal and external carotid arteries. There is no hemodynamically significant stenosis.     The left common carotid artery is normal in caliber. There is a normal bifurcation into the left internal and external carotid arteries. There is no hemodynamically significant stenosis.     The right vertebral artery is clearly seen from its origin to the proximal foraminal segment. There is an attenuated caliber to the remaining vertebral artery at approximately the C4 level. This may be secondary to a combination of underdevelopment and/or proximal proximal occlusion. The left vertebral artery appears intact.    The aortic arch appears intact without narrowing of the origin of the great vessels.    IMPRESSION: Poor visualization of the right vertebral artery which may be secondary to a combination of underdevelopment and/or proximal occlusion.    < end of copied text >    < from: CT Perfusion w/ Maps w/ IV Cont (20 @ 16:05) >    IMPRESSION: Normal CT perfusion study.    < end of copied text >  < from: CT Brain Stroke Protocol (20 @ 16:04) >    IMPRESSION:  No evidence of acute intracranial hemorrhage or transcortical infarct. Age-indeterminate lacunar infarcts within the bilateral basal ganglia.    < end of copied text >        Cath Report: Pending    Echo: < from: TTE Echo Complete w/o Contrast w/ Doppler (06.15.20 @ 15:46) >     1. The aortic valve is thickened. There is probably severe aortic stenosis. The peak transvalvular velocity is 3.80 m/s, the mean transvalvular gradient is 29.00 mmHg, and the LVOT/AV velocity ratio is 0.23. The peak transaortic gradient is 57.76 mmHg. The aortic valve area (estimated via the continuity method) is 0.9 cm². There is mild aortic regurgitation.   2. Normal left and right ventricular size and systolic function.   3. Structurally normal mitral valve with normal leaflet excursion. There is probably moderate eccentric mitral regurgitation.   4. No pericardial effusion.    < end of copied text >      PFT's:     Carotid Duplex: CT head/neck done with IV contrast.      Consult in Chart?  YES   Consent in Chart? To be done.   Pre-op Orders Placed? YES   Blood Prodeucts Ordered? YES  NPO ordered? YES

## 2020-06-18 ENCOUNTER — TRANSCRIPTION ENCOUNTER (OUTPATIENT)
Age: 70
End: 2020-06-18

## 2020-06-18 ENCOUNTER — APPOINTMENT (OUTPATIENT)
Dept: CARDIOTHORACIC SURGERY | Facility: HOSPITAL | Age: 70
End: 2020-06-18

## 2020-06-18 LAB
ANION GAP SERPL CALC-SCNC: 10 MMOL/L — SIGNIFICANT CHANGE UP (ref 5–17)
APTT BLD: 33.5 SEC — SIGNIFICANT CHANGE UP (ref 27.5–36.3)
BUN SERPL-MCNC: 20 MG/DL — SIGNIFICANT CHANGE UP (ref 7–23)
CALCIUM SERPL-MCNC: 9 MG/DL — SIGNIFICANT CHANGE UP (ref 8.4–10.5)
CHLORIDE SERPL-SCNC: 104 MMOL/L — SIGNIFICANT CHANGE UP (ref 96–108)
CO2 SERPL-SCNC: 27 MMOL/L — SIGNIFICANT CHANGE UP (ref 22–31)
CREAT SERPL-MCNC: 0.86 MG/DL — SIGNIFICANT CHANGE UP (ref 0.5–1.3)
GLUCOSE BLDC GLUCOMTR-MCNC: 140 MG/DL — HIGH (ref 70–99)
GLUCOSE BLDC GLUCOMTR-MCNC: 145 MG/DL — HIGH (ref 70–99)
GLUCOSE BLDC GLUCOMTR-MCNC: 150 MG/DL — HIGH (ref 70–99)
GLUCOSE BLDC GLUCOMTR-MCNC: 168 MG/DL — HIGH (ref 70–99)
GLUCOSE SERPL-MCNC: 128 MG/DL — HIGH (ref 70–99)
HCT VFR BLD CALC: 28.5 % — LOW (ref 39–50)
HGB BLD-MCNC: 9.2 G/DL — LOW (ref 13–17)
INR BLD: 1.42 — HIGH (ref 0.88–1.16)
MAGNESIUM SERPL-MCNC: 2.1 MG/DL — SIGNIFICANT CHANGE UP (ref 1.6–2.6)
MCHC RBC-ENTMCNC: 28 PG — SIGNIFICANT CHANGE UP (ref 27–34)
MCHC RBC-ENTMCNC: 32.3 GM/DL — SIGNIFICANT CHANGE UP (ref 32–36)
MCV RBC AUTO: 86.9 FL — SIGNIFICANT CHANGE UP (ref 80–100)
NRBC # BLD: 0 /100 WBCS — SIGNIFICANT CHANGE UP (ref 0–0)
PHOSPHATE SERPL-MCNC: 3.9 MG/DL — SIGNIFICANT CHANGE UP (ref 2.5–4.5)
PLATELET # BLD AUTO: 322 K/UL — SIGNIFICANT CHANGE UP (ref 150–400)
POTASSIUM SERPL-MCNC: 4 MMOL/L — SIGNIFICANT CHANGE UP (ref 3.5–5.3)
POTASSIUM SERPL-SCNC: 4 MMOL/L — SIGNIFICANT CHANGE UP (ref 3.5–5.3)
PROTHROM AB SERPL-ACNC: 16.4 SEC — HIGH (ref 10–12.9)
RBC # BLD: 3.28 M/UL — LOW (ref 4.2–5.8)
RBC # FLD: 15.3 % — HIGH (ref 10.3–14.5)
SODIUM SERPL-SCNC: 141 MMOL/L — SIGNIFICANT CHANGE UP (ref 135–145)
WBC # BLD: 9.29 K/UL — SIGNIFICANT CHANGE UP (ref 3.8–10.5)
WBC # FLD AUTO: 9.29 K/UL — SIGNIFICANT CHANGE UP (ref 3.8–10.5)

## 2020-06-18 PROCEDURE — 99233 SBSQ HOSP IP/OBS HIGH 50: CPT | Mod: GC

## 2020-06-18 PROCEDURE — 99233 SBSQ HOSP IP/OBS HIGH 50: CPT

## 2020-06-18 PROCEDURE — 93925 LOWER EXTREMITY STUDY: CPT | Mod: 26

## 2020-06-18 PROCEDURE — 99234 HOSP IP/OBS SM DT SF/LOW 45: CPT | Mod: GC

## 2020-06-18 RX ADMIN — IRON SUCROSE 110 MILLIGRAM(S): 20 INJECTION, SOLUTION INTRAVENOUS at 17:18

## 2020-06-18 RX ADMIN — PANTOPRAZOLE SODIUM 40 MILLIGRAM(S): 20 TABLET, DELAYED RELEASE ORAL at 17:19

## 2020-06-18 RX ADMIN — ATORVASTATIN CALCIUM 80 MILLIGRAM(S): 80 TABLET, FILM COATED ORAL at 21:51

## 2020-06-18 RX ADMIN — Medication 200 MILLIGRAM(S): at 17:18

## 2020-06-18 RX ADMIN — Medication 200 MILLIGRAM(S): at 06:16

## 2020-06-18 RX ADMIN — Medication 2: at 12:10

## 2020-06-18 RX ADMIN — Medication 90 MILLIGRAM(S): at 06:16

## 2020-06-18 RX ADMIN — PANTOPRAZOLE SODIUM 40 MILLIGRAM(S): 20 TABLET, DELAYED RELEASE ORAL at 06:19

## 2020-06-18 NOTE — PROGRESS NOTE ADULT - SUBJECTIVE AND OBJECTIVE BOX
INTERVAL HPI/OVERNIGHT EVENTS: Patient seen and examined at bedside. No acute events overnight.    VITAL SIGNS:  T(F): 98.9 (06-18-20 @ 15:26)  HR: 74 (06-18-20 @ 15:26)  BP: 151/65 (06-18-20 @ 15:26)  RR: 20 (06-18-20 @ 15:26)  SpO2: 99% (06-18-20 @ 15:26)  Wt(kg): --      PHYSICAL EXAM:    Constitutional: WDWN resting comfortably in bed; NAD  Head: NC/AT  Eyes: PERRL, EOMI, anicteric sclera  ENT: no oropharyngeal erythema or exudates; MMM  Neck: supple; no JVD  Respiratory: CTA B/L; no W/R/R, no retractions  Cardiac: +S1/S2; RRR; no M/R/G; PMI non-displaced  Gastrointestinal: soft, NT/ND; no rebound or guarding; +BSx4  Genitourinary: normal external genitalia  Back: spine midline, no bony tenderness or step-offs; no CVAT B/L  Extremities: WWP, no clubbing or cyanosis; no peripheral edema  Musculoskeletal: NROM x4; no joint swelling, tenderness or erythema  Vascular: 2+ radial, DP/PT pulses B/L  Lymphatic: no submandibular or cervical LAD  Neurologic: AAOx3; CNII-XII grossly intact; no focal deficits  Psychiatric: affect and characteristics of appearance, verbalizations, behaviors are appropriate    MEDICATIONS  (STANDING):  atorvastatin 80 milliGRAM(s) Oral at bedtime  dextrose 5%. 1000 milliLiter(s) (50 mL/Hr) IV Continuous <Continuous>  dextrose 50% Injectable 12.5 Gram(s) IV Push once  dextrose 50% Injectable 25 Gram(s) IV Push once  dextrose 50% Injectable 25 Gram(s) IV Push once  insulin lispro (HumaLOG) corrective regimen sliding scale   SubCutaneous Before meals and at bedtime  iron sucrose IVPB 200 milliGRAM(s) IV Intermittent every 24 hours  labetalol 200 milliGRAM(s) Oral every 12 hours  NIFEdipine XL 90 milliGRAM(s) Oral daily  pantoprazole  Injectable 40 milliGRAM(s) IV Push every 12 hours    MEDICATIONS  (PRN):  dextrose 40% Gel 15 Gram(s) Oral once PRN Blood Glucose LESS THAN 70 milliGRAM(s)/deciliter  glucagon  Injectable 1 milliGRAM(s) IntraMuscular once PRN Glucose LESS THAN 70 milligrams/deciliter      Allergies    No Known Allergies    Intolerances        LABS:                        9.2    9.29  )-----------( 322      ( 18 Jun 2020 07:24 )             28.5     06-18    141  |  104  |  20  ----------------------------<  128<H>  4.0   |  27  |  0.86    Ca    9.0      18 Jun 2020 07:24  Phos  3.9     06-18  Mg     2.1     06-18    TPro  6.6  /  Alb  3.7  /  TBili  0.3  /  DBili  x   /  AST  14  /  ALT  11  /  AlkPhos  114  06-17    PT/INR - ( 18 Jun 2020 07:24 )   PT: 16.4 sec;   INR: 1.42          PTT - ( 18 Jun 2020 07:24 )  PTT:33.5 sec          EKG:    Echo:    Cath:    NST:    RADIOLOGY & ADDITIONAL TESTS: INTERVAL HPI/OVERNIGHT EVENTS: Patient seen and examined at bedside. No acute events overnight. Doing well; no chest pain, palpitations, SOB. Wants to go home.    VITAL SIGNS:  T(F): 98.9 (06-18-20 @ 15:26)  HR: 74 (06-18-20 @ 15:26)  BP: 151/65 (06-18-20 @ 15:26)  RR: 20 (06-18-20 @ 15:26)  SpO2: 99% (06-18-20 @ 15:26)  Wt(kg): --      PHYSICAL EXAM:    GEN: Awake, comfortable. NAD.   HEENT: NCAT, PERRL, EOMI. Mucosa moist. No JVD.   RESP: CTA b/l  CV: RRR, normal s1/s2. III/VI LEVI w/radiation to carotids.  ABD: Soft, NTND. BS+  EXT: Warm. No edema, clubbing, or cyanosis.   NEURO: AAOx3. No focal deficits    MEDICATIONS  (STANDING):  atorvastatin 80 milliGRAM(s) Oral at bedtime  dextrose 5%. 1000 milliLiter(s) (50 mL/Hr) IV Continuous <Continuous>  dextrose 50% Injectable 12.5 Gram(s) IV Push once  dextrose 50% Injectable 25 Gram(s) IV Push once  dextrose 50% Injectable 25 Gram(s) IV Push once  insulin lispro (HumaLOG) corrective regimen sliding scale   SubCutaneous Before meals and at bedtime  iron sucrose IVPB 200 milliGRAM(s) IV Intermittent every 24 hours  labetalol 200 milliGRAM(s) Oral every 12 hours  NIFEdipine XL 90 milliGRAM(s) Oral daily  pantoprazole  Injectable 40 milliGRAM(s) IV Push every 12 hours    MEDICATIONS  (PRN):  dextrose 40% Gel 15 Gram(s) Oral once PRN Blood Glucose LESS THAN 70 milliGRAM(s)/deciliter  glucagon  Injectable 1 milliGRAM(s) IntraMuscular once PRN Glucose LESS THAN 70 milligrams/deciliter      Allergies    No Known Allergies    Intolerances        LABS:                        9.2    9.29  )-----------( 322      ( 18 Jun 2020 07:24 )             28.5     06-18    141  |  104  |  20  ----------------------------<  128<H>  4.0   |  27  |  0.86    Ca    9.0      18 Jun 2020 07:24  Phos  3.9     06-18  Mg     2.1     06-18    TPro  6.6  /  Alb  3.7  /  TBili  0.3  /  DBili  x   /  AST  14  /  ALT  11  /  AlkPhos  114  06-17    PT/INR - ( 18 Jun 2020 07:24 )   PT: 16.4 sec;   INR: 1.42          PTT - ( 18 Jun 2020 07:24 )  PTT:33.5 sec          EKG:    Echo:    < from: TTE Echo Complete w/o Contrast w/ Doppler (06.15.20 @ 15:46) >  CONCLUSIONS:     1. The aortic valve is thickened. There is probably severe aortic stenosis. The peak transvalvular velocity is 3.80 m/s, the mean transvalvular gradient is 29.00 mmHg, and the LVOT/AV velocity ratio is 0.23. The peak transaortic gradient is 57.76 mmHg. The aortic valve area (estimated via the continuity method) is 0.9 cm². There is mild aortic regurgitation.   2. Normal left and right ventricular size and systolic function.   3. Structurally normal mitral valve with normal leaflet excursion. There is probably moderate eccentric mitral regurgitation.   4. No pericardial effusion.    < end of copied text >    Cath:    NST:    RADIOLOGY & ADDITIONAL TESTS:

## 2020-06-18 NOTE — PROGRESS NOTE ADULT - ASSESSMENT
69M PMH of HTN, DM, HLD, PAD, CAD, PAD s/p right and left LE FEM-pop bypass in (R in 2016), recent admission for occluded CFA-AK pop bypass s/p Jayme balloon embolectomy (on xarelto and aspirin (3/2020), presented with anemia, admitted for symptomatic anemia with a Hb of 4.2, now s/p 3U pRBCs and colonoscopy showing small ulcerated mass now confirmed to be adenocarcinoma that has likely not metastasized. Will obtain CT chest to complete staging at a later time.

## 2020-06-18 NOTE — PROGRESS NOTE ADULT - ASSESSMENT
A/P: 69M w/CAD, PAD s/p right and left LE FEM-pop bypass in (R in 2016), HTN, DM, HLD, recent admission for occluded CFA-AK pop bypass s/p Jayme balloon embolectomy (on xarelto and aspirin (3/2020), presented on 6/13 with anemia, admitted for symptomatic anemia with a Hb of 4.2, now s/p 3U pRBCs and colonoscopy showing small ulcerated mass in the cecum suspicious for malignancy. Transferred to the [facility] for further malignancy workup. Cardiology consulted for severe AS noted on echocardiogram. Plan for BAV & LHC 6/19 w/CTS, and hemicolectomy w/colorectal surgery next week. Cardiology asked to risk stratify patient.    #Preop Assessment  - RCRI - 1, indicating low risk for intermediate risk procedure w/METS > 4. However, presence of severe AS makes him a high risk surgical candidate. Plan for cath and BAV joseluis w/CTS  - final assessment pending LHC & BAV w/CTS.     #Severe AS - patient currently asymptomatic w/echo findings of severe AS.   - f/u structural heart recommendations for decision on intervention     #HTN  - c/w labetalol 200mg bid  - c/w Nifedipine-XL 90mg QD  - hold ARB    #HLD  - c/w start lipitor 80mg PO QD    #CAD  - c/w atorvastatin 80  - reintroduce ASA 81 when safe from a bleeding perpsective  - will f/u LHC results      If patient does not have outpatient cardiology f/u, he can f/u with Dr. Paulino as an outpt. Please call [phone] to schedule an appointment.    --  Bernabe Velez MD  Cardiology PGY4

## 2020-06-18 NOTE — PROGRESS NOTE ADULT - SUBJECTIVE AND OBJECTIVE BOX
INTERVAL HPI/OVERNIGHT EVENTS:  Pt was seen and examined at the bedside. He was observed to be lying down comfortably.   He reports feeling well. No complaints.    Iron panel noted with iron sat 3%. Started IV iron per our recs.      VITAL SIGNS:  T(F): 98.6 (06-18-20 @ 08:52)  HR: 61 (06-18-20 @ 08:52)  BP: 126/77 (06-18-20 @ 08:52)  RR: 18 (06-18-20 @ 08:52)  SpO2: 96% (06-18-20 @ 08:52)  Wt(kg): --  I&O's Summary    PHYSICAL EXAM:  Constitutional: NAD, well-groomed, well-developed  HEENT: PERRLA, EOMI, Normal Hearing, MMM  Neck: No LAD, No JVD  Back: Normal spine flexure, No CVA tenderness  Respiratory: CTAB  Cardiovascular: S1 and S2, RRR, no M/G/R  Gastrointestinal: BS+, soft, NT/ND  Extremities: No peripheral edema  Vascular: 2+ peripheral pulses  Neurological: A/O x 3, no focal deficits  Psychiatric: Normal mood, normal affect  Musculoskeletal: 5/5 strength b/l upper and lower extremities  Skin: No rashes        MEDICATIONS  (STANDING):  atorvastatin 80 milliGRAM(s) Oral at bedtime  dextrose 5%. 1000 milliLiter(s) (50 mL/Hr) IV Continuous <Continuous>  dextrose 50% Injectable 12.5 Gram(s) IV Push once  dextrose 50% Injectable 25 Gram(s) IV Push once  dextrose 50% Injectable 25 Gram(s) IV Push once  insulin lispro (HumaLOG) corrective regimen sliding scale   SubCutaneous Before meals and at bedtime  iron sucrose IVPB 200 milliGRAM(s) IV Intermittent every 24 hours  labetalol 200 milliGRAM(s) Oral every 12 hours  NIFEdipine XL 90 milliGRAM(s) Oral daily  pantoprazole  Injectable 40 milliGRAM(s) IV Push every 12 hours    MEDICATIONS  (PRN):  dextrose 40% Gel 15 Gram(s) Oral once PRN Blood Glucose LESS THAN 70 milliGRAM(s)/deciliter  glucagon  Injectable 1 milliGRAM(s) IntraMuscular once PRN Glucose LESS THAN 70 milligrams/deciliter      Allergies    No Known Allergies    Intolerances        LABS:  CBC Full  -  ( 18 Jun 2020 07:24 )  WBC Count : 9.29 K/uL  RBC Count : 3.28 M/uL  Hemoglobin : 9.2 g/dL  Hematocrit : 28.5 %  Platelet Count - Automated : 322 K/uL  Mean Cell Volume : 86.9 fl  Mean Cell Hemoglobin : 28.0 pg  Mean Cell Hemoglobin Concentration : 32.3 gm/dL  Auto Neutrophil # : x  Auto Lymphocyte # : x  Auto Monocyte # : x  Auto Eosinophil # : x  Auto Basophil # : x  Auto Neutrophil % : x  Auto Lymphocyte % : x  Auto Monocyte % : x  Auto Eosinophil % : x  Auto Basophil % : x    06-18    141  |  104  |  20  ----------------------------<  128<H>  4.0   |  27  |  0.86    Ca    9.0      18 Jun 2020 07:24  Phos  3.9     06-18  Mg     2.1     06-18    TPro  6.6  /  Alb  3.7  /  TBili  0.3  /  DBili  x   /  AST  14  /  ALT  11  /  AlkPhos  114  06-17    PT/INR - ( 18 Jun 2020 07:24 )   PT: 16.4 sec;   INR: 1.42          PTT - ( 18 Jun 2020 07:24 )  PTT:33.5 sec      Iron Total, Serum: 11 ug/dL (06-17 @ 14:47)  Iron - Total Binding Capacity.: 329 ug/dL (06-17 @ 14:47)  Ferritin, Serum: 29 ng/mL (06-17 @ 14:47)    INR: 1.42 (06-18-20 @ 07:24)  Activated Partial Thromboplastin Time: 33.5 sec (06-18-20 @ 07:24)      RADIOLOGY & ADDITIONAL TESTS: Reviewed.

## 2020-06-18 NOTE — PROGRESS NOTE ADULT - SUBJECTIVE AND OBJECTIVE BOX
Pt seen and examined   no complaints wants surgery    REVIEW OF SYSTEMS:  Constitutional: No fever, weight loss or fatigue  Cardiovascular: No chest pain, palpitations, dizziness or leg swelling  Gastrointestinal: No abdominal or epigastric pain. No nausea, vomiting or hematemesis; No diarrhea or constipation. No melena or hematochezia.  Skin: No itching, burning, rashes or lesions       MEDICATIONS:  MEDICATIONS  (STANDING):  atorvastatin 80 milliGRAM(s) Oral at bedtime  dextrose 5%. 1000 milliLiter(s) (50 mL/Hr) IV Continuous <Continuous>  dextrose 50% Injectable 12.5 Gram(s) IV Push once  dextrose 50% Injectable 25 Gram(s) IV Push once  dextrose 50% Injectable 25 Gram(s) IV Push once  insulin lispro (HumaLOG) corrective regimen sliding scale   SubCutaneous Before meals and at bedtime  iron sucrose IVPB 200 milliGRAM(s) IV Intermittent every 24 hours  labetalol 200 milliGRAM(s) Oral every 12 hours  NIFEdipine XL 90 milliGRAM(s) Oral daily  pantoprazole  Injectable 40 milliGRAM(s) IV Push every 12 hours    MEDICATIONS  (PRN):  dextrose 40% Gel 15 Gram(s) Oral once PRN Blood Glucose LESS THAN 70 milliGRAM(s)/deciliter  glucagon  Injectable 1 milliGRAM(s) IntraMuscular once PRN Glucose LESS THAN 70 milligrams/deciliter      Allergies    No Known Allergies    Intolerances        Vital Signs Last 24 Hrs  T(C): 37 (18 Jun 2020 08:52), Max: 37.1 (17 Jun 2020 21:17)  T(F): 98.6 (18 Jun 2020 08:52), Max: 98.8 (17 Jun 2020 21:17)  HR: 61 (18 Jun 2020 08:52) (61 - 70)  BP: 126/77 (18 Jun 2020 08:52) (103/50 - 142/75)  BP(mean): --  RR: 18 (18 Jun 2020 08:52) (17 - 18)  SpO2: 96% (18 Jun 2020 08:52) (93% - 98%)      PHYSICAL EXAM:    General: Well developed; well nourished; in no acute distress  HEENT: MMM, conjunctiva and sclera clear  Gastrointestinal: Soft non-tender non-distended; Normal bowel sounds; No hepatosplenomegaly  Skin: Warm and dry. No obvious rash    LABS:      CBC Full  -  ( 18 Jun 2020 07:24 )  WBC Count : 9.29 K/uL  RBC Count : 3.28 M/uL  Hemoglobin : 9.2 g/dL  Hematocrit : 28.5 %  Platelet Count - Automated : 322 K/uL  Mean Cell Volume : 86.9 fl  Mean Cell Hemoglobin : 28.0 pg  Mean Cell Hemoglobin Concentration : 32.3 gm/dL  Auto Neutrophil # : x  Auto Lymphocyte # : x  Auto Monocyte # : x  Auto Eosinophil # : x  Auto Basophil # : x  Auto Neutrophil % : x  Auto Lymphocyte % : x  Auto Monocyte % : x  Auto Eosinophil % : x  Auto Basophil % : x    06-18    141  |  104  |  20  ----------------------------<  128<H>  4.0   |  27  |  0.86    Ca    9.0      18 Jun 2020 07:24  Phos  3.9     06-18  Mg     2.1     06-18    TPro  6.6  /  Alb  3.7  /  TBili  0.3  /  DBili  x   /  AST  14  /  ALT  11  /  AlkPhos  114  06-17    PT/INR - ( 18 Jun 2020 07:24 )   PT: 16.4 sec;   INR: 1.42          PTT - ( 18 Jun 2020 07:24 )  PTT:33.5 sec                  RADIOLOGY & ADDITIONAL STUDIES (The following images were personally reviewed):

## 2020-06-18 NOTE — PROGRESS NOTE ADULT - PROBLEM SELECTOR PLAN 3
Mildly elevated troponin 0.02, EKG with RBBB (known) ST depressions in I, II, V1-V6 (new); ST elevation in III. Likely demand ischemia in the setting of anemia and volume depletion.  - no active chest pain  - EKG now without ST depressions in I, II, V1-V6  - troponin trended to plateau   - troponin still increasing form 0.02 to 0.04, peaked at .05  - Cardiology consulted, f/u recs      #Leukocytosis RESOLVED   - WBC 14 on admission, could be stress response to GI bleed but unclear etiology.  Afebrile and no clear infectious source.  Patient has had chronically elevated WBC on prior admissions with baseline 13-17.  - trend CBC, now wnl

## 2020-06-18 NOTE — CONSULT NOTE ADULT - SUBJECTIVE AND OBJECTIVE BOX
Attending: Alfreda    HPI:     70M PMHx HTN, DM, CAD, PAD s/p bilateral fem-pop bypasses (2016) s/p R embolectomy and angioplasty x2 for RLE ischemia (3/2020), on Xarelto but may be noncompliant (found with empty pill bottles) was admitted last week with bloody diarrhea x 4 days associated with dizziness and weakness. In the ER, tachycardic to 108, normotensive, exam with lethargy and decreased memory. Anemic to 4.2 (baseline 9 in 3/2020). Stroke code called for altered mental status; CT head negative. Given 3U pRBC and responded to Hgb 9. EKG with NSR, RBBB (known), ST depressions in I, II, V1-V6 (new). Admitted to medicine telemetry for workup. 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 Cardiology and structural heart consulted, recommend surgical intervention; per medicine resident, CT surgery planning cardiac catheterization and aortic valve balloon angioplasty. Given xarelto yesterday so BAV/cath cancelled; reschedule date TBD.    History as above, vascular surgery consulted for recommendations on the need for anticoagulation. Patient was discharged on 3/17 with xarelto 15 BID and ASA 81. Patient is a poor historian and it is unclear whether he has actually been taking these medications; he has not seen Dr. Gant in over a year. Xarelto was restarted yesterday and then discontinued for BAV.       PAST MEDICAL & SURGICAL HISTORY:  PAD (peripheral artery disease)  DM (diabetes mellitus)  CAD (coronary artery disease)  Essential hypertension: Hypertension  S/P femoral-femoral bypass surgery: left 2016  Elective surgery: right leg angiogram with bypass  july 2016  History of hernia repair: june 2014    MEDICATIONS  (STANDING):  atorvastatin 80 milliGRAM(s) Oral at bedtime  dextrose 5%. 1000 milliLiter(s) (50 mL/Hr) IV Continuous <Continuous>  dextrose 50% Injectable 12.5 Gram(s) IV Push once  dextrose 50% Injectable 25 Gram(s) IV Push once  dextrose 50% Injectable 25 Gram(s) IV Push once  insulin lispro (HumaLOG) corrective regimen sliding scale   SubCutaneous Before meals and at bedtime  iron sucrose IVPB 200 milliGRAM(s) IV Intermittent every 24 hours  labetalol 200 milliGRAM(s) Oral every 12 hours  NIFEdipine XL 90 milliGRAM(s) Oral daily  pantoprazole  Injectable 40 milliGRAM(s) IV Push every 12 hours    MEDICATIONS  (PRN):  dextrose 40% Gel 15 Gram(s) Oral once PRN Blood Glucose LESS THAN 70 milliGRAM(s)/deciliter  glucagon  Injectable 1 milliGRAM(s) IntraMuscular once PRN Glucose LESS THAN 70 milligrams/deciliter    Allergies    No Known Allergies    Intolerances      T(C): 37 (06-18-20 @ 08:52), Max: 37.1 (06-17-20 @ 21:17)  HR: 61 (06-18-20 @ 08:52) (61 - 70)  BP: 126/77 (06-18-20 @ 08:52) (103/50 - 142/75)  RR: 18 (06-18-20 @ 08:52) (17 - 18)  SpO2: 96% (06-18-20 @ 08:52) (93% - 98%)    Physical Exam:  General: NAD, resting comfortably in bed  HEENT: MMM  Pulmonary: nonlabored breathing, normal resp effort  Cardiovascular: Regular rate  Abdominal: soft, nontender, nondistended  Extremities: Warm and well perfused, no lesions, R DP biphasic, R PT monophasic   Neuro: no focal deficits  Psych: pleasant    LABS:                        9.2    9.29  )-----------( 322      ( 18 Jun 2020 07:24 )             28.5     06-18    141  |  104  |  20  ----------------------------<  128<H>  4.0   |  27  |  0.86    Ca    9.0      18 Jun 2020 07:24  Phos  3.9     06-18  Mg     2.1     06-18    TPro  6.6  /  Alb  3.7  /  TBili  0.3  /  DBili  x   /  AST  14  /  ALT  11  /  AlkPhos  114  06-17    PT/INR - ( 18 Jun 2020 07:24 )   PT: 16.4 sec;   INR: 1.42          PTT - ( 18 Jun 2020 07:24 )  PTT:33.5 sec  LIVER FUNCTIONS - ( 17 Jun 2020 05:51 )  Alb: 3.7 g/dL / Pro: 6.6 g/dL / ALK PHOS: 114 U/L / ALT: 11 U/L / AST: 14 U/L / GGT: x               Assessment: 70y 70M PMHx HTN, DM, CAD, PAD s/p bilateral fem-pop bypasses (2016) s/p R embolectomy and angioplasty x2 for RLE ischemia (3/2020), on Xarelto but likely noncompliant, admitted 6/13 for GIB and 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 Now s/p 3U PRBC with Hgb stable and w/ normal non-bloody BMs. CT surgery currently planning BAV/cath for severe aortic stenosis. Vascular consulted regarding need for anticoagulation.    Recommendations:  - Obtain RLE duplex to assess bypass patency. If bypass is open, recommend restarting anticoagulation with asa and xarelto when medically feasible.  - Team 3c will continue to follow  - Final plans pending discussion with attending Attending: Alfreda    HPI:     70M PMHx HTN, DM, CAD, PAD s/p bilateral fem-pop bypasses (2016) s/p R embolectomy and angioplasty x2 for RLE ischemia (3/2020), on Xarelto but may be noncompliant (found with empty pill bottles) was admitted last week with bloody diarrhea x 4 days associated with dizziness and weakness. In the ER, tachycardic to 108, normotensive, exam with lethargy and decreased memory. Anemic to 4.2 (baseline 9 in 3/2020). Stroke code called for altered mental status; CT head negative. Given 3U pRBC and responded to Hgb 9. EKG with NSR, RBBB (known), ST depressions in I, II, V1-V6 (new). Admitted to medicine telemetry for workup. 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 Cardiology and structural heart consulted, recommend surgical intervention; per medicine resident, CT surgery planning cardiac catheterization and aortic valve balloon angioplasty. Given xarelto yesterday so BAV/cath cancelled; reschedule date TBD.    History as above, vascular surgery consulted for recommendations on the need for anticoagulation. Patient was discharged on 3/17 with xarelto 15 BID and ASA 81. Patient is a poor historian and it is unclear whether he has actually been taking these medications; he has not seen Dr. Gant in over a year. Xarelto was restarted yesterday and then discontinued for BAV.       PAST MEDICAL & SURGICAL HISTORY:  PAD (peripheral artery disease)  DM (diabetes mellitus)  CAD (coronary artery disease)  Essential hypertension: Hypertension  S/P femoral-femoral bypass surgery: left 2016  Elective surgery: right leg angiogram with bypass  july 2016  History of hernia repair: june 2014    MEDICATIONS  (STANDING):  atorvastatin 80 milliGRAM(s) Oral at bedtime  dextrose 5%. 1000 milliLiter(s) (50 mL/Hr) IV Continuous <Continuous>  dextrose 50% Injectable 12.5 Gram(s) IV Push once  dextrose 50% Injectable 25 Gram(s) IV Push once  dextrose 50% Injectable 25 Gram(s) IV Push once  insulin lispro (HumaLOG) corrective regimen sliding scale   SubCutaneous Before meals and at bedtime  iron sucrose IVPB 200 milliGRAM(s) IV Intermittent every 24 hours  labetalol 200 milliGRAM(s) Oral every 12 hours  NIFEdipine XL 90 milliGRAM(s) Oral daily  pantoprazole  Injectable 40 milliGRAM(s) IV Push every 12 hours    MEDICATIONS  (PRN):  dextrose 40% Gel 15 Gram(s) Oral once PRN Blood Glucose LESS THAN 70 milliGRAM(s)/deciliter  glucagon  Injectable 1 milliGRAM(s) IntraMuscular once PRN Glucose LESS THAN 70 milligrams/deciliter    Allergies    No Known Allergies    Intolerances      T(C): 37 (06-18-20 @ 08:52), Max: 37.1 (06-17-20 @ 21:17)  HR: 61 (06-18-20 @ 08:52) (61 - 70)  BP: 126/77 (06-18-20 @ 08:52) (103/50 - 142/75)  RR: 18 (06-18-20 @ 08:52) (17 - 18)  SpO2: 96% (06-18-20 @ 08:52) (93% - 98%)    Physical Exam:  General: NAD, resting comfortably in bed  HEENT: MMM  Pulmonary: nonlabored breathing, normal resp effort  Cardiovascular: Regular rate  Abdominal: soft, nontender, nondistended  Extremities: Warm and well perfused, no lesions, R DP biphasic, R PT monophasic, L PT/DP monophasic  Neuro: no focal deficits  Psych: pleasant    LABS:                        9.2    9.29  )-----------( 322      ( 18 Jun 2020 07:24 )             28.5     06-18    141  |  104  |  20  ----------------------------<  128<H>  4.0   |  27  |  0.86    Ca    9.0      18 Jun 2020 07:24  Phos  3.9     06-18  Mg     2.1     06-18    TPro  6.6  /  Alb  3.7  /  TBili  0.3  /  DBili  x   /  AST  14  /  ALT  11  /  AlkPhos  114  06-17    PT/INR - ( 18 Jun 2020 07:24 )   PT: 16.4 sec;   INR: 1.42          PTT - ( 18 Jun 2020 07:24 )  PTT:33.5 sec  LIVER FUNCTIONS - ( 17 Jun 2020 05:51 )  Alb: 3.7 g/dL / Pro: 6.6 g/dL / ALK PHOS: 114 U/L / ALT: 11 U/L / AST: 14 U/L / GGT: x               Assessment: 70y 70M PMHx HTN, DM, CAD, PAD s/p bilateral fem-pop bypasses (2016) s/p R embolectomy and angioplasty x2 for RLE ischemia (3/2020), on Xarelto but likely noncompliant, admitted 6/13 for GIB and 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 Now s/p 3U PRBC with Hgb stable and w/ normal non-bloody BMs. CT surgery currently planning BAV/cath for severe aortic stenosis. Vascular consulted regarding need for anticoagulation.    Recommendations:  - Obtain bilateral lower extremity duplex to assess bypass patency. If bypass is open, recommend restarting anticoagulation with asa and xarelto when medically feasible.  - Team 3c will continue to follow  - Final plans pending discussion with attending Attending: Alfreda    HPI:     70M PMHx HTN, DM, CAD, PAD s/p bilateral fem-pop bypasses (2016) s/p R embolectomy and angioplasty x2 for RLE ischemia (3/2020), on Xarelto but may be noncompliant (found with empty pill bottles) was admitted last week with bloody diarrhea x 4 days associated with dizziness and weakness. In the ER, tachycardic to 108, normotensive, exam with lethargy and decreased memory. Anemic to 4.2 (baseline 9 in 3/2020). Stroke code called for altered mental status; CT head negative. Given 3U pRBC and responded to Hgb 9. EKG with NSR, RBBB (known), ST depressions in I, II, V1-V6 (new). Admitted to medicine telemetry for workup. 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 Cardiology and structural heart consulted, recommend surgical intervention; per medicine resident, CT surgery planning cardiac catheterization and aortic valve balloon angioplasty. Given xarelto yesterday so BAV/cath cancelled; reschedule date TBD.    History as above, vascular surgery consulted for recommendations on the need for anticoagulation. Patient was discharged on 3/17 with xarelto 15 BID and ASA 81. Patient is a poor historian and it is unclear whether he has actually been taking these medications; he has not seen Dr. Gant in over a year. Xarelto was restarted yesterday and then discontinued for BAV.       PAST MEDICAL & SURGICAL HISTORY:  PAD (peripheral artery disease)  DM (diabetes mellitus)  CAD (coronary artery disease)  Essential hypertension: Hypertension  S/P femoral-femoral bypass surgery: left 2016  Elective surgery: right leg angiogram with bypass  july 2016  History of hernia repair: june 2014    MEDICATIONS  (STANDING):  atorvastatin 80 milliGRAM(s) Oral at bedtime  dextrose 5%. 1000 milliLiter(s) (50 mL/Hr) IV Continuous <Continuous>  dextrose 50% Injectable 12.5 Gram(s) IV Push once  dextrose 50% Injectable 25 Gram(s) IV Push once  dextrose 50% Injectable 25 Gram(s) IV Push once  insulin lispro (HumaLOG) corrective regimen sliding scale   SubCutaneous Before meals and at bedtime  iron sucrose IVPB 200 milliGRAM(s) IV Intermittent every 24 hours  labetalol 200 milliGRAM(s) Oral every 12 hours  NIFEdipine XL 90 milliGRAM(s) Oral daily  pantoprazole  Injectable 40 milliGRAM(s) IV Push every 12 hours    MEDICATIONS  (PRN):  dextrose 40% Gel 15 Gram(s) Oral once PRN Blood Glucose LESS THAN 70 milliGRAM(s)/deciliter  glucagon  Injectable 1 milliGRAM(s) IntraMuscular once PRN Glucose LESS THAN 70 milligrams/deciliter    Allergies    No Known Allergies    Intolerances      T(C): 37 (06-18-20 @ 08:52), Max: 37.1 (06-17-20 @ 21:17)  HR: 61 (06-18-20 @ 08:52) (61 - 70)  BP: 126/77 (06-18-20 @ 08:52) (103/50 - 142/75)  RR: 18 (06-18-20 @ 08:52) (17 - 18)  SpO2: 96% (06-18-20 @ 08:52) (93% - 98%)    Physical Exam:  General: NAD, resting comfortably in bed  HEENT: MMM  Pulmonary: nonlabored breathing, normal resp effort  Cardiovascular: Regular rate  Abdominal: soft, nontender, nondistended  Extremities: Warm and well perfused, no lesions, R DP biphasic, R PT monophasic, L PT/DP monophasic  Neuro: no focal deficits  Psych: pleasant    LABS:                        9.2    9.29  )-----------( 322      ( 18 Jun 2020 07:24 )             28.5     06-18    141  |  104  |  20  ----------------------------<  128<H>  4.0   |  27  |  0.86    Ca    9.0      18 Jun 2020 07:24  Phos  3.9     06-18  Mg     2.1     06-18    TPro  6.6  /  Alb  3.7  /  TBili  0.3  /  DBili  x   /  AST  14  /  ALT  11  /  AlkPhos  114  06-17    PT/INR - ( 18 Jun 2020 07:24 )   PT: 16.4 sec;   INR: 1.42          PTT - ( 18 Jun 2020 07:24 )  PTT:33.5 sec  LIVER FUNCTIONS - ( 17 Jun 2020 05:51 )  Alb: 3.7 g/dL / Pro: 6.6 g/dL / ALK PHOS: 114 U/L / ALT: 11 U/L / AST: 14 U/L / GGT: x               Assessment: 70y 70M PMHx HTN, DM, CAD, PAD s/p bilateral fem-pop bypasses (2016) s/p R embolectomy and angioplasty x2 for RLE ischemia (3/2020), on Xarelto but likely noncompliant, admitted 6/13 for GIB and 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 Now s/p 3U PRBC with Hgb stable and w/ normal non-bloody BMs. CT surgery currently planning BAV/cath for severe aortic stenosis. Vascular consulted regarding need for anticoagulation.    Recommendations:  - Obtain bilateral lower extremity duplex to assess bypass patency. If bypass is open, recommend restarting anticoagulation with asa and xarelto as soon as medically feasible  - Team 3c will continue to follow  - Discussed with chief resident Attending: Alfreda    HPI:     70M PMHx HTN, DM, CAD, PAD s/p bilateral fem-pop bypasses (2016) s/p R embolectomy and angioplasty x2 for RLE ischemia (3/2020), on Xarelto but may be noncompliant (found with empty pill bottles) was admitted last week with bloody diarrhea x 4 days associated with dizziness and weakness. In the ER, tachycardic to 108, normotensive, exam with lethargy and decreased memory. Anemic to 4.2 (baseline 9 in 3/2020). Stroke code called for altered mental status; CT head negative. Given 3U pRBC and responded to Hgb 9. EKG with NSR, RBBB (known), ST depressions in I, II, V1-V6 (new). Admitted to medicine telemetry for workup. 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 Cardiology and structural heart consulted, recommend surgical intervention; per medicine resident, CT surgery planning cardiac catheterization and aortic valve balloon angioplasty. Given xarelto yesterday so BAV/cath cancelled; reschedule date TBD.    History as above, vascular surgery consulted for recommendations on the need for anticoagulation. Patient was discharged on 3/17 with xarelto 15 BID and ASA 81. Patient is a poor historian and it is unclear whether he has actually been taking these medications; he has not seen Dr. Gant in over a year. Xarelto was restarted yesterday and then discontinued for BAV.       PAST MEDICAL & SURGICAL HISTORY:  PAD (peripheral artery disease)  DM (diabetes mellitus)  CAD (coronary artery disease)  Essential hypertension: Hypertension  S/P femoral-femoral bypass surgery: left 2016  Elective surgery: right leg angiogram with bypass  july 2016  History of hernia repair: june 2014    MEDICATIONS  (STANDING):  atorvastatin 80 milliGRAM(s) Oral at bedtime  dextrose 5%. 1000 milliLiter(s) (50 mL/Hr) IV Continuous <Continuous>  dextrose 50% Injectable 12.5 Gram(s) IV Push once  dextrose 50% Injectable 25 Gram(s) IV Push once  dextrose 50% Injectable 25 Gram(s) IV Push once  insulin lispro (HumaLOG) corrective regimen sliding scale   SubCutaneous Before meals and at bedtime  iron sucrose IVPB 200 milliGRAM(s) IV Intermittent every 24 hours  labetalol 200 milliGRAM(s) Oral every 12 hours  NIFEdipine XL 90 milliGRAM(s) Oral daily  pantoprazole  Injectable 40 milliGRAM(s) IV Push every 12 hours    MEDICATIONS  (PRN):  dextrose 40% Gel 15 Gram(s) Oral once PRN Blood Glucose LESS THAN 70 milliGRAM(s)/deciliter  glucagon  Injectable 1 milliGRAM(s) IntraMuscular once PRN Glucose LESS THAN 70 milligrams/deciliter    Allergies    No Known Allergies    Intolerances      T(C): 37 (06-18-20 @ 08:52), Max: 37.1 (06-17-20 @ 21:17)  HR: 61 (06-18-20 @ 08:52) (61 - 70)  BP: 126/77 (06-18-20 @ 08:52) (103/50 - 142/75)  RR: 18 (06-18-20 @ 08:52) (17 - 18)  SpO2: 96% (06-18-20 @ 08:52) (93% - 98%)    Physical Exam:  General: NAD, resting comfortably in bed  HEENT: MMM  Pulmonary: nonlabored breathing, normal resp effort  Cardiovascular: Regular rate  Abdominal: soft, nontender, nondistended  Extremities: Warm and well perfused, no lesions, fem palp b/l, pop biphasic b/l, R DP biphasic, R PT monophasic, L PT/DP monophasic  Neuro: no focal deficits  Psych: pleasant    LABS:                        9.2    9.29  )-----------( 322      ( 18 Jun 2020 07:24 )             28.5     06-18    141  |  104  |  20  ----------------------------<  128<H>  4.0   |  27  |  0.86    Ca    9.0      18 Jun 2020 07:24  Phos  3.9     06-18  Mg     2.1     06-18    TPro  6.6  /  Alb  3.7  /  TBili  0.3  /  DBili  x   /  AST  14  /  ALT  11  /  AlkPhos  114  06-17    PT/INR - ( 18 Jun 2020 07:24 )   PT: 16.4 sec;   INR: 1.42          PTT - ( 18 Jun 2020 07:24 )  PTT:33.5 sec  LIVER FUNCTIONS - ( 17 Jun 2020 05:51 )  Alb: 3.7 g/dL / Pro: 6.6 g/dL / ALK PHOS: 114 U/L / ALT: 11 U/L / AST: 14 U/L / GGT: x               Assessment: 70y 70M PMHx HTN, DM, CAD, PAD s/p bilateral fem-pop bypasses (2016) s/p R embolectomy and angioplasty x2 for RLE ischemia (3/2020), on Xarelto but likely noncompliant, admitted 6/13 for GIB and 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 Now s/p 3U PRBC with Hgb stable and w/ normal non-bloody BMs. CT surgery currently planning BAV/cath for severe aortic stenosis. Vascular consulted regarding need for anticoagulation.    Recommendations:  - Obtain bilateral lower extremity duplex to assess bypass patency.  - Recommend restarting anticoagulation with asa and xarelto as soon as medically feasible  - Team 3c will continue to follow  - Discussed with chief resident

## 2020-06-18 NOTE — PROGRESS NOTE ADULT - SUBJECTIVE AND OBJECTIVE BOX
Planned Date of Surgery: 20                                                                                                           Surgeon: Dr. Perez    Procedure: R/[facility]    HPI:  70y Male with a PMHx of HTN, HLD, PAD (s/p right and left LE FEM-pop bypass in 2016), CAD, and recent admission for occluded CFA-AK pop bypass s/p Jayme balloon embolectomy (on Xarelto and ASA 3) presented to [facility] on 20 complaining of cool hands, weakness, dizziness, diarrhea with blood in stool for 4 days. Upon arrival to the ED the patient was found to be anemic with Hb 4.2 (received 3UPRBC). A stroke code was called for AMS, CT head negative for acute findings. On 6/15/20 the patient underwent a colonoscopy which found diffuse diverticulosis, a small ulcerated mass in area of cecum which was biopsied and turned out to be malignant.  Heme/Onc is involved and patient will likely need colon resection in the near future.  TTE on 6/15/20 found severe aortic stenosis with ANDRZEJ 0.9 cm2. Patient is planned for Cardiac Cath tomorrow with Dr. Perez. Pending cath, will discuss further intervention for AS.    PAST MEDICAL & SURGICAL HISTORY:  PAD (peripheral artery disease)  DM (diabetes mellitus)  CAD (coronary artery disease)  Essential hypertension: Hypertension  S/P femoral-femoral bypass surgery: left 2016  Elective surgery: right leg angiogram with bypass  2016  History of hernia repair: 2014      No Known Allergies      Physical Exam  T(C): 36.8 (18-20 @ 20:35), Max: 37.2 (18-20 @ 15:26)  HR: 78 (-20 @ 20:35) (61 - 78)  BP: 153/71 (18-20 @ 20:35) (103/50 - 153/71)  RR: 18 (18-20 @ 20:35) (17 - 20)  SpO2: 97% (-20 @ 20:35) (96% - 99%)    General: Patient lying comfortably in bed, no acute distress   Neurological: Alert and oriented. No focal neurological deficits   Cardiovascular: S1S2, RRR, +LEVI heard best at RUSB  Respiratory: Clear to ausculation bilaterally, no wheezes, rales or rhonchi  Gastrointestinal: Abdomen soft, non tender, non distended   Extremities: Warm and well perfused. No peripheral edema or calf tenderness bilaterally  Vascular: Peripheral pulses palpable bilaterally      MEDICATIONS  (STANDING):  atorvastatin 80 milliGRAM(s) Oral at bedtime  dextrose 5%. 1000 milliLiter(s) (50 mL/Hr) IV Continuous <Continuous>  dextrose 50% Injectable 12.5 Gram(s) IV Push once  dextrose 50% Injectable 25 Gram(s) IV Push once  dextrose 50% Injectable 25 Gram(s) IV Push once  insulin lispro (HumaLOG) corrective regimen sliding scale   SubCutaneous Before meals and at bedtime  iron sucrose IVPB 200 milliGRAM(s) IV Intermittent every 24 hours  labetalol 200 milliGRAM(s) Oral every 12 hours  NIFEdipine XL 90 milliGRAM(s) Oral daily  pantoprazole  Injectable 40 milliGRAM(s) IV Push every 12 hours    MEDICATIONS  (PRN):  dextrose 40% Gel 15 Gram(s) Oral once PRN Blood Glucose LESS THAN 70 milliGRAM(s)/deciliter  glucagon  Injectable 1 milliGRAM(s) IntraMuscular once PRN Glucose LESS THAN 70 milligrams/deciliter      On Beta Blocker? YES     Labs:                        9.2    9.29  )-----------( 322      ( 2020 07:24 )             28.5     06-18    141  |  104  |  20  ----------------------------<  128<H>  4.0   |  27  |  0.86    Ca    9.0      2020 07:24  Phos  3.9     06-18  Mg     2.1     06-18    TPro  6.6  /  Alb  3.7  /  TBili  0.3  /  DBili  x   /  AST  14  /  ALT  11  /  AlkPhos  114  06-17    PT/INR - ( 2020 07:24 )   PT: 16.4 sec;   INR: 1.42          PTT - ( 2020 07:24 )  PTT:33.5 sec    ABO Interpretation: B (20 @ 05:30)            Hgb A1C: 5.5    EK SB, RBBB    CXR: < from: Xray Chest 1 View- PORTABLE-Routine (20 @ 20:12) >  Portable examination of chest demonstrates mild cardiomegaly. Bilateral infiltrates. Mild degenerative changes thoracic spine. Calcification aortic knob.    Impression: Bilateral infiltrates    < end of copied text >      CT Scans:     < from: CT Heart Congenital w/ IV Cont (20 @ 17:42) >  IMPRESSION:     1.  Aortic and arterial measurements as above; severe atherosclerotic plaques with luminal narrowing in bilateral iliac arteries/proximal femoral arteries. Bilateral bypass grafts arising from the common femoral arteries.  2.  Cardiomegaly.  3.  Moderate emphysema. Diffuse tree-in-bud nodules and groundglass opacities, likely inflammatory. Bilateral trace pleural effusions.        < end of copied text >  < from: CT Heart Congenital w/ IV Cont (20 @ 17:42) >    Impression:   1.  Please note this study was not optimized for the evaluation of the coronary arteries.  2.  Possible severe stenosis in mid and distal LAD and OM2.  3.  Mid RCA is probably non-obstructive.  4.  Non-obstructive coronary artery disease in remaining coronary segments.  Please see separate radiology report for non-coronary findings.    ION ENNIS NP, ADVANCED CARDIAC IMAGING FELLOW      < end of copied text >     < from: CT Abdomen and Pelvis w/ Oral Cont and w/ IV Cont (20 @ 19:55) >      IMPRESSION:    Masslike lipomatosis of the ileocecal valve.    Small bilateral pleural effusions    No evidence ofmetastasis.    < end of copied text >  ]< from: CT Angio Neck w/ IV Cont (20 @ 16:06) >  FINDINGS: The CTA examination demonstrates the right common carotid artery to be normal in caliber. There is a normal bifurcation into the right internal and external carotid arteries. There is no hemodynamically significant stenosis.     The left common carotid artery is normal in caliber. There is a normal bifurcation into the left internal and external carotid arteries. There is no hemodynamically significant stenosis.     The right vertebral artery is clearly seen from its origin to the proximal foraminal segment. There is an attenuated caliber to the remaining vertebral artery at approximately the C4 level. This may be secondary to a combination of underdevelopment and/or proximal proximal occlusion. The left vertebral artery appears intact.    The aortic arch appears intact without narrowing of the origin of the great vessels.    IMPRESSION: Poor visualization of the right vertebral artery which may be secondary to a combination of underdevelopment and/or proximal occlusion.    < end of copied text >    < from: CT Perfusion w/ Maps w/ IV Cont (20 @ 16:05) >    IMPRESSION: Normal CT perfusion study.    < end of copied text >  < from: CT Brain Stroke Protocol (20 @ 16:04) >    IMPRESSION:  No evidence of acute intracranial hemorrhage or transcortical infarct. Age-indeterminate lacunar infarcts within the bilateral basal ganglia.    < end of copied text >        Cath Report: Pending    Echo: < from: TTE Echo Complete w/o Contrast w/ Doppler (06.15.20 @ 15:46) >     1. The aortic valve is thickened. There is probably severe aortic stenosis. The peak transvalvular velocity is 3.80 m/s, the mean transvalvular gradient is 29.00 mmHg, and the LVOT/AV velocity ratio is 0.23. The peak transaortic gradient is 57.76 mmHg. The aortic valve area (estimated via the continuity method) is 0.9 cm². There is mild aortic regurgitation.   2. Normal left and right ventricular size and systolic function.   3. Structurally normal mitral valve with normal leaflet excursion. There is probably moderate eccentric mitral regurgitation.   4. No pericardial effusion.    < end of copied text >    Carotid Duplex: CT head/neck done with IV contrast.      Consult in Chart?  YES   Consent in Chart? YES  NPO ordered? YES    Plan for cardiac cath tomorrow, pending results, will discuss further intervention regarding AS.

## 2020-06-18 NOTE — PROGRESS NOTE ADULT - ASSESSMENT
Assessment: 70M PMHx HTN, DM, CAD, PAD s/p bilateral fem-pop bypasses (2016) s/p R embolectomy (3/2020), on Xarelto but may be noncompliant (found with empty pill bottles) was admitted last week with bloody diarrhea x 4 days, colonoscopy with "at least intramural adenocarcinoma", CT chest pending. CEA 1.6. Severe aortic stenosis on echo; plan for cath Friday 6/19, may need surgery on valve as well.     Recommendations:  - cardiac cath Friday 6/19  - after intervention, please reconsult cardiology to discuss preoperative risk stratification  - if he does not require dual antiplatelet therapy and cardiology agrees that he is optimized, could plan for right hemicolectomy Tuesday 6/23  - discussed w/ Dr. Acosta

## 2020-06-18 NOTE — PROGRESS NOTE ADULT - SUBJECTIVE AND OBJECTIVE BOX
DAILY PROGRESS NOTE:    S: NAEO. Pending cardiac cath, possible valve repair however would have to be surgical per structural heart service.    O:    Vital Signs Last 24 Hrs  T(C): 37 (18 Jun 2020 08:52), Max: 37.1 (17 Jun 2020 21:17)  T(F): 98.6 (18 Jun 2020 08:52), Max: 98.8 (17 Jun 2020 21:17)  HR: 61 (18 Jun 2020 08:52) (61 - 70)  BP: 126/77 (18 Jun 2020 08:52) (103/50 - 142/75)  BP(mean): --  RR: 18 (18 Jun 2020 08:52) (17 - 18)  SpO2: 96% (18 Jun 2020 08:52) (93% - 98%)    I&O's Detail      Physical Exam:    Gen: NAD  Pulm: normal resp effort and excursion  Abd: soft, ND, NT  Ext: WWP    LABS:                        9.2    9.29  )-----------( 322      ( 18 Jun 2020 07:24 )             28.5     06-18    141  |  104  |  20  ----------------------------<  128<H>  4.0   |  27  |  0.86    Ca    9.0      18 Jun 2020 07:24  Phos  3.9     06-18  Mg     2.1     06-18    TPro  6.6  /  Alb  3.7  /  TBili  0.3  /  DBili  x   /  AST  14  /  ALT  11  /  AlkPhos  114  06-17    PT/INR - ( 18 Jun 2020 07:24 )   PT: 16.4 sec;   INR: 1.42          PTT - ( 18 Jun 2020 07:24 )  PTT:33.5 sec      RADIOLOGY & ADDITIONAL STUDIES:

## 2020-06-18 NOTE — PROGRESS NOTE ADULT - ATTENDING COMMENTS
See CVD Fellow note written above, for details. I reviewed the fellow's documentation. I have personally seen and examined this patient. I have reviewed vitals, labs, medications, cardiac studies and additional imaging.  I agree with the findings and plans as written above with the following additions/amendments:   69M with history of Essential HTN, Type II DM, HLD, CAD, extensive PAD s/p right and left LE FEM-pop bypass c/b occluded CFA-AK pop bypass s/p balloon embolectomy on Xarelto and aspirin who presented with symptomatic acute blood loss anemia in context of GI mass, diagnosed with colonic adenocarcinoma. TTE with incidental finding of severe aortic stenosis. Cardiology consulted for evaluation of severe aortic stenosis.     ROS: Patient denies cardiopulmonary symptoms  Physical Exam notable for: elderly male laying flat in bed in NAD, flat neck veins, RRR, III/VI systolic murmur radiating to both carotids and apex, clear lungs, no pretibial pitting edema, no ankle edema, skin WWP throughout, A&Ox3    Assessment and Recommendations as follows:  High intensity Atorvastatin 80 qHS  Home labetalol 200mg BID  Home Nifedipine 90 daily  Continue to hold home ARB pending GI work up/surgery and surgical evaluation also hold ARB in context of severe AS  Resume home ASA daily when safe from GI bleeding perspective and surgical standpoint  Patient is at high cardiac risk for upcoming colorectal surgery given severe aortic stenosis and CAD - extent unknown  Anticipated [facility] 6/19 with Dr. Perez and timing of BAV to be determined  JAVIER Lockett.  Cardiology Attending

## 2020-06-18 NOTE — PROGRESS NOTE ADULT - SUBJECTIVE AND OBJECTIVE BOX
OVERNIGHT EVENTS: none     SUBJECTIVE / INTERVAL HPI:   Patient was assessed at bedside; in no acute distress. Patient is well and in no pain. No BM yesterday. States that he was told he could leave to go home and shower, and come back tomorrow. Wants to know when he will be undergoing surgery.   Xarelto was restarted yesterday and then discontinued due to planned BAV today.   CT abdomen and pelvis was a limited chest study. It showed severe atherosclerotic plaques with luminal narrowing in bilateral iliac and proximal femoral arteries. CT heart showed severe stenosis in mid and distal LAD and OM2, and non-obstructive coronary artery disease in remaining coronary segments.   BAV will no longer be done today. Will do cardiac cath tomorrow and see if any stents are needed, at which time we will reevaluate.       VITAL SIGNS:   Vital Signs Last 24 Hrs  T(C): 37 (18 Jun 2020 08:52), Max: 37.1 (17 Jun 2020 21:17)  T(F): 98.6 (18 Jun 2020 08:52), Max: 98.8 (17 Jun 2020 21:17)  HR: 61 (18 Jun 2020 08:52) (61 - 70)  BP: 126/77 (18 Jun 2020 08:52) (103/50 - 142/75)  BP(mean): --  RR: 18 (18 Jun 2020 08:52) (17 - 18)  SpO2: 96% (18 Jun 2020 08:52) (93% - 98%)      PHYSICAL EXAM:  Constitutional: WDWN resting comfortably in bed; NAD  HEENT: NC/AT, PERRL, EOMI, anicteric sclera  Neck: supple; no JVD  Respiratory: CTA B/L; no W/R/R, no retractions  Cardiac: +S1/S2; +crescendo-decrescendo murmur  Gastrointestinal: abdomen soft, NT, normal BSx4 quadrants  Extremities: WWP, no peripheral edema; no tenderness or erythema; pulses present   Neurologic: AAOx3 poor historian; no focal deficits      MEDICATIONS:   MEDICATIONS  (STANDING):  atorvastatin 80 milliGRAM(s) Oral at bedtime  dextrose 5%. 1000 milliLiter(s) (50 mL/Hr) IV Continuous <Continuous>  dextrose 50% Injectable 12.5 Gram(s) IV Push once  dextrose 50% Injectable 25 Gram(s) IV Push once  dextrose 50% Injectable 25 Gram(s) IV Push once  insulin lispro (HumaLOG) corrective regimen sliding scale   SubCutaneous Before meals and at bedtime  iron sucrose IVPB 200 milliGRAM(s) IV Intermittent every 24 hours  labetalol 200 milliGRAM(s) Oral every 12 hours  NIFEdipine XL 90 milliGRAM(s) Oral daily  pantoprazole  Injectable 40 milliGRAM(s) IV Push every 12 hours    MEDICATIONS  (PRN):  dextrose 40% Gel 15 Gram(s) Oral once PRN Blood Glucose LESS THAN 70 milliGRAM(s)/deciliter  glucagon  Injectable 1 milliGRAM(s) IntraMuscular once PRN Glucose LESS THAN 70 milligrams/deciliter      ALLERGIES:  Allergies    No Known Allergies    Intolerances      LABS:                         9.2    9.29  )-----------( 322      ( 18 Jun 2020 07:24 )             28.5     06-18    141  |  104  |  20  ----------------------------<  128<H>  4.0   |  27  |  0.86    Ca    9.0      18 Jun 2020 07:24  Phos  3.9     06-18  Mg     2.1     06-18    TPro  6.6  /  Alb  3.7  /  TBili  0.3  /  DBili  x   /  AST  14  /  ALT  11  /  AlkPhos  114  06-17    PT/INR - ( 18 Jun 2020 07:24 )   PT: 16.4 sec;   INR: 1.42          PTT - ( 18 Jun 2020 07:24 )  PTT:33.5 sec        CAPILLARY BLOOD GLUCOSE    POCT Blood Glucose.: 150 mg/dL (18 Jun 2020 08:40)      RADIOLOGY & ADDITIONAL TESTS: Reviewed.

## 2020-06-18 NOTE — PROGRESS NOTE ADULT - PROBLEM SELECTOR PLAN 8
F: none  E: replete K <4, Mg <2  N: NPO for imaging  GI Ppx: Protonix   DVT Ppx: Xarelto   Code status: FULL   Dispo: RMF    1) PCP Contacted on Admission: (Y/N) --> Name & Phone #: Dr. Priyank Mcgrath [phone]  2) Date of Contact with PCP: 6/16/20  3) PCP Contacted at Discharge: (Y/N, N/A)  4) Summary of Handoff Given to PCP:   5) Post-Discharge Appointment Date and Location: F: none  E: replete K <4, Mg <2  N: DASH/TLC   GI Ppx: Protonix   DVT Ppx: Xarelto   Code status: FULL   Dispo: RMF    1) PCP Contacted on Admission: (Y/N) --> Name & Phone #: Dr. Priyank Mcgrath [phone]  2) Date of Contact with PCP: 6/16/20  3) PCP Contacted at Discharge: (Y/N, N/A)  4) Summary of Handoff Given to PCP:   5) Post-Discharge Appointment Date and Location:

## 2020-06-18 NOTE — PROGRESS NOTE ADULT - ATTENDING COMMENTS
Patient was seen and examined with the resident team today.  I agree with the above assessment and plan with the following exceptions/additions:     Briefly, this is a 70yo Dutch-speaking gentleman with a PMH of HTN, HLD, NIDDM2 (A1c 6.2%), CAD, severe aortic stenosis, PAD s/p right and left LE FEM-pop bypass (2016) and recent admission for occluded CFA-AK pop bypass s/p Jayme balloon embolectomy (on Xarelto and ASA, 3/2020) who p/w symptomatic anemia i/s/o hematochezia.  C-scope from 6/15 revealed a cecal mass c/f malignancy; adenocarcinoma per prelim path.  CTAP w/o mets.  Pre-op eval showing severe iliac disease and some c/f CAD.  Plan for LHC tomorrow to assess aortic stenosis and CAD.  Findings will need to be taken into consideration in order to sort out timing of resection with colorectal.      -- LHC tentatively on 6/19; will ask Oncology if can defer chest CT with IV to avoid too much contrast exposure  -- will discuss pre-op eval with Cardiology but will likely need LHC in order to finalize risk stratification  -- c/w BB, CCB and statin  -- please have Vascular officially comment on need for A/C; if needed, would start heparin gtt (keeping in mind recent GIB)  -- f/u final path from c-scope   -- DVT PPx - SCDs for now  -- Dispo - TBD     America Cruz  [phone] Patient was seen and examined with the resident team today.  I agree with the above assessment and plan with the following exceptions/additions:     Briefly, this is a 68yo [place]an-speaking gentleman with a PMH of HTN, HLD, NIDDM2 (A1c 6.2%), CAD, severe aortic stenosis, PAD s/p right and left LE FEM-pop bypass (2016) and recent admission for occluded CFA-AK pop bypass s/p Jayme balloon embolectomy (on Xarelto and ASA, 3/2020) who p/w symptomatic anemia i/s/o hematochezia.  C-scope from 6/15 revealed a cecal mass c/f malignancy; adenocarcinoma per prelim path.  CTAP w/o mets.  Pre-op eval showing severe iliac disease and some c/f CAD.  Plan for LHC tomorrow to assess aortic stenosis and CAD.  Findings will need to be taken into consideration in order to sort out timing of right hemicolectomy with Colorectal Surgery.      -- LHC tentatively on 6/19; will ask Oncology if can defer chest CT with IV to avoid too much contrast exposure  -- will discuss pre-op eval with Cardiology but will likely need LHC in order to finalize risk stratification  -- c/w BB, CCB and statin  -- please have Vascular officially comment on need for A/C; if needed, would start heparin gtt (keeping in mind recent GIB)  -- f/u final path from c-scope   -- DVT PPx - SCDs for now  -- Dispo - TBD     America Cruz  [phone]

## 2020-06-18 NOTE — PROGRESS NOTE ADULT - PROBLEM SELECTOR PLAN 1
Patient reported melanotic stool 3-4 days prior to admission, though now with reported brown stool. Rectal exam performed on admission revealed brown stool in rectal vault. Hgb 4.3 on admission, likely secondary to GIB.   - GI following, Dr. Arroyo recommendations appreciated   - colonoscopy found evidence of a small ulcerated mass in cecum concerning for malignancy, biopsy was done; pathology report confirms adenocarcinoma   - maintain active T/S, two large bore IVs  - s/p 3U of pRBCs and Hb continues to be >8    - c/w PPI IV BID    #Adenocarcinoma of ileocecal valve   Colonoscopy findings and path report as above. CT ab/pelvis showing mass but no signs of mets  -Heme/onc consulted. F/u recs   -Colorectal surgery. F/u recs   -CT chest IV contrast for complete staging to be done after cardiac cath  - Reassess surgery timing with Cardio Patient reported melanotic stool 3-4 days prior to admission, though now with reported brown stool. Rectal exam performed on admission revealed brown stool in rectal vault. Hgb 4.3 on admission, likely secondary to GIB.   - GI following, Dr. Arroyo recommendations appreciated   - colonoscopy found evidence of a small ulcerated mass in cecum concerning for malignancy, biopsy was done; pathology report confirms adenocarcinoma   - maintain active T/S, two large bore IVs  - s/p 3U of pRBCs and Hb continues to be >8    - c/w PPI IV BID    #Adenocarcinoma of ileocecal valve   Colonoscopy findings and path report as above. CT ab/pelvis showing mass but no signs of mets  -Heme/onc consulted. F/u recs   -Colorectal surgery. F/u recs   -CT chest IV contrast for complete staging to be done after cardiac cath  - Will need risk stratification through Cards prior to SAVR vs colon resection

## 2020-06-18 NOTE — PROGRESS NOTE ADULT - PROBLEM SELECTOR PLAN 6
H/o PAD w. bypass procedure, takes NOAC, ASA and labetalol at home  - C/w Nifedipine 90mg and Labetalol 200mg BID   - staples in R groin now removed by vascular  - Restart Xarelto, continue to hold Aspirin for now H/o PAD w. bypass procedure, takes NOAC, ASA and labetalol at home  - C/w Nifedipine 90mg and Labetalol 200mg BID   - staples in R groin now removed by vascular  - Hold Xarelto and Aspirin for now in setting of cath tomorrow. Will determine when it can be restarted, f/u w/ vascular surgery

## 2020-06-18 NOTE — CONSULT NOTE ADULT - PROVIDER SPECIALTY LIST ADULT
Heme/Onc
Neurology
Critical Care
Surgery
Vascular Surgery
Cardiology
Gastroenterology
Structural Heart

## 2020-06-18 NOTE — PROGRESS NOTE ADULT - PROBLEM SELECTOR PLAN 5
H/o CAD, takes NOAC, ASA and labetalol at home  - Per GI- Restart Xarelto, continue to hold Aspirin. Have discontinued Xarelto due to cardiac cath to be done tomorrow   - C/w Labetalol 200mg BID   - cards consulted, f/u recs. C/w labetalol and nifedipine. Start Lipitor 80mg PO QD  - will do cardiac cath tomorrow  - Reassess surgery timing with GI; consult for risk stratification H/o CAD, takes NOAC, ASA and labetalol at home  - Per GI- Restart Xarelto, continue to hold Aspirin. Have discontinued Xarelto due to cardiac cath to be done tomorrow   - C/w Labetalol 200mg BID   - cards consulted, f/u recs. C/w labetalol and nifedipine. Start Lipitor 80mg PO QD  - will do cardiac cath tomorrow to determine whether SAVR necessary prior to colon resection   - Reassess surgery timing with CT surgery and colorectal surgery team; f/u cards for risk stratification after cath

## 2020-06-18 NOTE — PROGRESS NOTE ADULT - ASSESSMENT
appreciate surgical consult  needs cardiology input   spoke to Dr Carpenter patient's Cardiologist who feels surgery is a risk we have to take if Cardiology agrees

## 2020-06-18 NOTE — PROGRESS NOTE ADULT - ASSESSMENT
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 CT abdomen from 6/16 without intra-abdominal metastasis. Echocardiogram found to have severe AS, for which structural heart is consulted.    Colon adenocarcinoma - thus far appears to be localized, though unclear exact size of lesion on C-scope, but cannot complete staging until surgical pathology available.   CT coronaries w/o pulmonary lesions. Surgery consulted for left sided hemicolectomy next week after cardiac optimization. CEA normal 1.6.  Anemia - likely SUZIE. CW Venofer 200 mg IV x 5 days.  Severe AS - pending cardiac cath and BAV ut received Xarelto on 6/27 so procedure was cancelled for today.      Seen and examined with .    [facility] primary team. 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 CT abdomen from 6/16 without intra-abdominal metastasis. Echocardiogram found to have severe AS, for which structural heart is consulted.    Colon adenocarcinoma - thus far appears to be localized, though unclear exact size of lesion on C-scope, but cannot complete staging until surgical pathology available.   CT coronaries w/o pulmonary lesions. Surgery consulted for left sided hemicolectomy next week after cardiac optimization. CEA normal 1.6.  Anemia - likely SUZIE. CW Venofer 200 mg IV x 5 days. Cherck reticulocyte count, LDH and haptoglobin to r/o hemolysis in setting of severe AS.  Severe AS - pending cardiac cath and BAV ut received Xarelto on 6/27 so procedure was cancelled for today.      Seen and examined with .    [facility] primary team.

## 2020-06-18 NOTE — PROGRESS NOTE ADULT - PROBLEM SELECTOR PLAN 7
Patient with history of HTN  - C/w Nifedipine 90mg qd and Labetalol 200mg BID     #Aortic Stenosis   Echo showing severe AS, mod MR, EF 64%  -Per pt's PMD Dr. Mcgrath pt has a known hx of AS  -Cardiology consulted, f/u recs  -CTS consulted- recommending CT heart TAVR protocol. Showed severe stenosis of LAD and OM2. Nonobstructive artery disease in remaining coronary segments. Patient with history of HTN  - C/w Nifedipine 90mg qd and Labetalol 200mg BID     #Aortic Stenosis   Echo showing severe AS, mod MR, EF 64%  -Per pt's PMD Dr. Mcgrath pt has a known hx of AS  -Cardiology consulted, f/u recs  -CTS consulted- CT heart TAVR protocol showed severe stenosis of LAD and OM2. Nonobstructive artery disease in remaining coronary segments.  -Cardiac cath tomorrow to assess whether SAVR needs to occur prior to colon resection

## 2020-06-19 ENCOUNTER — APPOINTMENT (OUTPATIENT)
Dept: CARDIOTHORACIC SURGERY | Facility: HOSPITAL | Age: 70
End: 2020-06-19

## 2020-06-19 LAB
ANION GAP SERPL CALC-SCNC: 10 MMOL/L — SIGNIFICANT CHANGE UP (ref 5–17)
APTT BLD: 32.3 SEC — SIGNIFICANT CHANGE UP (ref 27.5–36.3)
BLD GP AB SCN SERPL QL: NEGATIVE — SIGNIFICANT CHANGE UP
BUN SERPL-MCNC: 20 MG/DL — SIGNIFICANT CHANGE UP (ref 7–23)
CALCIUM SERPL-MCNC: 8.6 MG/DL — SIGNIFICANT CHANGE UP (ref 8.4–10.5)
CHLORIDE SERPL-SCNC: 102 MMOL/L — SIGNIFICANT CHANGE UP (ref 96–108)
CO2 SERPL-SCNC: 27 MMOL/L — SIGNIFICANT CHANGE UP (ref 22–31)
CREAT SERPL-MCNC: 1.04 MG/DL — SIGNIFICANT CHANGE UP (ref 0.5–1.3)
GLUCOSE BLDC GLUCOMTR-MCNC: 126 MG/DL — HIGH (ref 70–99)
GLUCOSE BLDC GLUCOMTR-MCNC: 130 MG/DL — HIGH (ref 70–99)
GLUCOSE BLDC GLUCOMTR-MCNC: 137 MG/DL — HIGH (ref 70–99)
GLUCOSE BLDC GLUCOMTR-MCNC: 194 MG/DL — HIGH (ref 70–99)
GLUCOSE SERPL-MCNC: 138 MG/DL — HIGH (ref 70–99)
HAPTOGLOB SERPL-MCNC: 265 MG/DL — HIGH (ref 34–200)
HCT VFR BLD CALC: 30.3 % — LOW (ref 39–50)
HGB BLD-MCNC: 9.6 G/DL — LOW (ref 13–17)
INR BLD: 1.1 — SIGNIFICANT CHANGE UP (ref 0.88–1.16)
LDH SERPL L TO P-CCNC: 161 U/L — SIGNIFICANT CHANGE UP (ref 50–242)
MAGNESIUM SERPL-MCNC: 2.2 MG/DL — SIGNIFICANT CHANGE UP (ref 1.6–2.6)
MCHC RBC-ENTMCNC: 28.1 PG — SIGNIFICANT CHANGE UP (ref 27–34)
MCHC RBC-ENTMCNC: 31.7 GM/DL — LOW (ref 32–36)
MCV RBC AUTO: 88.6 FL — SIGNIFICANT CHANGE UP (ref 80–100)
NRBC # BLD: 0 /100 WBCS — SIGNIFICANT CHANGE UP (ref 0–0)
PLATELET # BLD AUTO: 353 K/UL — SIGNIFICANT CHANGE UP (ref 150–400)
POTASSIUM SERPL-MCNC: 4.3 MMOL/L — SIGNIFICANT CHANGE UP (ref 3.5–5.3)
POTASSIUM SERPL-SCNC: 4.3 MMOL/L — SIGNIFICANT CHANGE UP (ref 3.5–5.3)
PROTHROM AB SERPL-ACNC: 12.6 SEC — SIGNIFICANT CHANGE UP (ref 10–12.9)
RBC # BLD: 3.42 M/UL — LOW (ref 4.2–5.8)
RBC # BLD: 3.42 M/UL — LOW (ref 4.2–5.8)
RBC # FLD: 15.3 % — HIGH (ref 10.3–14.5)
RETICS #: 133 K/UL — HIGH (ref 25–125)
RETICS/RBC NFR: 3.9 % — HIGH (ref 0.5–2.5)
RH IG SCN BLD-IMP: POSITIVE — SIGNIFICANT CHANGE UP
SODIUM SERPL-SCNC: 139 MMOL/L — SIGNIFICANT CHANGE UP (ref 135–145)
WBC # BLD: 10.33 K/UL — SIGNIFICANT CHANGE UP (ref 3.8–10.5)
WBC # FLD AUTO: 10.33 K/UL — SIGNIFICANT CHANGE UP (ref 3.8–10.5)

## 2020-06-19 PROCEDURE — 93458 L HRT ARTERY/VENTRICLE ANGIO: CPT | Mod: 26

## 2020-06-19 PROCEDURE — 99233 SBSQ HOSP IP/OBS HIGH 50: CPT | Mod: GC

## 2020-06-19 RX ORDER — HEPARIN SODIUM 5000 [USP'U]/ML
1100 INJECTION INTRAVENOUS; SUBCUTANEOUS
Qty: 25000 | Refills: 0 | Status: DISCONTINUED | OUTPATIENT
Start: 2020-06-19 | End: 2020-06-21

## 2020-06-19 RX ADMIN — ATORVASTATIN CALCIUM 80 MILLIGRAM(S): 80 TABLET, FILM COATED ORAL at 22:36

## 2020-06-19 RX ADMIN — Medication 2: at 22:36

## 2020-06-19 RX ADMIN — PANTOPRAZOLE SODIUM 40 MILLIGRAM(S): 20 TABLET, DELAYED RELEASE ORAL at 22:36

## 2020-06-19 RX ADMIN — Medication 200 MILLIGRAM(S): at 05:27

## 2020-06-19 RX ADMIN — IRON SUCROSE 110 MILLIGRAM(S): 20 INJECTION, SOLUTION INTRAVENOUS at 22:37

## 2020-06-19 RX ADMIN — PANTOPRAZOLE SODIUM 40 MILLIGRAM(S): 20 TABLET, DELAYED RELEASE ORAL at 05:27

## 2020-06-19 RX ADMIN — Medication 200 MILLIGRAM(S): at 22:36

## 2020-06-19 RX ADMIN — Medication 90 MILLIGRAM(S): at 05:27

## 2020-06-19 RX ADMIN — HEPARIN SODIUM 11 UNIT(S)/HR: 5000 INJECTION INTRAVENOUS; SUBCUTANEOUS at 23:12

## 2020-06-19 NOTE — PROGRESS NOTE ADULT - PROBLEM SELECTOR PLAN 8
F: none  E: replete K <4, Mg <2  N: DASH/TLC   GI Ppx: Protonix   DVT Ppx: Xarelto   Code status: FULL   Dispo: RMF    1) PCP Contacted on Admission: (Y/N) --> Name & Phone #: Dr. Priyank Mcgrath [phone]  2) Date of Contact with PCP: 6/16/20  3) PCP Contacted at Discharge: (Y/N, N/A)  4) Summary of Handoff Given to PCP:   5) Post-Discharge Appointment Date and Location: F: none  E: replete K <4, Mg <2  N: DASH/TLC   GI Ppx: Protonix   DVT Ppx: SCDs   Code status: FULL   Dispo: RMF    1) PCP Contacted on Admission: (Y/N) --> Name & Phone #: Dr. Priyank Mcgrath [phone]  2) Date of Contact with PCP: 6/16/20  3) PCP Contacted at Discharge: (Y/N, N/A)  4) Summary of Handoff Given to PCP:   5) Post-Discharge Appointment Date and Location:

## 2020-06-19 NOTE — PROGRESS NOTE ADULT - PROBLEM SELECTOR PLAN 6
H/o PAD w. bypass procedure, takes NOAC, ASA and labetalol at home  - C/w Nifedipine 90mg and Labetalol 200mg BID   - staples in R groin now removed by vascular  - Hold Xarelto and Aspirin for now in setting of cath tomorrow. Will determine when it can be restarted, f/u w/ vascular surgery H/o PAD w. bypass procedure, takes NOAC, ASA and labetalol at home  - C/w Nifedipine 90mg and Labetalol 200mg BID   - staples in R groin now removed by vascular  - Hold Xarelto and Aspirin for now in setting of cath tomorrow. Will determine when it can be restarted, f/u w/ vascular surgery  -B/l LE arterial duplex- patent femoral bypass grafts, b/l superficial femoral arteries occluded, atherosclerotic dz involving proximal arteries b/l    #JAVID   Likely contrast induced   -Continue to monitor, may need some IVF s/p cath

## 2020-06-19 NOTE — PROGRESS NOTE ADULT - PROBLEM SELECTOR PLAN 7
Patient with history of HTN  - C/w Nifedipine 90mg qd and Labetalol 200mg BID     #Aortic Stenosis   Echo showing severe AS, mod MR, EF 64%  -Per pt's PMD Dr. Mcgrath pt has a known hx of AS  -Cardiology consulted, f/u recs  -CTS consulted- CT heart TAVR protocol showed severe stenosis of LAD and OM2. Nonobstructive artery disease in remaining coronary segments.  -Cardiac cath tomorrow to assess whether SAVR needs to occur prior to colon resection Patient with history of HTN  - C/w Nifedipine 90mg qd and Labetalol 200mg BID     #Aortic Stenosis   Echo showing severe AS, mod MR, EF 64%  -Per pt's PMD Dr. Mcgrath pt has a known hx of AS  -Cardiology consulted, f/u recs  -CTS consulted- CT heart TAVR protocol showed severe stenosis of LAD and OM2. Nonobstructive artery disease in remaining coronary segments.  -Cardiac cath today to assess whether SAVR/TAVR needs to occur prior to colon resection

## 2020-06-19 NOTE — PROGRESS NOTE ADULT - SUBJECTIVE AND OBJECTIVE BOX
Pt seen and examined   no chest pain no sob  no bleeding    REVIEW OF SYSTEMS:  Constitutional: No fever, weight loss or fatigue  Cardiovascular: No chest pain, palpitations, dizziness or leg swelling  Gastrointestinal: No abdominal or epigastric pain. No nausea, vomiting or hematemesis; No diarrhea or constipation. No melena or hematochezia.  Skin: No itching, burning, rashes or lesions       MEDICATIONS:  MEDICATIONS  (STANDING):  atorvastatin 80 milliGRAM(s) Oral at bedtime  dextrose 5%. 1000 milliLiter(s) (50 mL/Hr) IV Continuous <Continuous>  dextrose 50% Injectable 12.5 Gram(s) IV Push once  dextrose 50% Injectable 25 Gram(s) IV Push once  dextrose 50% Injectable 25 Gram(s) IV Push once  insulin lispro (HumaLOG) corrective regimen sliding scale   SubCutaneous Before meals and at bedtime  iron sucrose IVPB 200 milliGRAM(s) IV Intermittent every 24 hours  labetalol 200 milliGRAM(s) Oral every 12 hours  NIFEdipine XL 90 milliGRAM(s) Oral daily  pantoprazole  Injectable 40 milliGRAM(s) IV Push every 12 hours    MEDICATIONS  (PRN):  dextrose 40% Gel 15 Gram(s) Oral once PRN Blood Glucose LESS THAN 70 milliGRAM(s)/deciliter  glucagon  Injectable 1 milliGRAM(s) IntraMuscular once PRN Glucose LESS THAN 70 milligrams/deciliter      Allergies    No Known Allergies    Intolerances        Vital Signs Last 24 Hrs  T(C): 37 (19 Jun 2020 08:59), Max: 37.2 (18 Jun 2020 15:26)  T(F): 98.6 (19 Jun 2020 08:59), Max: 98.9 (18 Jun 2020 15:26)  HR: 64 (19 Jun 2020 08:59) (63 - 78)  BP: 125/74 (19 Jun 2020 08:59) (111/61 - 153/71)  BP(mean): --  RR: 16 (19 Jun 2020 08:59) (16 - 20)  SpO2: 98% (19 Jun 2020 08:59) (96% - 99%)    06-18 @ 07:01  -  06-19 @ 07:00  --------------------------------------------------------  IN: 100 mL / OUT: 0 mL / NET: 100 mL        PHYSICAL EXAM:    General: Well developed; well nourished; in no acute distress  HEENT: MMM, conjunctiva and sclera clear  Gastrointestinal: Soft non-tender non-distended; Normal bowel sounds; No hepatosplenomegaly  Skin: Warm and dry. No obvious rash    LABS:      CBC Full  -  ( 19 Jun 2020 06:53 )  WBC Count : 10.33 K/uL  RBC Count : 3.42 M/uL  Hemoglobin : 9.6 g/dL  Hematocrit : 30.3 %  Platelet Count - Automated : 353 K/uL  Mean Cell Volume : 88.6 fl  Mean Cell Hemoglobin : 28.1 pg  Mean Cell Hemoglobin Concentration : 31.7 gm/dL  Auto Neutrophil # : x  Auto Lymphocyte # : x  Auto Monocyte # : x  Auto Eosinophil # : x  Auto Basophil # : x  Auto Neutrophil % : x  Auto Lymphocyte % : x  Auto Monocyte % : x  Auto Eosinophil % : x  Auto Basophil % : x    06-19    139  |  102  |  20  ----------------------------<  138<H>  4.3   |  27  |  1.04    Ca    8.6      19 Jun 2020 06:53  Phos  3.9     06-18  Mg     2.2     06-19      PT/INR - ( 19 Jun 2020 06:53 )   PT: 12.6 sec;   INR: 1.10          PTT - ( 19 Jun 2020 06:53 )  PTT:32.3 sec                  RADIOLOGY & ADDITIONAL STUDIES (The following images were personally reviewed):

## 2020-06-19 NOTE — PROGRESS NOTE ADULT - ATTENDING COMMENTS
Patient was seen and examined with the resident team today.  I agree with the above assessment and plan with the following exceptions/additions:     Briefly, this is a 70yo Belgian-speaking gentleman with a PMH of HTN, HLD, NIDDM2 (A1c 6.2%), CAD, severe aortic stenosis, PAD s/p right and left LE FEM-pop bypass (2016) and recent admission for occluded CFA-AK pop bypass s/p Jayme balloon embolectomy (on Xarelto and ASA, 3/2020) who p/w symptomatic anemia i/s/o hematochezia.  C-scope from 6/15 revealed a cecal mass c/f malignancy; adenocarcinoma per prelim path.  CTAP w/o mets.  Pre-op eval showing severe iliac disease and some c/f CAD.  Plan for LHC today to assess aortic stenosis and CAD.  Findings will need to be taken into consideration in order to sort out timing of right hemicolectomy with Colorectal Surgery as he is a high risk candidate.      -- LHC today  -- monitor renal function and Cr uptrending and will get contrast load today   -- c/w BB, CCB and statin  -- f/u final path from c-scope   -- will discuss with Vascular if would like to start Heparin, as cannot start ASA or Xarelto at this time   -- DVT PPx - SCDs for now  -- Dispo - TBD     America Cruz  [phone]

## 2020-06-19 NOTE — PROGRESS NOTE ADULT - SUBJECTIVE AND OBJECTIVE BOX
DAILY PROGRESS NOTE:    S: s/p cardiac cath today showing 1 vessel CAD, moderate AS    O:    Vital Signs Last 24 Hrs  T(C): 37 (19 Jun 2020 08:59), Max: 37 (19 Jun 2020 05:04)  T(F): 98.6 (19 Jun 2020 08:59), Max: 98.6 (19 Jun 2020 05:04)  HR: 64 (19 Jun 2020 08:59) (63 - 78)  BP: 125/74 (19 Jun 2020 08:59) (111/61 - 153/71)  BP(mean): --  RR: 16 (19 Jun 2020 08:59) (16 - 18)  SpO2: 98% (19 Jun 2020 08:59) (96% - 98%)    I&O's Detail    18 Jun 2020 07:01  -  19 Jun 2020 07:00  --------------------------------------------------------  IN:    IV PiggyBack: 100 mL  Total IN: 100 mL    OUT:  Total OUT: 0 mL    Total NET: 100 mL      Physical Exam:    Gen: NAD  Pulm: normal resp effort and excursion  Abd: soft, ND, NT  Ext: WWP    LABS:                        9.6    10.33 )-----------( 353      ( 19 Jun 2020 06:53 )             30.3     06-19    139  |  102  |  20  ----------------------------<  138<H>  4.3   |  27  |  1.04    Ca    8.6      19 Jun 2020 06:53  Phos  3.9     06-18  Mg     2.2     06-19      PT/INR - ( 19 Jun 2020 06:53 )   PT: 12.6 sec;   INR: 1.10          PTT - ( 19 Jun 2020 06:53 )  PTT:32.3 sec      RADIOLOGY & ADDITIONAL STUDIES:

## 2020-06-19 NOTE — PROGRESS NOTE ADULT - ASSESSMENT
Assessment: 70M PMHx HTN, DM, CAD, PAD s/p bilateral fem-pop bypasses (2016) s/p R embolectomy (3/2020), on Xarelto but may be noncompliant (found with empty pill bottles) was admitted last week with bloody diarrhea x 4 days, colonoscopy with "at least intramural adenocarcinoma", CT chest pending. CEA 1.6. Cardiac cath 6/19 shows 1 vessel CAD, moderate AS.    Recommendations:  - plan for right hemicolectomy likely Tuesday 6/23  - structural heart service recommends medical mgmt of CAD and cardiac anesthesia during surgery  - please obtain updated risk stratification from cardiology given cardiac cath findings  - surgery team 1c will follow

## 2020-06-19 NOTE — PROGRESS NOTE ADULT - ASSESSMENT
Assesment:  70y Male with a PMHx of HTN, HLD, PAD (s/p right and left LE FEM-pop bypass in 2016), CAD, and recent admission for occluded CFA-AK pop bypass s/p Jayme balloon embolectomy (on Xarelto and ASA 3/2020) presented to [facility] on 6/13 complaining of cool hands, weakness, dizziness, diarrhea with blood in stool for 4 days. Upon arrival to the ED the patient was found to be anemic with Hb 4.2 (received 3UPRBC). A stroke code was called for AM, CT head negative for acute findings. On 6/15 the patient underwent a colonoscopy which found diffuse diverticulosis, a small ulcerated mass in area of cecum (biopsies sent, suspicious for malignancy). A TTE preformed 6/15 found severe aortic stenosis with ANDRZEJ 0.9 cm2 and Structural Heart (Dr. Perez) was consulted.  Pt planned for cardiac catheterization 6/19 with Dr. Perez.     Plan:  Problem 1: Aortic stenosis  - Case discussed with Dr. Perez  - TTE 6/15: severe aortic stenosis with ANDRZEJ 0.9 cm2  - Plan for cardiac catheterization 6/19, pending results will discuss AS intervention       I have reviewed clinical labs tests and reports, radiology tests and reports, as well as old patient medical records, and discussed with the refering physician. Assesment:  70y Male with a PMHx of HTN, HLD, PAD (s/p right and left LE FEM-pop bypass in 2016), CAD, and recent admission for occluded CFA-AK pop bypass s/p Jayme balloon embolectomy (on Xarelto and ASA 3/2020) presented to [facility] on 6/13 complaining of cool hands, weakness, dizziness, diarrhea with blood in stool for 4 days. Upon arrival to the ED the patient was found to be anemic with Hb 4.2 (received 3UPRBC). A stroke code was called for AM, CT head negative for acute findings. On 6/15 the patient underwent a colonoscopy which found diffuse diverticulosis, a small ulcerated mass in area of cecum (biopsies sent, suspicious for malignancy). A TTE preformed 6/15 found severe aortic stenosis with ANDRZEJ 0.9 cm2 and Structural Heart (Dr. Perez) was consulted.  Pt planned for cardiac catheterization 6/19 with Dr. Perez.     Plan:  Problem 1: Aortic stenosis  - Case discussed with Dr. Perez  - TTE 6/15: severe aortic stenosis with ANDRZEJ 0.9 cm2  - Plan for cardiac catheterization 6/19, pending results will discuss AS intervention     **ADDENDUM 1630**  - Post cardiac cath, case discussed with Dr. Perez  - AS is moderate, pt has lesion mid LCx, may need PCI in the future  - Will need outpatient follow up for AS with Dr. Perez after colon resection  - No cardiac intervention at this time  - Structural heart to sign off, feel free to reconsult if indicated       I have reviewed clinical labs tests and reports, radiology tests and reports, as well as old patient medical records, and discussed with the referring physician.

## 2020-06-19 NOTE — PROGRESS NOTE ADULT - PROBLEM SELECTOR PLAN 5
H/o CAD, takes NOAC, ASA and labetalol at home  - Per GI- Restart Xarelto, continue to hold Aspirin. Have discontinued Xarelto due to cardiac cath to be done tomorrow   - C/w Labetalol 200mg BID   - cards consulted, f/u recs. C/w labetalol and nifedipine. Start Lipitor 80mg PO QD  - will do cardiac cath tomorrow to determine whether SAVR necessary prior to colon resection   - Reassess surgery timing with CT surgery and colorectal surgery team; f/u cards for risk stratification after cath H/o CAD, takes NOAC, ASA and labetalol at home  - Per GI- Restart Xarelto, continue to hold Aspirin. Have discontinued Xarelto due to cardiac cath to be done tomorrow   - C/w Labetalol 200mg BID   - cards consulted, f/u recs. C/w labetalol and nifedipine. Start Lipitor 80mg PO QD  - will do cardiac cath tomorrow to determine whether SAVR necessary prior to colon resection   - Reassess surgery timing with CT surgery and colorectal surgery team; f/u cards for risk stratification after cath  - Monitor BUN/Cr levels H/o CAD, takes NOAC, ASA and labetalol at home  - Continue to hold Xarelto and Aspirin for cardiac cath   - C/w Labetalol 200mg BID   - cards consulted, f/u recs. C/w labetalol, nifedipine, Lipitor 80mg PO QD  - will do cardiac cath today to determine whether SAVR/TAVR necessary prior to colon resection   - Reassess surgery timing with CT surgery and colorectal surgery team

## 2020-06-19 NOTE — PROGRESS NOTE ADULT - ASSESSMENT
69M PMH of HTN, DM, HLD, PAD, CAD, PAD s/p right and left LE FEM-pop bypass in (R in 2016), recent admission for occluded CFA-AK pop bypass s/p Jayme balloon embolectomy (on xarelto and aspirin (3/2020), presented with anemia, admitted for symptomatic anemia with a Hb of 4.2, now s/p 3U pRBCs and colonoscopy showing small ulcerated mass now confirmed to be adenocarcinoma that has likely not metastasized. Will obtain CT chest to complete staging at a later time. 69M PMH of HTN, DM, HLD, PAD, CAD, PAD s/p right and left LE FEM-pop bypass in (R in 2016), recent admission for occluded CFA-AK pop bypass s/p Jayme balloon embolectomy (on xarelto and aspirin (3/2020), presented with anemia, admitted for symptomatic anemia with a Hb of 4.2, now s/p 3U pRBCs and colonoscopy showing small ulcerated mass now confirmed to be adenocarcinoma that has likely not metastasized. Cardiac cath on 6/19 to assess AS and determine further plan regarding dates of colon resection and TAVR vs SAVR

## 2020-06-19 NOTE — PROGRESS NOTE ADULT - PROBLEM SELECTOR PLAN 2
Management as above, anemia likely represents anemia of chronic disease with superimposed GIB.   - c/w management as above  - goal Hgb > 8 given CAD  - now s/p 3U pRBCs  - CVA workup during admission (negative) likely more attributable to symptomatic anemia versus intracranial process (CT head and angio negative) Management as above, anemia likely represents anemia of chronic disease with superimposed GIB.   - c/w management as above  - goal Hgb > 8 given CAD  - now s/p 3U pRBCs  - CVA workup during admission (negative) likely more attributable to symptomatic anemia versus intracranial process (CT head and angio negative)  - given IV iron sucrose; resultant increase in RBC count

## 2020-06-19 NOTE — PROGRESS NOTE ADULT - PROBLEM SELECTOR PLAN 1
Patient reported melanotic stool 3-4 days prior to admission, though now with reported brown stool. Rectal exam performed on admission revealed brown stool in rectal vault. Hgb 4.3 on admission, likely secondary to GIB.   - GI following, Dr. Arroyo recommendations appreciated   - colonoscopy found evidence of a small ulcerated mass in cecum concerning for malignancy, biopsy was done; pathology report confirms adenocarcinoma   - maintain active T/S, two large bore IVs  - s/p 3U of pRBCs and Hb continues to be >8    - c/w PPI IV BID    #Adenocarcinoma of ileocecal valve   Colonoscopy findings and path report as above. CT ab/pelvis showing mass but no signs of mets  -Heme/onc consulted. F/u recs   -Colorectal surgery. F/u recs   -CT chest IV contrast for complete staging to be done after cardiac cath  - Will need risk stratification through Cards prior to SAVR vs colon resection Patient reported melanotic stool 3-4 days prior to admission, though now with reported brown stool. Rectal exam performed on admission revealed brown stool in rectal vault. Hgb 4.3 on admission, likely secondary to GIB.   - GI following, Dr. Arroyo recommendations appreciated   - colonoscopy found evidence of a small ulcerated mass in cecum concerning for malignancy, biopsy was done; pathology report confirms adenocarcinoma   - maintain active T/S, two large bore IVs  - s/p 3U of pRBCs and Hb continues to be >8    - c/w PPI IV BID    #Adenocarcinoma of ileocecal valve   Colonoscopy findings and path report as above. CT ab/pelvis showing mass but no signs of mets  -Heme/onc consulted. F/u recs   -Colorectal surgery. F/u recs   -Cards consulted for surgical risk stratification- high cardiac risk given severe AS

## 2020-06-19 NOTE — CHART NOTE - NSCHARTNOTEFT_GEN_A_CORE
Pt examined in the cath recovery area post left heart cardiac catheterization. Pt is stable and doing well. Denies pain. He has right radial band in place and left groin pressure dressing.  Will plan to remove R radial band 4 hours post procedure. Pt can then return to room on 4 [place].

## 2020-06-19 NOTE — PROGRESS NOTE ADULT - SUBJECTIVE AND OBJECTIVE BOX
OVERNIGHT EVENTS: none     SUBJECTIVE / INTERVAL HPI:   Patient assessed at bedside; in no acute distress.     VITAL SIGNS:   vitals and i&os    PHYSICAL EXAM:  Constitutional: WDWN resting comfortably in bed; NAD  HEENT: NC/AT, PERRL, EOMI, anicteric sclera  Neck: supple; no JVD  Respiratory: CTA B/L; no W/R/R, no retractions  Cardiac: +S1/S2; +crescendo-decrescendo murmur  Gastrointestinal: abdomen soft, NT, normal BSx4 quadrants  Extremities: WWP, no peripheral edema; no tenderness or erythema; pulses present   Neurologic: AAOx3 poor historian; no focal deficits    MEDICATIONS:   standing and prn   ALLERGIES:  LABS:   cbc, chem, pt/inr/ptt   CAPILLARY BLOOD GLUCOSE  RADIOLOGY & ADDITIONAL TESTS: Reviewed. OVERNIGHT EVENTS: none     SUBJECTIVE / INTERVAL HPI:   Received IV iron sucrose yesterday.   Had LE duplex completed to assess bypass patency. It showed that the bilateral superficial femoral arteries are occluded, and atherosclerotic disease involving the proximal arteries bilaterally.   Patient assessed at bedside; in no acute distress. Eager to go home.     VITAL SIGNS:   Vital Signs Last 24 Hrs  T(C): 37 (19 Jun 2020 08:59), Max: 37.2 (18 Jun 2020 15:26)  T(F): 98.6 (19 Jun 2020 08:59), Max: 98.9 (18 Jun 2020 15:26)  HR: 64 (19 Jun 2020 08:59) (63 - 78)  BP: 125/74 (19 Jun 2020 08:59) (111/61 - 153/71)  BP(mean): --  RR: 16 (19 Jun 2020 08:59) (16 - 20)  SpO2: 98% (19 Jun 2020 08:59) (96% - 99%)    I&O's Summary    18 Jun 2020 07:01  -  19 Jun 2020 07:00  --------------------------------------------------------  IN: 100 mL / OUT: 0 mL / NET: 100 mL      PHYSICAL EXAM:  Constitutional: WDWN resting comfortably in bed; NAD  HEENT: NC/AT, PERRL, EOMI, anicteric sclera  Neck: supple; no JVD  Respiratory: CTA B/L; no W/R/R, no retractions  Cardiac: +S1/S2; +crescendo-decrescendo murmur  Gastrointestinal: abdomen soft, NT, normal BSx4 quadrants  Extremities: WWP, no peripheral edema; no tenderness or erythema; pulses present   Neurologic: AAOx3 poor historian; no focal deficits    MEDICATIONS:   MEDICATIONS  (STANDING):  atorvastatin 80 milliGRAM(s) Oral at bedtime  dextrose 5%. 1000 milliLiter(s) (50 mL/Hr) IV Continuous <Continuous>  dextrose 50% Injectable 12.5 Gram(s) IV Push once  dextrose 50% Injectable 25 Gram(s) IV Push once  dextrose 50% Injectable 25 Gram(s) IV Push once  insulin lispro (HumaLOG) corrective regimen sliding scale   SubCutaneous Before meals and at bedtime  iron sucrose IVPB 200 milliGRAM(s) IV Intermittent every 24 hours  labetalol 200 milliGRAM(s) Oral every 12 hours  NIFEdipine XL 90 milliGRAM(s) Oral daily  pantoprazole  Injectable 40 milliGRAM(s) IV Push every 12 hours    MEDICATIONS  (PRN):  dextrose 40% Gel 15 Gram(s) Oral once PRN Blood Glucose LESS THAN 70 milliGRAM(s)/deciliter  glucagon  Injectable 1 milliGRAM(s) IntraMuscular once PRN Glucose LESS THAN 70 milligrams/deciliter      ALLERGIES:  Allergies    No Known Allergies    Intolerances      LABS:                         9.6    10.33 )-----------( 353      ( 19 Jun 2020 06:53 )             30.3     06-19    139  |  102  |  20  ----------------------------<  138<H>  4.3   |  27  |  1.04    Ca    8.6      19 Jun 2020 06:53  Phos  3.9     06-18  Mg     2.2     06-19      PT/INR - ( 19 Jun 2020 06:53 )   PT: 12.6 sec;   INR: 1.10          PTT - ( 19 Jun 2020 06:53 )  PTT:32.3 sec      CAPILLARY BLOOD GLUCOSE    POCT Blood Glucose.: 137 mg/dL (19 Jun 2020 08:10)      RADIOLOGY & ADDITIONAL TESTS: Reviewed. HOSPITAL COURSE:   69 [place]ovan-speaking M with PMH of HTN, DM (on Metformin), HLD, PAD, CAD, PAD s/p right and left LE FEM-pop bypass in (R in 2016), recent admission for occluded CFA-AK pop bypass s/p Jayme balloon embolectomy (on xarelto and aspirin (3/2020), presented with weakness/dizziness and admitted for symptomatic anemia with a Hb of 4.2, s/p 3U pRBCs. Hgb has been stable. Colonoscopy was performed on 06/15 and showed a small ulcerated mass in the cecum with surgical pathology positive for adenocarcinoma. No evidence of mets on CT chest/abd/pelvis. Echo with severe AS, s/p LHC with moderate AS. Cardiology consulted for pre-op risk stratification. In setting of moderate AS, patient can proceed with hemicolectomy with colorectal surgery prior to intervention on the aortic valve.       OVERNIGHT EVENTS: none     SUBJECTIVE / INTERVAL HPI:   Received IV iron sucrose yesterday.   Had LE duplex completed to assess bypass patency. It showed that the bilateral superficial femoral arteries are occluded, and atherosclerotic disease involving the proximal arteries bilaterally.   Patient assessed at bedside; in no acute distress. Eager to go home.     VITAL SIGNS:   Vital Signs Last 24 Hrs  T(C): 37 (19 Jun 2020 08:59), Max: 37.2 (18 Jun 2020 15:26)  T(F): 98.6 (19 Jun 2020 08:59), Max: 98.9 (18 Jun 2020 15:26)  HR: 64 (19 Jun 2020 08:59) (63 - 78)  BP: 125/74 (19 Jun 2020 08:59) (111/61 - 153/71)  BP(mean): --  RR: 16 (19 Jun 2020 08:59) (16 - 20)  SpO2: 98% (19 Jun 2020 08:59) (96% - 99%)    I&O's Summary    18 Jun 2020 07:01  -  19 Jun 2020 07:00  --------------------------------------------------------  IN: 100 mL / OUT: 0 mL / NET: 100 mL      PHYSICAL EXAM:  Constitutional: WDWN resting comfortably in bed; NAD  HEENT: NC/AT, PERRL, EOMI, anicteric sclera  Neck: supple; no JVD  Respiratory: CTA B/L; no W/R/R, no retractions  Cardiac: +S1/S2; +crescendo-decrescendo murmur  Gastrointestinal: abdomen soft, NT, normal BSx4 quadrants  Extremities: WWP, no peripheral edema; no tenderness or erythema; pulses present   Neurologic: AAOx3 poor historian; no focal deficits    MEDICATIONS:   MEDICATIONS  (STANDING):  atorvastatin 80 milliGRAM(s) Oral at bedtime  dextrose 5%. 1000 milliLiter(s) (50 mL/Hr) IV Continuous <Continuous>  dextrose 50% Injectable 12.5 Gram(s) IV Push once  dextrose 50% Injectable 25 Gram(s) IV Push once  dextrose 50% Injectable 25 Gram(s) IV Push once  insulin lispro (HumaLOG) corrective regimen sliding scale   SubCutaneous Before meals and at bedtime  iron sucrose IVPB 200 milliGRAM(s) IV Intermittent every 24 hours  labetalol 200 milliGRAM(s) Oral every 12 hours  NIFEdipine XL 90 milliGRAM(s) Oral daily  pantoprazole  Injectable 40 milliGRAM(s) IV Push every 12 hours    MEDICATIONS  (PRN):  dextrose 40% Gel 15 Gram(s) Oral once PRN Blood Glucose LESS THAN 70 milliGRAM(s)/deciliter  glucagon  Injectable 1 milliGRAM(s) IntraMuscular once PRN Glucose LESS THAN 70 milligrams/deciliter      ALLERGIES:  Allergies    No Known Allergies    Intolerances      LABS:                         9.6    10.33 )-----------( 353      ( 19 Jun 2020 06:53 )             30.3     06-19    139  |  102  |  20  ----------------------------<  138<H>  4.3   |  27  |  1.04    Ca    8.6      19 Jun 2020 06:53  Phos  3.9     06-18  Mg     2.2     06-19      PT/INR - ( 19 Jun 2020 06:53 )   PT: 12.6 sec;   INR: 1.10          PTT - ( 19 Jun 2020 06:53 )  PTT:32.3 sec      CAPILLARY BLOOD GLUCOSE    POCT Blood Glucose.: 137 mg/dL (19 Jun 2020 08:10)      RADIOLOGY & ADDITIONAL TESTS: Reviewed.

## 2020-06-19 NOTE — PROGRESS NOTE ADULT - SUBJECTIVE AND OBJECTIVE BOX
Patient discussed on morning rounds with Dr. Perez    Operation / Date: Cardiac catheterization planned for 6/19    HPI:  70y Male with a PMHx of HTN, HLD, PAD (s/p right and left LE FEM-pop bypass in 2016), CAD, and recent admission for occluded CFA-AK pop bypass s/p Jayme balloon embolectomy (on Xarelto and ASA 3/2020) presented to [facility] on 6/13/20 complaining of cool hands, weakness, dizziness, diarrhea with blood in stool for 4 days. Upon arrival to the ED the patient was found to be anemic with Hb 4.2 (received 3UPRBC). A stroke code was called for AMS, CT head negative for acute findings. On 6/15/20 the patient underwent a colonoscopy which found diffuse diverticulosis, a small ulcerated mass in area of cecum which was biopsied and turned out to be malignant.  Heme/Onc is involved and patient will likely need colon resection in the near future.  TTE on 6/15/20 found severe aortic stenosis with ANDRZEJ 0.9 cm2. Patient is planned for Cardiac Cath 6/19 with Dr. Perez. Pending cath, will discuss further intervention for AS.    Vital Signs Last 24 Hrs  T(C): 37 (19 Jun 2020 08:59), Max: 37.2 (18 Jun 2020 15:26)  T(F): 98.6 (19 Jun 2020 08:59), Max: 98.9 (18 Jun 2020 15:26)  HR: 64 (19 Jun 2020 08:59) (63 - 78)  BP: 125/74 (19 Jun 2020 08:59) (111/61 - 153/71)  BP(mean): --  RR: 16 (19 Jun 2020 08:59) (16 - 20)  SpO2: 98% (19 Jun 2020 08:59) (96% - 99%)  I&O's Detail    18 Jun 2020 07:01  -  19 Jun 2020 07:00  --------------------------------------------------------  IN:    IV PiggyBack: 100 mL  Total IN: 100 mL    OUT:  Total OUT: 0 mL    Total NET: 100 mL    CHEST TUBE:  No  ADELSO DRAIN:  No.  EPICARDIAL WIRES: No.  TIE DOWNS: No.  REYES: No.    Physical Exam  CONSTITUTIONAL: Well appearing in NAD assessed laying comfortably in bed   NEURO: No focal deficits noted, moving bilateral upper and lower extremities                    CV: RRR, no murmurs, rubs, gallops  RESPIRATORY: Clear to auscultation bilateral posterior lung fields, no wheezes, rales, rhonchi   GI: +BS, NT/ND  MUSKULOSKELETAL: No peripheral edema or calf tenderness. Full strength and ROM bilateral upper and lower extremities   INCISIONS: None      LABS:                        9.6    10.33 )-----------( 353      ( 19 Jun 2020 06:53 )             30.3       COUMADIN:  No    PT/INR - ( 19 Jun 2020 06:53 )   PT: 12.6 sec;   INR: 1.10          PTT - ( 19 Jun 2020 06:53 )  PTT:32.3 sec    06-19    139  |  102  |  20  ----------------------------<  138<H>  4.3   |  27  |  1.04    Ca    8.6      19 Jun 2020 06:53  Phos  3.9     06-18  Mg     2.2     06-19    MEDICATIONS  (STANDING):  atorvastatin 80 milliGRAM(s) Oral at bedtime  dextrose 5%. 1000 milliLiter(s) (50 mL/Hr) IV Continuous <Continuous>  dextrose 50% Injectable 12.5 Gram(s) IV Push once  dextrose 50% Injectable 25 Gram(s) IV Push once  dextrose 50% Injectable 25 Gram(s) IV Push once  insulin lispro (HumaLOG) corrective regimen sliding scale   SubCutaneous Before meals and at bedtime  iron sucrose IVPB 200 milliGRAM(s) IV Intermittent every 24 hours  labetalol 200 milliGRAM(s) Oral every 12 hours  NIFEdipine XL 90 milliGRAM(s) Oral daily  pantoprazole  Injectable 40 milliGRAM(s) IV Push every 12 hours    MEDICATIONS  (PRN):  dextrose 40% Gel 15 Gram(s) Oral once PRN Blood Glucose LESS THAN 70 milliGRAM(s)/deciliter  glucagon  Injectable 1 milliGRAM(s) IntraMuscular once PRN Glucose LESS THAN 70 milligrams/deciliter    RADIOLOGY & ADDITIONAL TESTS:  < from: TTE Echo Complete w/o Contrast w/ Doppler (06.15.20 @ 15:46) >  CONCLUSIONS:     1. The aortic valve is thickened. There is probably severe aortic stenosis. The peak transvalvular velocity is 3.80 m/s, the mean transvalvular gradient is 29.00 mmHg, and the LVOT/AV velocity ratio is 0.23. The peak transaortic gradient is 57.76 mmHg. The aortic valve area (estimated via the continuity method) is 0.9 cm². There is mild aortic regurgitation.   2. Normal left and right ventricular size and systolic function.   3. Structurally normal mitral valve with normal leaflet excursion. There is probably moderate eccentric mitral regurgitation.   4. No pericardial effusion.

## 2020-06-20 LAB
ANION GAP SERPL CALC-SCNC: 10 MMOL/L — SIGNIFICANT CHANGE UP (ref 5–17)
APTT BLD: 59.9 SEC — HIGH (ref 27.5–36.3)
APTT BLD: 63.5 SEC — HIGH (ref 27.5–36.3)
APTT BLD: 79 SEC — HIGH (ref 27.5–36.3)
BUN SERPL-MCNC: 18 MG/DL — SIGNIFICANT CHANGE UP (ref 7–23)
CALCIUM SERPL-MCNC: 8.8 MG/DL — SIGNIFICANT CHANGE UP (ref 8.4–10.5)
CHLORIDE SERPL-SCNC: 103 MMOL/L — SIGNIFICANT CHANGE UP (ref 96–108)
CO2 SERPL-SCNC: 26 MMOL/L — SIGNIFICANT CHANGE UP (ref 22–31)
CREAT SERPL-MCNC: 0.8 MG/DL — SIGNIFICANT CHANGE UP (ref 0.5–1.3)
GLUCOSE BLDC GLUCOMTR-MCNC: 126 MG/DL — HIGH (ref 70–99)
GLUCOSE BLDC GLUCOMTR-MCNC: 132 MG/DL — HIGH (ref 70–99)
GLUCOSE BLDC GLUCOMTR-MCNC: 136 MG/DL — HIGH (ref 70–99)
GLUCOSE BLDC GLUCOMTR-MCNC: 153 MG/DL — HIGH (ref 70–99)
GLUCOSE BLDC GLUCOMTR-MCNC: 175 MG/DL — HIGH (ref 70–99)
GLUCOSE SERPL-MCNC: 122 MG/DL — HIGH (ref 70–99)
HCT VFR BLD CALC: 31 % — LOW (ref 39–50)
HGB BLD-MCNC: 9.8 G/DL — LOW (ref 13–17)
MAGNESIUM SERPL-MCNC: 2.1 MG/DL — SIGNIFICANT CHANGE UP (ref 1.6–2.6)
MCHC RBC-ENTMCNC: 27.8 PG — SIGNIFICANT CHANGE UP (ref 27–34)
MCHC RBC-ENTMCNC: 31.6 GM/DL — LOW (ref 32–36)
MCV RBC AUTO: 88.1 FL — SIGNIFICANT CHANGE UP (ref 80–100)
NRBC # BLD: 0 /100 WBCS — SIGNIFICANT CHANGE UP (ref 0–0)
PLATELET # BLD AUTO: 390 K/UL — SIGNIFICANT CHANGE UP (ref 150–400)
POTASSIUM SERPL-MCNC: 4 MMOL/L — SIGNIFICANT CHANGE UP (ref 3.5–5.3)
POTASSIUM SERPL-SCNC: 4 MMOL/L — SIGNIFICANT CHANGE UP (ref 3.5–5.3)
RBC # BLD: 3.52 M/UL — LOW (ref 4.2–5.8)
RBC # FLD: 15.5 % — HIGH (ref 10.3–14.5)
SODIUM SERPL-SCNC: 139 MMOL/L — SIGNIFICANT CHANGE UP (ref 135–145)
WBC # BLD: 12.61 K/UL — HIGH (ref 3.8–10.5)
WBC # FLD AUTO: 12.61 K/UL — HIGH (ref 3.8–10.5)

## 2020-06-20 PROCEDURE — 99232 SBSQ HOSP IP/OBS MODERATE 35: CPT

## 2020-06-20 PROCEDURE — 99233 SBSQ HOSP IP/OBS HIGH 50: CPT | Mod: GC

## 2020-06-20 RX ADMIN — Medication 90 MILLIGRAM(S): at 06:27

## 2020-06-20 RX ADMIN — Medication 200 MILLIGRAM(S): at 11:56

## 2020-06-20 RX ADMIN — PANTOPRAZOLE SODIUM 40 MILLIGRAM(S): 20 TABLET, DELAYED RELEASE ORAL at 18:06

## 2020-06-20 RX ADMIN — ATORVASTATIN CALCIUM 80 MILLIGRAM(S): 80 TABLET, FILM COATED ORAL at 22:07

## 2020-06-20 RX ADMIN — Medication 200 MILLIGRAM(S): at 18:07

## 2020-06-20 RX ADMIN — Medication 2: at 18:07

## 2020-06-20 RX ADMIN — Medication 2: at 12:26

## 2020-06-20 RX ADMIN — PANTOPRAZOLE SODIUM 40 MILLIGRAM(S): 20 TABLET, DELAYED RELEASE ORAL at 11:56

## 2020-06-20 RX ADMIN — IRON SUCROSE 110 MILLIGRAM(S): 20 INJECTION, SOLUTION INTRAVENOUS at 22:07

## 2020-06-20 RX ADMIN — HEPARIN SODIUM 11 UNIT(S)/HR: 5000 INJECTION INTRAVENOUS; SUBCUTANEOUS at 22:15

## 2020-06-20 NOTE — PROGRESS NOTE ADULT - PROBLEM SELECTOR PLAN 7
Patient with history of HTN  - C/w Nifedipine 90mg qd and Labetalol 200mg BID     #Aortic Stenosis   Echo showing severe AS, mod MR, EF 64%  -Per pt's PMD Dr. Mcgrath pt has a known hx of AS  -Cardiology consulted, f/u recs  -CTS consulted- CT heart TAVR protocol showed severe stenosis of LAD and OM2. Nonobstructive artery disease in remaining coronary segments.  -Cardiac cath with mod AS, some circumflex dz as above

## 2020-06-20 NOTE — PROGRESS NOTE ADULT - PROBLEM SELECTOR PLAN 1
Patient reported melanotic stool 3-4 days prior to admission, though now with reported brown stool. Rectal exam performed on admission revealed brown stool in rectal vault. Hgb 4.3 on admission, likely secondary to GIB.   - GI following, Dr. Arroyo recommendations appreciated   - colonoscopy found evidence of a small ulcerated mass in cecum concerning for malignancy, biopsy was done; pathology report confirms adenocarcinoma   - maintain active T/S, two large bore IVs  - s/p 3U of pRBCs and Hb continues to be >8    - c/w PPI IV BID    #Adenocarcinoma of ileocecal valve   Colonoscopy findings and path report as above. CT ab/pelvis showing mass but no signs of mets  -Heme/onc consulted. F/u recs   -Colorectal surgery. F/u recs. will likely go for right hemicolectomy on 6/23  -Cards consulted for surgical risk stratification- high cardiac risk given severe AS

## 2020-06-20 NOTE — PROGRESS NOTE ADULT - PROBLEM SELECTOR PLAN 8
F: none  E: replete K <4, Mg <2  N: DASH/TLC   GI Ppx: Protonix   DVT Ppx: SCDs   Code status: FULL   Dispo: [facility]F    1) PCP Contacted on Admission: (Y/N) --> Name & Phone #: Dr. Priyank Mcgrath [phone]  2) Date of Contact with PCP: 6/16/20  3) PCP Contacted at Discharge: (Y/N, N/A)  4) Summary of Handoff Given to PCP:   5) Post-Discharge Appointment Date and Location:

## 2020-06-20 NOTE — PROGRESS NOTE ADULT - PROBLEM SELECTOR PLAN 4
Longstanding history of DM, on Metformin at home, though unclear if patient is compliant with meds at home.  - ISS and diabetic diet  - HbA1c likely inaccurate in setting of recent GIB and subsequent transfusion, therefore would not obtain this test at this time  - continue to monitor FSGs

## 2020-06-20 NOTE — PROGRESS NOTE ADULT - PROBLEM SELECTOR PLAN 6
H/o PAD w. bypass procedure, takes NOAC, ASA and labetalol at home  - C/w Nifedipine 90mg and Labetalol 200mg BID   - staples in R groin now removed by vascular  - vascular surgery consulted, f/u recs   -B/l LE arterial duplex- patent femoral bypass grafts, b/l superficial femoral arteries occluded, atherosclerotic dz involving proximal arteries b/l  -Started on Hep gtt @ 11cc/hr, monitor PTT     #JAVID   Likely contrast induced, resolving   -Continue to monitor

## 2020-06-20 NOTE — PROGRESS NOTE ADULT - PROBLEM SELECTOR PLAN 2
Management as above, anemia likely represents anemia of chronic disease with superimposed GIB.   - c/w management as above  - goal Hgb > 8 given CAD  - now s/p 3U pRBCs  - CVA workup during admission (negative) likely more attributable to symptomatic anemia versus intracranial process (CT head and angio negative)  - IV iron sucrose x 5 days

## 2020-06-20 NOTE — PROGRESS NOTE ADULT - PROBLEM SELECTOR PLAN 5
H/o CAD, takes NOAC, ASA and labetalol at home  - Started Hep gtt @ 11cc/hr, monitor PTT  - C/w Labetalol 200mg BID   - cards consulted, f/u recs. C/w labetalol, nifedipine, Lipitor 80mg PO QD  - cardiac cath on 6.19 with mod AS, some circumflex dz. Can hold off on AV procedure and proceed with right hemicolectomy

## 2020-06-20 NOTE — PROGRESS NOTE ADULT - SUBJECTIVE AND OBJECTIVE BOX
Pt seen and examined   no complaints  appetite good  s/p cardio workup    REVIEW OF SYSTEMS:  Constitutional: No fever, weight loss   Cardiovascular: No chest pain, palpitations, dizziness or leg swelling  Gastrointestinal: No abdominal or epigastric pain. No nausea, vomiting or hematemesis; No diarrhea  No melena   Skin: No itching, burning, rashes or lesions       MEDICATIONS:  MEDICATIONS  (STANDING):  atorvastatin 80 milliGRAM(s) Oral at bedtime  dextrose 5%. 1000 milliLiter(s) (50 mL/Hr) IV Continuous <Continuous>  dextrose 50% Injectable 12.5 Gram(s) IV Push once  dextrose 50% Injectable 25 Gram(s) IV Push once  dextrose 50% Injectable 25 Gram(s) IV Push once  heparin  Infusion 1100 Unit(s)/Hr (11 mL/Hr) IV Continuous <Continuous>  insulin lispro (HumaLOG) corrective regimen sliding scale   SubCutaneous Before meals and at bedtime  iron sucrose IVPB 200 milliGRAM(s) IV Intermittent every 24 hours  labetalol 200 milliGRAM(s) Oral every 12 hours  NIFEdipine XL 90 milliGRAM(s) Oral daily  pantoprazole  Injectable 40 milliGRAM(s) IV Push every 12 hours    MEDICATIONS  (PRN):  dextrose 40% Gel 15 Gram(s) Oral once PRN Blood Glucose LESS THAN 70 milliGRAM(s)/deciliter  glucagon  Injectable 1 milliGRAM(s) IntraMuscular once PRN Glucose LESS THAN 70 milligrams/deciliter      Allergies    No Known Allergies    Intolerances        Vital Signs Last 24 Hrs  T(C): 36.4 (20 Jun 2020 10:05), Max: 37.3 (19 Jun 2020 20:42)  T(F): 97.5 (20 Jun 2020 10:05), Max: 99.2 (19 Jun 2020 20:42)  HR: 60 (20 Jun 2020 10:05) (60 - 64)  BP: 135/65 (20 Jun 2020 10:05) (129/69 - 145/75)  BP(mean): --  RR: 18 (20 Jun 2020 10:05) (16 - 18)  SpO2: 94% (20 Jun 2020 10:05) (93% - 98%)      PHYSICAL EXAM:    General: Well developed; well nourished; in no acute distress  HEENT: MMM, conjunctiva and sclera clear  Gastrointestinal: Soft non-tender non-distended; Normal bowel sounds; No hepatosplenomegaly  Skin: Warm and dry. No obvious rash    LABS:      CBC Full  -  ( 20 Jun 2020 08:11 )  WBC Count : 12.61 K/uL  RBC Count : 3.52 M/uL  Hemoglobin : 9.8 g/dL  Hematocrit : 31.0 %  Platelet Count - Automated : 390 K/uL  Mean Cell Volume : 88.1 fl  Mean Cell Hemoglobin : 27.8 pg  Mean Cell Hemoglobin Concentration : 31.6 gm/dL  Auto Neutrophil # : x  Auto Lymphocyte # : x  Auto Monocyte # : x  Auto Eosinophil # : x  Auto Basophil # : x  Auto Neutrophil % : x  Auto Lymphocyte % : x  Auto Monocyte % : x  Auto Eosinophil % : x  Auto Basophil % : x    06-20    139  |  103  |  18  ----------------------------<  122<H>  4.0   |  26  |  0.80    Ca    8.8      20 Jun 2020 08:11  Mg     2.1     06-20      PT/INR - ( 19 Jun 2020 06:53 )   PT: 12.6 sec;   INR: 1.10          PTT - ( 20 Jun 2020 08:11 )  PTT:79.0 sec                  RADIOLOGY & ADDITIONAL STUDIES (The following images were personally reviewed):

## 2020-06-20 NOTE — PROGRESS NOTE ADULT - ASSESSMENT
69M PMH of HTN, DM, HLD, PAD, CAD, PAD s/p right and left LE FEM-pop bypass in (R in 2016), recent admission for occluded CFA-AK pop bypass s/p Jayme balloon embolectomy (on xarelto and aspirin (3/2020), presented with anemia, admitted for symptomatic anemia with a Hb of 4.2, now s/p 3U pRBCs and colonoscopy showing small ulcerated mass now confirmed to be adenocarcinoma that has likely not metastasized. Cardiac cath on 6/19 with mod AS, circumflex dz. Pt will likely go for right hemicolectomy on Tuesday 6/23 and possible AV procedure can occur at a later time.

## 2020-06-20 NOTE — PROGRESS NOTE ADULT - PROBLEM SELECTOR PLAN 3
Mildly elevated troponin 0.02, EKG with RBBB (known) ST depressions in I, II, V1-V6 (new); ST elevation in III. Likely demand ischemia in the setting of anemia and volume depletion.  - no active chest pain  - EKG now without ST depressions in I, II, V1-V6  - troponin trended to plateau   - troponin still increasing form 0.02 to 0.04, peaked at .05  - Cardiology consulted, f/u recs      #Leukocytosis  - WBC 14 on admission, could be stress response to GI bleed but unclear etiology.  Afebrile and no clear infectious source.  Patient has had chronically elevated WBC on prior admissions with baseline 13-17. WBCs 12, likely reactive in setting of cardiac cath yesterday   - trend CBC

## 2020-06-20 NOTE — PROGRESS NOTE ADULT - ASSESSMENT
68 Yo Male with Pmhx of Essential HTN, Type II DM, HLD, CAD, extensive PAD s/p right and left LE FEM-pop bypass c/b occluded CFA-AK pop bypass s/p balloon embolectomy on Xarelto and aspirin who presented with symptomatic acute blood loss anemia in context of GI mass, diagnosed with colonic adenocarcinoma with course Complicated by incendental finding of Severe Aortic Stenosis of TTE s/p R/LHC by structural with plan for Surgical Resection of Colonic mass by Surgery.    1) Pre-Op Clearance    2) Aortic Stenosis    3) CAD 68 Yo Male with Pmhx of Essential HTN, Type II DM, HLD, CAD, extensive PAD s/p right and left LE FEM-pop bypass c/b occluded CFA-AK pop bypass s/p balloon embolectomy on Xarelto and aspirin who presented with symptomatic acute blood loss anemia in context of GI mass, diagnosed with colonic adenocarcinoma with course Complicated by incendental finding of Severe Aortic Stenosis now s/p R/LHC by structural team with plan for Surgical Resection of Colonic mass by Surgery. Cardiology following for Pre-op Clearance and optimization    1) Pre-Op Clearance  -Patient with knwon Atherosclerotic Cardiovascular burden: single vessel CAD not intervened on, known PAD with recent intervention, HTN, HLD and previous smoking History  -Clinically he is not reporting Angina any angina and feels weel. Echo showed preserved EF and there was concerned over Severity of AS.  -Patient now s/p Right and Left heart Cath. RHC showing Moderate AS. No acute indication for BAV or intervention at this time prior to colonic mass resection surgery  -Patient RCRI class III with a 10% chance of CV death, MI or cardiac arrest at 30 days.  -Recommend continuing lipitor 80. If not contraindicated by vascular/Surgical team, would recommend Daily aspirin therapy  -Recommend continued blood pressure control with Labetolol 200 BID and Nifedipine 90. SBP currently at goal 120-140/80-90  -Patient with known PAD with recent Left LE Fem-Pop Bypass on outpatient Xarelto. Heparin initiated this hospitalization pre-Operatively. Recommend switching to BID lovenox for patient convenience and in setting of suspected malignancy, if OK with Primary and Surgical teams  -No further Cardiac workup is needed prior to proceeding with surgery    2) Moderate AS  -Patient with TTE showing severe aortic stenosis with a  peak transvalvular velocity of 3.80 m/s,  mean transvalvular gradient of 29.00 mmHg, and a LVOT/AV velocity ratio is 0.23. The peak transaortic gradient is 57.76 mmHg with a AOVA of 0.9 cm². There is mild aortic regurgitation.  -Right heart cath showing Moderate AS.  -Patient will need close outpatient follow up with structural heart team with echo surveillance.    All recommendations preliminary pending final attending attestation and/or Signature  Cardiology will continue to follow. Please call with any questions 70 Yo Male with Pmhx of Essential HTN, Type II DM, HLD, CAD, extensive PAD s/p right and left LE FEM-pop bypass c/b occluded CFA-AK pop bypass s/p balloon embolectomy on Xarelto and aspirin who presented with symptomatic acute blood loss anemia in context of GI mass, diagnosed with colonic adenocarcinoma with course Complicated by incendental finding of Severe Aortic Stenosis now s/p R/LHC by structural team with plan for Surgical Resection of Colonic mass by Surgery. Cardiology following for Pre-op Clearance and optimization    1) Pre-Op Clearance  -Patient with knwon Atherosclerotic Cardiovascular burden: single vessel CAD not intervened on, known PAD with recent intervention, HTN, HLD and previous smoking History  -Clinically he is not reporting Angina any angina and feels weel. Echo showed preserved EF and there was concerned over Severity of AS.  -Patient now s/p Right and Left heart Cath. RHC showing Moderate AS. No acute indication for BAV or intervention at this time prior to colonic mass resection surgery  -Patient RCRI class II with a 6.1% chance of CV death, MI or cardiac arrest at 30 days.  -Recommend continuing lipitor 80. If not contraindicated by vascular/Surgical team, would recommend Daily aspirin therapy  -Recommend continued blood pressure control with Labetolol 200 BID and Nifedipine 90. SBP currently at goal 120-140/80-90  -Patient with known PAD with recent Left LE Fem-Pop Bypass on outpatient Xarelto. Heparin initiated this hospitalization pre-Operatively. Recommend switching to BID lovenox for patient convenience and in setting of suspected malignancy, if OK with Primary and Surgical teams  -No further Cardiac workup is needed prior to proceeding with surgery    2) Moderate AS  -Patient with TTE showing severe aortic stenosis with a  peak transvalvular velocity of 3.80 m/s,  mean transvalvular gradient of 29.00 mmHg, and a LVOT/AV velocity ratio is 0.23. The peak transaortic gradient is 57.76 mmHg with a AOVA of 0.9 cm². There is mild aortic regurgitation.  -Right heart cath showing Moderate AS.  -Patient will need close outpatient follow up with structural heart team with echo surveillance.    All recommendations preliminary pending final attending attestation and/or Signature  Cardiology will continue to follow. Please call with any questions

## 2020-06-20 NOTE — PROGRESS NOTE ADULT - SUBJECTIVE AND OBJECTIVE BOX
iNTERVAL HPI: Patient underwent right and Left heart Cath with structural team yesterday. Founf to have moderate AS. Noted to have LCX lesion (Not intervened on)      PAST MEDICAL & SURGICAL HISTORY:  PAD (peripheral artery disease)  DM (diabetes mellitus)  CAD (coronary artery disease)  Essential hypertension: Hypertension  S/P femoral-femoral bypass surgery: left 2016  Elective surgery: right leg angiogram with bypass  july 2016  History of hernia repair: june 2014      Home Medications:  aspirin 81 mg oral delayed release tablet: 1 tab(s) orally once a day (17 Jun 2020 15:46)  azilsartan-chlorthalidone 40 mg-12.5 mg oral tablet: 1 tab(s) orally once a day (17 Jun 2020 15:46)  labetalol 200 mg oral tablet: 1 tab(s) orally 2 times a day (17 Jun 2020 15:46)  metFORMIN 500 mg oral tablet: 1  orally 2 times a day (17 Jun 2020 15:46)  Multiple Vitamins oral tablet: 1 tab(s) orally once a day (17 Jun 2020 15:46)  NIFEdipine 90 mg oral tablet, extended release: 1 tab(s) orally once a day (17 Jun 2020 15:46)  rivaroxaban 20 mg oral tablet: 1 tab(s) orally once a day (in the evening) (17 Jun 2020 15:46)      MEDICATIONS  (STANDING):  atorvastatin 80 milliGRAM(s) Oral at bedtime  dextrose 5%. 1000 milliLiter(s) (50 mL/Hr) IV Continuous <Continuous>  dextrose 50% Injectable 12.5 Gram(s) IV Push once  dextrose 50% Injectable 25 Gram(s) IV Push once  dextrose 50% Injectable 25 Gram(s) IV Push once  heparin  Infusion 1100 Unit(s)/Hr (11 mL/Hr) IV Continuous <Continuous>  insulin lispro (HumaLOG) corrective regimen sliding scale   SubCutaneous Before meals and at bedtime  iron sucrose IVPB 200 milliGRAM(s) IV Intermittent every 24 hours  labetalol 200 milliGRAM(s) Oral every 12 hours  NIFEdipine XL 90 milliGRAM(s) Oral daily  pantoprazole  Injectable 40 milliGRAM(s) IV Push every 12 hours    MEDICATIONS  (PRN):  dextrose 40% Gel 15 Gram(s) Oral once PRN Blood Glucose LESS THAN 70 milliGRAM(s)/deciliter  glucagon  Injectable 1 milliGRAM(s) IntraMuscular once PRN Glucose LESS THAN 70 milligrams/deciliter      .  VITAL SIGNS:  T(C): 36.7 (06-20-20 @ 05:57), Max: 37.3 (06-19-20 @ 20:42)  T(F): 98.1 (06-20-20 @ 05:57), Max: 99.2 (06-19-20 @ 20:42)  HR: 61 (06-20-20 @ 05:57) (61 - 64)  BP: 145/75 (06-20-20 @ 05:57) (125/74 - 145/75)  BP(mean): --  RR: 17 (06-20-20 @ 05:57) (16 - 17)  SpO2: 93% (06-20-20 @ 05:57) (93% - 98%)  Wt(kg): --    PHYSICAL EXAM: INCOMPLETE    Constitutional: WDWN resting comfortably in bed; NAD  Head: NC/AT  Eyes: PERRL, EOMI, anicteric sclera  ENT: no nasal discharge; uvula midline, no oropharyngeal erythema or exudates; MMM  Neck: supple; no JVD or thyromegaly  Respiratory: CTA B/L; no W/R/R, no retractions  Cardiac: +S1/S2; RRR; no M/R/G; PMI non-displaced  Gastrointestinal: soft, NT/ND; no rebound or guarding; +BSx4  Genitourinary: normal external genitalia  Back: spine midline, no bony tenderness or step-offs; no CVAT B/L  Extremities: WWP, no clubbing or cyanosis; no peripheral edema  Musculoskeletal: NROM x4; no joint swelling, tenderness or erythema  Vascular: 2+ radial, femoral, DP/PT pulses B/L  Dermatologic: skin warm, dry and intact; no rashes, wounds, or scars  Lymphatic: no submandibular or cervical LAD  Neurologic: AAOx3; CNII-XII grossly intact; no focal deficits  Psychiatric: affect and characteristics of appearance, verbalizations, behaviors are appropriate    .  LABS:                         9.6    10.33 )-----------( 353      ( 19 Jun 2020 06:53 )             30.3     06-19    139  |  102  |  20  ----------------------------<  138<H>  4.3   |  27  |  1.04    Ca    8.6      19 Jun 2020 06:53  Mg     2.2     06-19      PT/INR - ( 19 Jun 2020 06:53 )   PT: 12.6 sec;   INR: 1.10          PTT - ( 19 Jun 2020 06:53 )  PTT:32.3 sec              RADIOLOGY, EKG & ADDITIONAL TESTS: Reviewed.     < from: TTE Echo Complete w/o Contrast w/ Doppler (06.15.20 @ 15:46) >  CONCLUSIONS:     1. The aortic valve is thickened. There is probably severe aortic stenosis. The peak transvalvular velocity is 3.80 m/s, the mean transvalvular gradient is 29.00 mmHg, and the LVOT/AV velocity ratio is 0.23. The peak transaortic gradient is 57.76 mmHg. The aortic valve area (estimated via the continuity method) is 0.9 cm². There is mild aortic regurgitation.   2. Normal left and right ventricular size and systolic function.   3. Structurally normal mitral valve with normal leaflet excursion. There is probably moderate eccentric mitral regurgitation.   4. No pericardial effusion.    < end of copied text > iNTERVAL HPI: Patient underwent right and Left heart Cath with structural team yesterday. Founf to have moderate AS. Noted to have single vessel CAD with stenosis of OM1 branch, not intervened on. Patient seen and examined at bedside. Reports feeling well. Has a good appetite. Denies chest pain or palpitations. Has not been walking around much but states he feels great.       PAST MEDICAL & SURGICAL HISTORY:  PAD (peripheral artery disease)  DM (diabetes mellitus)  CAD (coronary artery disease)  Essential hypertension: Hypertension  S/P femoral-femoral bypass surgery: left 2016  Elective surgery: right leg angiogram with bypass  july 2016  History of hernia repair: june 2014      Home Medications:  aspirin 81 mg oral delayed release tablet: 1 tab(s) orally once a day (17 Jun 2020 15:46)  azilsartan-chlorthalidone 40 mg-12.5 mg oral tablet: 1 tab(s) orally once a day (17 Jun 2020 15:46)  labetalol 200 mg oral tablet: 1 tab(s) orally 2 times a day (17 Jun 2020 15:46)  metFORMIN 500 mg oral tablet: 1  orally 2 times a day (17 Jun 2020 15:46)  Multiple Vitamins oral tablet: 1 tab(s) orally once a day (17 Jun 2020 15:46)  NIFEdipine 90 mg oral tablet, extended release: 1 tab(s) orally once a day (17 Jun 2020 15:46)  rivaroxaban 20 mg oral tablet: 1 tab(s) orally once a day (in the evening) (17 Jun 2020 15:46)      MEDICATIONS  (STANDING):  atorvastatin 80 milliGRAM(s) Oral at bedtime  dextrose 5%. 1000 milliLiter(s) (50 mL/Hr) IV Continuous <Continuous>  dextrose 50% Injectable 12.5 Gram(s) IV Push once  dextrose 50% Injectable 25 Gram(s) IV Push once  dextrose 50% Injectable 25 Gram(s) IV Push once  heparin  Infusion 1100 Unit(s)/Hr (11 mL/Hr) IV Continuous <Continuous>  insulin lispro (HumaLOG) corrective regimen sliding scale   SubCutaneous Before meals and at bedtime  iron sucrose IVPB 200 milliGRAM(s) IV Intermittent every 24 hours  labetalol 200 milliGRAM(s) Oral every 12 hours  NIFEdipine XL 90 milliGRAM(s) Oral daily  pantoprazole  Injectable 40 milliGRAM(s) IV Push every 12 hours    MEDICATIONS  (PRN):  dextrose 40% Gel 15 Gram(s) Oral once PRN Blood Glucose LESS THAN 70 milliGRAM(s)/deciliter  glucagon  Injectable 1 milliGRAM(s) IntraMuscular once PRN Glucose LESS THAN 70 milligrams/deciliter      .  VITAL SIGNS:  T(C): 36.7 (06-20-20 @ 05:57), Max: 37.3 (06-19-20 @ 20:42)  T(F): 98.1 (06-20-20 @ 05:57), Max: 99.2 (06-19-20 @ 20:42)  HR: 61 (06-20-20 @ 05:57) (61 - 64)  BP: 145/75 (06-20-20 @ 05:57) (125/74 - 145/75)  BP(mean): --  RR: 17 (06-20-20 @ 05:57) (16 - 17)  SpO2: 93% (06-20-20 @ 05:57) (93% - 98%)  Wt(kg): --    PHYSICAL EXAM: INCOMPLETE    Constitutional: WDWN resting comfortably in bed; NAD  Head: NC/AT  ENT: MMM  Neck: supple; no JVD   Respiratory: CTA B/L; no W/R/R, no retractions  Cardiac: +S1/S2; RRR; 3/6 loud systolic murmur heard trough the pericardium  Gastrointestinal: soft, midlly distended, NT no rebound or guarding; +BS  Extremities: WWP, no clubbing or cyanosis; no peripheral edema  Vascular: 2+ radial, DP/PT pulses B/L  Neurologic: AAOx3; CNII-XII grossly intact; no focal deficits      .  LABS:                         9.6    10.33 )-----------( 353      ( 19 Jun 2020 06:53 )             30.3     06-19    139  |  102  |  20  ----------------------------<  138<H>  4.3   |  27  |  1.04    Ca    8.6      19 Jun 2020 06:53  Mg     2.2     06-19      PT/INR - ( 19 Jun 2020 06:53 )   PT: 12.6 sec;   INR: 1.10          PTT - ( 19 Jun 2020 06:53 )  PTT:32.3 sec              RADIOLOGY, EKG & ADDITIONAL TESTS: Reviewed.     < from: TTE Echo Complete w/o Contrast w/ Doppler (06.15.20 @ 15:46) >  CONCLUSIONS:     1. The aortic valve is thickened. There is probably severe aortic stenosis. The peak transvalvular velocity is 3.80 m/s, the mean transvalvular gradient is 29.00 mmHg, and the LVOT/AV velocity ratio is 0.23. The peak transaortic gradient is 57.76 mmHg. The aortic valve area (estimated via the continuity method) is 0.9 cm². There is mild aortic regurgitation.   2. Normal left and right ventricular size and systolic function.   3. Structurally normal mitral valve with normal leaflet excursion. There is probably moderate eccentric mitral regurgitation.   4. No pericardial effusion.    < end of copied text >

## 2020-06-20 NOTE — PROGRESS NOTE ADULT - SUBJECTIVE AND OBJECTIVE BOX
OVERNIGHT EVENTS: Heparin gtt started at 11cc/hr     SUBJECTIVE / INTERVAL HPI: Patient seen and examined at bedside. Patient resting in bed without complaints. He is aware that he will likely go for a colon resection on Tuesday. Denies fevers, chills, HA, chest pain, sob, nausea, vomiting, abdominal pain, diarrhea, constipation, dysuria.      VITAL SIGNS:  Vital Signs Last 24 Hrs  T(C): 36.7 (20 Jun 2020 05:57), Max: 37.3 (19 Jun 2020 20:42)  T(F): 98.1 (20 Jun 2020 05:57), Max: 99.2 (19 Jun 2020 20:42)  HR: 61 (20 Jun 2020 05:57) (61 - 64)  BP: 145/75 (20 Jun 2020 05:57) (129/69 - 145/75)  BP(mean): --  RR: 17 (20 Jun 2020 05:57) (16 - 17)  SpO2: 93% (20 Jun 2020 05:57) (93% - 98%)    PHYSICAL EXAM:    General: WDWN, resting comfortably in bed, NAD   HEENT: NC/AT; PERRL, anicteric sclera; MMM  Neck: supple  Cardiovascular: +S1/S2; RRR; 3/6 systolic murmur at RUSB  Respiratory: CTA B/L; no W/R/R  Gastrointestinal: soft, NT/ND; +BSx4  Extremities: WWP; no edema, clubbing or cyanosis  Vascular: 2+ radial, DP/PT pulses B/L  Neurological: AAOx3; no focal deficits    MEDICATIONS:  MEDICATIONS  (STANDING):  atorvastatin 80 milliGRAM(s) Oral at bedtime  dextrose 5%. 1000 milliLiter(s) (50 mL/Hr) IV Continuous <Continuous>  dextrose 50% Injectable 12.5 Gram(s) IV Push once  dextrose 50% Injectable 25 Gram(s) IV Push once  dextrose 50% Injectable 25 Gram(s) IV Push once  heparin  Infusion 1100 Unit(s)/Hr (11 mL/Hr) IV Continuous <Continuous>  insulin lispro (HumaLOG) corrective regimen sliding scale   SubCutaneous Before meals and at bedtime  iron sucrose IVPB 200 milliGRAM(s) IV Intermittent every 24 hours  labetalol 200 milliGRAM(s) Oral every 12 hours  NIFEdipine XL 90 milliGRAM(s) Oral daily  pantoprazole  Injectable 40 milliGRAM(s) IV Push every 12 hours    MEDICATIONS  (PRN):  dextrose 40% Gel 15 Gram(s) Oral once PRN Blood Glucose LESS THAN 70 milliGRAM(s)/deciliter  glucagon  Injectable 1 milliGRAM(s) IntraMuscular once PRN Glucose LESS THAN 70 milligrams/deciliter      ALLERGIES:  Allergies    No Known Allergies    Intolerances        LABS:                        9.8    12.61 )-----------( 390      ( 20 Jun 2020 08:11 )             31.0     06-20    139  |  103  |  18  ----------------------------<  122<H>  4.0   |  26  |  0.80    Ca    8.8      20 Jun 2020 08:11  Mg     2.1     06-20      PT/INR - ( 19 Jun 2020 06:53 )   PT: 12.6 sec;   INR: 1.10          PTT - ( 20 Jun 2020 08:11 )  PTT:79.0 sec    CAPILLARY BLOOD GLUCOSE      POCT Blood Glucose.: 132 mg/dL (20 Jun 2020 08:43)      RADIOLOGY & ADDITIONAL TESTS: Reviewed.

## 2020-06-21 LAB
ANION GAP SERPL CALC-SCNC: 13 MMOL/L — SIGNIFICANT CHANGE UP (ref 5–17)
APTT BLD: 101.8 SEC — HIGH (ref 27.5–36.3)
APTT BLD: 71.8 SEC — HIGH (ref 27.5–36.3)
APTT BLD: 94.2 SEC — HIGH (ref 27.5–36.3)
BUN SERPL-MCNC: 17 MG/DL — SIGNIFICANT CHANGE UP (ref 7–23)
CALCIUM SERPL-MCNC: 8.9 MG/DL — SIGNIFICANT CHANGE UP (ref 8.4–10.5)
CHLORIDE SERPL-SCNC: 105 MMOL/L — SIGNIFICANT CHANGE UP (ref 96–108)
CO2 SERPL-SCNC: 25 MMOL/L — SIGNIFICANT CHANGE UP (ref 22–31)
CREAT SERPL-MCNC: 1.18 MG/DL — SIGNIFICANT CHANGE UP (ref 0.5–1.3)
GLUCOSE BLDC GLUCOMTR-MCNC: 135 MG/DL — HIGH (ref 70–99)
GLUCOSE BLDC GLUCOMTR-MCNC: 136 MG/DL — HIGH (ref 70–99)
GLUCOSE BLDC GLUCOMTR-MCNC: 136 MG/DL — HIGH (ref 70–99)
GLUCOSE BLDC GLUCOMTR-MCNC: 188 MG/DL — HIGH (ref 70–99)
GLUCOSE SERPL-MCNC: 133 MG/DL — HIGH (ref 70–99)
HCT VFR BLD CALC: 31.5 % — LOW (ref 39–50)
HGB BLD-MCNC: 9.8 G/DL — LOW (ref 13–17)
MAGNESIUM SERPL-MCNC: 2 MG/DL — SIGNIFICANT CHANGE UP (ref 1.6–2.6)
MCHC RBC-ENTMCNC: 28.4 PG — SIGNIFICANT CHANGE UP (ref 27–34)
MCHC RBC-ENTMCNC: 31.1 GM/DL — LOW (ref 32–36)
MCV RBC AUTO: 91.3 FL — SIGNIFICANT CHANGE UP (ref 80–100)
NRBC # BLD: 0 /100 WBCS — SIGNIFICANT CHANGE UP (ref 0–0)
PLATELET # BLD AUTO: 395 K/UL — SIGNIFICANT CHANGE UP (ref 150–400)
POTASSIUM SERPL-MCNC: 4.7 MMOL/L — SIGNIFICANT CHANGE UP (ref 3.5–5.3)
POTASSIUM SERPL-SCNC: 4.7 MMOL/L — SIGNIFICANT CHANGE UP (ref 3.5–5.3)
RBC # BLD: 3.45 M/UL — LOW (ref 4.2–5.8)
RBC # FLD: 15.9 % — HIGH (ref 10.3–14.5)
SODIUM SERPL-SCNC: 143 MMOL/L — SIGNIFICANT CHANGE UP (ref 135–145)
WBC # BLD: 12.44 K/UL — HIGH (ref 3.8–10.5)
WBC # FLD AUTO: 12.44 K/UL — HIGH (ref 3.8–10.5)

## 2020-06-21 PROCEDURE — 99233 SBSQ HOSP IP/OBS HIGH 50: CPT

## 2020-06-21 RX ORDER — PANTOPRAZOLE SODIUM 20 MG/1
40 TABLET, DELAYED RELEASE ORAL
Refills: 0 | Status: DISCONTINUED | OUTPATIENT
Start: 2020-06-22 | End: 2020-06-23

## 2020-06-21 RX ORDER — HEPARIN SODIUM 5000 [USP'U]/ML
1000 INJECTION INTRAVENOUS; SUBCUTANEOUS
Qty: 25000 | Refills: 0 | Status: DISCONTINUED | OUTPATIENT
Start: 2020-06-21 | End: 2020-06-22

## 2020-06-21 RX ADMIN — Medication 90 MILLIGRAM(S): at 06:13

## 2020-06-21 RX ADMIN — Medication 2: at 12:05

## 2020-06-21 RX ADMIN — IRON SUCROSE 110 MILLIGRAM(S): 20 INJECTION, SOLUTION INTRAVENOUS at 21:47

## 2020-06-21 RX ADMIN — Medication 200 MILLIGRAM(S): at 06:13

## 2020-06-21 RX ADMIN — ATORVASTATIN CALCIUM 80 MILLIGRAM(S): 80 TABLET, FILM COATED ORAL at 21:46

## 2020-06-21 RX ADMIN — HEPARIN SODIUM 10 UNIT(S)/HR: 5000 INJECTION INTRAVENOUS; SUBCUTANEOUS at 10:14

## 2020-06-21 RX ADMIN — Medication 200 MILLIGRAM(S): at 18:50

## 2020-06-21 RX ADMIN — PANTOPRAZOLE SODIUM 40 MILLIGRAM(S): 20 TABLET, DELAYED RELEASE ORAL at 06:13

## 2020-06-21 NOTE — CHART NOTE - NSCHARTNOTEFT_GEN_A_CORE
Bilateral lower extremity arterial bypasses are patent. Resume aspirin and therapeutic anticoagulation as soon as cleared by primary team for PAD and bypass patency. Vascular surg will sign off, reconsult if needed, [phone].

## 2020-06-21 NOTE — PROGRESS NOTE ADULT - SUBJECTIVE AND OBJECTIVE BOX
Patient is a 70y old  Male who presents with a chief complaint of GIB (21 Jun 2020 14:08)      INTERVAL HPI/OVERNIGHT EVENTS:  No complaints. Awaiting OR on Tuesday. Tolerating PO.     Review of Systems: 12 point review of systems otherwise negative    MEDICATIONS  (STANDING):  atorvastatin 80 milliGRAM(s) Oral at bedtime  dextrose 5%. 1000 milliLiter(s) (50 mL/Hr) IV Continuous <Continuous>  dextrose 50% Injectable 12.5 Gram(s) IV Push once  dextrose 50% Injectable 25 Gram(s) IV Push once  dextrose 50% Injectable 25 Gram(s) IV Push once  heparin  Infusion 1000 Unit(s)/Hr (10 mL/Hr) IV Continuous <Continuous>  insulin lispro (HumaLOG) corrective regimen sliding scale   SubCutaneous Before meals and at bedtime  iron sucrose IVPB 200 milliGRAM(s) IV Intermittent every 24 hours  labetalol 200 milliGRAM(s) Oral every 12 hours  NIFEdipine XL 90 milliGRAM(s) Oral daily    MEDICATIONS  (PRN):  dextrose 40% Gel 15 Gram(s) Oral once PRN Blood Glucose LESS THAN 70 milliGRAM(s)/deciliter  glucagon  Injectable 1 milliGRAM(s) IntraMuscular once PRN Glucose LESS THAN 70 milligrams/deciliter      Allergies    No Known Allergies    Intolerances          Vital Signs Last 24 Hrs  T(C): 37.1 (21 Jun 2020 15:55), Max: 37.2 (21 Jun 2020 05:33)  T(F): 98.7 (21 Jun 2020 15:55), Max: 98.9 (21 Jun 2020 05:33)  HR: 59 (21 Jun 2020 15:55) (55 - 61)  BP: 150/70 (21 Jun 2020 15:55) (150/70 - 169/54)  BP(mean): --  RR: 18 (21 Jun 2020 15:55) (17 - 19)  SpO2: 97% (21 Jun 2020 15:55) (94% - 97%)  CAPILLARY BLOOD GLUCOSE      POCT Blood Glucose.: 136 mg/dL (21 Jun 2020 16:52)  POCT Blood Glucose.: 188 mg/dL (21 Jun 2020 11:57)  POCT Blood Glucose.: 136 mg/dL (21 Jun 2020 08:00)  POCT Blood Glucose.: 126 mg/dL (20 Jun 2020 22:10)  POCT Blood Glucose.: 136 mg/dL (20 Jun 2020 22:05)  POCT Blood Glucose.: 175 mg/dL (20 Jun 2020 17:42)      06-20 @ 07:01  -  06-21 @ 07:00  --------------------------------------------------------  IN: 231 mL / OUT: 0 mL / NET: 231 mL    06-21 @ 07:01  -  06-21 @ 17:14  --------------------------------------------------------  IN: 100 mL / OUT: 0 mL / NET: 100 mL        Physical Exam:    Daily     Daily   General:  Well appearing, NAD, not cachetic  HEENT:  Nonicteric, PERRLA  CV:  S1S2 ok  Lungs:  CTA B/L, no wheezes, rales, rhonchi  Abdomen:  Soft, non-tender, no distended, positive BS, no hepatosplenomegaly  Extremities:  2+ pulses, no c/c, no edema  Skin:  Warm and dry, no rashes  :  No soni  Neuro:  AAOx3, non-focal, CN II-XII grossly intact  No Restraints    LABS:                        9.8    12.44 )-----------( 395      ( 21 Jun 2020 05:58 )             31.5     06-21    143  |  105  |  17  ----------------------------<  133<H>  4.7   |  25  |  1.18    Ca    8.9      21 Jun 2020 05:58  Mg     2.0     06-21      PTT - ( 21 Jun 2020 12:29 )  PTT:94.2 sec        RADIOLOGY & ADDITIONAL TESTS:    -----------

## 2020-06-21 NOTE — PROGRESS NOTE ADULT - SUBJECTIVE AND OBJECTIVE BOX
Pt seen and examined   no complaints    REVIEW OF SYSTEMS:  Constitutional: No fever,   Cardiovascular: No chest pain, palpitations, dizziness or leg swelling  Gastrointestinal: No abdominal or epigastric pain. No nausea, vomiting or hematemesis; No diarrhea No BMs.  Skin: No itching, burning, rashes or lesions       MEDICATIONS:  MEDICATIONS  (STANDING):  atorvastatin 80 milliGRAM(s) Oral at bedtime  dextrose 5%. 1000 milliLiter(s) (50 mL/Hr) IV Continuous <Continuous>  dextrose 50% Injectable 12.5 Gram(s) IV Push once  dextrose 50% Injectable 25 Gram(s) IV Push once  dextrose 50% Injectable 25 Gram(s) IV Push once  heparin  Infusion 1000 Unit(s)/Hr (10 mL/Hr) IV Continuous <Continuous>  insulin lispro (HumaLOG) corrective regimen sliding scale   SubCutaneous Before meals and at bedtime  iron sucrose IVPB 200 milliGRAM(s) IV Intermittent every 24 hours  labetalol 200 milliGRAM(s) Oral every 12 hours  NIFEdipine XL 90 milliGRAM(s) Oral daily    MEDICATIONS  (PRN):  dextrose 40% Gel 15 Gram(s) Oral once PRN Blood Glucose LESS THAN 70 milliGRAM(s)/deciliter  glucagon  Injectable 1 milliGRAM(s) IntraMuscular once PRN Glucose LESS THAN 70 milligrams/deciliter      Allergies    No Known Allergies    Intolerances        Vital Signs Last 24 Hrs  T(C): 37 (21 Jun 2020 08:26), Max: 37.2 (21 Jun 2020 05:33)  T(F): 98.6 (21 Jun 2020 08:26), Max: 98.9 (21 Jun 2020 05:33)  HR: 55 (21 Jun 2020 08:26) (55 - 61)  BP: 150/71 (21 Jun 2020 08:26) (129/66 - 169/54)  BP(mean): --  RR: 17 (21 Jun 2020 08:26) (17 - 19)  SpO2: 94% (21 Jun 2020 08:26) (94% - 98%)    06-20 @ 07:01  -  06-21 @ 07:00  --------------------------------------------------------  IN: 231 mL / OUT: 0 mL / NET: 231 mL        PHYSICAL EXAM:    General: in no acute distress  HEENT: MMM, conjunctiva and sclera clear  Gastrointestinal: Soft non-tender non-distended; Normal bowel sounds; No hepatosplenomegaly  Skin: Warm and dry. No obvious rash    LABS:      CBC Full  -  ( 21 Jun 2020 05:58 )  WBC Count : 12.44 K/uL  RBC Count : 3.45 M/uL  Hemoglobin : 9.8 g/dL  Hematocrit : 31.5 %  Platelet Count - Automated : 395 K/uL  Mean Cell Volume : 91.3 fl  Mean Cell Hemoglobin : 28.4 pg  Mean Cell Hemoglobin Concentration : 31.1 gm/dL  Auto Neutrophil # : x  Auto Lymphocyte # : x  Auto Monocyte # : x  Auto Eosinophil # : x  Auto Basophil # : x  Auto Neutrophil % : x  Auto Lymphocyte % : x  Auto Monocyte % : x  Auto Eosinophil % : x  Auto Basophil % : x    06-21    143  |  105  |  17  ----------------------------<  133<H>  4.7   |  25  |  1.18    Ca    8.9      21 Jun 2020 05:58  Mg     2.0     06-21      PTT - ( 21 Jun 2020 12:29 )  PTT:94.2 sec                  RADIOLOGY & ADDITIONAL STUDIES (The following images were personally reviewed):

## 2020-06-21 NOTE — PROGRESS NOTE ADULT - ASSESSMENT
70 year old male with,     1. Blood loss anemia secondary to GIB: stable since transfusion, secondary to new diagnosis of colon cancer, continue to monitor H/H, c/w IV iron  2. AS/CAD: s/p cardiac cath, apprec Cards recs, optimized for procedure on Tuesday  3. HTN: c/w meds  4. PVD with recent embolectomy: apprec Vascular recs, c/w IV heparin for now

## 2020-06-22 ENCOUNTER — TRANSCRIPTION ENCOUNTER (OUTPATIENT)
Age: 70
End: 2020-06-22

## 2020-06-22 LAB
ANION GAP SERPL CALC-SCNC: 11 MMOL/L — SIGNIFICANT CHANGE UP (ref 5–17)
APTT BLD: 92 SEC — HIGH (ref 27.5–36.3)
BUN SERPL-MCNC: 16 MG/DL — SIGNIFICANT CHANGE UP (ref 7–23)
CALCIUM SERPL-MCNC: 9 MG/DL — SIGNIFICANT CHANGE UP (ref 8.4–10.5)
CHLORIDE SERPL-SCNC: 103 MMOL/L — SIGNIFICANT CHANGE UP (ref 96–108)
CO2 SERPL-SCNC: 26 MMOL/L — SIGNIFICANT CHANGE UP (ref 22–31)
CREAT SERPL-MCNC: 0.89 MG/DL — SIGNIFICANT CHANGE UP (ref 0.5–1.3)
GLUCOSE BLDC GLUCOMTR-MCNC: 121 MG/DL — HIGH (ref 70–99)
GLUCOSE BLDC GLUCOMTR-MCNC: 128 MG/DL — HIGH (ref 70–99)
GLUCOSE BLDC GLUCOMTR-MCNC: 165 MG/DL — HIGH (ref 70–99)
GLUCOSE SERPL-MCNC: 123 MG/DL — HIGH (ref 70–99)
HCT VFR BLD CALC: 30.5 % — LOW (ref 39–50)
HGB BLD-MCNC: 9.6 G/DL — LOW (ref 13–17)
MAGNESIUM SERPL-MCNC: 1.9 MG/DL — SIGNIFICANT CHANGE UP (ref 1.6–2.6)
MCHC RBC-ENTMCNC: 28.2 PG — SIGNIFICANT CHANGE UP (ref 27–34)
MCHC RBC-ENTMCNC: 31.5 GM/DL — LOW (ref 32–36)
MCV RBC AUTO: 89.7 FL — SIGNIFICANT CHANGE UP (ref 80–100)
NRBC # BLD: 0 /100 WBCS — SIGNIFICANT CHANGE UP (ref 0–0)
PLATELET # BLD AUTO: 398 K/UL — SIGNIFICANT CHANGE UP (ref 150–400)
POTASSIUM SERPL-MCNC: 3.6 MMOL/L — SIGNIFICANT CHANGE UP (ref 3.5–5.3)
POTASSIUM SERPL-SCNC: 3.6 MMOL/L — SIGNIFICANT CHANGE UP (ref 3.5–5.3)
RBC # BLD: 3.4 M/UL — LOW (ref 4.2–5.8)
RBC # FLD: 16.8 % — HIGH (ref 10.3–14.5)
SARS-COV-2 RNA SPEC QL NAA+PROBE: SIGNIFICANT CHANGE UP
SODIUM SERPL-SCNC: 140 MMOL/L — SIGNIFICANT CHANGE UP (ref 135–145)
WBC # BLD: 12.42 K/UL — HIGH (ref 3.8–10.5)
WBC # FLD AUTO: 12.42 K/UL — HIGH (ref 3.8–10.5)

## 2020-06-22 PROCEDURE — 99233 SBSQ HOSP IP/OBS HIGH 50: CPT | Mod: GC

## 2020-06-22 RX ORDER — HEPARIN SODIUM 5000 [USP'U]/ML
5000 INJECTION INTRAVENOUS; SUBCUTANEOUS EVERY 8 HOURS
Refills: 0 | Status: DISCONTINUED | OUTPATIENT
Start: 2020-06-22 | End: 2020-06-23

## 2020-06-22 RX ORDER — ASPIRIN/CALCIUM CARB/MAGNESIUM 324 MG
81 TABLET ORAL DAILY
Refills: 0 | Status: DISCONTINUED | OUTPATIENT
Start: 2020-06-22 | End: 2020-06-23

## 2020-06-22 RX ORDER — POLYETHYLENE GLYCOL 3350 17 G/17G
238 POWDER, FOR SOLUTION ORAL ONCE
Refills: 0 | Status: COMPLETED | OUTPATIENT
Start: 2020-06-22 | End: 2020-06-22

## 2020-06-22 RX ORDER — NEOMYCIN SULFATE 500 MG/1
1000 TABLET ORAL
Refills: 0 | Status: COMPLETED | OUTPATIENT
Start: 2020-06-22 | End: 2020-06-22

## 2020-06-22 RX ORDER — POTASSIUM CHLORIDE 20 MEQ
40 PACKET (EA) ORAL ONCE
Refills: 0 | Status: COMPLETED | OUTPATIENT
Start: 2020-06-22 | End: 2020-06-22

## 2020-06-22 RX ORDER — ACETAMINOPHEN 500 MG
1000 TABLET ORAL ONCE
Refills: 0 | Status: COMPLETED | OUTPATIENT
Start: 2020-06-23 | End: 2020-06-23

## 2020-06-22 RX ORDER — METRONIDAZOLE 500 MG
1000 TABLET ORAL
Refills: 0 | Status: COMPLETED | OUTPATIENT
Start: 2020-06-22 | End: 2020-06-22

## 2020-06-22 RX ORDER — CHLORHEXIDINE GLUCONATE 213 G/1000ML
1 SOLUTION TOPICAL ONCE
Refills: 0 | Status: COMPLETED | OUTPATIENT
Start: 2020-06-22 | End: 2020-06-22

## 2020-06-22 RX ORDER — GABAPENTIN 400 MG/1
600 CAPSULE ORAL ONCE
Refills: 0 | Status: COMPLETED | OUTPATIENT
Start: 2020-06-23 | End: 2020-06-23

## 2020-06-22 RX ORDER — ALVIMOPAN 12 MG/1
12 CAPSULE ORAL ONCE
Refills: 0 | Status: COMPLETED | OUTPATIENT
Start: 2020-06-23 | End: 2020-06-23

## 2020-06-22 RX ORDER — MAGNESIUM SULFATE 500 MG/ML
1 VIAL (ML) INJECTION ONCE
Refills: 0 | Status: COMPLETED | OUTPATIENT
Start: 2020-06-22 | End: 2020-06-22

## 2020-06-22 RX ADMIN — Medication 40 MILLIEQUIVALENT(S): at 09:21

## 2020-06-22 RX ADMIN — Medication 200 MILLIGRAM(S): at 06:49

## 2020-06-22 RX ADMIN — Medication 1000 MILLIGRAM(S): at 19:16

## 2020-06-22 RX ADMIN — PANTOPRAZOLE SODIUM 40 MILLIGRAM(S): 20 TABLET, DELAYED RELEASE ORAL at 06:49

## 2020-06-22 RX ADMIN — POLYETHYLENE GLYCOL 3350 238 GRAM(S): 17 POWDER, FOR SOLUTION ORAL at 13:30

## 2020-06-22 RX ADMIN — Medication 81 MILLIGRAM(S): at 09:21

## 2020-06-22 RX ADMIN — Medication 100 GRAM(S): at 09:21

## 2020-06-22 RX ADMIN — Medication 10 MILLIGRAM(S): at 09:21

## 2020-06-22 RX ADMIN — NEOMYCIN SULFATE 1000 MILLIGRAM(S): 500 TABLET ORAL at 19:16

## 2020-06-22 RX ADMIN — NEOMYCIN SULFATE 1000 MILLIGRAM(S): 500 TABLET ORAL at 23:20

## 2020-06-22 RX ADMIN — Medication 1000 MILLIGRAM(S): at 22:01

## 2020-06-22 RX ADMIN — ATORVASTATIN CALCIUM 80 MILLIGRAM(S): 80 TABLET, FILM COATED ORAL at 22:01

## 2020-06-22 RX ADMIN — Medication 2: at 12:13

## 2020-06-22 RX ADMIN — NEOMYCIN SULFATE 1000 MILLIGRAM(S): 500 TABLET ORAL at 15:45

## 2020-06-22 RX ADMIN — Medication 1000 MILLIGRAM(S): at 15:45

## 2020-06-22 RX ADMIN — CHLORHEXIDINE GLUCONATE 1 APPLICATION(S): 213 SOLUTION TOPICAL at 22:01

## 2020-06-22 RX ADMIN — Medication 10 MILLIGRAM(S): at 22:36

## 2020-06-22 RX ADMIN — Medication 10 MILLIGRAM(S): at 15:44

## 2020-06-22 RX ADMIN — Medication 90 MILLIGRAM(S): at 06:49

## 2020-06-22 NOTE — PROGRESS NOTE ADULT - NSHPATTENDINGPLANDISCUSS_GEN_ALL_CORE
resident team and patient.
the patient, Dr. Cruz, Dr. Perez and resident team.
the patient, and primary medical team
HS
resident team and patient.
resident team and patient.  Will call daughters as well.
resident team and patient.
as above
resident team and patient.

## 2020-06-22 NOTE — PROGRESS NOTE ADULT - PROBLEM SELECTOR PROBLEM 7
Essential hypertension
PAD (peripheral artery disease)
PAD (peripheral artery disease)
Essential hypertension

## 2020-06-22 NOTE — PROGRESS NOTE ADULT - PROBLEM SELECTOR PLAN 3
Mildly elevated troponin 0.02, EKG with RBBB (known) ST depressions in I, II, V1-V6 (new); ST elevation in III. Likely demand ischemia in the setting of anemia and volume depletion.  - no active chest pain  - EKG now without ST depressions in I, II, V1-V6  - troponin trended to plateau   - troponin still increasing form 0.02 to 0.04, peaked at .05  - Cardiology consulted, f/u recs      #Leukocytosis  - WBC 14 on admission, could be stress response to GI bleed but unclear etiology.  Afebrile and no clear infectious source.  Patient has had chronically elevated WBC on prior admissions with baseline 13-17. WBCs 12, likely reactive in setting of cardiac cath on 6/19  - trend CBC

## 2020-06-22 NOTE — PROGRESS NOTE ADULT - PROBLEM SELECTOR PLAN 8
F: none  E: replete K <4, Mg <2  N: CLD, NPO after MD    GI Ppx: Protonix   DVT Ppx: SCDs   Code status: FULL   Dispo: [facility]F    1) PCP Contacted on Admission: (Y/N) --> Name & Phone #: Dr. Priyank Mcgrath [phone]  2) Date of Contact with PCP: 6/16/20  3) PCP Contacted at Discharge: (Y/N, N/A)  4) Summary of Handoff Given to PCP:   5) Post-Discharge Appointment Date and Location:

## 2020-06-22 NOTE — PROGRESS NOTE ADULT - PROBLEM SELECTOR PLAN 5
H/o CAD, takes NOAC, ASA and labetalol at home  -DCed Hep gtt as pt going to OR tomorrow. Restarted home Aspirin. Will need to determine when home Xarelto can be restarted   - C/w Labetalol 200mg BID   - cards consulted, f/u recs. C/w labetalol, nifedipine, Lipitor 80mg PO QD  - cardiac cath on 6.19 with mod AS, some circumflex dz. Can hold off on AV procedure and proceed with right hemicolectomy tomorrow

## 2020-06-22 NOTE — PROGRESS NOTE ADULT - ATTENDING COMMENTS
Patient was seen and examined with the resident team today.  I agree with the above assessment and plan with the following exceptions/additions:     Briefly, this is a 68yo Estonian-speaking gentleman with a PMH of HTN, HLD, NIDDM2 (A1c 6.2%), CAD, severe aortic stenosis, PAD s/p right and left LE FEM-pop bypass (2016) and recent admission for occluded CFA-AK pop bypass s/p Jayme balloon embolectomy (on Xarelto and ASA, 3/2020) who p/w symptomatic anemia i/s/o hematochezia.  C-scope from 6/15 revealed a cecal mass c/f malignancy; adenocarcinoma per prelim path.  CTAP w/o mets.  Pre-op eval showing severe iliac disease and some c/f CAD.  Went for LHC on 6/19 that showed circumflex disease but no urgent need for intervention, as well as only mild-to-moderate AS, no need for urgent intervention.  Now waiting for R- hemicolectomy tomorrow.    -- start bowel prep this afternoon for OR tomorrow  -- c/w BB, CCB and statin  -- f/u final path from c-scope   -- DVT PPx - SCDs  -- Dispo - TBD     America Cruz  [phone]

## 2020-06-22 NOTE — CHART NOTE - NSCHARTNOTEFT_GEN_A_CORE
Admitting Diagnosis:   Patient is a 70y old  Male who presents with a chief complaint of GIB (22 Jun 2020 12:27)    PAST MEDICAL & SURGICAL HISTORY:  PAD (peripheral artery disease)  DM (diabetes mellitus)  CAD (coronary artery disease)  Essential hypertension: Hypertension  S/P femoral-femoral bypass surgery: left 2016  Elective surgery: right leg angiogram with bypass  july 2016  History of hernia repair: june 2014    Current Nutrition Order:  Ordered for clear liquids however regular tray seen in room at breakfast?  Per team, plan for NPO at midnight for right hemicolectomy 6/23    PO Intake: Good (%) [   ]  Fair (50-75%) [ x ] Poor (<25%) [   ]-50% tray eaten    GI Issues:   Pt denies N/V/D/C    Pain:  No pain noted at this time    Skin Integrity:  wdl    Labs:   06-22    140  |  103  |  16  ----------------------------<  123<H>  3.6   |  26  |  0.89    Ca    9.0      22 Jun 2020 07:47  Mg     1.9     06-22      CAPILLARY BLOOD GLUCOSE      POCT Blood Glucose.: 165 mg/dL (22 Jun 2020 11:50)  POCT Blood Glucose.: 128 mg/dL (22 Jun 2020 07:43)  POCT Blood Glucose.: 135 mg/dL (21 Jun 2020 21:44)  POCT Blood Glucose.: 136 mg/dL (21 Jun 2020 16:52)      Medications:  MEDICATIONS  (STANDING):  aspirin enteric coated 81 milliGRAM(s) Oral daily  atorvastatin 80 milliGRAM(s) Oral at bedtime  bisacodyl 10 milliGRAM(s) Oral at bedtime  chlorhexidine 4% Liquid 1 Application(s) Topical once  dextrose 5%. 1000 milliLiter(s) (50 mL/Hr) IV Continuous <Continuous>  dextrose 50% Injectable 12.5 Gram(s) IV Push once  dextrose 50% Injectable 25 Gram(s) IV Push once  dextrose 50% Injectable 25 Gram(s) IV Push once  insulin lispro (HumaLOG) corrective regimen sliding scale   SubCutaneous Before meals and at bedtime  labetalol 200 milliGRAM(s) Oral every 12 hours  metroNIDAZOLE    Tablet 1000 milliGRAM(s) Oral <User Schedule>  neomycin 1000 milliGRAM(s) Oral <User Schedule>  NIFEdipine XL 90 milliGRAM(s) Oral daily  pantoprazole    Tablet 40 milliGRAM(s) Oral before breakfast    MEDICATIONS  (PRN):  dextrose 40% Gel 15 Gram(s) Oral once PRN Blood Glucose LESS THAN 70 milliGRAM(s)/deciliter  glucagon  Injectable 1 milliGRAM(s) IntraMuscular once PRN Glucose LESS THAN 70 milligrams/deciliter    Admission Anthropometrics:  Height: 63" Weight: 142lbs, IBW 124lbs+/-10%, %%, BMI 25.2,  ABW used for calculations as pt between % of IBW, adjusted for age, adenocarcinoma    Weight:  6/13 142lbs  6/19 133lbs    Weight Change: JORGE wt hx 2/2 pt poor historian, 6/19 wt reflects 9lb, 6.3% wt loss x 1week    Estimated energy needs:   1513-1815kcal (25-30kcal/kg)  73-85g pro (1.2-1.4g/kg)  1815-2118ml (30-35ml/kg)    Subjective:   69M PMH of HTN, DM, HLD, PAD, CAD, PAD s/p right and left LE FEM-pop bypass in (R in 2016), recent admission for occluded CFA-AK pop bypass s/p Jayme balloon embolectomy (on xarelto and aspirin (3/2020), presented with anemia, admitted for symptomatic anemia with a Hb of 4.2, now s/p 3U pRBCs and colonoscopy showing small ulcerated mass now confirmed to be adenocarcinoma that has likely not metastasized. Cardiac cath on 6/19 with mod AS, circumflex dz. Per team, plan for right hemicolectomy on Tuesday 6/23. Pt seen in room, eating ~50% breakfast tray. Pt not meeting estimated needs since admission, 6/19 wt 133lbs reflects 9lb, 6.3% wt loss x 1 week. Suspect severe PCM in context of acute illness 2/2 wt loss, meeting </= 50% EER >5 days. Please see recommendations below. Will continue to follow per RD protocol.     NFPE-not pertinent    Previous Nutrition Diagnosis:  Increased nutrient needs RT increased demands AEB CA    Active [ x  ]  Resolved [   ]-malnutrition diagnosis pertinent    New Nutrition Diagnosis:  Suspect severe PCM RT acute illness AEB 9lb, 6.3% wt loss x 1 week, meeting </= 50% EER >5 days    Goal: 1. Meet >75% EER consistently     Recommendations:  1. Recommend NPO as medically appropriate. Following procedure 6/23 advance diet as tolerated to DASH/TLC +Ensure Enlive BID (each 350kcal/20gpro).   2. If pt not able to meet >50% EER via PO intake recommend consider EN to meet needs 2/2 not meeting needs since admission 6/13  3. Monitor lytes and replete prn.   4. Trend wts weekly.    Education: Deferred 2/2 pt status     Risk Level: High [ x  ] Moderate [   ] Low [   ]

## 2020-06-22 NOTE — PROGRESS NOTE ADULT - PROBLEM SELECTOR PLAN 6
H/o PAD w. bypass procedure, takes NOAC, ASA and labetalol at home  - C/w Nifedipine 90mg and Labetalol 200mg BID   - staples in R groin now removed by vascular  - vascular surgery consulted, f/u recs   -B/l LE arterial duplex- patent femoral bypass grafts, b/l superficial femoral arteries occluded, atherosclerotic dz involving proximal arteries b/l  -DCed Hep gtt, restarted on home Aspirin. Will need to determine when home Xarelto can be restarted     #JAVID   Likely contrast induced, resolved   -Continue to monitor

## 2020-06-22 NOTE — PROGRESS NOTE ADULT - SUBJECTIVE AND OBJECTIVE BOX
DAILY PROGRESS NOTE:    S: NAEO.    O:    Vital Signs Last 24 Hrs  T(C): 37.1 (22 Jun 2020 05:19), Max: 37.1 (21 Jun 2020 15:55)  T(F): 98.8 (22 Jun 2020 05:19), Max: 98.8 (22 Jun 2020 05:19)  HR: 56 (22 Jun 2020 05:19) (55 - 59)  BP: 163/76 (22 Jun 2020 05:19) (135/74 - 163/76)  BP(mean): --  RR: 19 (22 Jun 2020 05:19) (17 - 19)  SpO2: 95% (22 Jun 2020 05:19) (94% - 98%)    I&O's Detail    21 Jun 2020 07:01  -  22 Jun 2020 07:00  --------------------------------------------------------  IN:    heparin Infusion: 100 mL  Total IN: 100 mL    OUT:  Total OUT: 0 mL    Total NET: 100 mL          Physical Exam:    General: NAD  Pulm: normal resp effort and excursion  Abd: soft, NT/ND    LABS:                        9.8    12.44 )-----------( 395      ( 21 Jun 2020 05:58 )             31.5     06-21    143  |  105  |  17  ----------------------------<  133<H>  4.7   |  25  |  1.18    Ca    8.9      21 Jun 2020 05:58  Mg     2.0     06-21      PTT - ( 22 Jun 2020 00:25 )  PTT:92.0 sec      RADIOLOGY & ADDITIONAL STUDIES:

## 2020-06-22 NOTE — PROGRESS NOTE ADULT - ASSESSMENT
69M PMH of HTN, DM, HLD, PAD, CAD, PAD s/p right and left LE FEM-pop bypass in (R in 2016), recent admission for occluded CFA-AK pop bypass s/p Jayme balloon embolectomy (on xarelto and aspirin (3/2020), presented with anemia, admitted for symptomatic anemia with a Hb of 4.2, now s/p 3U pRBCs and colonoscopy showing small ulcerated mass now confirmed to be adenocarcinoma that has likely not metastasized. Cardiac cath on 6/19 with mod AS, circumflex dz. Pt will go for right hemicolectomy on Tuesday 6/23 and possible AV procedure can occur at a later time.

## 2020-06-22 NOTE — PROGRESS NOTE ADULT - PROBLEM SELECTOR PLAN 2
Management as above, anemia likely represents anemia of chronic disease with superimposed GIB.   - c/w management as above  - goal Hgb > 8 given CAD  - now s/p 3U pRBCs  - CVA workup during admission (negative) likely more attributable to symptomatic anemia versus intracranial process (CT head and angio negative)  - s/p IV iron sucrose x 5 days

## 2020-06-22 NOTE — PROGRESS NOTE ADULT - PROBLEM SELECTOR PLAN 1
Patient reported melanotic stool 3-4 days prior to admission, though now with reported brown stool. Rectal exam performed on admission revealed brown stool in rectal vault. Hgb 4.3 on admission, likely secondary to GIB.   - GI following, Dr. Arroyo recommendations appreciated   - colonoscopy found evidence of a small ulcerated mass in cecum concerning for malignancy, biopsy was done; pathology report confirms adenocarcinoma   - maintain active T/S, two large bore IVs  - s/p 3U of pRBCs and Hb continues to be >8    - c/w PPI IV BID    #Adenocarcinoma of ileocecal valve   Colonoscopy findings and path report as above. CT ab/pelvis showing mass but no signs of mets  -Heme/onc consulted. F/u recs   -Colorectal surgery. F/u recs. will go for right hemicolectomy on 6/23. NPO after MD. Bowel prep today   -Cards consulted for surgical risk stratification- high cardiac risk given severe AS

## 2020-06-22 NOTE — PROGRESS NOTE ADULT - PROBLEM SELECTOR PROBLEM 3
Troponin level elevated
R/O CVA (cerebral vascular accident)
R/O CVA (cerebral vascular accident)
Troponin level elevated

## 2020-06-22 NOTE — PROGRESS NOTE ADULT - SUBJECTIVE AND OBJECTIVE BOX
HOSPITAL COURSE:   69 Cymro-speaking M with PMH of HTN, DM (on Metformin), HLD, PAD, CAD, PAD s/p right and left LE FEM-pop bypass in (R in 2016), recent admission for occluded CFA-AK pop bypass s/p Jayme balloon embolectomy (on xarelto and aspirin (3/2020), presented with weakness/dizziness and admitted for symptomatic anemia with a Hb of 4.2, s/p 3U pRBCs. Hgb has been stable. Colonoscopy was performed on 06/15 and showed a small ulcerated mass in the cecum with surgical pathology positive for adenocarcinoma. No evidence of mets on CT chest/abd/pelvis. Echo with severe AS, s/p LHC with moderate AS. Cardiology consulted for pre-op risk stratification. In setting of moderate AS, patient can proceed with hemicolectomy with colorectal surgery prior to intervention on the aortic valve (f/u outpt). Restarted on aspirin and will need to determine when Xarelto can be restarted.     OVERNIGHT EVENTS: 10pm PTT therapeutic     SUBJECTIVE / INTERVAL HPI: Patient seen and examined at bedside. Patient resting in bed without complaints. Feels good and is ready for surgery tomorrow. Denies fevers, chills, HA, chest pain, sob, nausea, vomiting, abdominal pain, diarrhea, constipation, dysuria.      VITAL SIGNS:  Vital Signs Last 24 Hrs  T(C): 37 (22 Jun 2020 16:20), Max: 37.1 (22 Jun 2020 05:19)  T(F): 98.6 (22 Jun 2020 16:20), Max: 98.8 (22 Jun 2020 05:19)  HR: 51 (22 Jun 2020 16:20) (51 - 57)  BP: 162/83 (22 Jun 2020 16:20) (123/63 - 163/76)  BP(mean): --  RR: 19 (22 Jun 2020 10:40) (18 - 19)  SpO2: 95% (22 Jun 2020 10:40) (95% - 98%)    PHYSICAL EXAM:    General: WDWN, resting comfortably in bed, NAD   HEENT: NC/AT; PERRL, anicteric sclera; MMM  Neck: supple  Cardiovascular: +S1/S2; RRR; 3/6 systolic murmur at RUSB  Respiratory: CTA B/L; no W/R/R  Gastrointestinal: soft, NT/ND; +BSx4  Extremities: WWP; no edema, clubbing or cyanosis  Vascular: 2+ radial, DP/PT pulses B/L  Neurological: AAOx3; no focal deficits      MEDICATIONS:  MEDICATIONS  (STANDING):  aspirin enteric coated 81 milliGRAM(s) Oral daily  atorvastatin 80 milliGRAM(s) Oral at bedtime  bisacodyl 10 milliGRAM(s) Oral at bedtime  chlorhexidine 4% Liquid 1 Application(s) Topical once  dextrose 5%. 1000 milliLiter(s) (50 mL/Hr) IV Continuous <Continuous>  dextrose 50% Injectable 12.5 Gram(s) IV Push once  dextrose 50% Injectable 25 Gram(s) IV Push once  dextrose 50% Injectable 25 Gram(s) IV Push once  insulin lispro (HumaLOG) corrective regimen sliding scale   SubCutaneous Before meals and at bedtime  labetalol 200 milliGRAM(s) Oral every 12 hours  metroNIDAZOLE    Tablet 1000 milliGRAM(s) Oral <User Schedule>  neomycin 1000 milliGRAM(s) Oral <User Schedule>  NIFEdipine XL 90 milliGRAM(s) Oral daily  pantoprazole    Tablet 40 milliGRAM(s) Oral before breakfast    MEDICATIONS  (PRN):  dextrose 40% Gel 15 Gram(s) Oral once PRN Blood Glucose LESS THAN 70 milliGRAM(s)/deciliter  glucagon  Injectable 1 milliGRAM(s) IntraMuscular once PRN Glucose LESS THAN 70 milligrams/deciliter      ALLERGIES:  Allergies    No Known Allergies    Intolerances        LABS:                        9.6    12.42 )-----------( 398      ( 22 Jun 2020 07:47 )             30.5     06-22    140  |  103  |  16  ----------------------------<  123<H>  3.6   |  26  |  0.89    Ca    9.0      22 Jun 2020 07:47  Mg     1.9     06-22      PTT - ( 22 Jun 2020 00:25 )  PTT:92.0 sec    CAPILLARY BLOOD GLUCOSE      POCT Blood Glucose.: 165 mg/dL (22 Jun 2020 11:50)      RADIOLOGY & ADDITIONAL TESTS: Reviewed. HOSPITAL COURSE:   69 German-speaking M with PMH of HTN, DM (on Metformin), HLD, PAD, CAD, PAD s/p right and left LE FEM-pop bypass in (R in 2016), recent admission for occluded CFA-AK pop bypass s/p Jayme balloon embolectomy (on xarelto and aspirin (3/2020), presented with weakness/dizziness and admitted for symptomatic anemia with a Hb of 4.2, s/p 3U pRBCs. Hgb has been stable. Colonoscopy was performed on 06/15 and showed a small ulcerated mass in the cecum with surgical pathology positive for adenocarcinoma. No evidence of mets on CT chest/abd/pelvis. Echo with severe AS, s/p LHC with moderate AS. Cardiology consulted for pre-op risk stratification. In setting of moderate AS, patient can proceed with right hemicolectomy with colorectal surgery prior to intervention on the aortic valve (f/u outpt). Restarted on aspirin and will need to determine when Xarelto can be restarted.     OVERNIGHT EVENTS: 10pm PTT therapeutic     SUBJECTIVE / INTERVAL HPI: Patient seen and examined at bedside. Patient resting in bed without complaints. Feels good and is ready for surgery tomorrow. Denies fevers, chills, HA, chest pain, sob, nausea, vomiting, abdominal pain, diarrhea, constipation, dysuria.      VITAL SIGNS:  Vital Signs Last 24 Hrs  T(C): 37 (22 Jun 2020 16:20), Max: 37.1 (22 Jun 2020 05:19)  T(F): 98.6 (22 Jun 2020 16:20), Max: 98.8 (22 Jun 2020 05:19)  HR: 51 (22 Jun 2020 16:20) (51 - 57)  BP: 162/83 (22 Jun 2020 16:20) (123/63 - 163/76)  BP(mean): --  RR: 19 (22 Jun 2020 10:40) (18 - 19)  SpO2: 95% (22 Jun 2020 10:40) (95% - 98%)    PHYSICAL EXAM:    General: WDWN, resting comfortably in bed, NAD   HEENT: NC/AT; PERRL, anicteric sclera; MMM  Neck: supple  Cardiovascular: +S1/S2; RRR; 3/6 systolic murmur at RUSB  Respiratory: CTA B/L; no W/R/R  Gastrointestinal: soft, NT/ND; +BSx4  Extremities: WWP; no edema, clubbing or cyanosis  Vascular: 2+ radial, DP/PT pulses B/L  Neurological: AAOx3; no focal deficits      MEDICATIONS:  MEDICATIONS  (STANDING):  aspirin enteric coated 81 milliGRAM(s) Oral daily  atorvastatin 80 milliGRAM(s) Oral at bedtime  bisacodyl 10 milliGRAM(s) Oral at bedtime  chlorhexidine 4% Liquid 1 Application(s) Topical once  dextrose 5%. 1000 milliLiter(s) (50 mL/Hr) IV Continuous <Continuous>  dextrose 50% Injectable 12.5 Gram(s) IV Push once  dextrose 50% Injectable 25 Gram(s) IV Push once  dextrose 50% Injectable 25 Gram(s) IV Push once  insulin lispro (HumaLOG) corrective regimen sliding scale   SubCutaneous Before meals and at bedtime  labetalol 200 milliGRAM(s) Oral every 12 hours  metroNIDAZOLE    Tablet 1000 milliGRAM(s) Oral <User Schedule>  neomycin 1000 milliGRAM(s) Oral <User Schedule>  NIFEdipine XL 90 milliGRAM(s) Oral daily  pantoprazole    Tablet 40 milliGRAM(s) Oral before breakfast    MEDICATIONS  (PRN):  dextrose 40% Gel 15 Gram(s) Oral once PRN Blood Glucose LESS THAN 70 milliGRAM(s)/deciliter  glucagon  Injectable 1 milliGRAM(s) IntraMuscular once PRN Glucose LESS THAN 70 milligrams/deciliter      ALLERGIES:  Allergies    No Known Allergies    Intolerances        LABS:                        9.6    12.42 )-----------( 398      ( 22 Jun 2020 07:47 )             30.5     06-22    140  |  103  |  16  ----------------------------<  123<H>  3.6   |  26  |  0.89    Ca    9.0      22 Jun 2020 07:47  Mg     1.9     06-22      PTT - ( 22 Jun 2020 00:25 )  PTT:92.0 sec    CAPILLARY BLOOD GLUCOSE      POCT Blood Glucose.: 165 mg/dL (22 Jun 2020 11:50)      RADIOLOGY & ADDITIONAL TESTS: Reviewed.

## 2020-06-22 NOTE — PROGRESS NOTE ADULT - SUBJECTIVE AND OBJECTIVE BOX
Pt seen and examined   no complaints      REVIEW OF SYSTEMS:  Constitutional: No fever,  Cardiovascular: No chest pain, palpitations, dizziness or leg swelling  Gastrointestinal: No abdominal or epigastric pain. No nausea, no vomiting ; No diarrhea   Skin: No itching, burning, rashes or lesions       MEDICATIONS:  MEDICATIONS  (STANDING):  aspirin enteric coated 81 milliGRAM(s) Oral daily  atorvastatin 80 milliGRAM(s) Oral at bedtime  bisacodyl 10 milliGRAM(s) Oral at bedtime  chlorhexidine 4% Liquid 1 Application(s) Topical once  dextrose 5%. 1000 milliLiter(s) (50 mL/Hr) IV Continuous <Continuous>  dextrose 50% Injectable 12.5 Gram(s) IV Push once  dextrose 50% Injectable 25 Gram(s) IV Push once  dextrose 50% Injectable 25 Gram(s) IV Push once  insulin lispro (HumaLOG) corrective regimen sliding scale   SubCutaneous Before meals and at bedtime  labetalol 200 milliGRAM(s) Oral every 12 hours  metroNIDAZOLE    Tablet 1000 milliGRAM(s) Oral <User Schedule>  neomycin 1000 milliGRAM(s) Oral <User Schedule>  NIFEdipine XL 90 milliGRAM(s) Oral daily  pantoprazole    Tablet 40 milliGRAM(s) Oral before breakfast  polyethylene glycol 3350 238 Gram(s) Oral once    MEDICATIONS  (PRN):  dextrose 40% Gel 15 Gram(s) Oral once PRN Blood Glucose LESS THAN 70 milliGRAM(s)/deciliter  glucagon  Injectable 1 milliGRAM(s) IntraMuscular once PRN Glucose LESS THAN 70 milligrams/deciliter      Allergies    No Known Allergies    Intolerances        Vital Signs Last 24 Hrs  T(C): 36.7 (22 Jun 2020 10:40), Max: 37.1 (21 Jun 2020 15:55)  T(F): 98.1 (22 Jun 2020 10:40), Max: 98.8 (22 Jun 2020 05:19)  HR: 53 (22 Jun 2020 10:40) (53 - 59)  BP: 123/63 (22 Jun 2020 10:40) (123/63 - 163/76)  BP(mean): --  RR: 19 (22 Jun 2020 10:40) (18 - 19)  SpO2: 95% (22 Jun 2020 10:40) (95% - 98%)    06-21 @ 07:01  -  06-22 @ 07:00  --------------------------------------------------------  IN: 100 mL / OUT: 0 mL / NET: 100 mL        PHYSICAL EXAM:    General: Well developed; well nourished; in no acute distress  HEENT: MMM, conjunctiva and sclera clear  Gastrointestinal: Soft non-tender non-distended; Normal bowel sounds; No hepatosplenomegaly  Skin: Warm and dry. No obvious rash    LABS:      CBC Full  -  ( 22 Jun 2020 07:47 )  WBC Count : 12.42 K/uL  RBC Count : 3.40 M/uL  Hemoglobin : 9.6 g/dL  Hematocrit : 30.5 %  Platelet Count - Automated : 398 K/uL  Mean Cell Volume : 89.7 fl  Mean Cell Hemoglobin : 28.2 pg  Mean Cell Hemoglobin Concentration : 31.5 gm/dL  Auto Neutrophil # : x  Auto Lymphocyte # : x  Auto Monocyte # : x  Auto Eosinophil # : x  Auto Basophil # : x  Auto Neutrophil % : x  Auto Lymphocyte % : x  Auto Monocyte % : x  Auto Eosinophil % : x  Auto Basophil % : x    06-22    140  |  103  |  16  ----------------------------<  123<H>  3.6   |  26  |  0.89    Ca    9.0      22 Jun 2020 07:47  Mg     1.9     06-22      PTT - ( 22 Jun 2020 00:25 )  PTT:92.0 sec                  RADIOLOGY & ADDITIONAL STUDIES (The following images were personally reviewed):

## 2020-06-22 NOTE — PROGRESS NOTE ADULT - PROBLEM SELECTOR PROBLEM 5
CAD (coronary artery disease)
DM (diabetes mellitus)
DM (diabetes mellitus)
CAD (coronary artery disease)

## 2020-06-22 NOTE — CHART NOTE - NSCHARTNOTEFT_GEN_A_CORE
Upon Nutritional Assessment by the Registered Dietitian your patient was determined to meet criteria / has evidence of the following diagnosis/diagnoses:          [ ]  Mild Protein Calorie Malnutrition        [ ]  Moderate Protein Calorie Malnutrition        [x ] Severe Protein Calorie Malnutrition        [ ] Unspecified Protein Calorie Malnutrition        [ ] Underweight / BMI <19        [ ] Morbid Obesity / BMI > 40    Suspect severe PCM RT acute illness AEB 9lb, 6.3% wt loss x 1 week, meeting </= 50% EER >5 days    Findings as based on:  •  Comprehensive nutrition assessment and consultation  •  Calorie counts (nutrient intake analysis)  •  Food acceptance and intake status from observations by staff  •  Follow up  •  Patient education  •  Intervention secondary to interdisciplinary rounds  •   concerns      Treatment:    The following diet has been recommended:    1. Recommend NPO as medically appropriate. Following procedure 6/23 advance diet as tolerated to DASH/TLC +Ensure Enlive BID (each 350kcal/20gpro).   2. If pt not able to meet >50% EER via PO intake recommend consider EN to meet needs 2/2 not meeting needs since admission 6/13  3. Monitor lytes and replete prn.   4. Trend wts weekly.    PROVIDER Section:     By signing this assessment you are acknowledging and agree with the diagnosis/diagnoses assigned by the Registered Dietitian    Comments:

## 2020-06-22 NOTE — PROGRESS NOTE ADULT - PROBLEM SELECTOR PROBLEM 4
DM (diabetes mellitus)
Troponin level elevated
Troponin level elevated
DM (diabetes mellitus)

## 2020-06-22 NOTE — PROGRESS NOTE ADULT - PROBLEM SELECTOR PLAN 7
Patient with history of HTN  - C/w Nifedipine 90mg qd and Labetalol 200mg BID     #Aortic Stenosis   Echo showing severe AS, mod MR, EF 64%  -Per pt's PMD Dr. Mcgrath pt has a known hx of AS  -Cardiology consulted, f/u recs  -CTS consulted- CT heart TAVR protocol showed severe stenosis of LAD and OM2. Nonobstructive artery disease in remaining coronary segments.  -Cardiac cath with mod AS, some circumflex dz as above. Can f/u outpt

## 2020-06-22 NOTE — PROGRESS NOTE ADULT - ASSESSMENT
Assessment: 70M PMHx HTN, DM, CAD, PAD s/p bilateral fem-pop bypasses (2016) s/p R embolectomy (3/2020), on Xarelto but may be noncompliant (found with empty pill bottles) was admitted last week with bloody diarrhea x 4 days, colonoscopy with "at least intramural adenocarcinoma", CT chest pending. CEA 1.6. Cardiac cath 6/19 shows 1 vessel CAD, moderate AS.    Recommendations:  - plan for right hemicolectomy likely Tuesday 6/23, cleared by cards  - prep for surgery tomorrow ([facility] protocol) - bowel prep starting 1 PM, neomycin/flagyl 1 g 3, 7, and 11 PM, 2 dulcolax tabs at 10 AM and 4PM, CLD today, shower with chlorhexidine soap tonight and in AM  - NPO after midnight  - hold heparin today, start ASA81  - discussed w/ Dr. Acosta

## 2020-06-23 ENCOUNTER — RESULT REVIEW (OUTPATIENT)
Age: 70
End: 2020-06-23

## 2020-06-23 LAB
ANION GAP SERPL CALC-SCNC: 15 MMOL/L — SIGNIFICANT CHANGE UP (ref 5–17)
APPEARANCE UR: CLEAR — SIGNIFICANT CHANGE UP
BILIRUB UR-MCNC: ABNORMAL
BUN SERPL-MCNC: 13 MG/DL — SIGNIFICANT CHANGE UP (ref 7–23)
CALCIUM SERPL-MCNC: 8.8 MG/DL — SIGNIFICANT CHANGE UP (ref 8.4–10.5)
CHLORIDE SERPL-SCNC: 106 MMOL/L — SIGNIFICANT CHANGE UP (ref 96–108)
CO2 SERPL-SCNC: 19 MMOL/L — LOW (ref 22–31)
COLOR SPEC: YELLOW — SIGNIFICANT CHANGE UP
CREAT SERPL-MCNC: 0.93 MG/DL — SIGNIFICANT CHANGE UP (ref 0.5–1.3)
DIFF PNL FLD: NEGATIVE — SIGNIFICANT CHANGE UP
GLUCOSE BLDC GLUCOMTR-MCNC: 132 MG/DL — HIGH (ref 70–99)
GLUCOSE BLDC GLUCOMTR-MCNC: 140 MG/DL — HIGH (ref 70–99)
GLUCOSE BLDC GLUCOMTR-MCNC: 162 MG/DL — HIGH (ref 70–99)
GLUCOSE BLDC GLUCOMTR-MCNC: 172 MG/DL — HIGH (ref 70–99)
GLUCOSE BLDC GLUCOMTR-MCNC: 196 MG/DL — HIGH (ref 70–99)
GLUCOSE BLDC GLUCOMTR-MCNC: 215 MG/DL — HIGH (ref 70–99)
GLUCOSE SERPL-MCNC: 106 MG/DL — HIGH (ref 70–99)
GLUCOSE UR QL: NEGATIVE — SIGNIFICANT CHANGE UP
HCT VFR BLD CALC: 32.9 % — LOW (ref 39–50)
HGB BLD-MCNC: 10.4 G/DL — LOW (ref 13–17)
INR BLD: 1.07 — SIGNIFICANT CHANGE UP (ref 0.88–1.16)
KETONES UR-MCNC: ABNORMAL MG/DL
LEUKOCYTE ESTERASE UR-ACNC: ABNORMAL
MAGNESIUM SERPL-MCNC: 2 MG/DL — SIGNIFICANT CHANGE UP (ref 1.6–2.6)
MCHC RBC-ENTMCNC: 28.7 PG — SIGNIFICANT CHANGE UP (ref 27–34)
MCHC RBC-ENTMCNC: 31.6 GM/DL — LOW (ref 32–36)
MCV RBC AUTO: 90.6 FL — SIGNIFICANT CHANGE UP (ref 80–100)
NITRITE UR-MCNC: POSITIVE
NRBC # BLD: 0 /100 WBCS — SIGNIFICANT CHANGE UP (ref 0–0)
PH UR: 5 — SIGNIFICANT CHANGE UP (ref 5–8)
PHOSPHATE SERPL-MCNC: 3.8 MG/DL — SIGNIFICANT CHANGE UP (ref 2.5–4.5)
PLATELET # BLD AUTO: 345 K/UL — SIGNIFICANT CHANGE UP (ref 150–400)
POTASSIUM SERPL-MCNC: 4.2 MMOL/L — SIGNIFICANT CHANGE UP (ref 3.5–5.3)
POTASSIUM SERPL-SCNC: 4.2 MMOL/L — SIGNIFICANT CHANGE UP (ref 3.5–5.3)
PROT UR-MCNC: 30 MG/DL
PROTHROM AB SERPL-ACNC: 12.2 SEC — SIGNIFICANT CHANGE UP (ref 10–12.9)
RBC # BLD: 3.63 M/UL — LOW (ref 4.2–5.8)
RBC # FLD: 17.2 % — HIGH (ref 10.3–14.5)
SODIUM SERPL-SCNC: 140 MMOL/L — SIGNIFICANT CHANGE UP (ref 135–145)
SP GR SPEC: >=1.03 — SIGNIFICANT CHANGE UP (ref 1–1.03)
UROBILINOGEN FLD QL: 1 E.U./DL — SIGNIFICANT CHANGE UP
WBC # BLD: 11.98 K/UL — HIGH (ref 3.8–10.5)
WBC # FLD AUTO: 11.98 K/UL — HIGH (ref 3.8–10.5)

## 2020-06-23 PROCEDURE — 99232 SBSQ HOSP IP/OBS MODERATE 35: CPT

## 2020-06-23 PROCEDURE — 44160 REMOVAL OF COLON: CPT | Mod: GC

## 2020-06-23 PROCEDURE — 88309 TISSUE EXAM BY PATHOLOGIST: CPT | Mod: 26

## 2020-06-23 RX ORDER — DEXTROSE 50 % IN WATER 50 %
15 SYRINGE (ML) INTRAVENOUS ONCE
Refills: 0 | Status: DISCONTINUED | OUTPATIENT
Start: 2020-06-23 | End: 2020-06-29

## 2020-06-23 RX ORDER — HEPARIN SODIUM 5000 [USP'U]/ML
5000 INJECTION INTRAVENOUS; SUBCUTANEOUS ONCE
Refills: 0 | Status: COMPLETED | OUTPATIENT
Start: 2020-06-23 | End: 2020-06-23

## 2020-06-23 RX ORDER — ONDANSETRON 8 MG/1
4 TABLET, FILM COATED ORAL EVERY 4 HOURS
Refills: 0 | Status: DISCONTINUED | OUTPATIENT
Start: 2020-06-23 | End: 2020-06-29

## 2020-06-23 RX ORDER — GLUCAGON INJECTION, SOLUTION 0.5 MG/.1ML
1 INJECTION, SOLUTION SUBCUTANEOUS ONCE
Refills: 0 | Status: DISCONTINUED | OUTPATIENT
Start: 2020-06-23 | End: 2020-06-29

## 2020-06-23 RX ORDER — ATORVASTATIN CALCIUM 80 MG/1
80 TABLET, FILM COATED ORAL AT BEDTIME
Refills: 0 | Status: DISCONTINUED | OUTPATIENT
Start: 2020-06-23 | End: 2020-06-29

## 2020-06-23 RX ORDER — DEXTROSE 50 % IN WATER 50 %
25 SYRINGE (ML) INTRAVENOUS ONCE
Refills: 0 | Status: DISCONTINUED | OUTPATIENT
Start: 2020-06-23 | End: 2020-06-29

## 2020-06-23 RX ORDER — BUPIVACAINE 13.3 MG/ML
20 INJECTION, SUSPENSION, LIPOSOMAL INFILTRATION ONCE
Refills: 0 | Status: DISCONTINUED | OUTPATIENT
Start: 2020-06-23 | End: 2020-06-29

## 2020-06-23 RX ORDER — KETOROLAC TROMETHAMINE 30 MG/ML
15 SYRINGE (ML) INJECTION EVERY 6 HOURS
Refills: 0 | Status: DISCONTINUED | OUTPATIENT
Start: 2020-06-23 | End: 2020-06-26

## 2020-06-23 RX ORDER — LABETALOL HCL 100 MG
200 TABLET ORAL EVERY 12 HOURS
Refills: 0 | Status: DISCONTINUED | OUTPATIENT
Start: 2020-06-23 | End: 2020-06-29

## 2020-06-23 RX ORDER — PANTOPRAZOLE SODIUM 20 MG/1
40 TABLET, DELAYED RELEASE ORAL
Refills: 0 | Status: DISCONTINUED | OUTPATIENT
Start: 2020-06-23 | End: 2020-06-29

## 2020-06-23 RX ORDER — HYDRALAZINE HCL 50 MG
5 TABLET ORAL ONCE
Refills: 0 | Status: COMPLETED | OUTPATIENT
Start: 2020-06-23 | End: 2020-06-23

## 2020-06-23 RX ORDER — ASPIRIN/CALCIUM CARB/MAGNESIUM 324 MG
81 TABLET ORAL DAILY
Refills: 0 | Status: DISCONTINUED | OUTPATIENT
Start: 2020-06-23 | End: 2020-06-29

## 2020-06-23 RX ORDER — HEPARIN SODIUM 5000 [USP'U]/ML
5000 INJECTION INTRAVENOUS; SUBCUTANEOUS EVERY 8 HOURS
Refills: 0 | Status: DISCONTINUED | OUTPATIENT
Start: 2020-06-23 | End: 2020-06-29

## 2020-06-23 RX ORDER — HYDROMORPHONE HYDROCHLORIDE 2 MG/ML
0.5 INJECTION INTRAMUSCULAR; INTRAVENOUS; SUBCUTANEOUS EVERY 4 HOURS
Refills: 0 | Status: DISCONTINUED | OUTPATIENT
Start: 2020-06-23 | End: 2020-06-29

## 2020-06-23 RX ORDER — SODIUM CHLORIDE 9 MG/ML
1000 INJECTION INTRAMUSCULAR; INTRAVENOUS; SUBCUTANEOUS
Refills: 0 | Status: DISCONTINUED | OUTPATIENT
Start: 2020-06-23 | End: 2020-06-23

## 2020-06-23 RX ORDER — SODIUM CHLORIDE 9 MG/ML
1000 INJECTION, SOLUTION INTRAVENOUS
Refills: 0 | Status: DISCONTINUED | OUTPATIENT
Start: 2020-06-23 | End: 2020-06-27

## 2020-06-23 RX ORDER — ALVIMOPAN 12 MG/1
12 CAPSULE ORAL
Refills: 0 | Status: DISCONTINUED | OUTPATIENT
Start: 2020-06-23 | End: 2020-06-29

## 2020-06-23 RX ORDER — DEXTROSE 50 % IN WATER 50 %
12.5 SYRINGE (ML) INTRAVENOUS ONCE
Refills: 0 | Status: DISCONTINUED | OUTPATIENT
Start: 2020-06-23 | End: 2020-06-29

## 2020-06-23 RX ORDER — SODIUM CHLORIDE 9 MG/ML
1000 INJECTION, SOLUTION INTRAVENOUS
Refills: 0 | Status: DISCONTINUED | OUTPATIENT
Start: 2020-06-23 | End: 2020-06-29

## 2020-06-23 RX ORDER — NIFEDIPINE 30 MG
90 TABLET, EXTENDED RELEASE 24 HR ORAL DAILY
Refills: 0 | Status: DISCONTINUED | OUTPATIENT
Start: 2020-06-23 | End: 2020-06-28

## 2020-06-23 RX ORDER — ACETAMINOPHEN 500 MG
1000 TABLET ORAL EVERY 6 HOURS
Refills: 0 | Status: DISCONTINUED | OUTPATIENT
Start: 2020-06-23 | End: 2020-06-29

## 2020-06-23 RX ORDER — INSULIN LISPRO 100/ML
VIAL (ML) SUBCUTANEOUS
Refills: 0 | Status: DISCONTINUED | OUTPATIENT
Start: 2020-06-23 | End: 2020-06-29

## 2020-06-23 RX ADMIN — Medication 2: at 10:15

## 2020-06-23 RX ADMIN — HEPARIN SODIUM 5000 UNIT(S): 5000 INJECTION INTRAVENOUS; SUBCUTANEOUS at 22:43

## 2020-06-23 RX ADMIN — Medication 4: at 18:47

## 2020-06-23 RX ADMIN — Medication 90 MILLIGRAM(S): at 18:11

## 2020-06-23 RX ADMIN — Medication 200 MILLIGRAM(S): at 06:33

## 2020-06-23 RX ADMIN — HEPARIN SODIUM 5000 UNIT(S): 5000 INJECTION INTRAVENOUS; SUBCUTANEOUS at 06:41

## 2020-06-23 RX ADMIN — ALVIMOPAN 12 MILLIGRAM(S): 12 CAPSULE ORAL at 18:11

## 2020-06-23 RX ADMIN — Medication 1000 MILLIGRAM(S): at 06:32

## 2020-06-23 RX ADMIN — Medication 200 MILLIGRAM(S): at 18:45

## 2020-06-23 RX ADMIN — Medication 2: at 22:43

## 2020-06-23 RX ADMIN — Medication 15 MILLIGRAM(S): at 17:38

## 2020-06-23 RX ADMIN — Medication 1000 MILLIGRAM(S): at 17:38

## 2020-06-23 RX ADMIN — Medication 81 MILLIGRAM(S): at 06:41

## 2020-06-23 RX ADMIN — HEPARIN SODIUM 5000 UNIT(S): 5000 INJECTION INTRAVENOUS; SUBCUTANEOUS at 17:37

## 2020-06-23 RX ADMIN — SODIUM CHLORIDE 75 MILLILITER(S): 9 INJECTION INTRAMUSCULAR; INTRAVENOUS; SUBCUTANEOUS at 01:05

## 2020-06-23 RX ADMIN — Medication 81 MILLIGRAM(S): at 12:08

## 2020-06-23 RX ADMIN — ALVIMOPAN 12 MILLIGRAM(S): 12 CAPSULE ORAL at 06:32

## 2020-06-23 RX ADMIN — PANTOPRAZOLE SODIUM 40 MILLIGRAM(S): 20 TABLET, DELAYED RELEASE ORAL at 06:32

## 2020-06-23 RX ADMIN — Medication 1000 MILLIGRAM(S): at 11:59

## 2020-06-23 RX ADMIN — Medication 15 MILLIGRAM(S): at 12:07

## 2020-06-23 RX ADMIN — HYDROMORPHONE HYDROCHLORIDE 0.5 MILLIGRAM(S): 2 INJECTION INTRAMUSCULAR; INTRAVENOUS; SUBCUTANEOUS at 21:14

## 2020-06-23 RX ADMIN — Medication 5 MILLIGRAM(S): at 17:37

## 2020-06-23 RX ADMIN — GABAPENTIN 600 MILLIGRAM(S): 400 CAPSULE ORAL at 06:32

## 2020-06-23 RX ADMIN — ATORVASTATIN CALCIUM 80 MILLIGRAM(S): 80 TABLET, FILM COATED ORAL at 22:43

## 2020-06-23 NOTE — PROGRESS NOTE ADULT - SUBJECTIVE AND OBJECTIVE BOX
Pt seen and examined   post op in telemetry  aox3  no c/o    REVIEW OF SYSTEMS:  Constitutional: No fever, weight loss or fatigue  Cardiovascular: No chest pain, palpitations, dizziness or leg swelling  Gastrointestinal: No abdominal or epigastric pain. No nausea, vomiting ; No diarrhea   Skin: No itching, burning, rashes or lesions       MEDICATIONS:  MEDICATIONS  (STANDING):  acetaminophen   Tablet .. 1000 milliGRAM(s) Oral every 6 hours  alvimopan 12 milliGRAM(s) Oral two times a day  aspirin  chewable 81 milliGRAM(s) Oral daily  atorvastatin 80 milliGRAM(s) Oral at bedtime  BUpivacaine liposome 1.3% Injectable (no eMAR) 20 milliLiter(s) Local Injection once  dextrose 5%. 1000 milliLiter(s) (50 mL/Hr) IV Continuous <Continuous>  dextrose 50% Injectable 12.5 Gram(s) IV Push once  dextrose 50% Injectable 25 Gram(s) IV Push once  dextrose 50% Injectable 25 Gram(s) IV Push once  heparin   Injectable 5000 Unit(s) SubCutaneous every 8 hours  hydrALAZINE Injectable 5 milliGRAM(s) IV Push once  insulin lispro (HumaLOG) corrective regimen sliding scale   SubCutaneous Before meals and at bedtime  ketorolac   Injectable 15 milliGRAM(s) IV Push every 6 hours  labetalol 200 milliGRAM(s) Oral every 12 hours  lactated ringers. 1000 milliLiter(s) (40 mL/Hr) IV Continuous <Continuous>  NIFEdipine XL 90 milliGRAM(s) Oral daily  pantoprazole    Tablet 40 milliGRAM(s) Oral before breakfast    MEDICATIONS  (PRN):  dextrose 40% Gel 15 Gram(s) Oral once PRN Blood Glucose LESS THAN 70 milliGRAM(s)/deciliter  glucagon  Injectable 1 milliGRAM(s) IntraMuscular once PRN Glucose LESS THAN 70 milligrams/deciliter  HYDROmorphone  Injectable 0.5 milliGRAM(s) IV Push every 4 hours PRN breakthrough pain  ondansetron Injectable 4 milliGRAM(s) IV Push every 4 hours PRN Nausea and/or Vomiting      Allergies    No Known Allergies    Intolerances        Vital Signs Last 24 Hrs  T(C): 36.1 (23 Jun 2020 13:00), Max: 36.9 (22 Jun 2020 18:54)  T(F): 97 (23 Jun 2020 13:00), Max: 98.4 (22 Jun 2020 18:54)  HR: 70 (23 Jun 2020 14:26) (48 - 70)  BP: 148/90 (23 Jun 2020 14:26) (148/90 - 183/86)  BP(mean): 112 (23 Jun 2020 14:26) (110 - 126)  RR: 18 (23 Jun 2020 14:26) (12 - 18)  SpO2: 98% (23 Jun 2020 14:26) (96% - 100%)    06-23 @ 07:01  -  06-23 @ 17:34  --------------------------------------------------------  IN: 80 mL / OUT: 45 mL / NET: 35 mL        PHYSICAL EXAM:    General: ; in no acute distress  HEENT: MMM, conjunctiva and sclera clear  Gastrointestinal: Soft non-tender sl-distended; mildline dressing  Skin: Warm and dry. No obvious rash    LABS:      CBC Full  -  ( 23 Jun 2020 06:55 )  WBC Count : 11.98 K/uL  RBC Count : 3.63 M/uL  Hemoglobin : 10.4 g/dL  Hematocrit : 32.9 %  Platelet Count - Automated : 345 K/uL  Mean Cell Volume : 90.6 fl  Mean Cell Hemoglobin : 28.7 pg  Mean Cell Hemoglobin Concentration : 31.6 gm/dL  Auto Neutrophil # : x  Auto Lymphocyte # : x  Auto Monocyte # : x  Auto Eosinophil # : x  Auto Basophil # : x  Auto Neutrophil % : x  Auto Lymphocyte % : x  Auto Monocyte % : x  Auto Eosinophil % : x  Auto Basophil % : x    06-23    140  |  106  |  13  ----------------------------<  106<H>  4.2   |  19<L>  |  0.93    Ca    8.8      23 Jun 2020 06:55  Phos  3.8     06-23  Mg     2.0     06-23      PT/INR - ( 23 Jun 2020 06:55 )   PT: 12.2 sec;   INR: 1.07          PTT - ( 22 Jun 2020 00:25 )  PTT:92.0 sec      Urinalysis Basic - ( 23 Jun 2020 06:25 )    Color: Yellow / Appearance: Clear / SG: >=1.030 / pH: x  Gluc: x / Ketone: Trace mg/dL  / Bili: Moderate / Urobili: 1.0 E.U./dL   Blood: x / Protein: 30 mg/dL / Nitrite: POSITIVE   Leuk Esterase: Small / RBC: < 5 /HPF / WBC 5-10 /HPF   Sq Epi: x / Non Sq Epi: 0-5 /HPF / Bacteria: Present /HPF                RADIOLOGY & ADDITIONAL STUDIES (The following images were personally reviewed):

## 2020-06-23 NOTE — PROGRESS NOTE ADULT - SUBJECTIVE AND OBJECTIVE BOX
Pre-Op Dx: RECTAL BLEEDING;ANEMIACONFUSION  Colon cancer  Rectal bleeding    Procedure: Right hemicolectomy      Surgeon: Dr. Espinoza    Patient was seen and examined at bedside. No complaints. Denies SOB, chest or abdominal pain.         T(C): 36.2 (06-23-20 @ 09:30), Max: 37 (06-22-20 @ 16:20)  HR: 48 (06-23-20 @ 12:30) (48 - 65)  BP: 179/82 (06-23-20 @ 12:30) (158/75 - 183/86)  RR: 12 (06-23-20 @ 12:30) (12 - 18)  SpO2: 100% (06-23-20 @ 12:30) (96% - 100%)  Vital Signs Last 24 Hrs  T(C): 36.2 (23 Jun 2020 09:30), Max: 37 (22 Jun 2020 16:20)  T(F): 97.2 (23 Jun 2020 09:30), Max: 98.6 (22 Jun 2020 16:20)  HR: 48 (23 Jun 2020 12:30) (48 - 65)  BP: 179/82 (23 Jun 2020 12:30) (158/75 - 183/86)  BP(mean): 118 (23 Jun 2020 12:30) (110 - 126)  RR: 12 (23 Jun 2020 12:30) (12 - 18)  SpO2: 100% (23 Jun 2020 12:30) (96% - 100%)    Physical Exam:  General: NAD, resting comfortably in bed  Pulmonary: Nonlabored breathing, no respiratory distress  Cardiovascular: NSR  Abdominal: soft, NT, mildly distended, no guarding, dressing CDI  Extremities: WWP, normal strength, calf soft b/l  Neuro: A/O x 3, CNs II-XII grossly intact, normal motor/sensation, no focal deficits        LABS:                        10.4   11.98 )-----------( 345      ( 23 Jun 2020 06:55 )             32.9     06-23    140  |  106  |  13  ----------------------------<  106<H>  4.2   |  19<L>  |  0.93    Ca    8.8      23 Jun 2020 06:55  Phos  3.8     06-23  Mg     2.0     06-23      PT/INR - ( 23 Jun 2020 06:55 )   PT: 12.2 sec;   INR: 1.07          PTT - ( 22 Jun 2020 00:25 )  PTT:92.0 sec  CAPILLARY BLOOD GLUCOSE      POCT Blood Glucose.: 172 mg/dL (23 Jun 2020 09:41)  POCT Blood Glucose.: 140 mg/dL (23 Jun 2020 06:28)  POCT Blood Glucose.: 132 mg/dL (22 Jun 2020 22:52)  POCT Blood Glucose.: 121 mg/dL (22 Jun 2020 18:32)      Urinalysis Basic - ( 23 Jun 2020 06:25 )    Color: Yellow / Appearance: Clear / SG: >=1.030 / pH: x  Gluc: x / Ketone: Trace mg/dL  / Bili: Moderate / Urobili: 1.0 E.U./dL   Blood: x / Protein: 30 mg/dL / Nitrite: POSITIVE   Leuk Esterase: Small / RBC: < 5 /HPF / WBC 5-10 /HPF   Sq Epi: x / Non Sq Epi: 0-5 /HPF / Bacteria: Present /HPF        Radiology and Additional Studies:    Assessment:70y Male s/p above procedure    Plan:  Pain/nausea control PRN  Home meds  Incentive spirometer/OOB/Ambulate  Vitals per protocol  IVF   Parr  AM labs

## 2020-06-23 NOTE — PRE-OP CHECKLIST - SELECT TESTS ORDERED
Urinalysis/CBC/Type and Screen/BMP/POCT Blood Glucose EKG/Type and Screen/CBC/BMP/Urinalysis/POCT Blood Glucose

## 2020-06-23 NOTE — BRIEF OPERATIVE NOTE - OPERATION/FINDINGS
Exploratory laparotomy via midline incision. Right colon and hepatic flexure mobilized from lateral to medial with visualization and protection of the duodenum and right ureter. Identification and ligation of the right branch of the middle colic, right colic and ileocolic vessels with ligasure cautery. Creation of a primary stapled side-to-side ileocolic anastomosis using Gorge technique. Hemostasis achieved. Specimen opened on the back table and 3cm sessile lesion appreciated at the base of the cecum. Colorectal bundle closing tray used for abdominal wall closure with #1 looped PDS x 2. Skin closed with staples.

## 2020-06-24 LAB
ANION GAP SERPL CALC-SCNC: 13 MMOL/L — SIGNIFICANT CHANGE UP (ref 5–17)
BUN SERPL-MCNC: 23 MG/DL — SIGNIFICANT CHANGE UP (ref 7–23)
CALCIUM SERPL-MCNC: 7.9 MG/DL — LOW (ref 8.4–10.5)
CHLORIDE SERPL-SCNC: 104 MMOL/L — SIGNIFICANT CHANGE UP (ref 96–108)
CO2 SERPL-SCNC: 18 MMOL/L — LOW (ref 22–31)
CREAT SERPL-MCNC: 1.39 MG/DL — HIGH (ref 0.5–1.3)
GLUCOSE BLDC GLUCOMTR-MCNC: 140 MG/DL — HIGH (ref 70–99)
GLUCOSE BLDC GLUCOMTR-MCNC: 142 MG/DL — HIGH (ref 70–99)
GLUCOSE BLDC GLUCOMTR-MCNC: 142 MG/DL — HIGH (ref 70–99)
GLUCOSE BLDC GLUCOMTR-MCNC: 183 MG/DL — HIGH (ref 70–99)
GLUCOSE SERPL-MCNC: 193 MG/DL — HIGH (ref 70–99)
HCT VFR BLD CALC: 21.7 % — LOW (ref 39–50)
HCT VFR BLD CALC: 22.5 % — LOW (ref 39–50)
HCT VFR BLD CALC: 30.4 % — LOW (ref 39–50)
HGB BLD-MCNC: 6.8 G/DL — CRITICAL LOW (ref 13–17)
HGB BLD-MCNC: 7 G/DL — CRITICAL LOW (ref 13–17)
HGB BLD-MCNC: 9.7 G/DL — LOW (ref 13–17)
MAGNESIUM SERPL-MCNC: 1.7 MG/DL — SIGNIFICANT CHANGE UP (ref 1.6–2.6)
MCHC RBC-ENTMCNC: 28.7 PG — SIGNIFICANT CHANGE UP (ref 27–34)
MCHC RBC-ENTMCNC: 28.7 PG — SIGNIFICANT CHANGE UP (ref 27–34)
MCHC RBC-ENTMCNC: 29.4 PG — SIGNIFICANT CHANGE UP (ref 27–34)
MCHC RBC-ENTMCNC: 31.1 GM/DL — LOW (ref 32–36)
MCHC RBC-ENTMCNC: 31.3 GM/DL — LOW (ref 32–36)
MCHC RBC-ENTMCNC: 31.9 GM/DL — LOW (ref 32–36)
MCV RBC AUTO: 91.6 FL — SIGNIFICANT CHANGE UP (ref 80–100)
MCV RBC AUTO: 92.1 FL — SIGNIFICANT CHANGE UP (ref 80–100)
MCV RBC AUTO: 92.2 FL — SIGNIFICANT CHANGE UP (ref 80–100)
NRBC # BLD: 0 /100 WBCS — SIGNIFICANT CHANGE UP (ref 0–0)
PHOSPHATE SERPL-MCNC: 5.6 MG/DL — HIGH (ref 2.5–4.5)
PLATELET # BLD AUTO: 221 K/UL — SIGNIFICANT CHANGE UP (ref 150–400)
PLATELET # BLD AUTO: 276 K/UL — SIGNIFICANT CHANGE UP (ref 150–400)
PLATELET # BLD AUTO: 277 K/UL — SIGNIFICANT CHANGE UP (ref 150–400)
POTASSIUM SERPL-MCNC: 4.5 MMOL/L — SIGNIFICANT CHANGE UP (ref 3.5–5.3)
POTASSIUM SERPL-SCNC: 4.5 MMOL/L — SIGNIFICANT CHANGE UP (ref 3.5–5.3)
RBC # BLD: 2.37 M/UL — LOW (ref 4.2–5.8)
RBC # BLD: 2.44 M/UL — LOW (ref 4.2–5.8)
RBC # BLD: 3.3 M/UL — LOW (ref 4.2–5.8)
RBC # FLD: 17 % — HIGH (ref 10.3–14.5)
RBC # FLD: 17.7 % — HIGH (ref 10.3–14.5)
RBC # FLD: 17.8 % — HIGH (ref 10.3–14.5)
SODIUM SERPL-SCNC: 135 MMOL/L — SIGNIFICANT CHANGE UP (ref 135–145)
WBC # BLD: 13.58 K/UL — HIGH (ref 3.8–10.5)
WBC # BLD: 15.89 K/UL — HIGH (ref 3.8–10.5)
WBC # BLD: 17.26 K/UL — HIGH (ref 3.8–10.5)
WBC # FLD AUTO: 13.58 K/UL — HIGH (ref 3.8–10.5)
WBC # FLD AUTO: 15.89 K/UL — HIGH (ref 3.8–10.5)
WBC # FLD AUTO: 17.26 K/UL — HIGH (ref 3.8–10.5)

## 2020-06-24 PROCEDURE — 99232 SBSQ HOSP IP/OBS MODERATE 35: CPT

## 2020-06-24 RX ORDER — MAGNESIUM SULFATE 500 MG/ML
1 VIAL (ML) INJECTION ONCE
Refills: 0 | Status: COMPLETED | OUTPATIENT
Start: 2020-06-24 | End: 2020-06-24

## 2020-06-24 RX ORDER — SODIUM CHLORIDE 9 MG/ML
500 INJECTION, SOLUTION INTRAVENOUS ONCE
Refills: 0 | Status: COMPLETED | OUTPATIENT
Start: 2020-06-24 | End: 2020-06-24

## 2020-06-24 RX ADMIN — Medication 200 MILLIGRAM(S): at 14:01

## 2020-06-24 RX ADMIN — Medication 15 MILLIGRAM(S): at 07:38

## 2020-06-24 RX ADMIN — ATORVASTATIN CALCIUM 80 MILLIGRAM(S): 80 TABLET, FILM COATED ORAL at 21:06

## 2020-06-24 RX ADMIN — Medication 81 MILLIGRAM(S): at 13:24

## 2020-06-24 RX ADMIN — Medication 1000 MILLIGRAM(S): at 13:23

## 2020-06-24 RX ADMIN — Medication 15 MILLIGRAM(S): at 00:44

## 2020-06-24 RX ADMIN — HEPARIN SODIUM 5000 UNIT(S): 5000 INJECTION INTRAVENOUS; SUBCUTANEOUS at 14:01

## 2020-06-24 RX ADMIN — Medication 15 MILLIGRAM(S): at 13:24

## 2020-06-24 RX ADMIN — ALVIMOPAN 12 MILLIGRAM(S): 12 CAPSULE ORAL at 07:38

## 2020-06-24 RX ADMIN — HEPARIN SODIUM 5000 UNIT(S): 5000 INJECTION INTRAVENOUS; SUBCUTANEOUS at 21:06

## 2020-06-24 RX ADMIN — ALVIMOPAN 12 MILLIGRAM(S): 12 CAPSULE ORAL at 20:44

## 2020-06-24 RX ADMIN — Medication 1000 MILLIGRAM(S): at 20:44

## 2020-06-24 RX ADMIN — SODIUM CHLORIDE 1000 MILLILITER(S): 9 INJECTION, SOLUTION INTRAVENOUS at 09:39

## 2020-06-24 RX ADMIN — PANTOPRAZOLE SODIUM 40 MILLIGRAM(S): 20 TABLET, DELAYED RELEASE ORAL at 07:38

## 2020-06-24 RX ADMIN — Medication 100 GRAM(S): at 07:38

## 2020-06-24 RX ADMIN — Medication 15 MILLIGRAM(S): at 20:44

## 2020-06-24 RX ADMIN — HEPARIN SODIUM 5000 UNIT(S): 5000 INJECTION INTRAVENOUS; SUBCUTANEOUS at 07:39

## 2020-06-24 RX ADMIN — HYDROMORPHONE HYDROCHLORIDE 0.5 MILLIGRAM(S): 2 INJECTION INTRAMUSCULAR; INTRAVENOUS; SUBCUTANEOUS at 11:07

## 2020-06-24 NOTE — PROGRESS NOTE ADULT - ASSESSMENT
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 CEA 1.6. Cardiac cath 6/19 shows 1 vessel CAD, moderate AS. Now s/p open right hemicolectomy 6/23 POD    Plan:  Repeat CBC STAT AM   1 Unit of PRBC  CLD/LR at 40  Entereg  Pain/Nausea control  DVT ppx  Parr  ASA 81, s/p cardiac cath  AM labs 70M PMHx HTN, DM, CAD, PAD s/p bilateral fem-pop bypasses (2016) s/p R embolectomy (3/2020), on Xarelto but may be noncompliant (found with empty pill bottles) was admitted last week with bloody diarrhea x 4 days, colonoscopy with "at least intramural adenocarcinoma", CT chest pending. CEA 1.6. Cardiac cath 6/19 shows 1 vessel CAD, moderate AS. Now s/p open right hemicolectomy 6/23 POD 1    Plan:  Repeat CBC STAT AM   1 Unit of PRBC  CLD/LR at 40  Entereg  Pain/Nausea control  DVT ppx  Parr  ASA 81, s/p cardiac cath  AM labs

## 2020-06-24 NOTE — PROGRESS NOTE ADULT - SUBJECTIVE AND OBJECTIVE BOX
SUBJECTIVE: Patient seen and examined at bedside with chief. Patient reports 2 bowel movement this morning around 5 AM. Patient reports second bowel movement had blood present in the stool, states it was bright red and minimal. Patient reports having flatus, is tolerating clear liquid diet without nausea or vomiting.   Patient denies fever, chills, chest pain, or shortness of breathe.      MEDICATIONS  (STANDING):  acetaminophen   Tablet .. 1000 milliGRAM(s) Oral every 6 hours  alvimopan 12 milliGRAM(s) Oral two times a day  aspirin  chewable 81 milliGRAM(s) Oral daily  atorvastatin 80 milliGRAM(s) Oral at bedtime  BUpivacaine liposome 1.3% Injectable (no eMAR) 20 milliLiter(s) Local Injection once  dextrose 5%. 1000 milliLiter(s) (50 mL/Hr) IV Continuous <Continuous>  dextrose 50% Injectable 12.5 Gram(s) IV Push once  dextrose 50% Injectable 25 Gram(s) IV Push once  dextrose 50% Injectable 25 Gram(s) IV Push once  heparin   Injectable 5000 Unit(s) SubCutaneous every 8 hours  insulin lispro (HumaLOG) corrective regimen sliding scale   SubCutaneous Before meals and at bedtime  ketorolac   Injectable 15 milliGRAM(s) IV Push every 6 hours  labetalol 200 milliGRAM(s) Oral every 12 hours  lactated ringers Bolus 500 milliLiter(s) IV Bolus once  lactated ringers. 1000 milliLiter(s) (40 mL/Hr) IV Continuous <Continuous>  NIFEdipine XL 90 milliGRAM(s) Oral daily  pantoprazole    Tablet 40 milliGRAM(s) Oral before breakfast    MEDICATIONS  (PRN):  dextrose 40% Gel 15 Gram(s) Oral once PRN Blood Glucose LESS THAN 70 milliGRAM(s)/deciliter  glucagon  Injectable 1 milliGRAM(s) IntraMuscular once PRN Glucose LESS THAN 70 milligrams/deciliter  HYDROmorphone  Injectable 0.5 milliGRAM(s) IV Push every 4 hours PRN breakthrough pain  ondansetron Injectable 4 milliGRAM(s) IV Push every 4 hours PRN Nausea and/or Vomiting      Vital Signs Last 24 Hrs  T(C): 36.6 (24 Jun 2020 05:48), Max: 36.6 (23 Jun 2020 22:37)  T(F): 97.8 (24 Jun 2020 05:48), Max: 97.9 (23 Jun 2020 22:37)  HR: 60 (24 Jun 2020 04:47) (48 - 82)  BP: 148/67 (24 Jun 2020 04:47) (145/77 - 183/86)  BP(mean): 96 (24 Jun 2020 04:47) (96 - 126)  RR: 18 (24 Jun 2020 00:20) (12 - 18)  SpO2: 93% (24 Jun 2020 04:47) (93% - 100%)    Physical Exam:  GENERAL: NAD, Resting comfortably in bed  RESP: Nonlabored breathing, No respiratory distress  CARD: Normal rate, Normal peripheral perfusion  GI: Soft, NT, minimally distended, no guarding, no rebound tenderness, midline incision is clear, dry without drainage, and intact.   EXTREM: WWP, No edema, SCDs in place    I&O's Summary    23 Jun 2020 07:01  -  24 Jun 2020 07:00  --------------------------------------------------------  IN: 240 mL / OUT: 720 mL / NET: -480 mL        LABS:                        7.0    15.89 )-----------( 276      ( 24 Jun 2020 05:28 )             22.5     06-24    135  |  104  |  23  ----------------------------<  193<H>  4.5   |  18<L>  |  1.39<H>    Ca    7.9<L>      24 Jun 2020 05:27  Phos  5.6     06-24  Mg     1.7     06-24      PT/INR - ( 23 Jun 2020 06:55 )   PT: 12.2 sec;   INR: 1.07            Urinalysis Basic - ( 23 Jun 2020 06:25 )    Color: Yellow / Appearance: Clear / SG: >=1.030 / pH: x  Gluc: x / Ketone: Trace mg/dL  / Bili: Moderate / Urobili: 1.0 E.U./dL   Blood: x / Protein: 30 mg/dL / Nitrite: POSITIVE   Leuk Esterase: Small / RBC: < 5 /HPF / WBC 5-10 /HPF   Sq Epi: x / Non Sq Epi: 0-5 /HPF / Bacteria: Present /HPF      CAPILLARY BLOOD GLUCOSE      POCT Blood Glucose.: 183 mg/dL (24 Jun 2020 06:54)  POCT Blood Glucose.: 162 mg/dL (23 Jun 2020 22:31)  POCT Blood Glucose.: 215 mg/dL (23 Jun 2020 18:42)  POCT Blood Glucose.: 196 mg/dL (23 Jun 2020 16:48)  POCT Blood Glucose.: 172 mg/dL (23 Jun 2020 09:41)

## 2020-06-24 NOTE — CHART NOTE - NSCHARTNOTEFT_GEN_A_CORE
Admitting Diagnosis:   Patient is a 70y old  Male who presents with a chief complaint of GIB (24 Jun 2020 13:52)      PAST MEDICAL & SURGICAL HISTORY:  PAD (peripheral artery disease)  DM (diabetes mellitus)  CAD (coronary artery disease)  Essential hypertension: Hypertension  S/P femoral-femoral bypass surgery: left 2016  Elective surgery: right leg angiogram with bypass  july 2016  History of hernia repair: june 2014      Current Nutrition Order:  NPO (has been primarily NPO x8days)    PO Intake: Good (%) [   ]  Fair (50-75%) [ x ] Poor (<25%) [   ] N/A    GI Issues:   S/p open hemicolectomy  Had loose bloody BM o/n  Ordered for protonix, zofran, entereg    Pain:  No pain reported  Ordered for tylenol, dilaudid    Skin Integrity:  Surgical incision    Labs:   06-24    135  |  104  |  23  ----------------------------<  193<H>  4.5   |  18<L>  |  1.39<H>    Ca    7.9<L>      24 Jun 2020 05:27  Phos  5.6     06-24  Mg     1.7     06-24      CAPILLARY BLOOD GLUCOSE      POCT Blood Glucose.: 142 mg/dL (24 Jun 2020 11:50)  POCT Blood Glucose.: 183 mg/dL (24 Jun 2020 06:54)  POCT Blood Glucose.: 162 mg/dL (23 Jun 2020 22:31)  POCT Blood Glucose.: 215 mg/dL (23 Jun 2020 18:42)  POCT Blood Glucose.: 196 mg/dL (23 Jun 2020 16:48)      Medications:  MEDICATIONS  (STANDING):  acetaminophen   Tablet .. 1000 milliGRAM(s) Oral every 6 hours  alvimopan 12 milliGRAM(s) Oral two times a day  aspirin  chewable 81 milliGRAM(s) Oral daily  atorvastatin 80 milliGRAM(s) Oral at bedtime  BUpivacaine liposome 1.3% Injectable (no eMAR) 20 milliLiter(s) Local Injection once  dextrose 5%. 1000 milliLiter(s) (50 mL/Hr) IV Continuous <Continuous>  dextrose 50% Injectable 12.5 Gram(s) IV Push once  dextrose 50% Injectable 25 Gram(s) IV Push once  dextrose 50% Injectable 25 Gram(s) IV Push once  heparin   Injectable 5000 Unit(s) SubCutaneous every 8 hours  insulin lispro (HumaLOG) corrective regimen sliding scale   SubCutaneous Before meals and at bedtime  ketorolac   Injectable 15 milliGRAM(s) IV Push every 6 hours  labetalol 200 milliGRAM(s) Oral every 12 hours  lactated ringers. 1000 milliLiter(s) (40 mL/Hr) IV Continuous <Continuous>  NIFEdipine XL 90 milliGRAM(s) Oral daily  pantoprazole    Tablet 40 milliGRAM(s) Oral before breakfast    MEDICATIONS  (PRN):  dextrose 40% Gel 15 Gram(s) Oral once PRN Blood Glucose LESS THAN 70 milliGRAM(s)/deciliter  glucagon  Injectable 1 milliGRAM(s) IntraMuscular once PRN Glucose LESS THAN 70 milligrams/deciliter  HYDROmorphone  Injectable 0.5 milliGRAM(s) IV Push every 4 hours PRN breakthrough pain  ondansetron Injectable 4 milliGRAM(s) IV Push every 4 hours PRN Nausea and/or Vomiting    Admission Anthropometrics:  Height: 63" Weight: 142lbs (6/13), IBW 124lbs+/-10%, %%, BMI 25.2,    Weight Change:  142lbs (6/13)  133lbs (6/19)  126lbs (6/23)  Pt noted to have a gradual 16lb wt loss this admission (11.2% wt change) x10 days. Suspected to have severe PCM at this time.    Estimated energy needs:   ABW (60.45kg) used for calculations as pt between % of IBW.   Nutrient needs based on [facility] standards of care for maintenance in older adults.   Needs adjusted for age, post-op healing, suspected severe PCM 2/2 unintentional wt loss and inadequate energy intake for >1 week.   1513-1815kcal/day (25-30kcal/kg)  73-85g pro/day (1.2-1.4g/kg)  1815-2118ml fluid/day (30-35ml/kg)    Subjective:   70M PMHx HTN, DM, CAD, PAD s/p bilateral fem-pop bypasses (2016) s/p R embolectomy (3/2020), on Xarelto but may be noncompliant (found with empty pill bottles) was admitted last week with bloody diarrhea x 4 days, colonoscopy with "at least intramural adenocarcinoma", CT chest pending. CEA 1.6. Cardiac cath 6/19 shows 1 vessel CAD, moderate AS. Now s/p open right hemicolectomy 6/23 POD 1. Pt seen in room, resting in bed. Currently NPO. Had 2 loose bloody BMs this morning. Was getting 1U pRBC at time of assessment; H/H improved after transfusion. Per RN, pt remains NPO for possible scope to assess for GIB. Attempting to reach team as pt has been primarily NPO for >1 week and has a history of gradual weight loss this admission: 142lbs (6/13), 133lbs (6/19), 126lbs (6/23). If unable to advance diet PO within next 24-48 hours pt is indicated for TPN. Notable labs: POCT , 162, 215, Cr 1.39, Phos 5.6, HDL 35. See advancement recs below. RD to follow.     Previous Nutrition Diagnosis:  Suspect severe PCM RT acute illness AEB 9lb, 6.3% wt loss x 1 week, meeting </= 50% EER >5 days  Active [  x ]  Resolved [   ]    If resolved, new PES:     Goal:  Pt to consistently meet % of estimated needs via most appropriate route    Recommendations:  1. Recommend advancing diet to clears > low fiber as deemed medically feasible  2. If unable to advance w/in next 24hrs, PN is indicated at this time 2/2 suspected severe PCM, continuous weight loss, and catabolic illness  3. Monitor for s/s intolerance as diet is advanced  4. Appreciated updated weights being taken  *d/w team. RD to follow.     Education:   N/A will f/u to discuss diet advancement/low fiber diet when plan is known    Risk Level: High [  x ] Moderate [   ] Low [   ]

## 2020-06-24 NOTE — PROGRESS NOTE ADULT - ASSESSMENT
68 Yo Male with Pmhx of Essential HTN, Type II DM, HLD, CAD, extensive PAD s/p right and left LE FEM-pop bypass c/b occluded CFA-AK pop bypass s/p balloon embolectomy on Xarelto and aspirin who presented with symptomatic acute blood loss anemia in context of GI mass, diagnosed with colonic adenocarcinoma with course Complicated by incendental finding of Severe Aortic Stenosis now s/p R/LHC by structural team nwo s/p Right Hemicolectomy, with post op course complicated by Anemia now being tranfused    INCOMPLETE      1) ASCVD  -Patient with knwon Atherosclerotic Cardiovascular burden: single vessel CAD not intervened on, known PAD with recent intervention, HTN, HLD and previous smoking History  -Clinically he is not reporting Angina any angina and feels weel. Echo showed preserved EF and there was concerned over Severity of AS.  -Patient now s/p Right and Left heart Cath. RHC showing Moderate AS. No acute indication for BAV or intervention at this time prior to colonic mass resection surgery  -Recommend continuing lipitor 80. Can hold Aspirin in setting of downtrending Hemoglobin  -Recommend continued blood pressure control with Labetolol 200 BID and Nifedipine 90 with strict holding parameters      2) Moderate-Severe AS  -Patient with TTE showing severe aortic stenosis with a  peak transvalvular velocity of 3.80 m/s,  mean transvalvular gradient of 29.00 mmHg, and a LVOT/AV velocity ratio is 0.23. The peak transaortic gradient is 57.76 mmHg with a AOVA of 0.9 cm². There is mild aortic regurgitation.  -Right heart cath showing Moderate AS.  -Patient currently getting transfusion for downtrending hemoglobin. Given Valve disorder recommend slow transfusion with goal to Keep Hg at 8. Goal of 8 2/2 patient's CAD  -Patient will need close outpatient follow up with structural heart team with echo surveillance. 68 Yo Male with Pmhx of Essential HTN, Type II DM, HLD, CAD, extensive PAD s/p right and left LE FEM-pop bypass c/b occluded CFA-AK pop bypass s/p balloon embolectomy on Xarelto and aspirin who presented with symptomatic acute blood loss anemia in context of GI mass, diagnosed with colonic adenocarcinoma with course Complicated by incendental finding of Severe Aortic Stenosis now s/p R/LHC by structural team nwo s/p Right Hemicolectomy, with post op course complicated by Anemia now being tranfused      1) ASCVD  -Patient with knwon Atherosclerotic Cardiovascular burden: single vessel CAD not intervened on, known PAD with recent intervention, HTN, HLD and previous smoking History  -Clinically he is not reporting Angina any angina and feels weel. Echo showed preserved EF and there was concerned over Severity of AS.  -Patient now s/p Right and Left heart Cath. RHC showing Moderate AS. No acute indication for BAV or intervention at this time prior to colonic mass resection surgery  -Recommend continuing lipitor 80. Can hold Aspirin in setting of downtrending Hemoglobin  -Recommend continued blood pressure control with Labetolol 200 BID and Nifedipine 90 with strict holding parameters      2) Moderate-Severe AS  -Patient with TTE showing severe aortic stenosis with a  peak transvalvular velocity of 3.80 m/s,  mean transvalvular gradient of 29.00 mmHg, and a LVOT/AV velocity ratio is 0.23. The peak transaortic gradient is 57.76 mmHg with a AOVA of 0.9 cm². There is mild aortic regurgitation.  -Right heart cath showing Moderate AS.  -Patient currently getting transfusion for downtrending hemoglobin. Given Valve disorder recommend slow transfusion with goal to Keep Hg at 8. Goal of 8 2/2 patient's CAD  -Patient will need close outpatient follow up with structural heart team with echo surveillance.    Cardiology will follow peripherally.  please call with any questions

## 2020-06-24 NOTE — PROGRESS NOTE ADULT - SUBJECTIVE AND OBJECTIVE BOX
Pt seen and examined   says he does not feel well today  2 BMs and bloody  transfusion in progress  wants to eat  some nausea  '     REVIEW OF SYSTEMS:  Constitutional: No fever, weight loss or fatigue  Cardiovascular: No chest pain, palpitations, dizziness or leg swelling  Gastrointestinal: sl  abdominal  pain. One episode of vomiting No hematemesis; ? hematochezia.  Skin: No itching, burning, rashes or lesions       MEDICATIONS:  MEDICATIONS  (STANDING):  acetaminophen   Tablet .. 1000 milliGRAM(s) Oral every 6 hours  alvimopan 12 milliGRAM(s) Oral two times a day  aspirin  chewable 81 milliGRAM(s) Oral daily  atorvastatin 80 milliGRAM(s) Oral at bedtime  BUpivacaine liposome 1.3% Injectable (no eMAR) 20 milliLiter(s) Local Injection once  dextrose 5%. 1000 milliLiter(s) (50 mL/Hr) IV Continuous <Continuous>  dextrose 50% Injectable 12.5 Gram(s) IV Push once  dextrose 50% Injectable 25 Gram(s) IV Push once  dextrose 50% Injectable 25 Gram(s) IV Push once  heparin   Injectable 5000 Unit(s) SubCutaneous every 8 hours  insulin lispro (HumaLOG) corrective regimen sliding scale   SubCutaneous Before meals and at bedtime  ketorolac   Injectable 15 milliGRAM(s) IV Push every 6 hours  labetalol 200 milliGRAM(s) Oral every 12 hours  lactated ringers. 1000 milliLiter(s) (40 mL/Hr) IV Continuous <Continuous>  NIFEdipine XL 90 milliGRAM(s) Oral daily  pantoprazole    Tablet 40 milliGRAM(s) Oral before breakfast    MEDICATIONS  (PRN):  dextrose 40% Gel 15 Gram(s) Oral once PRN Blood Glucose LESS THAN 70 milliGRAM(s)/deciliter  glucagon  Injectable 1 milliGRAM(s) IntraMuscular once PRN Glucose LESS THAN 70 milligrams/deciliter  HYDROmorphone  Injectable 0.5 milliGRAM(s) IV Push every 4 hours PRN breakthrough pain  ondansetron Injectable 4 milliGRAM(s) IV Push every 4 hours PRN Nausea and/or Vomiting      Allergies    No Known Allergies    Intolerances        Vital Signs Last 24 Hrs  T(C): 36.6 (24 Jun 2020 05:48), Max: 36.6 (23 Jun 2020 22:37)  T(F): 97.8 (24 Jun 2020 05:48), Max: 97.9 (23 Jun 2020 22:37)  HR: 60 (24 Jun 2020 08:40) (48 - 82)  BP: 104/56 (24 Jun 2020 08:40) (104/56 - 179/82)  BP(mean): 77 (24 Jun 2020 08:40) (77 - 118)  RR: 18 (24 Jun 2020 08:40) (12 - 18)  SpO2: 96% (24 Jun 2020 08:40) (93% - 100%)    06-23 @ 07:01  -  06-24 @ 07:00  --------------------------------------------------------  IN: 240 mL / OUT: 720 mL / NET: -480 mL        PHYSICAL EXAM:    General:  in no acute distress  HEENT: MMM, conjunctiva and sclera clear  Gastrointestinal: tympanitic, distended; + bowel sounds; dressings  Skin: Warm and dry. No obvious rash    LABS:      CBC Full  -  ( 24 Jun 2020 06:37 )  WBC Count : 17.26 K/uL  RBC Count : 2.37 M/uL  Hemoglobin : 6.8 g/dL  Hematocrit : 21.7 %  Platelet Count - Automated : 277 K/uL  Mean Cell Volume : 91.6 fl  Mean Cell Hemoglobin : 28.7 pg  Mean Cell Hemoglobin Concentration : 31.3 gm/dL  Auto Neutrophil # : x  Auto Lymphocyte # : x  Auto Monocyte # : x  Auto Eosinophil # : x  Auto Basophil # : x  Auto Neutrophil % : x  Auto Lymphocyte % : x  Auto Monocyte % : x  Auto Eosinophil % : x  Auto Basophil % : x    06-24    135  |  104  |  23  ----------------------------<  193<H>  4.5   |  18<L>  |  1.39<H>    Ca    7.9<L>      24 Jun 2020 05:27  Phos  5.6     06-24  Mg     1.7     06-24      PT/INR - ( 23 Jun 2020 06:55 )   PT: 12.2 sec;   INR: 1.07                Urinalysis Basic - ( 23 Jun 2020 06:25 )    Color: Yellow / Appearance: Clear / SG: >=1.030 / pH: x  Gluc: x / Ketone: Trace mg/dL  / Bili: Moderate / Urobili: 1.0 E.U./dL   Blood: x / Protein: 30 mg/dL / Nitrite: POSITIVE   Leuk Esterase: Small / RBC: < 5 /HPF / WBC 5-10 /HPF   Sq Epi: x / Non Sq Epi: 0-5 /HPF / Bacteria: Present /HPF                RADIOLOGY & ADDITIONAL STUDIES (The following images were personally reviewed):

## 2020-06-24 NOTE — PROGRESS NOTE ADULT - ATTENDING COMMENTS
I have personally seen, examined and participated in the care of this Patient.  Moderate to severe AS, no post op complications.

## 2020-06-24 NOTE — PROGRESS NOTE ADULT - SUBJECTIVE AND OBJECTIVE BOX
Interval HPI: Patient s/p R hemicolectomy yesterday and recieved 1uPRBC this am for Hg of 6.7      PAST MEDICAL & SURGICAL HISTORY:  PAD (peripheral artery disease)  DM (diabetes mellitus)  CAD (coronary artery disease)  Essential hypertension: Hypertension  S/P femoral-femoral bypass surgery: left 2016  Elective surgery: right leg angiogram with bypass  july 2016  History of hernia repair: june 2014      Home Medications:  aspirin 81 mg oral delayed release tablet: 1 tab(s) orally once a day (17 Jun 2020 15:46)  azilsartan-chlorthalidone 40 mg-12.5 mg oral tablet: 1 tab(s) orally once a day (17 Jun 2020 15:46)  labetalol 200 mg oral tablet: 1 tab(s) orally 2 times a day (17 Jun 2020 15:46)  metFORMIN 500 mg oral tablet: 1  orally 2 times a day (17 Jun 2020 15:46)  Multiple Vitamins oral tablet: 1 tab(s) orally once a day (17 Jun 2020 15:46)  NIFEdipine 90 mg oral tablet, extended release: 1 tab(s) orally once a day (17 Jun 2020 15:46)  rivaroxaban 20 mg oral tablet: 1 tab(s) orally once a day (in the evening) (17 Jun 2020 15:46)      MEDICATIONS  (STANDING):  acetaminophen   Tablet .. 1000 milliGRAM(s) Oral every 6 hours  alvimopan 12 milliGRAM(s) Oral two times a day  aspirin  chewable 81 milliGRAM(s) Oral daily  atorvastatin 80 milliGRAM(s) Oral at bedtime  BUpivacaine liposome 1.3% Injectable (no eMAR) 20 milliLiter(s) Local Injection once  dextrose 5%. 1000 milliLiter(s) (50 mL/Hr) IV Continuous <Continuous>  dextrose 50% Injectable 12.5 Gram(s) IV Push once  dextrose 50% Injectable 25 Gram(s) IV Push once  dextrose 50% Injectable 25 Gram(s) IV Push once  heparin   Injectable 5000 Unit(s) SubCutaneous every 8 hours  insulin lispro (HumaLOG) corrective regimen sliding scale   SubCutaneous Before meals and at bedtime  ketorolac   Injectable 15 milliGRAM(s) IV Push every 6 hours  labetalol 200 milliGRAM(s) Oral every 12 hours  lactated ringers. 1000 milliLiter(s) (40 mL/Hr) IV Continuous <Continuous>  NIFEdipine XL 90 milliGRAM(s) Oral daily  pantoprazole    Tablet 40 milliGRAM(s) Oral before breakfast    MEDICATIONS  (PRN):  dextrose 40% Gel 15 Gram(s) Oral once PRN Blood Glucose LESS THAN 70 milliGRAM(s)/deciliter  glucagon  Injectable 1 milliGRAM(s) IntraMuscular once PRN Glucose LESS THAN 70 milligrams/deciliter  HYDROmorphone  Injectable 0.5 milliGRAM(s) IV Push every 4 hours PRN breakthrough pain  ondansetron Injectable 4 milliGRAM(s) IV Push every 4 hours PRN Nausea and/or Vomiting      .  VITAL SIGNS:  T(C): 36.7 (06-24-20 @ 13:33), Max: 37 (06-24-20 @ 10:30)  T(F): 98 (06-24-20 @ 13:33), Max: 98.6 (06-24-20 @ 10:30)  HR: 60 (06-24-20 @ 13:01) (60 - 82)  BP: 134/65 (06-24-20 @ 13:01) (104/56 - 169/80)  BP(mean): 93 (06-24-20 @ 13:01) (77 - 117)  RR: 17 (06-24-20 @ 13:01) (17 - 18)  SpO2: 92% (06-24-20 @ 13:01) (92% - 98%)  Wt(kg): --    PHYSICAL EXAM: INCOMPLETE    Constitutional: WDWN resting comfortably in bed; NAD  Head: NC/AT  Eyes: PERRL, EOMI, anicteric sclera  ENT: no nasal discharge; uvula midline, no oropharyngeal erythema or exudates; MMM  Neck: supple; no JVD or thyromegaly  Respiratory: CTA B/L; no W/R/R, no retractions  Cardiac: +S1/S2; RRR; no M/R/G; PMI non-displaced  Gastrointestinal: soft, NT/ND; no rebound or guarding; +BSx4  Genitourinary: normal external genitalia  Back: spine midline, no bony tenderness or step-offs; no CVAT B/L  Extremities: WWP, no clubbing or cyanosis; no peripheral edema  Musculoskeletal: NROM x4; no joint swelling, tenderness or erythema  Vascular: 2+ radial, femoral, DP/PT pulses B/L  Dermatologic: skin warm, dry and intact; no rashes, wounds, or scars  Lymphatic: no submandibular or cervical LAD  Neurologic: AAOx3; CNII-XII grossly intact; no focal deficits  Psychiatric: affect and characteristics of appearance, verbalizations, behaviors are appropriate    .  LABS:                         6.8    17.26 )-----------( 277      ( 24 Jun 2020 06:37 )             21.7     06-24    135  |  104  |  23  ----------------------------<  193<H>  4.5   |  18<L>  |  1.39<H>    Ca    7.9<L>      24 Jun 2020 05:27  Phos  5.6     06-24  Mg     1.7     06-24      PT/INR - ( 23 Jun 2020 06:55 )   PT: 12.2 sec;   INR: 1.07            Urinalysis Basic - ( 23 Jun 2020 06:25 )    Color: Yellow / Appearance: Clear / SG: >=1.030 / pH: x  Gluc: x / Ketone: Trace mg/dL  / Bili: Moderate / Urobili: 1.0 E.U./dL   Blood: x / Protein: 30 mg/dL / Nitrite: POSITIVE   Leuk Esterase: Small / RBC: < 5 /HPF / WBC 5-10 /HPF   Sq Epi: x / Non Sq Epi: 0-5 /HPF / Bacteria: Present /HPF                RADIOLOGY, EKG & ADDITIONAL TESTS: Reviewed.     < from: TTE Echo Complete w/o Contrast w/ Doppler (06.15.20 @ 15:46) >  CONCLUSIONS:     1. The aortic valve is thickened. There is probably severe aortic stenosis. The peak transvalvular velocity is 3.80 m/s, the mean transvalvular gradient is 29.00 mmHg, and the LVOT/AV velocity ratio is 0.23. The peak transaortic gradient is 57.76 mmHg. The aortic valve area (estimated via the continuity method) is 0.9 cm². There is mild aortic regurgitation.   2. Normal left and right ventricular size and systolic function.   3. Structurally normal mitral valve with normal leaflet excursion. There is probably moderate eccentric mitral regurgitation.   4. No pericardial effusion.    < end of copied text > Interval HPI: Patient s/p R hemicolectomy yesterday and recieved 1uPRBC this am for Hg of 6.7      PAST MEDICAL & SURGICAL HISTORY:  PAD (peripheral artery disease)  DM (diabetes mellitus)  CAD (coronary artery disease)  Essential hypertension: Hypertension  S/P femoral-femoral bypass surgery: left 2016  Elective surgery: right leg angiogram with bypass  july 2016  History of hernia repair: june 2014      Home Medications:  aspirin 81 mg oral delayed release tablet: 1 tab(s) orally once a day (17 Jun 2020 15:46)  azilsartan-chlorthalidone 40 mg-12.5 mg oral tablet: 1 tab(s) orally once a day (17 Jun 2020 15:46)  labetalol 200 mg oral tablet: 1 tab(s) orally 2 times a day (17 Jun 2020 15:46)  metFORMIN 500 mg oral tablet: 1  orally 2 times a day (17 Jun 2020 15:46)  Multiple Vitamins oral tablet: 1 tab(s) orally once a day (17 Jun 2020 15:46)  NIFEdipine 90 mg oral tablet, extended release: 1 tab(s) orally once a day (17 Jun 2020 15:46)  rivaroxaban 20 mg oral tablet: 1 tab(s) orally once a day (in the evening) (17 Jun 2020 15:46)      MEDICATIONS  (STANDING):  acetaminophen   Tablet .. 1000 milliGRAM(s) Oral every 6 hours  alvimopan 12 milliGRAM(s) Oral two times a day  aspirin  chewable 81 milliGRAM(s) Oral daily  atorvastatin 80 milliGRAM(s) Oral at bedtime  BUpivacaine liposome 1.3% Injectable (no eMAR) 20 milliLiter(s) Local Injection once  dextrose 5%. 1000 milliLiter(s) (50 mL/Hr) IV Continuous <Continuous>  dextrose 50% Injectable 12.5 Gram(s) IV Push once  dextrose 50% Injectable 25 Gram(s) IV Push once  dextrose 50% Injectable 25 Gram(s) IV Push once  heparin   Injectable 5000 Unit(s) SubCutaneous every 8 hours  insulin lispro (HumaLOG) corrective regimen sliding scale   SubCutaneous Before meals and at bedtime  ketorolac   Injectable 15 milliGRAM(s) IV Push every 6 hours  labetalol 200 milliGRAM(s) Oral every 12 hours  lactated ringers. 1000 milliLiter(s) (40 mL/Hr) IV Continuous <Continuous>  NIFEdipine XL 90 milliGRAM(s) Oral daily  pantoprazole    Tablet 40 milliGRAM(s) Oral before breakfast    MEDICATIONS  (PRN):  dextrose 40% Gel 15 Gram(s) Oral once PRN Blood Glucose LESS THAN 70 milliGRAM(s)/deciliter  glucagon  Injectable 1 milliGRAM(s) IntraMuscular once PRN Glucose LESS THAN 70 milligrams/deciliter  HYDROmorphone  Injectable 0.5 milliGRAM(s) IV Push every 4 hours PRN breakthrough pain  ondansetron Injectable 4 milliGRAM(s) IV Push every 4 hours PRN Nausea and/or Vomiting      .  VITAL SIGNS:  T(C): 36.7 (06-24-20 @ 13:33), Max: 37 (06-24-20 @ 10:30)  T(F): 98 (06-24-20 @ 13:33), Max: 98.6 (06-24-20 @ 10:30)  HR: 60 (06-24-20 @ 13:01) (60 - 82)  BP: 134/65 (06-24-20 @ 13:01) (104/56 - 169/80)  BP(mean): 93 (06-24-20 @ 13:01) (77 - 117)  RR: 17 (06-24-20 @ 13:01) (17 - 18)  SpO2: 92% (06-24-20 @ 13:01) (92% - 98%)  Wt(kg): --    PHYSICAL EXAM: INCOMPLETE    Constitutional: Comfortable in NAD  Head: NC/AT  ENT: MMM  Neck: supple; no JVD   Respiratory: CTA B/L;   Cardiac: +S1; delayed faint S2; RRR; 3+Systolic murmur heard trough the pericardium   Gastrointestinal: soft, midlly distended; No guarding; Dressing over the abdomen  Extremities: WWP, no clubbing or cyanosis; no peripheral edema  Vascular: 2+ radial,, DP/PT pulses B/L  Neurologic: AAOx3; CNII-XII grossly intact; no focal deficits      .  LABS:                         6.8    17.26 )-----------( 277      ( 24 Jun 2020 06:37 )             21.7     06-24    135  |  104  |  23  ----------------------------<  193<H>  4.5   |  18<L>  |  1.39<H>    Ca    7.9<L>      24 Jun 2020 05:27  Phos  5.6     06-24  Mg     1.7     06-24      PT/INR - ( 23 Jun 2020 06:55 )   PT: 12.2 sec;   INR: 1.07            Urinalysis Basic - ( 23 Jun 2020 06:25 )    Color: Yellow / Appearance: Clear / SG: >=1.030 / pH: x  Gluc: x / Ketone: Trace mg/dL  / Bili: Moderate / Urobili: 1.0 E.U./dL   Blood: x / Protein: 30 mg/dL / Nitrite: POSITIVE   Leuk Esterase: Small / RBC: < 5 /HPF / WBC 5-10 /HPF   Sq Epi: x / Non Sq Epi: 0-5 /HPF / Bacteria: Present /HPF                RADIOLOGY, EKG & ADDITIONAL TESTS: Reviewed.     < from: TTE Echo Complete w/o Contrast w/ Doppler (06.15.20 @ 15:46) >  CONCLUSIONS:     1. The aortic valve is thickened. There is probably severe aortic stenosis. The peak transvalvular velocity is 3.80 m/s, the mean transvalvular gradient is 29.00 mmHg, and the LVOT/AV velocity ratio is 0.23. The peak transaortic gradient is 57.76 mmHg. The aortic valve area (estimated via the continuity method) is 0.9 cm². There is mild aortic regurgitation.   2. Normal left and right ventricular size and systolic function.   3. Structurally normal mitral valve with normal leaflet excursion. There is probably moderate eccentric mitral regurgitation.   4. No pericardial effusion.    < end of copied text >

## 2020-06-25 LAB
ANION GAP SERPL CALC-SCNC: 10 MMOL/L — SIGNIFICANT CHANGE UP (ref 5–17)
BUN SERPL-MCNC: 12 MG/DL — SIGNIFICANT CHANGE UP (ref 7–23)
CALCIUM SERPL-MCNC: 8.1 MG/DL — LOW (ref 8.4–10.5)
CHLORIDE SERPL-SCNC: 107 MMOL/L — SIGNIFICANT CHANGE UP (ref 96–108)
CO2 SERPL-SCNC: 23 MMOL/L — SIGNIFICANT CHANGE UP (ref 22–31)
CREAT SERPL-MCNC: 0.87 MG/DL — SIGNIFICANT CHANGE UP (ref 0.5–1.3)
GLUCOSE BLDC GLUCOMTR-MCNC: 116 MG/DL — HIGH (ref 70–99)
GLUCOSE BLDC GLUCOMTR-MCNC: 124 MG/DL — HIGH (ref 70–99)
GLUCOSE BLDC GLUCOMTR-MCNC: 128 MG/DL — HIGH (ref 70–99)
GLUCOSE BLDC GLUCOMTR-MCNC: 160 MG/DL — HIGH (ref 70–99)
GLUCOSE SERPL-MCNC: 107 MG/DL — HIGH (ref 70–99)
HCT VFR BLD CALC: 26.8 % — LOW (ref 39–50)
HGB BLD-MCNC: 8.8 G/DL — LOW (ref 13–17)
MAGNESIUM SERPL-MCNC: 1.9 MG/DL — SIGNIFICANT CHANGE UP (ref 1.6–2.6)
MCHC RBC-ENTMCNC: 29.6 PG — SIGNIFICANT CHANGE UP (ref 27–34)
MCHC RBC-ENTMCNC: 32.8 GM/DL — SIGNIFICANT CHANGE UP (ref 32–36)
MCV RBC AUTO: 90.2 FL — SIGNIFICANT CHANGE UP (ref 80–100)
NRBC # BLD: 0 /100 WBCS — SIGNIFICANT CHANGE UP (ref 0–0)
PHOSPHATE SERPL-MCNC: 2.7 MG/DL — SIGNIFICANT CHANGE UP (ref 2.5–4.5)
PLATELET # BLD AUTO: 229 K/UL — SIGNIFICANT CHANGE UP (ref 150–400)
POTASSIUM SERPL-MCNC: 3.9 MMOL/L — SIGNIFICANT CHANGE UP (ref 3.5–5.3)
POTASSIUM SERPL-SCNC: 3.9 MMOL/L — SIGNIFICANT CHANGE UP (ref 3.5–5.3)
RBC # BLD: 2.97 M/UL — LOW (ref 4.2–5.8)
RBC # FLD: 17.5 % — HIGH (ref 10.3–14.5)
SODIUM SERPL-SCNC: 140 MMOL/L — SIGNIFICANT CHANGE UP (ref 135–145)
SURGICAL PATHOLOGY STUDY: SIGNIFICANT CHANGE UP
WBC # BLD: 11.05 K/UL — HIGH (ref 3.8–10.5)
WBC # FLD AUTO: 11.05 K/UL — HIGH (ref 3.8–10.5)

## 2020-06-25 RX ORDER — POTASSIUM PHOSPHATE, MONOBASIC POTASSIUM PHOSPHATE, DIBASIC 236; 224 MG/ML; MG/ML
15 INJECTION, SOLUTION INTRAVENOUS ONCE
Refills: 0 | Status: COMPLETED | OUTPATIENT
Start: 2020-06-25 | End: 2020-06-25

## 2020-06-25 RX ADMIN — Medication 1000 MILLIGRAM(S): at 06:01

## 2020-06-25 RX ADMIN — Medication 15 MILLIGRAM(S): at 05:38

## 2020-06-25 RX ADMIN — Medication 15 MILLIGRAM(S): at 01:07

## 2020-06-25 RX ADMIN — Medication 15 MILLIGRAM(S): at 12:11

## 2020-06-25 RX ADMIN — Medication 1000 MILLIGRAM(S): at 01:07

## 2020-06-25 RX ADMIN — Medication 1000 MILLIGRAM(S): at 21:39

## 2020-06-25 RX ADMIN — ALVIMOPAN 12 MILLIGRAM(S): 12 CAPSULE ORAL at 17:38

## 2020-06-25 RX ADMIN — Medication 200 MILLIGRAM(S): at 14:27

## 2020-06-25 RX ADMIN — ATORVASTATIN CALCIUM 80 MILLIGRAM(S): 80 TABLET, FILM COATED ORAL at 21:39

## 2020-06-25 RX ADMIN — Medication 81 MILLIGRAM(S): at 12:11

## 2020-06-25 RX ADMIN — ALVIMOPAN 12 MILLIGRAM(S): 12 CAPSULE ORAL at 05:38

## 2020-06-25 RX ADMIN — POTASSIUM PHOSPHATE, MONOBASIC POTASSIUM PHOSPHATE, DIBASIC 63.75 MILLIMOLE(S): 236; 224 INJECTION, SOLUTION INTRAVENOUS at 17:38

## 2020-06-25 RX ADMIN — HEPARIN SODIUM 5000 UNIT(S): 5000 INJECTION INTRAVENOUS; SUBCUTANEOUS at 05:38

## 2020-06-25 RX ADMIN — HEPARIN SODIUM 5000 UNIT(S): 5000 INJECTION INTRAVENOUS; SUBCUTANEOUS at 14:27

## 2020-06-25 RX ADMIN — PANTOPRAZOLE SODIUM 40 MILLIGRAM(S): 20 TABLET, DELAYED RELEASE ORAL at 06:01

## 2020-06-25 RX ADMIN — HEPARIN SODIUM 5000 UNIT(S): 5000 INJECTION INTRAVENOUS; SUBCUTANEOUS at 21:39

## 2020-06-25 RX ADMIN — Medication 90 MILLIGRAM(S): at 05:39

## 2020-06-25 RX ADMIN — Medication 1000 MILLIGRAM(S): at 14:27

## 2020-06-25 RX ADMIN — Medication 15 MILLIGRAM(S): at 21:39

## 2020-06-25 NOTE — PROGRESS NOTE ADULT - SUBJECTIVE AND OBJECTIVE BOX
Pt seen and examined   feels much better today  no vomiting  no bleeding    REVIEW OF SYSTEMS:  Constitutional: No fever,  Cardiovascular: No chest pain, palpitations, dizziness or leg swelling  Gastrointestinal: No abdominal or epigastric pain. No nausea, vomiting ; No diarrhea . No melena or hematochezia.  Skin: No itching, burning, rashes or lesions       MEDICATIONS:  MEDICATIONS  (STANDING):  acetaminophen   Tablet .. 1000 milliGRAM(s) Oral every 6 hours  alvimopan 12 milliGRAM(s) Oral two times a day  aspirin  chewable 81 milliGRAM(s) Oral daily  atorvastatin 80 milliGRAM(s) Oral at bedtime  BUpivacaine liposome 1.3% Injectable (no eMAR) 20 milliLiter(s) Local Injection once  dextrose 5%. 1000 milliLiter(s) (50 mL/Hr) IV Continuous <Continuous>  dextrose 50% Injectable 12.5 Gram(s) IV Push once  dextrose 50% Injectable 25 Gram(s) IV Push once  dextrose 50% Injectable 25 Gram(s) IV Push once  heparin   Injectable 5000 Unit(s) SubCutaneous every 8 hours  insulin lispro (HumaLOG) corrective regimen sliding scale   SubCutaneous Before meals and at bedtime  ketorolac   Injectable 15 milliGRAM(s) IV Push every 6 hours  labetalol 200 milliGRAM(s) Oral every 12 hours  lactated ringers. 1000 milliLiter(s) (40 mL/Hr) IV Continuous <Continuous>  NIFEdipine XL 90 milliGRAM(s) Oral daily  pantoprazole    Tablet 40 milliGRAM(s) Oral before breakfast    MEDICATIONS  (PRN):  dextrose 40% Gel 15 Gram(s) Oral once PRN Blood Glucose LESS THAN 70 milliGRAM(s)/deciliter  glucagon  Injectable 1 milliGRAM(s) IntraMuscular once PRN Glucose LESS THAN 70 milligrams/deciliter  HYDROmorphone  Injectable 0.5 milliGRAM(s) IV Push every 4 hours PRN breakthrough pain  ondansetron Injectable 4 milliGRAM(s) IV Push every 4 hours PRN Nausea and/or Vomiting      Allergies    No Known Allergies    Intolerances        Vital Signs Last 24 Hrs  T(C): 36.8 (24 Jun 2020 22:33), Max: 37 (24 Jun 2020 10:30)  T(F): 98.3 (24 Jun 2020 22:33), Max: 98.6 (24 Jun 2020 10:30)  HR: 66 (25 Jun 2020 08:42) (54 - 66)  BP: 147/66 (25 Jun 2020 08:42) (124/60 - 156/65)  BP(mean): 95 (25 Jun 2020 08:42) (86 - 105)  RR: 16 (25 Jun 2020 08:42) (16 - 17)  SpO2: 96% (25 Jun 2020 08:42) (92% - 99%)    06-24 @ 07:01  -  06-25 @ 07:00  --------------------------------------------------------  IN: 2020 mL / OUT: 2475 mL / NET: -455 mL        PHYSICAL EXAM:    General: Well developed; well nourished; in no acute distress  HEENT: MMM, conjunctiva and sclera clear  Gastrointestinal: Soft non-tender lot less -distended; Normal bowel sounds; staples intact, wound clean and dry  Skin: Warm and dry. No obvious rash    LABS:      CBC Full  -  ( 25 Jun 2020 06:09 )  WBC Count : 11.05 K/uL  RBC Count : 2.97 M/uL  Hemoglobin : 8.8 g/dL  Hematocrit : 26.8 %  Platelet Count - Automated : 229 K/uL  Mean Cell Volume : 90.2 fl  Mean Cell Hemoglobin : 29.6 pg  Mean Cell Hemoglobin Concentration : 32.8 gm/dL  Auto Neutrophil # : x  Auto Lymphocyte # : x  Auto Monocyte # : x  Auto Eosinophil # : x  Auto Basophil # : x  Auto Neutrophil % : x  Auto Lymphocyte % : x  Auto Monocyte % : x  Auto Eosinophil % : x  Auto Basophil % : x    06-25    140  |  107  |  12  ----------------------------<  107<H>  3.9   |  23  |  0.87    Ca    8.1<L>      25 Jun 2020 06:09  Phos  2.7     06-25  Mg     1.9     06-25                        RADIOLOGY & ADDITIONAL STUDIES (The following images were personally reviewed):

## 2020-06-25 NOTE — PROGRESS NOTE ADULT - SUBJECTIVE AND OBJECTIVE BOX
STATUS POST:    6/23: open right hemicolectomy       SUBJECTIVE: Patient seen and examined bedside by chief resident.  no bloody BM overnight. passing flatus. feeling well. no complaints this AM.     aspirin  chewable 81 milliGRAM(s) Oral daily  heparin   Injectable 5000 Unit(s) SubCutaneous every 8 hours  labetalol 200 milliGRAM(s) Oral every 12 hours  NIFEdipine XL 90 milliGRAM(s) Oral daily      Vital Signs Last 24 Hrs  T(C): 36.8 (25 Jun 2020 10:00), Max: 36.8 (24 Jun 2020 22:33)  T(F): 98.2 (25 Jun 2020 10:00), Max: 98.3 (24 Jun 2020 22:33)  HR: 66 (25 Jun 2020 08:42) (54 - 66)  BP: 147/66 (25 Jun 2020 08:42) (132/60 - 156/65)  BP(mean): 95 (25 Jun 2020 08:42) (87 - 105)  RR: 16 (25 Jun 2020 08:42) (16 - 17)  SpO2: 96% (25 Jun 2020 08:42) (92% - 99%)  I&O's Detail    24 Jun 2020 07:01  -  25 Jun 2020 07:00  --------------------------------------------------------  IN:    lactated ringers.: 840 mL    Oral Fluid: 480 mL    Packed Red Blood Cells: 700 mL  Total IN: 2020 mL    OUT:    Indwelling Catheter - Urethral: 2475 mL  Total OUT: 2475 mL    Total NET: -455 mL      25 Jun 2020 07:01  -  25 Jun 2020 11:03  --------------------------------------------------------  IN:    lactated ringers.: 160 mL  Total IN: 160 mL    OUT:  Total OUT: 0 mL    Total NET: 160 mL          General: NAD, resting comfortably in bed  C/V: NSR  Pulm: Nonlabored breathing, no respiratory distress  Abd: soft, NT/ND.  Extrem: WWP, no edema, SCDs in place        LABS:                        8.8    11.05 )-----------( 229      ( 25 Jun 2020 06:09 )             26.8     06-25    140  |  107  |  12  ----------------------------<  107<H>  3.9   |  23  |  0.87    Ca    8.1<L>      25 Jun 2020 06:09  Phos  2.7     06-25  Mg     1.9     06-25            RADIOLOGY & ADDITIONAL STUDIES:

## 2020-06-25 NOTE — PROGRESS NOTE ADULT - ASSESSMENT
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 CEA 1.6. Cardiac cath 6/19 shows 1 vessel CAD, moderate AS. Now s/p open right hemicolectomy 6/23 POD    CLD/IVF  Entereg  Pain/Nausea control  DVT ppx  Parr rmeoved, TOV pending   ASA 81, s/p cardiac cath  AM labs

## 2020-06-26 LAB
ANION GAP SERPL CALC-SCNC: 12 MMOL/L — SIGNIFICANT CHANGE UP (ref 5–17)
ANION GAP SERPL CALC-SCNC: 9 MMOL/L — SIGNIFICANT CHANGE UP (ref 5–17)
APPEARANCE UR: CLEAR — SIGNIFICANT CHANGE UP
BASOPHILS # BLD AUTO: 0.08 K/UL — SIGNIFICANT CHANGE UP (ref 0–0.2)
BASOPHILS NFR BLD AUTO: 0.5 % — SIGNIFICANT CHANGE UP (ref 0–2)
BILIRUB UR-MCNC: NEGATIVE — SIGNIFICANT CHANGE UP
BUN SERPL-MCNC: 10 MG/DL — SIGNIFICANT CHANGE UP (ref 7–23)
BUN SERPL-MCNC: 8 MG/DL — SIGNIFICANT CHANGE UP (ref 7–23)
CALCIUM SERPL-MCNC: 8.7 MG/DL — SIGNIFICANT CHANGE UP (ref 8.4–10.5)
CALCIUM SERPL-MCNC: 9 MG/DL — SIGNIFICANT CHANGE UP (ref 8.4–10.5)
CHLORIDE SERPL-SCNC: 101 MMOL/L — SIGNIFICANT CHANGE UP (ref 96–108)
CHLORIDE SERPL-SCNC: 102 MMOL/L — SIGNIFICANT CHANGE UP (ref 96–108)
CO2 SERPL-SCNC: 25 MMOL/L — SIGNIFICANT CHANGE UP (ref 22–31)
CO2 SERPL-SCNC: 26 MMOL/L — SIGNIFICANT CHANGE UP (ref 22–31)
COLOR SPEC: YELLOW — SIGNIFICANT CHANGE UP
CREAT SERPL-MCNC: 0.71 MG/DL — SIGNIFICANT CHANGE UP (ref 0.5–1.3)
CREAT SERPL-MCNC: 0.72 MG/DL — SIGNIFICANT CHANGE UP (ref 0.5–1.3)
DIFF PNL FLD: NEGATIVE — SIGNIFICANT CHANGE UP
EOSINOPHIL # BLD AUTO: 0.9 K/UL — HIGH (ref 0–0.5)
EOSINOPHIL NFR BLD AUTO: 6.1 % — HIGH (ref 0–6)
GLUCOSE BLDC GLUCOMTR-MCNC: 110 MG/DL — HIGH (ref 70–99)
GLUCOSE BLDC GLUCOMTR-MCNC: 135 MG/DL — HIGH (ref 70–99)
GLUCOSE BLDC GLUCOMTR-MCNC: 136 MG/DL — HIGH (ref 70–99)
GLUCOSE BLDC GLUCOMTR-MCNC: 140 MG/DL — HIGH (ref 70–99)
GLUCOSE SERPL-MCNC: 127 MG/DL — HIGH (ref 70–99)
GLUCOSE SERPL-MCNC: 131 MG/DL — HIGH (ref 70–99)
GLUCOSE UR QL: NEGATIVE — SIGNIFICANT CHANGE UP
HCT VFR BLD CALC: 31.7 % — LOW (ref 39–50)
HCT VFR BLD CALC: 33.3 % — LOW (ref 39–50)
HGB BLD-MCNC: 10.2 G/DL — LOW (ref 13–17)
HGB BLD-MCNC: 10.5 G/DL — LOW (ref 13–17)
IMM GRANULOCYTES NFR BLD AUTO: 0.7 % — SIGNIFICANT CHANGE UP (ref 0–1.5)
KETONES UR-MCNC: 15 MG/DL
LEUKOCYTE ESTERASE UR-ACNC: NEGATIVE — SIGNIFICANT CHANGE UP
LYMPHOCYTES # BLD AUTO: 1.82 K/UL — SIGNIFICANT CHANGE UP (ref 1–3.3)
LYMPHOCYTES # BLD AUTO: 12.3 % — LOW (ref 13–44)
MAGNESIUM SERPL-MCNC: 1.8 MG/DL — SIGNIFICANT CHANGE UP (ref 1.6–2.6)
MCHC RBC-ENTMCNC: 29.1 PG — SIGNIFICANT CHANGE UP (ref 27–34)
MCHC RBC-ENTMCNC: 29.2 PG — SIGNIFICANT CHANGE UP (ref 27–34)
MCHC RBC-ENTMCNC: 31.5 GM/DL — LOW (ref 32–36)
MCHC RBC-ENTMCNC: 32.2 GM/DL — SIGNIFICANT CHANGE UP (ref 32–36)
MCV RBC AUTO: 90.3 FL — SIGNIFICANT CHANGE UP (ref 80–100)
MCV RBC AUTO: 92.8 FL — SIGNIFICANT CHANGE UP (ref 80–100)
MONOCYTES # BLD AUTO: 0.72 K/UL — SIGNIFICANT CHANGE UP (ref 0–0.9)
MONOCYTES NFR BLD AUTO: 4.9 % — SIGNIFICANT CHANGE UP (ref 2–14)
NEUTROPHILS # BLD AUTO: 11.19 K/UL — HIGH (ref 1.8–7.4)
NEUTROPHILS NFR BLD AUTO: 75.5 % — SIGNIFICANT CHANGE UP (ref 43–77)
NITRITE UR-MCNC: NEGATIVE — SIGNIFICANT CHANGE UP
NRBC # BLD: 0 /100 WBCS — SIGNIFICANT CHANGE UP (ref 0–0)
NRBC # BLD: 0 /100 WBCS — SIGNIFICANT CHANGE UP (ref 0–0)
PH UR: 7 — SIGNIFICANT CHANGE UP (ref 5–8)
PHOSPHATE SERPL-MCNC: 2.3 MG/DL — LOW (ref 2.5–4.5)
PLATELET # BLD AUTO: 273 K/UL — SIGNIFICANT CHANGE UP (ref 150–400)
PLATELET # BLD AUTO: 284 K/UL — SIGNIFICANT CHANGE UP (ref 150–400)
POTASSIUM SERPL-MCNC: 3.8 MMOL/L — SIGNIFICANT CHANGE UP (ref 3.5–5.3)
POTASSIUM SERPL-MCNC: 4.4 MMOL/L — SIGNIFICANT CHANGE UP (ref 3.5–5.3)
POTASSIUM SERPL-SCNC: 3.8 MMOL/L — SIGNIFICANT CHANGE UP (ref 3.5–5.3)
POTASSIUM SERPL-SCNC: 4.4 MMOL/L — SIGNIFICANT CHANGE UP (ref 3.5–5.3)
PROT UR-MCNC: NEGATIVE MG/DL — SIGNIFICANT CHANGE UP
RBC # BLD: 3.51 M/UL — LOW (ref 4.2–5.8)
RBC # BLD: 3.59 M/UL — LOW (ref 4.2–5.8)
RBC # FLD: 17.5 % — HIGH (ref 10.3–14.5)
RBC # FLD: 17.7 % — HIGH (ref 10.3–14.5)
SODIUM SERPL-SCNC: 137 MMOL/L — SIGNIFICANT CHANGE UP (ref 135–145)
SODIUM SERPL-SCNC: 138 MMOL/L — SIGNIFICANT CHANGE UP (ref 135–145)
SP GR SPEC: 1.01 — SIGNIFICANT CHANGE UP (ref 1–1.03)
UROBILINOGEN FLD QL: 0.2 E.U./DL — SIGNIFICANT CHANGE UP
WBC # BLD: 14.35 K/UL — HIGH (ref 3.8–10.5)
WBC # BLD: 14.4 K/UL — HIGH (ref 3.8–10.5)
WBC # FLD AUTO: 14.35 K/UL — HIGH (ref 3.8–10.5)
WBC # FLD AUTO: 14.4 K/UL — HIGH (ref 3.8–10.5)

## 2020-06-26 PROCEDURE — 99232 SBSQ HOSP IP/OBS MODERATE 35: CPT

## 2020-06-26 PROCEDURE — 71260 CT THORAX DX C+: CPT | Mod: 26

## 2020-06-26 PROCEDURE — 74177 CT ABD & PELVIS W/CONTRAST: CPT | Mod: 26

## 2020-06-26 PROCEDURE — 71045 X-RAY EXAM CHEST 1 VIEW: CPT | Mod: 26

## 2020-06-26 RX ORDER — POTASSIUM CHLORIDE 20 MEQ
20 PACKET (EA) ORAL ONCE
Refills: 0 | Status: COMPLETED | OUTPATIENT
Start: 2020-06-26 | End: 2020-06-26

## 2020-06-26 RX ORDER — POTASSIUM PHOSPHATE, MONOBASIC POTASSIUM PHOSPHATE, DIBASIC 236; 224 MG/ML; MG/ML
15 INJECTION, SOLUTION INTRAVENOUS ONCE
Refills: 0 | Status: COMPLETED | OUTPATIENT
Start: 2020-06-26 | End: 2020-06-26

## 2020-06-26 RX ORDER — IOHEXOL 300 MG/ML
30 INJECTION, SOLUTION INTRAVENOUS ONCE
Refills: 0 | Status: COMPLETED | OUTPATIENT
Start: 2020-06-26 | End: 2020-06-26

## 2020-06-26 RX ADMIN — IOHEXOL 30 MILLILITER(S): 300 INJECTION, SOLUTION INTRAVENOUS at 14:19

## 2020-06-26 RX ADMIN — Medication 15 MILLIGRAM(S): at 05:54

## 2020-06-26 RX ADMIN — ALVIMOPAN 12 MILLIGRAM(S): 12 CAPSULE ORAL at 05:54

## 2020-06-26 RX ADMIN — Medication 81 MILLIGRAM(S): at 11:12

## 2020-06-26 RX ADMIN — HEPARIN SODIUM 5000 UNIT(S): 5000 INJECTION INTRAVENOUS; SUBCUTANEOUS at 22:00

## 2020-06-26 RX ADMIN — Medication 200 MILLIGRAM(S): at 15:08

## 2020-06-26 RX ADMIN — HEPARIN SODIUM 5000 UNIT(S): 5000 INJECTION INTRAVENOUS; SUBCUTANEOUS at 05:55

## 2020-06-26 RX ADMIN — Medication 1000 MILLIGRAM(S): at 01:12

## 2020-06-26 RX ADMIN — HEPARIN SODIUM 5000 UNIT(S): 5000 INJECTION INTRAVENOUS; SUBCUTANEOUS at 15:08

## 2020-06-26 RX ADMIN — Medication 20 MILLIEQUIVALENT(S): at 11:12

## 2020-06-26 RX ADMIN — PANTOPRAZOLE SODIUM 40 MILLIGRAM(S): 20 TABLET, DELAYED RELEASE ORAL at 06:07

## 2020-06-26 RX ADMIN — ALVIMOPAN 12 MILLIGRAM(S): 12 CAPSULE ORAL at 18:25

## 2020-06-26 RX ADMIN — Medication 1000 MILLIGRAM(S): at 15:08

## 2020-06-26 RX ADMIN — Medication 90 MILLIGRAM(S): at 05:54

## 2020-06-26 RX ADMIN — SODIUM CHLORIDE 40 MILLILITER(S): 9 INJECTION, SOLUTION INTRAVENOUS at 10:57

## 2020-06-26 RX ADMIN — Medication 200 MILLIGRAM(S): at 01:12

## 2020-06-26 RX ADMIN — ATORVASTATIN CALCIUM 80 MILLIGRAM(S): 80 TABLET, FILM COATED ORAL at 21:59

## 2020-06-26 RX ADMIN — POTASSIUM PHOSPHATE, MONOBASIC POTASSIUM PHOSPHATE, DIBASIC 63.75 MILLIMOLE(S): 236; 224 INJECTION, SOLUTION INTRAVENOUS at 09:08

## 2020-06-26 RX ADMIN — Medication 1000 MILLIGRAM(S): at 21:59

## 2020-06-26 NOTE — PROGRESS NOTE ADULT - ASSESSMENT
71 Yo Male with Pmhx of Essential HTN, Type II DM, HLD, CAD, extensive PAD s/p right and left LE FEM-pop bypass c/b occluded CFA-AK pop bypass s/p balloon embolectomy on Xarelto and aspirin who presented with symptomatic acute blood loss anemia in context of GI mass, diagnosed with colonic adenocarcinoma with course Complicated by incendental finding of Severe Aortic Stenosis now s/p R/LHC by structural team now s/p Right Hemicolectomy, with post op course complicated by Anemia requiring transfusion     INCOMPLETE    1) ASCVD  -Patient with known Atherosclerotic Cardiovascular burden: single vessel CAD not intervened on, known PAD with recent intervention, HTN, HLD and previous smoking History  - Echo showed preserved EF and there was concerned over Severity of AS.  -Patient  s/p Right and Left heart Cath. RHC showing Moderate AS. No acute indication for BAV or intervention at this time   -Recommend continuing lipitor 80.   -Continue with Aspirin 81 mg given stable Hg  -Recommend continued blood pressure control with Labetolol 200 BID and Nifedipine 90 with strict holding parameters      2) Moderate-Severe AS  -Patient with TTE showing severe aortic stenosis with a  peak transvalvular velocity of 3.80 m/s,  mean transvalvular gradient of 29.00 mmHg, and a LVOT/AV velocity ratio is 0.23. The peak transaortic gradient is 57.76 mmHg with a AOVA of 0.9 cm². There is mild aortic regurgitation.  -Right heart cath showing Moderate AS.  -Patient with Severe AS are proned to arrythmias. Patient with 8 Beats of NSVT 2 nigth ago. Recommend keeping on BB and ensuring K>4 and Mg>2  -Patient will need close outpatient follow up with structural heart team with echo surveillance.    Cardiology will follow peripherally.  please call with any questions 69 Yo Male with Pmhx of Essential HTN, Type II DM, HLD, CAD, extensive PAD s/p right and left LE FEM-pop bypass c/b occluded CFA-AK pop bypass s/p balloon embolectomy on Xarelto and aspirin who presented with symptomatic acute blood loss anemia in context of GI mass, diagnosed with colonic adenocarcinoma with course Complicated by incendental finding of Severe Aortic Stenosis now s/p R/LHC by structural team now s/p Right Hemicolectomy, with post op course complicated by Anemia requiring transfusion, now with continuous bloody BM       1) ASCVD  -Patient with known Atherosclerotic Cardiovascular burden: single vessel CAD not intervened on, known PAD with recent intervention, HTN, HLD and previous smoking History  - Echo showed preserved EF and there was concerned over Severity of AS.  -Patient  s/p Right and Left heart Cath. RHC showing Moderate AS. No acute indication for BAV or intervention at this time   -Recommend continuing lipitor 80.   -Would Discontinue Aspirin 81 mg given large Melena/Bloody BM  -Recommend continued blood pressure control with Labetolol 200 BID and Nifedipine 90 with strict holding parameters      2) Moderate-Severe AS  -Patient with TTE showing severe aortic stenosis with a  peak transvalvular velocity of 3.80 m/s,  mean transvalvular gradient of 29.00 mmHg, and a LVOT/AV velocity ratio is 0.23. The peak transaortic gradient is 57.76 mmHg with a AOVA of 0.9 cm². There is mild aortic regurgitation.  -Right heart cath showing Moderate AS.  -Patient with Severe AS are proned to arrythmias. Patient with 8 Beats of NSVT 2 nigth ago. Recommend keeping on BB and ensuring K>4 and Mg>2  -Please ensure patient remains euvolemic.   -Patient will need close outpatient follow up with structural heart team with echo surveillance.    Cardiology will follow peripherally.  please call with any questions

## 2020-06-26 NOTE — PROGRESS NOTE ADULT - SUBJECTIVE AND OBJECTIVE BOX
Interval HPI: 8 beats of VT on tele yesterday. Patient seen and examined at bedside. He is passing flatus. No bloody BM. No complaints of Chest pain, palpitations or SOB. Hg has remained stable        PAST MEDICAL & SURGICAL HISTORY:  PAD (peripheral artery disease)  DM (diabetes mellitus)  CAD (coronary artery disease)  Essential hypertension: Hypertension  S/P femoral-femoral bypass surgery: left 2016  Elective surgery: right leg angiogram with bypass  july 2016  History of hernia repair: june 2014      Home Medications:  aspirin 81 mg oral delayed release tablet: 1 tab(s) orally once a day (17 Jun 2020 15:46)  azilsartan-chlorthalidone 40 mg-12.5 mg oral tablet: 1 tab(s) orally once a day (17 Jun 2020 15:46)  labetalol 200 mg oral tablet: 1 tab(s) orally 2 times a day (17 Jun 2020 15:46)  metFORMIN 500 mg oral tablet: 1  orally 2 times a day (17 Jun 2020 15:46)  Multiple Vitamins oral tablet: 1 tab(s) orally once a day (17 Jun 2020 15:46)  NIFEdipine 90 mg oral tablet, extended release: 1 tab(s) orally once a day (17 Jun 2020 15:46)  rivaroxaban 20 mg oral tablet: 1 tab(s) orally once a day (in the evening) (17 Jun 2020 15:46)      MEDICATIONS  (STANDING):  acetaminophen   Tablet .. 1000 milliGRAM(s) Oral every 6 hours  alvimopan 12 milliGRAM(s) Oral two times a day  aspirin  chewable 81 milliGRAM(s) Oral daily  atorvastatin 80 milliGRAM(s) Oral at bedtime  BUpivacaine liposome 1.3% Injectable (no eMAR) 20 milliLiter(s) Local Injection once  dextrose 5%. 1000 milliLiter(s) (50 mL/Hr) IV Continuous <Continuous>  dextrose 50% Injectable 12.5 Gram(s) IV Push once  dextrose 50% Injectable 25 Gram(s) IV Push once  dextrose 50% Injectable 25 Gram(s) IV Push once  heparin   Injectable 5000 Unit(s) SubCutaneous every 8 hours  insulin lispro (HumaLOG) corrective regimen sliding scale   SubCutaneous Before meals and at bedtime  labetalol 200 milliGRAM(s) Oral every 12 hours  lactated ringers. 1000 milliLiter(s) (40 mL/Hr) IV Continuous <Continuous>  NIFEdipine XL 90 milliGRAM(s) Oral daily  pantoprazole    Tablet 40 milliGRAM(s) Oral before breakfast  potassium chloride   Powder 20 milliEquivalent(s) Oral once  potassium phosphate IVPB 15 milliMole(s) IV Intermittent once    MEDICATIONS  (PRN):  dextrose 40% Gel 15 Gram(s) Oral once PRN Blood Glucose LESS THAN 70 milliGRAM(s)/deciliter  glucagon  Injectable 1 milliGRAM(s) IntraMuscular once PRN Glucose LESS THAN 70 milligrams/deciliter  HYDROmorphone  Injectable 0.5 milliGRAM(s) IV Push every 4 hours PRN breakthrough pain  ondansetron Injectable 4 milliGRAM(s) IV Push every 4 hours PRN Nausea and/or Vomiting      .  VITAL SIGNS:  T(C): 36.8 (06-26-20 @ 05:15), Max: 37 (06-25-20 @ 21:34)  T(F): 98.2 (06-26-20 @ 05:15), Max: 98.6 (06-25-20 @ 21:34)  HR: 82 (06-26-20 @ 04:30) (74 - 84)  BP: 147/63 (06-26-20 @ 04:30) (126/58 - 161/69)  BP(mean): 91 (06-26-20 @ 04:30) (83 - 103)  RR: 18 (06-26-20 @ 04:30) (16 - 18)  SpO2: 96% (06-26-20 @ 04:30) (92% - 96%)  Wt(kg): --    PHYSICAL EXAM: INCOMPLTE    constitutional: Comfortable in NAD  Head: NC/AT  ENT: MMM  Neck: supple; no JVD   Respiratory: CTA B/L;   Cardiac: +S1; delayed faint S2; RRR; 3+Systolic murmur heard trough the pericardium   Gastrointestinal: soft, midlly distended; No guarding; +bs  Extremities: WWP, no clubbing or cyanosis; no peripheral edema  Vascular: 2+ radial,, DP/PT pulses B/L  Neurologic: AAOx3; CNII-XII grossly intact; no focal deficits      .  LABS:                         10.2   14.40 )-----------( 273      ( 26 Jun 2020 05:31 )             31.7     06-26    137  |  102  |  8   ----------------------------<  127<H>  3.8   |  26  |  0.72    Ca    8.7      26 Jun 2020 05:31  Phos  2.3     06-26  Mg     1.8     06-26          RADIOLOGY, EKG & ADDITIONAL TESTS: Reviewed.     ?< from: TTE Echo Complete w/o Contrast w/ Doppler (06.15.20 @ 15:46) >  CONCLUSIONS:     1. The aortic valve is thickened. There is probably severe aortic stenosis. The peak transvalvular velocity is 3.80 m/s, the mean transvalvular gradient is 29.00 mmHg, and the LVOT/AV velocity ratio is 0.23. The peak transaortic gradient is 57.76 mmHg. The aortic valve area (estimated via the continuity method) is 0.9 cm². There is mild aortic regurgitation.   2. Normal left and right ventricular size and systolic function.   3. Structurally normal mitral valve with normal leaflet excursion. There is probably moderate eccentric mitral regurgitation.   4. No pericardial effusion.    < end of copied text > Interval HPI: 8 beats of VT on tele yesterday. Patient seen and examined at bedside. He had a large Bloody Bowel movement earlier this morning. Reports feeling worse since operation, has low energy level. Denies chest pain.       PAST MEDICAL & SURGICAL HISTORY:  PAD (peripheral artery disease)  DM (diabetes mellitus)  CAD (coronary artery disease)  Essential hypertension: Hypertension  S/P femoral-femoral bypass surgery: left 2016  Elective surgery: right leg angiogram with bypass  july 2016  History of hernia repair: june 2014      Home Medications:  aspirin 81 mg oral delayed release tablet: 1 tab(s) orally once a day (17 Jun 2020 15:46)  azilsartan-chlorthalidone 40 mg-12.5 mg oral tablet: 1 tab(s) orally once a day (17 Jun 2020 15:46)  labetalol 200 mg oral tablet: 1 tab(s) orally 2 times a day (17 Jun 2020 15:46)  metFORMIN 500 mg oral tablet: 1  orally 2 times a day (17 Jun 2020 15:46)  Multiple Vitamins oral tablet: 1 tab(s) orally once a day (17 Jun 2020 15:46)  NIFEdipine 90 mg oral tablet, extended release: 1 tab(s) orally once a day (17 Jun 2020 15:46)  rivaroxaban 20 mg oral tablet: 1 tab(s) orally once a day (in the evening) (17 Jun 2020 15:46)      MEDICATIONS  (STANDING):  acetaminophen   Tablet .. 1000 milliGRAM(s) Oral every 6 hours  alvimopan 12 milliGRAM(s) Oral two times a day  aspirin  chewable 81 milliGRAM(s) Oral daily  atorvastatin 80 milliGRAM(s) Oral at bedtime  BUpivacaine liposome 1.3% Injectable (no eMAR) 20 milliLiter(s) Local Injection once  dextrose 5%. 1000 milliLiter(s) (50 mL/Hr) IV Continuous <Continuous>  dextrose 50% Injectable 12.5 Gram(s) IV Push once  dextrose 50% Injectable 25 Gram(s) IV Push once  dextrose 50% Injectable 25 Gram(s) IV Push once  heparin   Injectable 5000 Unit(s) SubCutaneous every 8 hours  insulin lispro (HumaLOG) corrective regimen sliding scale   SubCutaneous Before meals and at bedtime  labetalol 200 milliGRAM(s) Oral every 12 hours  lactated ringers. 1000 milliLiter(s) (40 mL/Hr) IV Continuous <Continuous>  NIFEdipine XL 90 milliGRAM(s) Oral daily  pantoprazole    Tablet 40 milliGRAM(s) Oral before breakfast  potassium chloride   Powder 20 milliEquivalent(s) Oral once  potassium phosphate IVPB 15 milliMole(s) IV Intermittent once    MEDICATIONS  (PRN):  dextrose 40% Gel 15 Gram(s) Oral once PRN Blood Glucose LESS THAN 70 milliGRAM(s)/deciliter  glucagon  Injectable 1 milliGRAM(s) IntraMuscular once PRN Glucose LESS THAN 70 milligrams/deciliter  HYDROmorphone  Injectable 0.5 milliGRAM(s) IV Push every 4 hours PRN breakthrough pain  ondansetron Injectable 4 milliGRAM(s) IV Push every 4 hours PRN Nausea and/or Vomiting      .  VITAL SIGNS:  T(C): 36.8 (06-26-20 @ 05:15), Max: 37 (06-25-20 @ 21:34)  T(F): 98.2 (06-26-20 @ 05:15), Max: 98.6 (06-25-20 @ 21:34)  HR: 82 (06-26-20 @ 04:30) (74 - 84)  BP: 147/63 (06-26-20 @ 04:30) (126/58 - 161/69)  BP(mean): 91 (06-26-20 @ 04:30) (83 - 103)  RR: 18 (06-26-20 @ 04:30) (16 - 18)  SpO2: 96% (06-26-20 @ 04:30) (92% - 96%)  Wt(kg): --    PHYSICAL EXAM: INCOMPLTE    constitutional: Comfortable in NAD  Head: NC/AT  ENT: MMM  Neck: supple; no JVD   Respiratory: CTA B/L;   Cardiac: +S1; delayed faint S2; RRR; 3+Systolic murmur heard trough the pericardium   Gastrointestinal: soft, midlly distended; No guarding; +bs  Extremities: WWP, no clubbing or cyanosis; no peripheral edema  Vascular: 2+ radial,, DP/PT pulses B/L  Neurologic: AAOx3; CNII-XII grossly intact; no focal deficits      .  LABS:                         10.2   14.40 )-----------( 273      ( 26 Jun 2020 05:31 )             31.7     06-26    137  |  102  |  8   ----------------------------<  127<H>  3.8   |  26  |  0.72    Ca    8.7      26 Jun 2020 05:31  Phos  2.3     06-26  Mg     1.8     06-26          RADIOLOGY, EKG & ADDITIONAL TESTS: Reviewed.     ?< from: TTE Echo Complete w/o Contrast w/ Doppler (06.15.20 @ 15:46) >  CONCLUSIONS:     1. The aortic valve is thickened. There is probably severe aortic stenosis. The peak transvalvular velocity is 3.80 m/s, the mean transvalvular gradient is 29.00 mmHg, and the LVOT/AV velocity ratio is 0.23. The peak transaortic gradient is 57.76 mmHg. The aortic valve area (estimated via the continuity method) is 0.9 cm². There is mild aortic regurgitation.   2. Normal left and right ventricular size and systolic function.   3. Structurally normal mitral valve with normal leaflet excursion. There is probably moderate eccentric mitral regurgitation.   4. No pericardial effusion.    < end of copied text >

## 2020-06-26 NOTE — PROGRESS NOTE ADULT - SUBJECTIVE AND OBJECTIVE BOX
Pt seen and examined   one episode of dark blood BM small amount    REVIEW OF SYSTEMS:  Constitutional: No fever,   Cardiovascular: No chest pain, palpitations, dizziness or leg swelling  Gastrointestinal: No abdominal or epigastric pain. No nausea, no vomiting or hematemesis;   Skin: No itching, burning, rashes or lesions       MEDICATIONS:  MEDICATIONS  (STANDING):  acetaminophen   Tablet .. 1000 milliGRAM(s) Oral every 6 hours  alvimopan 12 milliGRAM(s) Oral two times a day  aspirin  chewable 81 milliGRAM(s) Oral daily  atorvastatin 80 milliGRAM(s) Oral at bedtime  BUpivacaine liposome 1.3% Injectable (no eMAR) 20 milliLiter(s) Local Injection once  dextrose 5%. 1000 milliLiter(s) (50 mL/Hr) IV Continuous <Continuous>  dextrose 50% Injectable 12.5 Gram(s) IV Push once  dextrose 50% Injectable 25 Gram(s) IV Push once  dextrose 50% Injectable 25 Gram(s) IV Push once  heparin   Injectable 5000 Unit(s) SubCutaneous every 8 hours  insulin lispro (HumaLOG) corrective regimen sliding scale   SubCutaneous Before meals and at bedtime  iohexol 300 mG (iodine)/mL Oral Solution 30 milliLiter(s) Oral once  labetalol 200 milliGRAM(s) Oral every 12 hours  lactated ringers. 1000 milliLiter(s) (40 mL/Hr) IV Continuous <Continuous>  NIFEdipine XL 90 milliGRAM(s) Oral daily  pantoprazole    Tablet 40 milliGRAM(s) Oral before breakfast    MEDICATIONS  (PRN):  dextrose 40% Gel 15 Gram(s) Oral once PRN Blood Glucose LESS THAN 70 milliGRAM(s)/deciliter  glucagon  Injectable 1 milliGRAM(s) IntraMuscular once PRN Glucose LESS THAN 70 milligrams/deciliter  HYDROmorphone  Injectable 0.5 milliGRAM(s) IV Push every 4 hours PRN breakthrough pain  ondansetron Injectable 4 milliGRAM(s) IV Push every 4 hours PRN Nausea and/or Vomiting      Allergies    No Known Allergies    Intolerances        Vital Signs Last 24 Hrs  T(C): 36.2 (2020 10:00), Max: 37 (2020 21:34)  T(F): 97.2 (2020 10:00), Max: 98.6 (2020 21:34)  HR: 74 (2020 08:25) (74 - 84)  BP: 156/75 (2020 08:25) (126/58 - 161/69)  BP(mean): 108 (2020 08:25) (83 - 108)  RR: 16 (2020 08:25) (16 - 18)  SpO2: 94% (2020 08:25) (92% - 96%)     @ 07:01   @ 07:00  --------------------------------------------------------  IN: 1050 mL / OUT: 2250 mL / NET: -1200 mL     @ 07:01   @ 13:24  --------------------------------------------------------  IN: 200 mL / OUT: 200 mL / NET: 0 mL        PHYSICAL EXAM:    General: Well developed; well nourished; in no acute distress  HEENT: MMM, conjunctiva and sclera clear  Gastrointestinal: Soft non-tender non-distended; Normal bowel sounds; wounds clean and dry, staples intact  Skin: Warm and dry. No obvious rash    LABS:      CBC Full  -  ( 2020 12:02 )  WBC Count : 14.35 K/uL  RBC Count : 3.59 M/uL  Hemoglobin : 10.5 g/dL  Hematocrit : 33.3 %  Platelet Count - Automated : 284 K/uL  Mean Cell Volume : 92.8 fl  Mean Cell Hemoglobin : 29.2 pg  Mean Cell Hemoglobin Concentration : 31.5 gm/dL  Auto Neutrophil # : x  Auto Lymphocyte # : x  Auto Monocyte # : x  Auto Eosinophil # : x  Auto Basophil # : x  Auto Neutrophil % : x  Auto Lymphocyte % : x  Auto Monocyte % : x  Auto Eosinophil % : x  Auto Basophil % : x        138  |  101  |  10  ----------------------------<  131<H>  4.4   |  25  |  0.71    Ca    9.0      2020 12:02  Phos  2.3     -  Mg     1.8     -            Urinalysis Basic - ( 2020 11:09 )    Color: Yellow / Appearance: Clear / S.010 / pH: x  Gluc: x / Ketone: 15 mg/dL  / Bili: Negative / Urobili: 0.2 E.U./dL   Blood: x / Protein: NEGATIVE mg/dL / Nitrite: NEGATIVE   Leuk Esterase: NEGATIVE / RBC: x / WBC x   Sq Epi: x / Non Sq Epi: x / Bacteria: x                RADIOLOGY & ADDITIONAL STUDIES (The following images were personally reviewed):

## 2020-06-26 NOTE — PROGRESS NOTE ADULT - ATTENDING COMMENTS
I have personally seen, examined and participated in the care of this pt.  Holding ASA in light of bloody BM.

## 2020-06-26 NOTE — PROGRESS NOTE ADULT - SUBJECTIVE AND OBJECTIVE BOX
STATUS POST:  6/23: open right hemicolectomy   SUBJECTIVE: Patient seen and examined bedside by chief resident.  +flatus, one episode of dark bloody diarrhea this AM. tolerating CLD. No acute events overnight.     aspirin  chewable 81 milliGRAM(s) Oral daily  heparin   Injectable 5000 Unit(s) SubCutaneous every 8 hours  labetalol 200 milliGRAM(s) Oral every 12 hours  NIFEdipine XL 90 milliGRAM(s) Oral daily      Vital Signs Last 24 Hrs  T(C): 36.8 (26 Jun 2020 05:15), Max: 37 (25 Jun 2020 21:34)  T(F): 98.2 (26 Jun 2020 05:15), Max: 98.6 (25 Jun 2020 21:34)  HR: 82 (26 Jun 2020 04:30) (74 - 84)  BP: 147/63 (26 Jun 2020 04:30) (126/58 - 161/69)  BP(mean): 91 (26 Jun 2020 04:30) (83 - 103)  RR: 18 (26 Jun 2020 04:30) (16 - 18)  SpO2: 96% (26 Jun 2020 04:30) (92% - 96%)  I&O's Detail    25 Jun 2020 07:01  -  26 Jun 2020 07:00  --------------------------------------------------------  IN:    IV PiggyBack: 250 mL    lactated ringers.: 800 mL  Total IN: 1050 mL    OUT:    Indwelling Catheter - Urethral: 450 mL    Voided: 1800 mL  Total OUT: 2250 mL    Total NET: -1200 mL          General: NAD, resting comfortably in bed  C/V: NSR  Pulm: Nonlabored breathing, no respiratory distress  Abd: soft, mild distended, NT. incisions are clean, dry and intact   Extrem: WWP, no edema, SCDs in place        LABS:                        10.2   14.40 )-----------( 273      ( 26 Jun 2020 05:31 )             31.7     06-26    137  |  102  |  8   ----------------------------<  127<H>  3.8   |  26  |  0.72    Ca    8.7      26 Jun 2020 05:31  Phos  2.3     06-26  Mg     1.8     06-26            RADIOLOGY & ADDITIONAL STUDIES:

## 2020-06-26 NOTE — PROGRESS NOTE ADULT - ASSESSMENT
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 CEA 1.6. Cardiac cath 6/19 shows 1 vessel CAD, moderate AS. Now s/p open right hemicolectomy 6/23     CLD/IVF  Entereg  Pain/Nausea control  DVT ppx  ASA 81, s/p cardiac cath  AM labs

## 2020-06-27 LAB
ANION GAP SERPL CALC-SCNC: 13 MMOL/L — SIGNIFICANT CHANGE UP (ref 5–17)
BUN SERPL-MCNC: 11 MG/DL — SIGNIFICANT CHANGE UP (ref 7–23)
C DIFF GDH STL QL: NEGATIVE — SIGNIFICANT CHANGE UP
C DIFF GDH STL QL: SIGNIFICANT CHANGE UP
CALCIUM SERPL-MCNC: 8.5 MG/DL — SIGNIFICANT CHANGE UP (ref 8.4–10.5)
CHLORIDE SERPL-SCNC: 103 MMOL/L — SIGNIFICANT CHANGE UP (ref 96–108)
CO2 SERPL-SCNC: 22 MMOL/L — SIGNIFICANT CHANGE UP (ref 22–31)
CREAT SERPL-MCNC: 0.76 MG/DL — SIGNIFICANT CHANGE UP (ref 0.5–1.3)
GLUCOSE BLDC GLUCOMTR-MCNC: 107 MG/DL — HIGH (ref 70–99)
GLUCOSE BLDC GLUCOMTR-MCNC: 109 MG/DL — HIGH (ref 70–99)
GLUCOSE BLDC GLUCOMTR-MCNC: 116 MG/DL — HIGH (ref 70–99)
GLUCOSE BLDC GLUCOMTR-MCNC: 117 MG/DL — HIGH (ref 70–99)
GLUCOSE SERPL-MCNC: 115 MG/DL — HIGH (ref 70–99)
HCT VFR BLD CALC: 32.9 % — LOW (ref 39–50)
HGB BLD-MCNC: 10.5 G/DL — LOW (ref 13–17)
MAGNESIUM SERPL-MCNC: 1.8 MG/DL — SIGNIFICANT CHANGE UP (ref 1.6–2.6)
MCHC RBC-ENTMCNC: 29.3 PG — SIGNIFICANT CHANGE UP (ref 27–34)
MCHC RBC-ENTMCNC: 31.9 GM/DL — LOW (ref 32–36)
MCV RBC AUTO: 91.9 FL — SIGNIFICANT CHANGE UP (ref 80–100)
NRBC # BLD: 0 /100 WBCS — SIGNIFICANT CHANGE UP (ref 0–0)
PHOSPHATE SERPL-MCNC: 3.9 MG/DL — SIGNIFICANT CHANGE UP (ref 2.5–4.5)
PLATELET # BLD AUTO: 306 K/UL — SIGNIFICANT CHANGE UP (ref 150–400)
POTASSIUM SERPL-MCNC: 3.7 MMOL/L — SIGNIFICANT CHANGE UP (ref 3.5–5.3)
POTASSIUM SERPL-SCNC: 3.7 MMOL/L — SIGNIFICANT CHANGE UP (ref 3.5–5.3)
RBC # BLD: 3.58 M/UL — LOW (ref 4.2–5.8)
RBC # FLD: 17.8 % — HIGH (ref 10.3–14.5)
SODIUM SERPL-SCNC: 138 MMOL/L — SIGNIFICANT CHANGE UP (ref 135–145)
WBC # BLD: 11.52 K/UL — HIGH (ref 3.8–10.5)
WBC # FLD AUTO: 11.52 K/UL — HIGH (ref 3.8–10.5)

## 2020-06-27 RX ORDER — POTASSIUM CHLORIDE 20 MEQ
40 PACKET (EA) ORAL ONCE
Refills: 0 | Status: COMPLETED | OUTPATIENT
Start: 2020-06-27 | End: 2020-06-27

## 2020-06-27 RX ORDER — MAGNESIUM SULFATE 500 MG/ML
2 VIAL (ML) INJECTION ONCE
Refills: 0 | Status: COMPLETED | OUTPATIENT
Start: 2020-06-27 | End: 2020-06-27

## 2020-06-27 RX ADMIN — ATORVASTATIN CALCIUM 80 MILLIGRAM(S): 80 TABLET, FILM COATED ORAL at 22:10

## 2020-06-27 RX ADMIN — PANTOPRAZOLE SODIUM 40 MILLIGRAM(S): 20 TABLET, DELAYED RELEASE ORAL at 05:52

## 2020-06-27 RX ADMIN — Medication 90 MILLIGRAM(S): at 05:52

## 2020-06-27 RX ADMIN — Medication 1000 MILLIGRAM(S): at 07:09

## 2020-06-27 RX ADMIN — Medication 81 MILLIGRAM(S): at 11:33

## 2020-06-27 RX ADMIN — Medication 200 MILLIGRAM(S): at 01:15

## 2020-06-27 RX ADMIN — HEPARIN SODIUM 5000 UNIT(S): 5000 INJECTION INTRAVENOUS; SUBCUTANEOUS at 15:08

## 2020-06-27 RX ADMIN — HEPARIN SODIUM 5000 UNIT(S): 5000 INJECTION INTRAVENOUS; SUBCUTANEOUS at 22:10

## 2020-06-27 RX ADMIN — Medication 40 MILLIEQUIVALENT(S): at 11:01

## 2020-06-27 RX ADMIN — ALVIMOPAN 12 MILLIGRAM(S): 12 CAPSULE ORAL at 18:07

## 2020-06-27 RX ADMIN — Medication 200 MILLIGRAM(S): at 11:00

## 2020-06-27 RX ADMIN — ALVIMOPAN 12 MILLIGRAM(S): 12 CAPSULE ORAL at 05:52

## 2020-06-27 RX ADMIN — Medication 1000 MILLIGRAM(S): at 01:15

## 2020-06-27 RX ADMIN — Medication 1000 MILLIGRAM(S): at 15:38

## 2020-06-27 RX ADMIN — HEPARIN SODIUM 5000 UNIT(S): 5000 INJECTION INTRAVENOUS; SUBCUTANEOUS at 05:52

## 2020-06-27 RX ADMIN — Medication 50 GRAM(S): at 11:05

## 2020-06-27 RX ADMIN — Medication 1000 MILLIGRAM(S): at 22:09

## 2020-06-27 NOTE — PROGRESS NOTE ADULT - SUBJECTIVE AND OBJECTIVE BOX
Pt seen and examined   feeling well  ate his lunch w/o issue  no bleed and no BMS  Hgb stable    REVIEW OF SYSTEMS:  Constitutional: No fever,  Cardiovascular: No chest pain, palpitations, dizziness or leg swelling  Gastrointestinal: No abdominal or epigastric pain. No nausea, vomiting or hematemesis; No diarrhea or constipation. No melena or hematochezia.  Skin: No itching, burning, rashes or lesions       MEDICATIONS:  MEDICATIONS  (STANDING):  acetaminophen   Tablet .. 1000 milliGRAM(s) Oral every 6 hours  alvimopan 12 milliGRAM(s) Oral two times a day  aspirin  chewable 81 milliGRAM(s) Oral daily  atorvastatin 80 milliGRAM(s) Oral at bedtime  BUpivacaine liposome 1.3% Injectable (no eMAR) 20 milliLiter(s) Local Injection once  dextrose 5%. 1000 milliLiter(s) (50 mL/Hr) IV Continuous <Continuous>  dextrose 50% Injectable 12.5 Gram(s) IV Push once  dextrose 50% Injectable 25 Gram(s) IV Push once  dextrose 50% Injectable 25 Gram(s) IV Push once  heparin   Injectable 5000 Unit(s) SubCutaneous every 8 hours  insulin lispro (HumaLOG) corrective regimen sliding scale   SubCutaneous Before meals and at bedtime  labetalol 200 milliGRAM(s) Oral every 12 hours  lactated ringers. 1000 milliLiter(s) (40 mL/Hr) IV Continuous <Continuous>  NIFEdipine XL 90 milliGRAM(s) Oral daily  pantoprazole    Tablet 40 milliGRAM(s) Oral before breakfast    MEDICATIONS  (PRN):  dextrose 40% Gel 15 Gram(s) Oral once PRN Blood Glucose LESS THAN 70 milliGRAM(s)/deciliter  glucagon  Injectable 1 milliGRAM(s) IntraMuscular once PRN Glucose LESS THAN 70 milligrams/deciliter  HYDROmorphone  Injectable 0.5 milliGRAM(s) IV Push every 4 hours PRN breakthrough pain  ondansetron Injectable 4 milliGRAM(s) IV Push every 4 hours PRN Nausea and/or Vomiting      Allergies    No Known Allergies    Intolerances        Vital Signs Last 24 Hrs  T(C): 36.7 (2020 10:14), Max: 36.9 (2020 05:10)  T(F): 98 (2020 10:14), Max: 98.5 (2020 05:10)  HR: 76 (2020 12:49) (62 - 88)  BP: 163/75 (2020 12:49) (129/61 - 180/87)  BP(mean): 108 (2020 12:49) (88 - 124)  RR: 18 (2020 12:49) (16 - 18)  SpO2: 96% (2020 12:49) (95% - 97%)     @ 07:01  -   @ 07:00  --------------------------------------------------------  IN: 680 mL / OUT: 750 mL / NET: -70 mL        PHYSICAL EXAM:    General: Well developed; well nourished; in no acute distress  HEENT: MMM, conjunctiva and sclera clear  Gastrointestinal: Soft non-tender non-distended; Normal bowel sounds; wound clean and dry  Skin: Warm and dry. No obvious rash    LABS:      CBC Full  -  ( 2020 06:19 )  WBC Count : 11.52 K/uL  RBC Count : 3.58 M/uL  Hemoglobin : 10.5 g/dL  Hematocrit : 32.9 %  Platelet Count - Automated : 306 K/uL  Mean Cell Volume : 91.9 fl  Mean Cell Hemoglobin : 29.3 pg  Mean Cell Hemoglobin Concentration : 31.9 gm/dL  Auto Neutrophil # : x  Auto Lymphocyte # : x  Auto Monocyte # : x  Auto Eosinophil # : x  Auto Basophil # : x  Auto Neutrophil % : x  Auto Lymphocyte % : x  Auto Monocyte % : x  Auto Eosinophil % : x  Auto Basophil % : x        138  |  103  |  11  ----------------------------<  115<H>  3.7   |  22  |  0.76    Ca    8.5      2020 06:19  Phos  3.9     -27  Mg     1.8     27            Urinalysis Basic - ( 2020 11:09 )    Color: Yellow / Appearance: Clear / S.010 / pH: x  Gluc: x / Ketone: 15 mg/dL  / Bili: Negative / Urobili: 0.2 E.U./dL   Blood: x / Protein: NEGATIVE mg/dL / Nitrite: NEGATIVE   Leuk Esterase: NEGATIVE / RBC: x / WBC x   Sq Epi: x / Non Sq Epi: x / Bacteria: x                RADIOLOGY & ADDITIONAL STUDIES (The following images were personally reviewed):

## 2020-06-27 NOTE — PROGRESS NOTE ADULT - ASSESSMENT
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 CEA 1.6. Cardiac cath 6/19 shows 1 vessel CAD, moderate AS. Now s/p open right hemicolectomy 6/23     Advanced to LRD /IVF  Entereg  Pain/Nausea control  HSQ  ASA 81, s/p cardiac cath  AM labs

## 2020-06-27 NOTE — PROGRESS NOTE ADULT - SUBJECTIVE AND OBJECTIVE BOX
OVERNIGHT EVENTS:    STATUS POST:      POST OPERATIVE DAY #: 4    SUBJECTIVE:      aspirin  chewable 81 milliGRAM(s) Oral daily  heparin   Injectable 5000 Unit(s) SubCutaneous every 8 hours  labetalol 200 milliGRAM(s) Oral every 12 hours  NIFEdipine XL 90 milliGRAM(s) Oral daily      Vital Signs Last 24 Hrs  T(C): 36.7 (2020 10:14), Max: 36.9 (2020 05:10)  T(F): 98 (2020 10:14), Max: 98.5 (2020 05:10)  HR: 62 (2020 09:00) (62 - 88)  BP: 180/87 (2020 09:00) (129/61 - 180/87)  BP(mean): 124 (2020 09:00) (88 - 124)  RR: 16 (2020 09:00) (16 - 18)  SpO2: 97% (2020 09:00) (95% - 97%)  I&O's Detail    2020 07:01  -  2020 07:00  --------------------------------------------------------  IN:    lactated ringers.: 680 mL  Total IN: 680 mL    OUT:    Voided: 750 mL  Total OUT: 750 mL    Total NET: -70 mL          General: NAD, resting comfortably in bed  C/V: NSR  Pulm: Nonlabored breathing, no respiratory distress  Abd: Soft , non-distended , TTP around incision site , incision clean dry and intact   Extrem: WWP, no edema, SCDs in place        LABS:                        10.5   11.52 )-----------( 306      ( 2020 06:19 )             32.9     06-27    138  |  103  |  11  ----------------------------<  115<H>  3.7   |  22  |  0.76    Ca    8.5      2020 06:19  Phos  3.9     06-27  Mg     1.8     27        Urinalysis Basic - ( 2020 11:09 )    Color: Yellow / Appearance: Clear / S.010 / pH: x  Gluc: x / Ketone: 15 mg/dL  / Bili: Negative / Urobili: 0.2 E.U./dL   Blood: x / Protein: NEGATIVE mg/dL / Nitrite: NEGATIVE   Leuk Esterase: NEGATIVE / RBC: x / WBC x   Sq Epi: x / Non Sq Epi: x / Bacteria: x INTERVAL HPI/OVERNIGHT EVENTS: CT chest/AP showing Moderate to severe centrilobular & paraseptal emphysema, no consolidation, mild bibasilar atelectasis, small volume free air wnl post op, no intra-abd collection, 3.3cm intraluminal hematoma at ileocolic anastomosis, 1.1cm intramural soft tissue density lesion distal to ileocolic anastomosis, mildly dilated large & SB w/o transition pt likely ileus. No BMs overnight to obtain c diff. Maycol diet, -N/-V, VSS  : +diarhea with some dark blood x2, WBC 14.4, UA and chest xray wnl, repeat labs wbw unchanged, CT chest/abd/pelvis pending, c diff pending      STATUS POST:  open right hemicolectomy    POST OPERATIVE DAY #: 4    SUBJECTIVE: Patient examined bedside with chief resident. Patient reports pain well controlled. Patient denies passing flatus and having bowel movements. Patient denies SOB, chest pain , nausea and emesis. Patient making adequate urine output.       aspirin  chewable 81 milliGRAM(s) Oral daily  heparin   Injectable 5000 Unit(s) SubCutaneous every 8 hours  labetalol 200 milliGRAM(s) Oral every 12 hours  NIFEdipine XL 90 milliGRAM(s) Oral daily      Vital Signs Last 24 Hrs  T(C): 36.7 (2020 10:14), Max: 36.9 (2020 05:10)  T(F): 98 (2020 10:14), Max: 98.5 (2020 05:10)  HR: 62 (2020 09:00) (62 - 88)  BP: 180/87 (2020 09:00) (129/61 - 180/87)  BP(mean): 124 (2020 09:00) (88 - 124)  RR: 16 (2020 09:00) (16 - 18)  SpO2: 97% (2020 09:00) (95% - 97%)  I&O's Detail    2020 07:01  -  2020 07:00  --------------------------------------------------------  IN:    lactated ringers.: 680 mL  Total IN: 680 mL    OUT:    Voided: 750 mL  Total OUT: 750 mL    Total NET: -70 mL          General: NAD, resting comfortably in bed  C/V: NSR  Pulm: Nonlabored breathing, no respiratory distress  Abd: Soft , non-distended , TTP around incision site , incision clean dry and intact   Extrem: WWP, no edema, SCDs in place        LABS:                        10.5   11.52 )-----------( 306      ( 2020 06:19 )             32.9     -    138  |  103  |  11  ----------------------------<  115<H>  3.7   |  22  |  0.76    Ca    8.5      2020 06:19  Phos  3.9       Mg     1.8             Urinalysis Basic - ( 2020 11:09 )    Color: Yellow / Appearance: Clear / S.010 / pH: x  Gluc: x / Ketone: 15 mg/dL  / Bili: Negative / Urobili: 0.2 E.U./dL   Blood: x / Protein: NEGATIVE mg/dL / Nitrite: NEGATIVE   Leuk Esterase: NEGATIVE / RBC: x / WBC x   Sq Epi: x / Non Sq Epi: x / Bacteria: x

## 2020-06-28 LAB
ANION GAP SERPL CALC-SCNC: 12 MMOL/L — SIGNIFICANT CHANGE UP (ref 5–17)
BUN SERPL-MCNC: 20 MG/DL — SIGNIFICANT CHANGE UP (ref 7–23)
CALCIUM SERPL-MCNC: 8.5 MG/DL — SIGNIFICANT CHANGE UP (ref 8.4–10.5)
CHLORIDE SERPL-SCNC: 102 MMOL/L — SIGNIFICANT CHANGE UP (ref 96–108)
CO2 SERPL-SCNC: 21 MMOL/L — LOW (ref 22–31)
CREAT SERPL-MCNC: 0.91 MG/DL — SIGNIFICANT CHANGE UP (ref 0.5–1.3)
GLUCOSE BLDC GLUCOMTR-MCNC: 119 MG/DL — HIGH (ref 70–99)
GLUCOSE BLDC GLUCOMTR-MCNC: 121 MG/DL — HIGH (ref 70–99)
GLUCOSE BLDC GLUCOMTR-MCNC: 129 MG/DL — HIGH (ref 70–99)
GLUCOSE BLDC GLUCOMTR-MCNC: 132 MG/DL — HIGH (ref 70–99)
GLUCOSE SERPL-MCNC: 112 MG/DL — HIGH (ref 70–99)
HCT VFR BLD CALC: 31.7 % — LOW (ref 39–50)
HGB BLD-MCNC: 9.9 G/DL — LOW (ref 13–17)
MAGNESIUM SERPL-MCNC: 2.1 MG/DL — SIGNIFICANT CHANGE UP (ref 1.6–2.6)
MCHC RBC-ENTMCNC: 29.1 PG — SIGNIFICANT CHANGE UP (ref 27–34)
MCHC RBC-ENTMCNC: 31.2 GM/DL — LOW (ref 32–36)
MCV RBC AUTO: 93.2 FL — SIGNIFICANT CHANGE UP (ref 80–100)
NRBC # BLD: 0 /100 WBCS — SIGNIFICANT CHANGE UP (ref 0–0)
PHOSPHATE SERPL-MCNC: 3.2 MG/DL — SIGNIFICANT CHANGE UP (ref 2.5–4.5)
PLATELET # BLD AUTO: 314 K/UL — SIGNIFICANT CHANGE UP (ref 150–400)
POTASSIUM SERPL-MCNC: 4.2 MMOL/L — SIGNIFICANT CHANGE UP (ref 3.5–5.3)
POTASSIUM SERPL-SCNC: 4.2 MMOL/L — SIGNIFICANT CHANGE UP (ref 3.5–5.3)
RBC # BLD: 3.4 M/UL — LOW (ref 4.2–5.8)
RBC # FLD: 17.8 % — HIGH (ref 10.3–14.5)
SODIUM SERPL-SCNC: 135 MMOL/L — SIGNIFICANT CHANGE UP (ref 135–145)
WBC # BLD: 10.49 K/UL — SIGNIFICANT CHANGE UP (ref 3.8–10.5)
WBC # FLD AUTO: 10.49 K/UL — SIGNIFICANT CHANGE UP (ref 3.8–10.5)

## 2020-06-28 RX ORDER — NIFEDIPINE 30 MG
120 TABLET, EXTENDED RELEASE 24 HR ORAL DAILY
Refills: 0 | Status: DISCONTINUED | OUTPATIENT
Start: 2020-06-29 | End: 2020-06-29

## 2020-06-28 RX ORDER — NIFEDIPINE 30 MG
90 TABLET, EXTENDED RELEASE 24 HR ORAL DAILY
Refills: 0 | Status: DISCONTINUED | OUTPATIENT
Start: 2020-06-28 | End: 2020-06-28

## 2020-06-28 RX ORDER — NIFEDIPINE 30 MG
120 TABLET, EXTENDED RELEASE 24 HR ORAL DAILY
Refills: 0 | Status: DISCONTINUED | OUTPATIENT
Start: 2020-06-29 | End: 2020-06-28

## 2020-06-28 RX ADMIN — Medication 90 MILLIGRAM(S): at 06:00

## 2020-06-28 RX ADMIN — HEPARIN SODIUM 5000 UNIT(S): 5000 INJECTION INTRAVENOUS; SUBCUTANEOUS at 15:31

## 2020-06-28 RX ADMIN — Medication 1000 MILLIGRAM(S): at 06:05

## 2020-06-28 RX ADMIN — Medication 200 MILLIGRAM(S): at 15:31

## 2020-06-28 RX ADMIN — Medication 81 MILLIGRAM(S): at 12:13

## 2020-06-28 RX ADMIN — ATORVASTATIN CALCIUM 80 MILLIGRAM(S): 80 TABLET, FILM COATED ORAL at 21:27

## 2020-06-28 RX ADMIN — HEPARIN SODIUM 5000 UNIT(S): 5000 INJECTION INTRAVENOUS; SUBCUTANEOUS at 21:27

## 2020-06-28 RX ADMIN — HEPARIN SODIUM 5000 UNIT(S): 5000 INJECTION INTRAVENOUS; SUBCUTANEOUS at 06:00

## 2020-06-28 RX ADMIN — PANTOPRAZOLE SODIUM 40 MILLIGRAM(S): 20 TABLET, DELAYED RELEASE ORAL at 07:03

## 2020-06-28 RX ADMIN — ALVIMOPAN 12 MILLIGRAM(S): 12 CAPSULE ORAL at 18:22

## 2020-06-28 RX ADMIN — ALVIMOPAN 12 MILLIGRAM(S): 12 CAPSULE ORAL at 06:00

## 2020-06-28 RX ADMIN — Medication 1000 MILLIGRAM(S): at 21:24

## 2020-06-28 RX ADMIN — Medication 200 MILLIGRAM(S): at 01:22

## 2020-06-28 NOTE — PROGRESS NOTE ADULT - ASSESSMENT
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 CT abdomen from 6/16 without intra-abdominal metastasis. Echocardiogram found to have severe AS, for which structural heart is consulted.    Colon ca- T2/N0/M0- tumor size 1.6 x 1.5 x 0.4 cm, neg margins, 0/21 lymph nodes positive, no high risk features. MSI pending. Will likely not need adjuvant therapy with this stage of colon ca. Recommend observation only. Can follow up in the outpt setting.   Anemia- iron def. Hgb improved. S/p IV iron on 6/17/20. May give another dose now.   Valvulopathy- structural following.   Thank you  Will follow with you.    Gilson Rankin MD  [facility] [phone]

## 2020-06-28 NOTE — PROGRESS NOTE ADULT - SUBJECTIVE AND OBJECTIVE BOX
INTERVAL HPI/OVERNIGHT EVENTS:  No overnight events. NAD.      VITAL SIGNS:  Vital Signs Last 24 Hrs  T(C): 36.3 (28 Jun 2020 08:57), Max: 37.1 (28 Jun 2020 05:27)  T(F): 97.4 (28 Jun 2020 08:57), Max: 98.7 (28 Jun 2020 05:27)  HR: 62 (28 Jun 2020 08:35) (52 - 82)  BP: 167/79 (28 Jun 2020 08:35) (163/75 - 182/78)  BP(mean): 113 (28 Jun 2020 08:35) (108 - 124)  RR: 18 (28 Jun 2020 08:35) (17 - 18)  SpO2: 99% (28 Jun 2020 08:35) (96% - 99%)    PHYSICAL EXAM:  Constitutional: NAD, well-groomed, well-developed  HEENT: PERRLA, EOMI, Normal Hearing, MMM  Neck: No LAD, No JVD  Back: Normal spine flexure, No CVA tenderness  Respiratory: CTAB  Cardiovascular: S1 and S2, RRR, no M/G/R  Gastrointestinal: BS+, soft, NT/ND  Extremities: No peripheral edema  Vascular: 2+ peripheral pulses  Neurological: A/O x 3, no focal deficits  Psychiatric: Normal mood, normal affect  Musculoskeletal: 5/5 strength b/l upper and lower extremities  Skin: No rashes        MEDICATIONS  (STANDING):  atorvastatin 80 milliGRAM(s) Oral at bedtime  dextrose 5%. 1000 milliLiter(s) (50 mL/Hr) IV Continuous <Continuous>  dextrose 50% Injectable 12.5 Gram(s) IV Push once  dextrose 50% Injectable 25 Gram(s) IV Push once  dextrose 50% Injectable 25 Gram(s) IV Push once  insulin lispro (HumaLOG) corrective regimen sliding scale   SubCutaneous Before meals and at bedtime  iron sucrose IVPB 200 milliGRAM(s) IV Intermittent every 24 hours  labetalol 200 milliGRAM(s) Oral every 12 hours  NIFEdipine XL 90 milliGRAM(s) Oral daily  pantoprazole  Injectable 40 milliGRAM(s) IV Push every 12 hours    MEDICATIONS  (PRN):  dextrose 40% Gel 15 Gram(s) Oral once PRN Blood Glucose LESS THAN 70 milliGRAM(s)/deciliter  glucagon  Injectable 1 milliGRAM(s) IntraMuscular once PRN Glucose LESS THAN 70 milligrams/deciliter    Allergies  No Known Allergies    LABS:  10.49, 9.9, 314, Cr 0.91,     Path:  T2/N0/M0- tumor size 1.6 x 1.5 x 0.4 cm, negative margins, 0/21 lymph nodes negative, no high risk features, MSI pending.    RADIOLOGY & ADDITIONAL TESTS: Reviewed.

## 2020-06-28 NOTE — PROGRESS NOTE ADULT - SUBJECTIVE AND OBJECTIVE BOX
Pt seen and examined   one big BM, no blood  feels well    REVIEW OF SYSTEMS:  Constitutional: No fever, weight loss or fatigue  Cardiovascular: No chest pain, palpitations, dizziness or leg swelling  Gastrointestinal: No abdominal or epigastric pain. No nausea, vomiting or hematemesis; No diarrhea or constipation. No melena or hematochezia.  Skin: No itching, burning, rashes or lesions       MEDICATIONS:  MEDICATIONS  (STANDING):  acetaminophen   Tablet .. 1000 milliGRAM(s) Oral every 6 hours  alvimopan 12 milliGRAM(s) Oral two times a day  aspirin  chewable 81 milliGRAM(s) Oral daily  atorvastatin 80 milliGRAM(s) Oral at bedtime  BUpivacaine liposome 1.3% Injectable (no eMAR) 20 milliLiter(s) Local Injection once  dextrose 5%. 1000 milliLiter(s) (50 mL/Hr) IV Continuous <Continuous>  dextrose 50% Injectable 12.5 Gram(s) IV Push once  dextrose 50% Injectable 25 Gram(s) IV Push once  dextrose 50% Injectable 25 Gram(s) IV Push once  heparin   Injectable 5000 Unit(s) SubCutaneous every 8 hours  insulin lispro (HumaLOG) corrective regimen sliding scale   SubCutaneous Before meals and at bedtime  labetalol 200 milliGRAM(s) Oral every 12 hours  NIFEdipine XL 90 milliGRAM(s) Oral daily  pantoprazole    Tablet 40 milliGRAM(s) Oral before breakfast    MEDICATIONS  (PRN):  dextrose 40% Gel 15 Gram(s) Oral once PRN Blood Glucose LESS THAN 70 milliGRAM(s)/deciliter  glucagon  Injectable 1 milliGRAM(s) IntraMuscular once PRN Glucose LESS THAN 70 milligrams/deciliter  HYDROmorphone  Injectable 0.5 milliGRAM(s) IV Push every 4 hours PRN breakthrough pain  ondansetron Injectable 4 milliGRAM(s) IV Push every 4 hours PRN Nausea and/or Vomiting      Allergies    No Known Allergies    Intolerances        Vital Signs Last 24 Hrs  T(C): 36.3 (28 Jun 2020 08:57), Max: 37.1 (28 Jun 2020 05:27)  T(F): 97.4 (28 Jun 2020 08:57), Max: 98.7 (28 Jun 2020 05:27)  HR: 62 (28 Jun 2020 08:35) (52 - 82)  BP: 167/79 (28 Jun 2020 08:35) (163/75 - 182/78)  BP(mean): 113 (28 Jun 2020 08:35) (108 - 124)  RR: 18 (28 Jun 2020 08:35) (17 - 18)  SpO2: 99% (28 Jun 2020 08:35) (96% - 99%)    06-28 @ 07:01  -  06-28 @ 11:16  --------------------------------------------------------  IN: 320 mL / OUT: 0 mL / NET: 320 mL        PHYSICAL EXAM:    General:  in no acute distress  HEENT: MMM, conjunctiva and sclera clear  Gastrointestinal: Soft non-tender non-distended; Normal bowel sounds; wounds clean and dry, staples intact  Skin: Warm and dry. No obvious rash    LABS:      CBC Full  -  ( 28 Jun 2020 06:15 )  WBC Count : 10.49 K/uL  RBC Count : 3.40 M/uL  Hemoglobin : 9.9 g/dL  Hematocrit : 31.7 %  Platelet Count - Automated : 314 K/uL  Mean Cell Volume : 93.2 fl  Mean Cell Hemoglobin : 29.1 pg  Mean Cell Hemoglobin Concentration : 31.2 gm/dL  Auto Neutrophil # : x  Auto Lymphocyte # : x  Auto Monocyte # : x  Auto Eosinophil # : x  Auto Basophil # : x  Auto Neutrophil % : x  Auto Lymphocyte % : x  Auto Monocyte % : x  Auto Eosinophil % : x  Auto Basophil % : x    06-28    135  |  102  |  20  ----------------------------<  112<H>  4.2   |  21<L>  |  0.91    Ca    8.5      28 Jun 2020 06:15  Phos  3.2     06-28  Mg     2.1     06-28                        RADIOLOGY & ADDITIONAL STUDIES (The following images were personally reviewed):

## 2020-06-28 NOTE — PROGRESS NOTE ADULT - ASSESSMENT
69 Yo Male with Pmhx of Essential HTN, Type II DM, HLD, CAD, extensive PAD s/p right and left LE FEM-pop bypass c/b occluded CFA-AK pop bypass s/p balloon embolectomy on Xarelto and aspirin who presented with symptomatic acute blood loss anemia in context of GI mass, diagnosed with colonic adenocarcinoma with course Complicated by incendental finding of Severe Aortic Stenosis now s/p R/LHC by structural team now s/p Right Hemicolectomy, with post op course complicated by Anemia requiring transfusion, now with continuous bloody BM       1) ASCVD  -Patient with known Atherosclerotic Cardiovascular burden: single vessel CAD not intervened on, known PAD with recent intervention, HTN, HLD and previous smoking History  - Echo showed preserved EF and there was concerned over Severity of AS.  -Patient  s/p Right and Left heart Cath. RHC showing Moderate AS. No acute indication for BAV or intervention at this time   -Recommend continuing lipitor 80.   -Would Discontinue Aspirin 81 mg given large Melena/Bloody BM  -Recommend continued blood pressure control with Labetolol 200 BID and Nifedipine 90 with strict holding parameters.  -Can increase Nifedipine 120mg daily if still having trouble controlling BP. If HR <60 bpm, would hold.   -Since received Nifedipine 90 mg, can give additional 30mg and increase dose for tomorrow. Use hold parameters.   -No events on telemetry. If needs to be stepped down from telemetry, stable from cardiac perspective.       2) Moderate-Severe AS  -Patient with TTE showing severe aortic stenosis with a  peak transvalvular velocity of 3.80 m/s,  mean transvalvular gradient of 29.00 mmHg, and a LVOT/AV velocity ratio is 0.23. The peak transaortic gradient is 57.76 mmHg with a AOVA of 0.9 cm². There is mild aortic regurgitation.  -Right heart cath showing Moderate AS.  -Patient with Severe AS are proned to arrythmias. Patient with 8 Beats of NSVT 2 nigth ago. Recommend keeping on BB and ensuring K>4 and Mg>2  -Please ensure patient remains euvolemic.   -Patient will need close outpatient follow up with structural heart team with echo surveillance.      Discussed with attending.

## 2020-06-28 NOTE — PROGRESS NOTE ADULT - ASSESSMENT
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 CEA 1.6. Cardiac cath 6/19 shows 1 vessel CAD, moderate AS. Now s/p open right hemicolectomy 6/23 POD 5     LRD/IVF   Entereg  Pain/Nausea control  HSQ  ASA 81, s/p cardiac cath  AM labs

## 2020-06-28 NOTE — PROGRESS NOTE ADULT - SUBJECTIVE AND OBJECTIVE BOX
INTERVAL EVENTS: Patient doing well. Denies complaints.     PAST MEDICAL & SURGICAL HISTORY:  PAD (peripheral artery disease)  DM (diabetes mellitus)  CAD (coronary artery disease)  Essential hypertension: Hypertension  S/P femoral-femoral bypass surgery: left 2016  Elective surgery: right leg angiogram with bypass  july 2016  History of hernia repair: june 2014  Other postprocedural status: June 2014      MEDICATIONS  (STANDING):  acetaminophen   Tablet .. 1000 milliGRAM(s) Oral every 6 hours  alvimopan 12 milliGRAM(s) Oral two times a day  aspirin  chewable 81 milliGRAM(s) Oral daily  atorvastatin 80 milliGRAM(s) Oral at bedtime  BUpivacaine liposome 1.3% Injectable (no eMAR) 20 milliLiter(s) Local Injection once  dextrose 5%. 1000 milliLiter(s) (50 mL/Hr) IV Continuous <Continuous>  dextrose 50% Injectable 12.5 Gram(s) IV Push once  dextrose 50% Injectable 25 Gram(s) IV Push once  dextrose 50% Injectable 25 Gram(s) IV Push once  heparin   Injectable 5000 Unit(s) SubCutaneous every 8 hours  insulin lispro (HumaLOG) corrective regimen sliding scale   SubCutaneous Before meals and at bedtime  labetalol 200 milliGRAM(s) Oral every 12 hours  pantoprazole    Tablet 40 milliGRAM(s) Oral before breakfast    MEDICATIONS  (PRN):  dextrose 40% Gel 15 Gram(s) Oral once PRN Blood Glucose LESS THAN 70 milliGRAM(s)/deciliter  glucagon  Injectable 1 milliGRAM(s) IntraMuscular once PRN Glucose LESS THAN 70 milligrams/deciliter  HYDROmorphone  Injectable 0.5 milliGRAM(s) IV Push every 4 hours PRN breakthrough pain  ondansetron Injectable 4 milliGRAM(s) IV Push every 4 hours PRN Nausea and/or Vomiting      Vital Signs Last 24 Hrs  T(C): 36.7 (28 Jun 2020 17:30), Max: 37.1 (28 Jun 2020 05:27)  T(F): 98.1 (28 Jun 2020 17:30), Max: 98.7 (28 Jun 2020 05:27)  HR: 62 (28 Jun 2020 08:35) (52 - 66)  BP: 167/79 (28 Jun 2020 08:35) (167/79 - 182/78)  BP(mean): 113 (28 Jun 2020 08:35) (112 - 124)  RR: 18 (28 Jun 2020 08:35) (17 - 18)  SpO2: 99% (28 Jun 2020 08:35) (96% - 99%)     PHYSICAL EXAM:  constitutional: Comfortable in NAD  Head: NC/AT  ENT: MMM  Neck: supple; no JVD   Respiratory: CTA B/L;   Cardiac: +S1; delayed faint S2; RRR; 3/6 Systolic murmur heard through the pericardium   Gastrointestinal: soft, midlly distended; No guarding; +bs  Extremities: WWP, no clubbing or cyanosis; no peripheral edema  Vascular: 2+ radial,, DP/PT pulses B/L  Neurologic: AAOx3; CNII-XII grossly intact; no focal deficits        LABS:                        9.9    10.49 )-----------( 314      ( 28 Jun 2020 06:15 )             31.7     06-28    135  |  102  |  20  ----------------------------<  112<H>  4.2   |  21<L>  |  0.91    Ca    8.5      28 Jun 2020 06:15  Phos  3.2     06-28  Mg     2.1     06-28              I&O's Summary    28 Jun 2020 07:01  -  28 Jun 2020 18:05  --------------------------------------------------------  IN: 320 mL / OUT: 0 mL / NET: 320 mL      BNP  RADIOLOGY & ADDITIONAL STUDIES:    TELEMETRY:    EKG:

## 2020-06-28 NOTE — PROGRESS NOTE ADULT - SUBJECTIVE AND OBJECTIVE BOX
SUBJECTIVE: Patient seen and examined at bedside with chief. Pt seen and examined by chief resident. Pt is doing well, resting comfortably on bed. Pain controlled. Diet tolerated. Ambulating out of bed. +F/+BM. No nausea or vomiting. No complaints at this time.   Patient denies fever, chills, chest pain, or shortness of breathe.       MEDICATIONS  (STANDING):  acetaminophen   Tablet .. 1000 milliGRAM(s) Oral every 6 hours  alvimopan 12 milliGRAM(s) Oral two times a day  aspirin  chewable 81 milliGRAM(s) Oral daily  atorvastatin 80 milliGRAM(s) Oral at bedtime  BUpivacaine liposome 1.3% Injectable (no eMAR) 20 milliLiter(s) Local Injection once  dextrose 5%. 1000 milliLiter(s) (50 mL/Hr) IV Continuous <Continuous>  dextrose 50% Injectable 12.5 Gram(s) IV Push once  dextrose 50% Injectable 25 Gram(s) IV Push once  dextrose 50% Injectable 25 Gram(s) IV Push once  heparin   Injectable 5000 Unit(s) SubCutaneous every 8 hours  insulin lispro (HumaLOG) corrective regimen sliding scale   SubCutaneous Before meals and at bedtime  labetalol 200 milliGRAM(s) Oral every 12 hours  NIFEdipine XL 90 milliGRAM(s) Oral daily  pantoprazole    Tablet 40 milliGRAM(s) Oral before breakfast    MEDICATIONS  (PRN):  dextrose 40% Gel 15 Gram(s) Oral once PRN Blood Glucose LESS THAN 70 milliGRAM(s)/deciliter  glucagon  Injectable 1 milliGRAM(s) IntraMuscular once PRN Glucose LESS THAN 70 milligrams/deciliter  HYDROmorphone  Injectable 0.5 milliGRAM(s) IV Push every 4 hours PRN breakthrough pain  ondansetron Injectable 4 milliGRAM(s) IV Push every 4 hours PRN Nausea and/or Vomiting      Vital Signs Last 24 Hrs  T(C): 36.3 (28 Jun 2020 08:57), Max: 37.1 (28 Jun 2020 05:27)  T(F): 97.4 (28 Jun 2020 08:57), Max: 98.7 (28 Jun 2020 05:27)  HR: 62 (28 Jun 2020 08:35) (52 - 82)  BP: 167/79 (28 Jun 2020 08:35) (163/75 - 182/78)  BP(mean): 113 (28 Jun 2020 08:35) (108 - 124)  RR: 18 (28 Jun 2020 08:35) (17 - 18)  SpO2: 99% (28 Jun 2020 08:35) (96% - 99%)    Physical Exam:  GENERAL: NAD, Resting comfortably in bed  RESP: Nonlabored breathing, No respiratory distress  CARD: Normal rate, Normal peripheral perfusion  GI: Soft, NT, ND, no guarding, no rebound tenderness, staples + incisions are clean, dry without drainage and intact   EXTREM: WWP, No edema, SCDs in place    I&O's Summary    28 Jun 2020 07:01  -  28 Jun 2020 11:28  --------------------------------------------------------  IN: 320 mL / OUT: 0 mL / NET: 320 mL        LABS:                        9.9    10.49 )-----------( 314      ( 28 Jun 2020 06:15 )             31.7     06-28    135  |  102  |  20  ----------------------------<  112<H>  4.2   |  21<L>  |  0.91    Ca    8.5      28 Jun 2020 06:15  Phos  3.2     06-28  Mg     2.1     06-28          CAPILLARY BLOOD GLUCOSE      POCT Blood Glucose.: 121 mg/dL (28 Jun 2020 05:38)  POCT Blood Glucose.: 109 mg/dL (27 Jun 2020 21:38)  POCT Blood Glucose.: 107 mg/dL (27 Jun 2020 16:44)

## 2020-06-29 ENCOUNTER — TRANSCRIPTION ENCOUNTER (OUTPATIENT)
Age: 70
End: 2020-06-29

## 2020-06-29 VITALS
OXYGEN SATURATION: 97 % | RESPIRATION RATE: 16 BRPM | SYSTOLIC BLOOD PRESSURE: 114 MMHG | HEART RATE: 88 BPM | DIASTOLIC BLOOD PRESSURE: 65 MMHG

## 2020-06-29 LAB
GLUCOSE BLDC GLUCOMTR-MCNC: 129 MG/DL — HIGH (ref 70–99)
GLUCOSE BLDC GLUCOMTR-MCNC: 168 MG/DL — HIGH (ref 70–99)

## 2020-06-29 PROCEDURE — 83550 IRON BINDING TEST: CPT

## 2020-06-29 PROCEDURE — 80053 COMPREHEN METABOLIC PANEL: CPT

## 2020-06-29 PROCEDURE — 83010 ASSAY OF HAPTOGLOBIN QUANT: CPT

## 2020-06-29 PROCEDURE — 71260 CT THORAX DX C+: CPT

## 2020-06-29 PROCEDURE — 74174 CTA ABD&PLVS W/CONTRAST: CPT

## 2020-06-29 PROCEDURE — 74177 CT ABD & PELVIS W/CONTRAST: CPT

## 2020-06-29 PROCEDURE — 36430 TRANSFUSION BLD/BLD COMPNT: CPT

## 2020-06-29 PROCEDURE — 86803 HEPATITIS C AB TEST: CPT

## 2020-06-29 PROCEDURE — 81001 URINALYSIS AUTO W/SCOPE: CPT

## 2020-06-29 PROCEDURE — 82962 GLUCOSE BLOOD TEST: CPT

## 2020-06-29 PROCEDURE — 80061 LIPID PANEL: CPT

## 2020-06-29 PROCEDURE — 85730 THROMBOPLASTIN TIME PARTIAL: CPT

## 2020-06-29 PROCEDURE — 85045 AUTOMATED RETICULOCYTE COUNT: CPT

## 2020-06-29 PROCEDURE — 99292 CRITICAL CARE ADDL 30 MIN: CPT | Mod: 25

## 2020-06-29 PROCEDURE — 82550 ASSAY OF CK (CPK): CPT

## 2020-06-29 PROCEDURE — C1769: CPT

## 2020-06-29 PROCEDURE — 87635 SARS-COV-2 COVID-19 AMP PRB: CPT

## 2020-06-29 PROCEDURE — 93306 TTE W/DOPPLER COMPLETE: CPT

## 2020-06-29 PROCEDURE — 84100 ASSAY OF PHOSPHORUS: CPT

## 2020-06-29 PROCEDURE — 93005 ELECTROCARDIOGRAM TRACING: CPT

## 2020-06-29 PROCEDURE — 88309 TISSUE EXAM BY PATHOLOGIST: CPT

## 2020-06-29 PROCEDURE — 87324 CLOSTRIDIUM AG IA: CPT

## 2020-06-29 PROCEDURE — C1887: CPT

## 2020-06-29 PROCEDURE — 84484 ASSAY OF TROPONIN QUANT: CPT

## 2020-06-29 PROCEDURE — 71045 X-RAY EXAM CHEST 1 VIEW: CPT

## 2020-06-29 PROCEDURE — 93925 LOWER EXTREMITY STUDY: CPT

## 2020-06-29 PROCEDURE — 84145 PROCALCITONIN (PCT): CPT

## 2020-06-29 PROCEDURE — 82553 CREATINE MB FRACTION: CPT

## 2020-06-29 PROCEDURE — 85610 PROTHROMBIN TIME: CPT

## 2020-06-29 PROCEDURE — 86923 COMPATIBILITY TEST ELECTRIC: CPT

## 2020-06-29 PROCEDURE — 84443 ASSAY THYROID STIM HORMONE: CPT

## 2020-06-29 PROCEDURE — 87449 NOS EACH ORGANISM AG IA: CPT

## 2020-06-29 PROCEDURE — C1894: CPT

## 2020-06-29 PROCEDURE — 36415 COLL VENOUS BLD VENIPUNCTURE: CPT

## 2020-06-29 PROCEDURE — 85027 COMPLETE CBC AUTOMATED: CPT

## 2020-06-29 PROCEDURE — C1889: CPT

## 2020-06-29 PROCEDURE — 86850 RBC ANTIBODY SCREEN: CPT

## 2020-06-29 PROCEDURE — 70498 CT ANGIOGRAPHY NECK: CPT

## 2020-06-29 PROCEDURE — 88305 TISSUE EXAM BY PATHOLOGIST: CPT

## 2020-06-29 PROCEDURE — 86901 BLOOD TYPING SEROLOGIC RH(D): CPT

## 2020-06-29 PROCEDURE — 96374 THER/PROPH/DIAG INJ IV PUSH: CPT | Mod: XU

## 2020-06-29 PROCEDURE — 83690 ASSAY OF LIPASE: CPT

## 2020-06-29 PROCEDURE — 83540 ASSAY OF IRON: CPT

## 2020-06-29 PROCEDURE — 99291 CRITICAL CARE FIRST HOUR: CPT | Mod: 25

## 2020-06-29 PROCEDURE — 82728 ASSAY OF FERRITIN: CPT

## 2020-06-29 PROCEDURE — 80048 BASIC METABOLIC PNL TOTAL CA: CPT

## 2020-06-29 PROCEDURE — 70450 CT HEAD/BRAIN W/O DYE: CPT

## 2020-06-29 PROCEDURE — 70496 CT ANGIOGRAPHY HEAD: CPT

## 2020-06-29 PROCEDURE — 83735 ASSAY OF MAGNESIUM: CPT

## 2020-06-29 PROCEDURE — 81003 URINALYSIS AUTO W/O SCOPE: CPT

## 2020-06-29 PROCEDURE — 83615 LACTATE (LD) (LDH) ENZYME: CPT

## 2020-06-29 PROCEDURE — P9016: CPT

## 2020-06-29 PROCEDURE — 0042T: CPT

## 2020-06-29 PROCEDURE — 82378 CARCINOEMBRYONIC ANTIGEN: CPT

## 2020-06-29 PROCEDURE — 75573 CT HRT C+ STRUX CGEN HRT DS: CPT

## 2020-06-29 PROCEDURE — 85025 COMPLETE CBC W/AUTO DIFF WBC: CPT

## 2020-06-29 PROCEDURE — 83036 HEMOGLOBIN GLYCOSYLATED A1C: CPT

## 2020-06-29 RX ORDER — DOCUSATE SODIUM 100 MG
1 CAPSULE ORAL
Qty: 16 | Refills: 0
Start: 2020-06-29 | End: 2020-07-06

## 2020-06-29 RX ORDER — RIVAROXABAN 15 MG-20MG
1 KIT ORAL
Qty: 0 | Refills: 0 | DISCHARGE

## 2020-06-29 RX ORDER — OXYCODONE AND ACETAMINOPHEN 5; 325 MG/1; MG/1
1 TABLET ORAL
Qty: 12 | Refills: 0
Start: 2020-06-29 | End: 2020-07-01

## 2020-06-29 RX ADMIN — Medication 200 MILLIGRAM(S): at 03:15

## 2020-06-29 RX ADMIN — Medication 2: at 11:56

## 2020-06-29 RX ADMIN — Medication 81 MILLIGRAM(S): at 12:11

## 2020-06-29 RX ADMIN — ALVIMOPAN 12 MILLIGRAM(S): 12 CAPSULE ORAL at 06:04

## 2020-06-29 RX ADMIN — HEPARIN SODIUM 5000 UNIT(S): 5000 INJECTION INTRAVENOUS; SUBCUTANEOUS at 06:04

## 2020-06-29 RX ADMIN — PANTOPRAZOLE SODIUM 40 MILLIGRAM(S): 20 TABLET, DELAYED RELEASE ORAL at 06:05

## 2020-06-29 RX ADMIN — Medication 120 MILLIGRAM(S): at 06:04

## 2020-06-29 RX ADMIN — Medication 1000 MILLIGRAM(S): at 09:32

## 2020-06-29 NOTE — DISCHARGE NOTE PROVIDER - NSDCCPTREATMENT_GEN_ALL_CORE_FT
PRINCIPAL PROCEDURE  Procedure: Right hemicolectomy  Findings and Treatment: Please continue a LOW-FIBER DIET. Listed below are some foods you may eat and those you should avoid.   --Allowed foods:  White bread without nuts and seeds  White rice, plain white pasta, and crackers  Refined hot cereals, such as Cream of Wheat, or cold cereals with less than 1 gram of fiber per serving  Pancakes or waffles made from white refined flour  Most canned or well-cooked vegetables and fruits without skins or seeds  Fruit and vegetable juice with little or no pulp, fruit-flavored drinks, and flavored stevenson  Tender meat, poultry, fish, eggs and tofu  Milk and foods made from milk — such as yogurt, pudding, ice cream, cheeses and sour cream — if tolerated  Butter, margarine, oils and salad dressings without seeds  --Foods to avoid:  Whole-wheat or whole-grain breads, cereals and pasta  Brown or wild rice and other whole grains, such as oats, kasha, barley and quinoa  Dried fruits and prune juice  Raw fruit, including those with seeds, skin or membranes, such as berries  Raw or undercooked vegetables, including corn  Dried beans, peas and lentils  Seeds and nuts and foods containing them, including peanut butter and other nut butters  Coconut  Popcorn      SECONDARY PROCEDURE  Procedure: Complete cardiac catheterization  Findings and Treatment: AS is moderate, lesion mid LCx, may need PCI in the future  Outpatient follow up for AS with Dr. Perez after colon

## 2020-06-29 NOTE — DISCHARGE NOTE PROVIDER - HOSPITAL COURSE
69M PMH of HTN, DM, HLD, PAD, CAD, PAD s/p right and left LE FEM-pop bypass in (R in 2016), recent admission for occluded CFA-AK pop bypass s/p Jayme balloon embolectomy (on xarelto and aspirin (3/2020)) who present today c/o cool hands, diarrhea, dizziness, weakness, and blood in stool for 4 days, last three days before presentation.  Pt is Pashto speaking, poor historian, repeating that diarrhea has resolved and fixated on new onset of yellow discoloration of his hands. Hx obtained via daughter Kerry ([phone] lives with pt). Was able to say had about two loose bowel movements with very dark bloody stool that filled up bowl, denied ever bleeding without BM).  Pt has possibly not been taking Xarelto for past several weeks because all his Xarelto bottles were empty. Upon arrival to ED, patient was found to be anemic Hb 4.2 (last Hb 9 in March 2020). During ED exam, found to be acutely lethargic and stroke code called for AMS. Per neuro note: last known normal 2:41pm, no acute changes in CTH, chronic R basal ganglia infarct, no TPA given. Upon CC team exam, pt alert and oriented to self and hospital, not date, thinks Gerson is president. Daughter reports worsening memory last several years. Reports that his dizziness and weakness has resolved, never had CP/pressure, nausea, vomiting or abdominal pain, changes to urine. Denies fevers, chills, cough, SoB, changes in exercise tolerance, no sick contacts. Patient was transfused 2 units of blood early midnight of 6/14 with repeat CBC of 7.8. Patient was made NPO, bowel prepped and sent for colonoscopy on 6/14, with 1 unit of PRBC given. Colonoscopy found a small ulcerating along the cecum which showed intramucosal adenocarcinoma. Patient had CT chest/A/P which showed the mass but no mets. General surgery consulted for mass and vascular consulted for anticoagulation and work up for B/L LE duplex and cardiac cath which showed moderate AS, no intervention necessary. Patient started on heparin ggt on 6/20 and was dc'ed prior to right hemicolectomy on 6/22. Patient went to the OR on 6/23 for open right hemicolectomy. on 6/24, patient was found to have a bloody bowel movement, CBC was 7.0, made NPO, transfused 2 units of PRBC, repeat CBC showed 9.7/30.4. Patient experienced dark bloody diarrhea on 6/26, c diff panel was sent which was negative. His postoperative course was unremarkable with advancement of diet, passing trial of void, and pain control. On day of discharge patient was stable to be d/c'd home. 69M PMH of HTN, DM, HLD, PAD, CAD, PAD s/p right and left LE FEM-pop bypass in (R in 2016), recent admission for occluded CFA-AK pop bypass s/p Jayme balloon embolectomy (on xarelto and aspirin (3/2020)) who present today c/o cool hands, diarrhea, dizziness, weakness, and blood in stool for 4 days, last three days before presentation.  Pt is Uzbek speaking, poor historian, repeating that diarrhea has resolved and fixated on new onset of yellow discoloration of his hands. Hx obtained via daughter Kerry ([phone] lives with pt). Was able to say had about two loose bowel movements with very dark bloody stool that filled up bowl, denied ever bleeding without BM).  Pt has possibly not been taking Xarelto for past several weeks because all his Xarelto bottles were empty. Upon arrival to ED, patient was found to be anemic Hb 4.2 (last Hb 9 in March 2020). During ED exam, found to be acutely lethargic and stroke code called for AMS. Per neuro note: last known normal 2:41pm, no acute changes in CTH, chronic R basal ganglia infarct, no TPA given. Upon CC team exam, pt alert and oriented to self and hospital, not date, thinks Gerson is president. Daughter reports worsening memory last several years. Reports that his dizziness and weakness has resolved, never had CP/pressure, nausea, vomiting or abdominal pain, changes to urine. Denies fevers, chills, cough, SoB, changes in exercise tolerance, no sick contacts. Patient was transfused 2 units of blood early midnight of 6/14 with repeat CBC of 7.8. Patient was made NPO, bowel prepped and sent for colonoscopy on 6/14, with 1 unit of PRBC given. Colonoscopy found a small ulcerating along the cecum which showed intramucosal adenocarcinoma. Patient had CT chest/A/P which showed the mass but no mets. General surgery consulted for mass and vascular consulted for anticoagulation and work up for B/L LE duplex and cardiac cath which showed moderate AS, no intervention at this time. Patient started on heparin ggt on 6/20 and was dc'ed prior to right hemicolectomy on 6/22. Patient went to the OR on 6/23 for open right hemicolectomy. on 6/24, patient was found to have a bloody bowel movement, CBC was 7.0, made NPO, transfused 2 units of PRBC, repeat CBC showed 9.7/30.4. Patient experienced dark bloody diarrhea on 6/26, c diff panel was sent which was negative. His postoperative course was unremarkable with advancement of diet, passing trial of void, and pain control. On day of discharge patient was stable to be d/c'd home.

## 2020-06-29 NOTE — DISCHARGE NOTE PROVIDER - NSDCMRMEDTOKEN_GEN_ALL_CORE_FT
aspirin 81 mg oral delayed release tablet: 1 tab(s) orally once a day  azilsartan-chlorthalidone 40 mg-12.5 mg oral tablet: 1 tab(s) orally once a day  Colace 100 mg oral capsule: 1 cap(s) orally 2 times a day, As Needed for constipation MDD:2 tabs   labetalol 200 mg oral tablet: 1 tab(s) orally 2 times a day  metFORMIN 500 mg oral tablet: 1  orally 2 times a day  Multiple Vitamins oral tablet: 1 tab(s) orally once a day  NIFEdipine 90 mg oral tablet, extended release: 1 tab(s) orally once a day  oxycodone-acetaminophen 5 mg-325 mg oral tablet: 1 tab(s) orally every 6 hours, As Needed -for severe pain MDD:4 tablets

## 2020-06-29 NOTE — PROGRESS NOTE ADULT - SUBJECTIVE AND OBJECTIVE BOX
Pt seen and examined   no complaints  appetite good    REVIEW OF SYSTEMS:  Constitutional: No fever, weight loss or fatigue  Cardiovascular: No chest pain, palpitations, dizziness or leg swelling  Gastrointestinal: No abdominal or epigastric pain. No nausea, vomiting or hematemesis; No diarrhea or constipation. No melena or hematochezia.  Skin: No itching, burning, rashes or lesions       MEDICATIONS:  MEDICATIONS  (STANDING):  acetaminophen   Tablet .. 1000 milliGRAM(s) Oral every 6 hours  alvimopan 12 milliGRAM(s) Oral two times a day  aspirin  chewable 81 milliGRAM(s) Oral daily  atorvastatin 80 milliGRAM(s) Oral at bedtime  BUpivacaine liposome 1.3% Injectable (no eMAR) 20 milliLiter(s) Local Injection once  dextrose 5%. 1000 milliLiter(s) (50 mL/Hr) IV Continuous <Continuous>  dextrose 50% Injectable 12.5 Gram(s) IV Push once  dextrose 50% Injectable 25 Gram(s) IV Push once  dextrose 50% Injectable 25 Gram(s) IV Push once  heparin   Injectable 5000 Unit(s) SubCutaneous every 8 hours  insulin lispro (HumaLOG) corrective regimen sliding scale   SubCutaneous Before meals and at bedtime  labetalol 200 milliGRAM(s) Oral every 12 hours  NIFEdipine  milliGRAM(s) Oral daily  pantoprazole    Tablet 40 milliGRAM(s) Oral before breakfast    MEDICATIONS  (PRN):  dextrose 40% Gel 15 Gram(s) Oral once PRN Blood Glucose LESS THAN 70 milliGRAM(s)/deciliter  glucagon  Injectable 1 milliGRAM(s) IntraMuscular once PRN Glucose LESS THAN 70 milligrams/deciliter  HYDROmorphone  Injectable 0.5 milliGRAM(s) IV Push every 4 hours PRN breakthrough pain  ondansetron Injectable 4 milliGRAM(s) IV Push every 4 hours PRN Nausea and/or Vomiting      Allergies    No Known Allergies    Intolerances        Vital Signs Last 24 Hrs  T(C): 37 (29 Jun 2020 05:00), Max: 37 (29 Jun 2020 05:00)  T(F): 98.6 (29 Jun 2020 05:00), Max: 98.6 (29 Jun 2020 05:00)  HR: 90 (29 Jun 2020 08:46) (54 - 90)  BP: 107/69 (29 Jun 2020 08:46) (107/69 - 174/83)  BP(mean): 84 (29 Jun 2020 08:46) (84 - 119)  RR: 18 (29 Jun 2020 08:46) (18 - 18)  SpO2: 98% (29 Jun 2020 08:46) (96% - 98%)    06-28 @ 07:01  -  06-29 @ 07:00  --------------------------------------------------------  IN: 320 mL / OUT: 725 mL / NET: -405 mL    06-29 @ 07:01 - 06-29 @ 12:14  --------------------------------------------------------  IN: 480 mL / OUT: 0 mL / NET: 480 mL        PHYSICAL EXAM:    General: Well developed; well nourished; in no acute distress  HEENT: MMM, conjunctiva and sclera clear  Gastrointestinal: Soft non-tender non-distended; Normal bowel sounds; No hepatosplenomegaly  Skin: Warm and dry. No obvious rash    LABS:      CBC Full  -  ( 28 Jun 2020 06:15 )  WBC Count : 10.49 K/uL  RBC Count : 3.40 M/uL  Hemoglobin : 9.9 g/dL  Hematocrit : 31.7 %  Platelet Count - Automated : 314 K/uL  Mean Cell Volume : 93.2 fl  Mean Cell Hemoglobin : 29.1 pg  Mean Cell Hemoglobin Concentration : 31.2 gm/dL  Auto Neutrophil # : x  Auto Lymphocyte # : x  Auto Monocyte # : x  Auto Eosinophil # : x  Auto Basophil # : x  Auto Neutrophil % : x  Auto Lymphocyte % : x  Auto Monocyte % : x  Auto Eosinophil % : x  Auto Basophil % : x    06-28    135  |  102  |  20  ----------------------------<  112<H>  4.2   |  21<L>  |  0.91    Ca    8.5      28 Jun 2020 06:15  Phos  3.2     06-28  Mg     2.1     06-28                        RADIOLOGY & ADDITIONAL STUDIES (The following images were personally reviewed):

## 2020-06-29 NOTE — PROGRESS NOTE ADULT - ASSESSMENT
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 CEA 1.6. Cardiac cath 6/19 shows 1 vessel CAD, moderate AS. Now s/p open right hemicolectomy 6/23 POD 6    DC home today   Possible restart Xarelto prior to DC   LRD/IVF   Entereg  Pain/Nausea control  HSQ  ASA 81, s/p cardiac cath

## 2020-06-29 NOTE — DISCHARGE NOTE PROVIDER - PROVIDER TOKENS
PROVIDER:[TOKEN:[[id number]],FOLLOWUP:[1 week]],PROVIDER:[TOKEN:[[id number]],FOLLOWUP:[1 week]],PROVIDER:[TOKEN:[[id number]],FOLLOWUP:[1 week]] PROVIDER:[TOKEN:[[id number]],FOLLOWUP:[1 week]],PROVIDER:[TOKEN:[[id number]],FOLLOWUP:[1 week]],PROVIDER:[TOKEN:[[id number]],FOLLOWUP:[1 week]],PROVIDER:[TOKEN:[[id number]],FOLLOWUP:[1 week]]

## 2020-06-29 NOTE — DISCHARGE NOTE PROVIDER - CARE PROVIDERS DIRECT ADDRESSES
,DirectAddress_Unknown,DirectAddress_Unknown,DirectAddress_Unknown ,DirectAddress_Unknown,DirectAddress_Unknown,DirectAddress_Unknown,[email or web address]

## 2020-06-29 NOTE — DISCHARGE NOTE PROVIDER - NSDCCPCAREPLAN_GEN_ALL_CORE_FT
PRINCIPAL DISCHARGE DIAGNOSIS  Diagnosis: Rectal bleeding  Assessment and Plan of Treatment: Please resume all regular home medications unless specifically advised not to take a particular medication. Also, please take any new medications as prescribed.  Please get plenty of rest, continue to ambulate several times per day, and drink adequate amounts of fluids. Avoid lifting weights greater than 5-10 lbs until you follow-up with your surgeon, who will instruct you further regarding activity restrictions.  Avoid driving or operating heavy machinery while taking pain medications.  Please follow-up with your surgeon and Primary Care Provider (PCP) as advised.  Incision Care:  *Please call your doctor or nurse practitioner if you have increased pain, swelling, redness, or drainage from the incision site.  *Avoid swimming and baths until your follow-up appointment.  *You may shower, and wash surgical incisions with a mild soap and warm water. Gently pat the area dry.  *If you have staples, they will be removed at your follow-up appointment.  Warning Signs:  Please call your doctor or nurse practitioner if you experience the following:  *You experience new chest pain, pressure, squeezing or tightness.  *New or worsening cough, shortness of breath, or wheeze.  *If you are vomiting and cannot keep down fluids or your medications.  *You are getting dehydrated due to continued vomiting, diarrhea, or other reasons. Signs of dehydration include dry mouth, rapid heartbeat, or feeling dizzy or faint when standing.  *You see blood or dark/black material when you vomit or have a bowel movement.  *You experience burning when you urinate, have blood in your urine, or experience a discharge.  *Your pain is not improving within 8-12 hours or is not gone within 24 hours. Call or return immediately if your pain is getting worse, changes location, or moves to your chest or back.  *You have shaking chills, or fever greater than 101.5 degrees Fahrenheit or 38 degrees Celsius.  *Any change in your symptoms, or any new symptoms that concern you.      SECONDARY DISCHARGE DIAGNOSES  Diagnosis: Confusion  Assessment and Plan of Treatment:     Diagnosis: CAD (coronary artery disease)  Assessment and Plan of Treatment: CAD (coronary artery disease)    Diagnosis: Anemia  Assessment and Plan of Treatment: PLEASE FOLLOW UP OUTPATIENT WITH HEME/ONC outpatient 1-2 weeks in regards to your anemia. PRINCIPAL DISCHARGE DIAGNOSIS  Diagnosis: Rectal bleeding  Assessment and Plan of Treatment: Please resume all regular home medications unless specifically advised not to take a particular medication. Also, please take any new medications as prescribed.  Please get plenty of rest, continue to ambulate several times per day, and drink adequate amounts of fluids. Avoid lifting weights greater than 5-10 lbs until you follow-up with your surgeon, who will instruct you further regarding activity restrictions.  Avoid driving or operating heavy machinery while taking pain medications.  Please follow-up with your surgeon and Primary Care Provider (PCP) as advised.  Incision Care:  *Please call your doctor or nurse practitioner if you have increased pain, swelling, redness, or drainage from the incision site.  *Avoid swimming and baths until your follow-up appointment.  *You may shower, and wash surgical incisions with a mild soap and warm water. Gently pat the area dry.  *If you have staples, they will be removed at your follow-up appointment.  Warning Signs:  Please call your doctor or nurse practitioner if you experience the following:  *You experience new chest pain, pressure, squeezing or tightness.  *New or worsening cough, shortness of breath, or wheeze.  *If you are vomiting and cannot keep down fluids or your medications.  *You are getting dehydrated due to continued vomiting, diarrhea, or other reasons. Signs of dehydration include dry mouth, rapid heartbeat, or feeling dizzy or faint when standing.  *You see blood or dark/black material when you vomit or have a bowel movement.  *You experience burning when you urinate, have blood in your urine, or experience a discharge.  *Your pain is not improving within 8-12 hours or is not gone within 24 hours. Call or return immediately if your pain is getting worse, changes location, or moves to your chest or back.  *You have shaking chills, or fever greater than 101.5 degrees Fahrenheit or 38 degrees Celsius.  *Any change in your symptoms, or any new symptoms that concern you.      SECONDARY DISCHARGE DIAGNOSES  Diagnosis: Aortic stenosis  Assessment and Plan of Treatment: PLEASE RESTART YOUR HOME XARELTO ON MONDAY JULY 6TH.    Diagnosis: CAD (coronary artery disease)  Assessment and Plan of Treatment: Please follow up outpatient with Dr. Perez outpatient in 1 week in regards to your severe aortic stenosis, coronary arterial disease and peripheral arterial disease. AS is moderate, lesion mid LCx, may need PCI in the future      Diagnosis: Confusion  Assessment and Plan of Treatment:     Diagnosis: Anemia  Assessment and Plan of Treatment: Please follow up outpatient with Dr. Rankin outpatient 1 week in regards to your anemia. PRINCIPAL DISCHARGE DIAGNOSIS  Diagnosis: Rectal bleeding  Assessment and Plan of Treatment: Please resume all regular home medications unless specifically advised not to take a particular medication. Also, please take any new medications as prescribed.  Please get plenty of rest, continue to ambulate several times per day, and drink adequate amounts of fluids. Avoid lifting weights greater than 5-10 lbs until you follow-up with your surgeon, who will instruct you further regarding activity restrictions.  Avoid driving or operating heavy machinery while taking pain medications.  Please follow-up with your surgeon and Primary Care Provider (PCP) as advised.  Incision Care:  *Please call your doctor or nurse practitioner if you have increased pain, swelling, redness, or drainage from the incision site.  *Avoid swimming and baths until your follow-up appointment.  *You may shower, and wash surgical incisions with a mild soap and warm water. Gently pat the area dry.  *If you have staples, they will be removed at your follow-up appointment.  Warning Signs:  Please call your doctor or nurse practitioner if you experience the following:  *You experience new chest pain, pressure, squeezing or tightness.  *New or worsening cough, shortness of breath, or wheeze.  *If you are vomiting and cannot keep down fluids or your medications.  *You are getting dehydrated due to continued vomiting, diarrhea, or other reasons. Signs of dehydration include dry mouth, rapid heartbeat, or feeling dizzy or faint when standing.  *You see blood or dark/black material when you vomit or have a bowel movement.  *You experience burning when you urinate, have blood in your urine, or experience a discharge.  *Your pain is not improving within 8-12 hours or is not gone within 24 hours. Call or return immediately if your pain is getting worse, changes location, or moves to your chest or back.  *You have shaking chills, or fever greater than 101.5 degrees Fahrenheit or 38 degrees Celsius.  *Any change in your symptoms, or any new symptoms that concern you.      SECONDARY DISCHARGE DIAGNOSES  Diagnosis: Aortic stenosis  Assessment and Plan of Treatment: PLEASE RESTART YOUR HOME XARELTO ON MONDAY JULY 6TH.  Please follow up with Dr. Paulino outpatient in 1 week    Diagnosis: CAD (coronary artery disease)  Assessment and Plan of Treatment: Please follow up with Dr. Perez outpatient in 1 week in regards to your severe aortic stenosis, coronary arterial disease and peripheral arterial disease. AS is moderate, lesion mid LCx, may need PCI in the future      Diagnosis: Confusion  Assessment and Plan of Treatment:     Diagnosis: Anemia  Assessment and Plan of Treatment: Please follow up with Dr. Rankin outpatient 1 week in regards to your anemia. PRINCIPAL DISCHARGE DIAGNOSIS  Diagnosis: Rectal bleeding  Assessment and Plan of Treatment: Please resume all regular home medications unless specifically advised not to take a particular medication. Also, please take any new medications as prescribed.  Please get plenty of rest, continue to ambulate several times per day, and drink adequate amounts of fluids. Avoid lifting weights greater than 5-10 lbs until you follow-up with your surgeon, who will instruct you further regarding activity restrictions.  Avoid driving or operating heavy machinery while taking pain medications.  Please follow-up with your surgeon and Primary Care Provider (PCP) as advised.  Incision Care:  *Please call your doctor or nurse practitioner if you have increased pain, swelling, redness, or drainage from the incision site.  *Avoid swimming and baths until your follow-up appointment.  *You may shower, and wash surgical incisions with a mild soap and warm water. Gently pat the area dry.  *If you have staples, they will be removed at your follow-up appointment.  Warning Signs:  Please call your doctor or nurse practitioner if you experience the following:  *You experience new chest pain, pressure, squeezing or tightness.  *New or worsening cough, shortness of breath, or wheeze.  *If you are vomiting and cannot keep down fluids or your medications.  *You are getting dehydrated due to continued vomiting, diarrhea, or other reasons. Signs of dehydration include dry mouth, rapid heartbeat, or feeling dizzy or faint when standing.  *You see blood or dark/black material when you vomit or have a bowel movement.  *You experience burning when you urinate, have blood in your urine, or experience a discharge.  *Your pain is not improving within 8-12 hours or is not gone within 24 hours. Call or return immediately if your pain is getting worse, changes location, or moves to your chest or back.  *You have shaking chills, or fever greater than 101.5 degrees Fahrenheit or 38 degrees Celsius.  *Any change in your symptoms, or any new symptoms that concern you.      SECONDARY DISCHARGE DIAGNOSES  Diagnosis: Aortic stenosis  Assessment and Plan of Treatment: PLEASE RESTART YOUR HOME XARELTO ON MONDAY JULY 6TH.  Please follow up with Dr. Paulino outpatient in 1 week    Diagnosis: CAD (coronary artery disease)  Assessment and Plan of Treatment: Please follow up with Dr. Perez outpatient in 1 week in regards to your severe aortic stenosis, coronary arterial disease and peripheral arterial disease. Cardiac Catherization reveales aortic stenosis is moderate, lesion mid LCx, may need PCI in the future.   PLEASE CONTINUE YOUR HOME ASPIRIN.       Diagnosis: Confusion  Assessment and Plan of Treatment:     Diagnosis: Anemia  Assessment and Plan of Treatment: Please follow up with Dr. Rankin outpatient 1 week in regards to your anemia. PRINCIPAL DISCHARGE DIAGNOSIS  Diagnosis: Rectal bleeding  Assessment and Plan of Treatment: Please resume all regular home medications unless specifically advised not to take a particular medication. Also, please take any new medications as prescribed.  Please get plenty of rest, continue to ambulate several times per day, and drink adequate amounts of fluids. Avoid lifting weights greater than 5-10 lbs until you follow-up with your surgeon, who will instruct you further regarding activity restrictions.  Avoid driving or operating heavy machinery while taking pain medications.  Please follow-up with your surgeon and Primary Care Provider (PCP) as advised.  Incision Care:  *Please call your doctor or nurse practitioner if you have increased pain, swelling, redness, or drainage from the incision site.  *Avoid swimming and baths until your follow-up appointment.  *You may shower, and wash surgical incisions with a mild soap and warm water. Gently pat the area dry.  *If you have staples, they will be removed at your follow-up appointment.  Warning Signs:  Please call your doctor or nurse practitioner if you experience the following:  *You experience new chest pain, pressure, squeezing or tightness.  *New or worsening cough, shortness of breath, or wheeze.  *If you are vomiting and cannot keep down fluids or your medications.  *You are getting dehydrated due to continued vomiting, diarrhea, or other reasons. Signs of dehydration include dry mouth, rapid heartbeat, or feeling dizzy or faint when standing.  *You see blood or dark/black material when you vomit or have a bowel movement.  *You experience burning when you urinate, have blood in your urine, or experience a discharge.  *Your pain is not improving within 8-12 hours or is not gone within 24 hours. Call or return immediately if your pain is getting worse, changes location, or moves to your chest or back.  *You have shaking chills, or fever greater than 101.5 degrees Fahrenheit or 38 degrees Celsius.  *Any change in your symptoms, or any new symptoms that concern you.      SECONDARY DISCHARGE DIAGNOSES  Diagnosis: Aortic stenosis  Assessment and Plan of Treatment: PLEASE RESTART YOUR HOME XARELTO ON MONDAY JULY 6TH.  PLEASE CONTINUE YOUR HOME ASPIRIN.   Please follow up with Dr. Paulino outpatient in 1 week    Diagnosis: CAD (coronary artery disease)  Assessment and Plan of Treatment: Please follow up with Dr. Perez outpatient in 1 week in regards to your severe aortic stenosis, coronary arterial disease and peripheral arterial disease. Cardiac Catherization revealed moderate aortic stenotic with lesion mid LCx, which may require PCI in the future.       Diagnosis: Confusion  Assessment and Plan of Treatment:     Diagnosis: Anemia  Assessment and Plan of Treatment: Please follow up with Dr. Rankin outpatient 1 week in regards to your anemia.

## 2020-06-29 NOTE — PROGRESS NOTE ADULT - REASON FOR ADMISSION
GIB

## 2020-06-29 NOTE — DISCHARGE NOTE PROVIDER - CARE PROVIDER_API CALL
David Acosta  Colon and Rectal Surgery  [address] [place] [address]  Phone: [phone]  Fax: [phone]  Follow Up Time: 1 week    Danis Perez  CARDIOVASCULAR SURGERY  [address] [place] [address]  Phone: [phone]  Fax: [phone]  Follow Up Time: 1 week    Gilson Rankin  INTERNAL MEDICINE  [address]  [place] [address]  Phone: [phone]  Fax: [phone]  Follow Up Time: 1 week David Acosta  Colon and Rectal Surgery  [address] [place] [address]  Phone: [phone]  Fax: [phone]  Follow Up Time: 1 week    Danis Perez  CARDIOVASCULAR SURGERY  [address] [place] [address]  Phone: [phone]  Fax: [phone]  Follow Up Time: 1 week    Gilson Rankin  INTERNAL MEDICINE  [address]  [place] [address]  Phone: [phone]  Fax: [phone]  Follow Up Time: 1 week    Alex Paulino  CARDIOVASCULAR DISEASE  [address] [address]  Phone: [phone]  Fax: [phone]  Follow Up Time: 1 week David Acosta  Colon and Rectal Surgery  [address] [place] [address]  Phone: [phone]  Fax: [phone]  Follow Up Time: 1 week    Danis Perez  CARDIOVASCULAR SURGERY  [address] [place] [address]  Phone: [phone]  Fax: [phone]  Follow Up Time: 1 week    Gilson Rankin  INTERNAL MEDICINE  [address]  [place] [address]  Phone: [phone]  Fax: [phone]  Follow Up Time: 1 week    Alex Paulino  CARDIOVASCULAR DISEASE  [address] [address]  Phone: [phone]  Fax: [phone]  Follow Up Time: 1 week

## 2020-06-29 NOTE — PROGRESS NOTE ADULT - SUBJECTIVE AND OBJECTIVE BOX
SUBJECTIVE: Patient seen and examined at bedside with chief. Pt seen and examined by chief resident. Pt is doing well, resting comfortably on bed. Pain controlled. Diet tolerated. Ambulating out of bed. +F/+BM. No nausea or vomiting. No complaints at this time.   Patient denies fever, chills, chest pain, or shortness of breathe.       MEDICATIONS  (STANDING):  acetaminophen   Tablet .. 1000 milliGRAM(s) Oral every 6 hours  alvimopan 12 milliGRAM(s) Oral two times a day  aspirin  chewable 81 milliGRAM(s) Oral daily  atorvastatin 80 milliGRAM(s) Oral at bedtime  BUpivacaine liposome 1.3% Injectable (no eMAR) 20 milliLiter(s) Local Injection once  dextrose 5%. 1000 milliLiter(s) (50 mL/Hr) IV Continuous <Continuous>  dextrose 50% Injectable 12.5 Gram(s) IV Push once  dextrose 50% Injectable 25 Gram(s) IV Push once  dextrose 50% Injectable 25 Gram(s) IV Push once  heparin   Injectable 5000 Unit(s) SubCutaneous every 8 hours  insulin lispro (HumaLOG) corrective regimen sliding scale   SubCutaneous Before meals and at bedtime  labetalol 200 milliGRAM(s) Oral every 12 hours  NIFEdipine  milliGRAM(s) Oral daily  pantoprazole    Tablet 40 milliGRAM(s) Oral before breakfast    MEDICATIONS  (PRN):  dextrose 40% Gel 15 Gram(s) Oral once PRN Blood Glucose LESS THAN 70 milliGRAM(s)/deciliter  glucagon  Injectable 1 milliGRAM(s) IntraMuscular once PRN Glucose LESS THAN 70 milligrams/deciliter  HYDROmorphone  Injectable 0.5 milliGRAM(s) IV Push every 4 hours PRN breakthrough pain  ondansetron Injectable 4 milliGRAM(s) IV Push every 4 hours PRN Nausea and/or Vomiting      Vital Signs Last 24 Hrs  T(C): 37 (29 Jun 2020 05:00), Max: 37 (29 Jun 2020 05:00)  T(F): 98.6 (29 Jun 2020 05:00), Max: 98.6 (29 Jun 2020 05:00)  HR: 72 (29 Jun 2020 05:50) (54 - 72)  BP: 164/72 (29 Jun 2020 05:50) (147/68 - 174/83)  BP(mean): 103 (29 Jun 2020 05:50) (103 - 119)  RR: 18 (29 Jun 2020 05:50) (18 - 18)  SpO2: 97% (29 Jun 2020 05:50) (96% - 99%)    Physical Exam:  GENERAL: NAD, Resting comfortably in bed  RESP: Nonlabored breathing, No respiratory distress  CARD: Normal rate, Normal peripheral perfusion  GI: Soft, NT, ND, no guarding, no rebound tenderness, midline incisions with staples are clean, dry without drainage, and intact   EXTREM: WWP, No edema, SCDs in place    I&O's Summary    28 Jun 2020 07:01  -  29 Jun 2020 07:00  --------------------------------------------------------  IN: 320 mL / OUT: 725 mL / NET: -405 mL        LABS:                        9.9    10.49 )-----------( 314      ( 28 Jun 2020 06:15 )             31.7     06-28    135  |  102  |  20  ----------------------------<  112<H>  4.2   |  21<L>  |  0.91    Ca    8.5      28 Jun 2020 06:15  Phos  3.2     06-28  Mg     2.1     06-28          CAPILLARY BLOOD GLUCOSE      POCT Blood Glucose.: 129 mg/dL (29 Jun 2020 05:43)  POCT Blood Glucose.: 129 mg/dL (28 Jun 2020 21:27)  POCT Blood Glucose.: 132 mg/dL (28 Jun 2020 16:12)  POCT Blood Glucose.: 119 mg/dL (28 Jun 2020 11:38) SUBJECTIVE: Pt seen and examined by chief resident. Pt is doing well, resting comfortably on bed. Pain controlled. Diet tolerated. Ambulating out of bed. +F/+BM. No nausea or vomiting. No complaints at this time.   Patient denies fever, chills, chest pain, or shortness of breathe.       MEDICATIONS  (STANDING):  acetaminophen   Tablet .. 1000 milliGRAM(s) Oral every 6 hours  alvimopan 12 milliGRAM(s) Oral two times a day  aspirin  chewable 81 milliGRAM(s) Oral daily  atorvastatin 80 milliGRAM(s) Oral at bedtime  BUpivacaine liposome 1.3% Injectable (no eMAR) 20 milliLiter(s) Local Injection once  dextrose 5%. 1000 milliLiter(s) (50 mL/Hr) IV Continuous <Continuous>  dextrose 50% Injectable 12.5 Gram(s) IV Push once  dextrose 50% Injectable 25 Gram(s) IV Push once  dextrose 50% Injectable 25 Gram(s) IV Push once  heparin   Injectable 5000 Unit(s) SubCutaneous every 8 hours  insulin lispro (HumaLOG) corrective regimen sliding scale   SubCutaneous Before meals and at bedtime  labetalol 200 milliGRAM(s) Oral every 12 hours  NIFEdipine  milliGRAM(s) Oral daily  pantoprazole    Tablet 40 milliGRAM(s) Oral before breakfast    MEDICATIONS  (PRN):  dextrose 40% Gel 15 Gram(s) Oral once PRN Blood Glucose LESS THAN 70 milliGRAM(s)/deciliter  glucagon  Injectable 1 milliGRAM(s) IntraMuscular once PRN Glucose LESS THAN 70 milligrams/deciliter  HYDROmorphone  Injectable 0.5 milliGRAM(s) IV Push every 4 hours PRN breakthrough pain  ondansetron Injectable 4 milliGRAM(s) IV Push every 4 hours PRN Nausea and/or Vomiting      Vital Signs Last 24 Hrs  T(C): 37 (29 Jun 2020 05:00), Max: 37 (29 Jun 2020 05:00)  T(F): 98.6 (29 Jun 2020 05:00), Max: 98.6 (29 Jun 2020 05:00)  HR: 72 (29 Jun 2020 05:50) (54 - 72)  BP: 164/72 (29 Jun 2020 05:50) (147/68 - 174/83)  BP(mean): 103 (29 Jun 2020 05:50) (103 - 119)  RR: 18 (29 Jun 2020 05:50) (18 - 18)  SpO2: 97% (29 Jun 2020 05:50) (96% - 99%)    Physical Exam:  GENERAL: NAD, Resting comfortably in bed  RESP: Nonlabored breathing, No respiratory distress  CARD: Normal rate, Normal peripheral perfusion  GI: Soft, NT, ND, no guarding, no rebound tenderness, midline incisions with staples are clean, dry without drainage, and intact   EXTREM: WWP, No edema, SCDs in place    I&O's Summary    28 Jun 2020 07:01  -  29 Jun 2020 07:00  --------------------------------------------------------  IN: 320 mL / OUT: 725 mL / NET: -405 mL        LABS:                        9.9    10.49 )-----------( 314      ( 28 Jun 2020 06:15 )             31.7     06-28    135  |  102  |  20  ----------------------------<  112<H>  4.2   |  21<L>  |  0.91    Ca    8.5      28 Jun 2020 06:15  Phos  3.2     06-28  Mg     2.1     06-28          CAPILLARY BLOOD GLUCOSE      POCT Blood Glucose.: 129 mg/dL (29 Jun 2020 05:43)  POCT Blood Glucose.: 129 mg/dL (28 Jun 2020 21:27)  POCT Blood Glucose.: 132 mg/dL (28 Jun 2020 16:12)  POCT Blood Glucose.: 119 mg/dL (28 Jun 2020 11:38)

## 2020-07-01 DIAGNOSIS — I25.10 ATHEROSCLEROTIC HEART DISEASE OF NATIVE CORONARY ARTERY WITHOUT ANGINA PECTORIS: ICD-10-CM

## 2020-07-01 DIAGNOSIS — I47.2 VENTRICULAR TACHYCARDIA: ICD-10-CM

## 2020-07-01 DIAGNOSIS — K92.2 GASTROINTESTINAL HEMORRHAGE, UNSPECIFIED: ICD-10-CM

## 2020-07-01 DIAGNOSIS — K64.4 RESIDUAL HEMORRHOIDAL SKIN TAGS: ICD-10-CM

## 2020-07-01 DIAGNOSIS — Z87.891 PERSONAL HISTORY OF NICOTINE DEPENDENCE: ICD-10-CM

## 2020-07-01 DIAGNOSIS — E43 UNSPECIFIED SEVERE PROTEIN-CALORIE MALNUTRITION: ICD-10-CM

## 2020-07-01 DIAGNOSIS — C18.0 MALIGNANT NEOPLASM OF CECUM: ICD-10-CM

## 2020-07-01 DIAGNOSIS — D62 ACUTE POSTHEMORRHAGIC ANEMIA: ICD-10-CM

## 2020-07-01 DIAGNOSIS — I10 ESSENTIAL (PRIMARY) HYPERTENSION: ICD-10-CM

## 2020-07-01 DIAGNOSIS — E11.51 TYPE 2 DIABETES MELLITUS WITH DIABETIC PERIPHERAL ANGIOPATHY WITHOUT GANGRENE: ICD-10-CM

## 2020-07-01 DIAGNOSIS — F03.90 UNSPECIFIED DEMENTIA WITHOUT BEHAVIORAL DISTURBANCE: ICD-10-CM
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
## 2020-07-01 DIAGNOSIS — K57.30 DIVERTICULOSIS OF LARGE INTESTINE WITHOUT PERFORATION OR ABSCESS WITHOUT BLEEDING: ICD-10-CM

## 2020-07-01 DIAGNOSIS — I35.0 NONRHEUMATIC AORTIC (VALVE) STENOSIS: ICD-10-CM

## 2020-07-01 DIAGNOSIS — T50.8X5A ADVERSE EFFECT OF DIAGNOSTIC AGENTS, INITIAL ENCOUNTER: ICD-10-CM

## 2020-07-01 DIAGNOSIS — Z95.820 PERIPHERAL VASCULAR ANGIOPLASTY STATUS WITH IMPLANTS AND GRAFTS: ICD-10-CM

## 2020-07-01 DIAGNOSIS — Z86.73 PERSONAL HISTORY OF TRANSIENT ISCHEMIC ATTACK (TIA), AND CEREBRAL INFARCTION WITHOUT RESIDUAL DEFICITS: ICD-10-CM

## 2020-07-01 DIAGNOSIS — E78.5 HYPERLIPIDEMIA, UNSPECIFIED: ICD-10-CM

## 2020-07-01 DIAGNOSIS — I24.8 OTHER FORMS OF ACUTE ISCHEMIC HEART DISEASE: ICD-10-CM

## 2020-07-01 DIAGNOSIS — Z79.82 LONG TERM (CURRENT) USE OF ASPIRIN: ICD-10-CM

## 2020-07-01 DIAGNOSIS — F05 DELIRIUM DUE TO KNOWN PHYSIOLOGICAL CONDITION: ICD-10-CM

## 2020-07-01 DIAGNOSIS — N17.9 ACUTE KIDNEY FAILURE, UNSPECIFIED: ICD-10-CM

## 2020-07-01 DIAGNOSIS — Z79.84 LONG TERM (CURRENT) USE OF ORAL HYPOGLYCEMIC DRUGS: ICD-10-CM

## 2020-07-01 DIAGNOSIS — Z79.01 LONG TERM (CURRENT) USE OF ANTICOAGULANTS: ICD-10-CM

## 2020-07-10 ENCOUNTER — APPOINTMENT (OUTPATIENT)
Dept: COLORECTAL SURGERY | Facility: CLINIC | Age: 70
End: 2020-07-10
Payer: COMMERCIAL

## 2020-07-10 VITALS
WEIGHT: 132 LBS | SYSTOLIC BLOOD PRESSURE: 199 MMHG | HEIGHT: 65 IN | DIASTOLIC BLOOD PRESSURE: 81 MMHG | TEMPERATURE: 98 F | BODY MASS INDEX: 21.99 KG/M2 | HEART RATE: 61 BPM

## 2020-07-10 DIAGNOSIS — Z80.41 FAMILY HISTORY OF MALIGNANT NEOPLASM OF OVARY: ICD-10-CM

## 2020-07-10 PROCEDURE — 99024 POSTOP FOLLOW-UP VISIT: CPT

## 2020-07-10 NOTE — HISTORY OF PRESENT ILLNESS
[FreeTextEntry1] : 71 yo Algerian speaking M w/ PMH HTN, DM, HLD, PAD, CAD, PAD s/p right and left LE FEM-pop bypass in \par (R in 2016), recent admission for occluded CFA-AK pop bypass s/p Jayme balloon embolectomy (on xarelto and aspirin (3/2020)) who presented to [facility] w/ c/o cool hands, diarrhea, dizziness, weakness, and blood in stool for 4 days, last three days before presentation.  Pt is Algerian speaking, poor historian, \par repeating that diarrhea has resolved and fixated on new onset of yellow discoloration of his hands. Hx obtained via daughter Kerry ([phone] lives with pt). Was able to say had about two loose bowel movements with very dark bloody stool that filled up bowl, denied ever bleeding without BM).  Pt \sepideh has possibly not been taking Xarelto for past several weeks because all his Xarelto bottles were empty. Upon arrival to ED, patient was found to be anemic Hb 4.2 (last Hb 9 in March 2020). During ED exam, found to be acutely lethargic and stroke code called for AMS. Per neuro note: last known normal 2:41pm, no acute changes in CTH, chronic R basal ganglia infarct, no TPA given. Upon CC team exam, pt alert and oriented to self and hospital, not date, thinks Gerson is president. Daughter reports worsening memory last several years. Reports that his dizziness and weakness has resolved, never had CP/pressure, nausea, vomiting or abdominal pain, changes to urine. Denies fevers, chills, cough, SoB, changes in exercise tolerance, no sick contacts. Patient was transfused 2 units of blood early midnight of 6/14 with repeat CBC of 7.8. Patient was made NPO, bowel prepped and sent for colonoscopy on 6/14, with 1 unit of PRBC given. \par Colonoscopy found a small ulcerating along the cecum which showed intramucosal \par adenocarcinoma. Patient had CT chest/A/P which showed the mass but no mets. \par General surgery consulted for mass and vascular consulted for anticoagulation and work up for B/L LE duplex and cardiac cath which showed moderate AS, no intervention at this time. Patient started on heparin ggt on 6/20 and was dc'ed prior to surgery. Patient went to the OR on 6/23 for open \par right hemicolectomy. on 6/24, patient was found to have a bloody bowel movement, CBC was 7.0, made NPO, transfused 2 units of PRBC, repeat CBC showed 9.7/30.4. Patient experienced dark bloody diarrhea on 6/26, c diff panel was sent which was negative. His postoperative course was unremarkable with \par advancement of diet, passing trial of void, and pain control. On day of discharge patient was stable to be d/c'd home. \par \par Surgical Final Report\par \par \par \par \par Final Diagnosis\par \par Right colon, hemicolectomy:\par - Well differentiated adenocarcinoma\par - 1.6 x 1.5 x 0.4 cm\par - Carcinoma invades muscularis propria\par - Margins negative for carcinoma\par - Lymphovascular invasion not identified\par - 21 lymph nodes negative for carcinoma, 0/21\par -  See synoptic report below\par \par MMR proteins intact\par \par Procedure: Right hemicolectomy\par Tumor Site: Cecum\par Tumor Location (applicable only to rectal primaries): Not\par applicable\par Tumor Size: 1.6 x 1.5 x 0.4 cm\par Macroscopic Tumor Perforation: Not identified\par Macroscopic Intactness of Mesorectum: Not applicable\par Histologic Type: Adenocarcinoma\par Histologic Grade: Well-differentiated, G1\par Microscopic Tumor Extension: Carcinoma invades muscularis propria\par Margins: All margins negative for carcinoma\par Proximal Margin: 11 cm\par Distal Margin: 22.5 cm\par Radial Margin:  9.5 cm\par Treatment Effect: No known prior treatment\par Lymph-Vascular Invasion: Not identified\par Perineural Invasion: Not identified\par Tumor Budding: Not identified/low score (0-4)\par Tumor Deposits: None identified\par Regional Lymph Node Examination: 21 lymph nodes negative for carcinoma, 0/21\par \par pT2, pN0\par \par Pt presents today w/ daughter\par Appetite good, energy somewhat low and taking naps more frequently. Only took pain medication for one day. Following regular diet.\par Denies fever, pain, SOB, n/v/c/d or BPR\par BH: twice daily, denies straining\par Denies taking stool softeners or fiber supplements

## 2020-07-10 NOTE — ASSESSMENT
[FreeTextEntry1] : Stage 1 - right colon cancer.\par \par No adverse risk factors.\par \par Advised surveillance.\par \par Follow up colonoscopy with Gi in 1 year.\par RTC 1 year.\par

## 2020-07-10 NOTE — PHYSICAL EXAM
[Abdomen Masses] : No abdominal masses [Abdomen Tenderness] : ~T No ~M abdominal tenderness [No HSM] : no hepatosplenomegaly [de-identified] : midline incisors well healed./ staples removed-  small inferior wound liqufied hematoma evacuated.

## 2020-07-13 ENCOUNTER — APPOINTMENT (OUTPATIENT)
Dept: CARDIOTHORACIC SURGERY | Facility: CLINIC | Age: 70
End: 2020-07-13
Payer: COMMERCIAL

## 2020-07-13 PROCEDURE — 99215 OFFICE O/P EST HI 40 MIN: CPT

## 2020-07-14 RX ORDER — METFORMIN HYDROCHLORIDE 500 MG/1
500 TABLET, COATED ORAL
Refills: 0 | Status: ACTIVE | COMMUNITY

## 2020-07-14 RX ORDER — AZILSARTAN KAMEDOXOMIL AND CHLORTHALIDONE 40; 12.5 MG/1; MG/1
40-12.5 TABLET ORAL DAILY
Refills: 0 | Status: ACTIVE | COMMUNITY

## 2020-07-14 RX ORDER — ASPIRIN 81 MG
81 TABLET, DELAYED RELEASE (ENTERIC COATED) ORAL DAILY
Refills: 4 | Status: ACTIVE | COMMUNITY

## 2020-07-14 RX ORDER — LABETALOL HYDROCHLORIDE 200 MG/1
200 TABLET, FILM COATED ORAL
Refills: 0 | Status: ACTIVE | COMMUNITY

## 2020-07-14 RX ORDER — NIFEDIPINE 90 MG
90 TABLET, EXTENDED RELEASE ORAL DAILY
Refills: 0 | Status: ACTIVE | COMMUNITY

## 2020-07-14 NOTE — HISTORY OF PRESENT ILLNESS
[FreeTextEntry1] : Verbal consent given on 07/14/2020 at 14:35 by KALPANA MCNEAL, via telephone, also spoke to his daughter. \par \par 70 year old male with a history of hypertension, hyperlipidemia, PAD s.p right and left FEM-pop bypass in 2016, occluded CFA-AK pop bypass s/p Jayme balloon embolectomy in 03/2020 (currently on ASA), recently admitted to [facility] for GI bleed with Hg of 4 s/p RBCs found to have small ulcerated malignant mass of cecum s/p open right hemicolectomy on 06/24/2020 (Stage 1 Adenocarcinoma), Coronary artery disease, Chronic diastolic heart failure with moderate aortic stenosis who presents for follow up via telephone. \par \par The patient presented to [facility] in June 2020 complaining of blood in the stool.  He was found to have a Hg of 4 and was diagnosed with small ulcerated mass in the colon which was biopsied and found to be malignant. Incidental finding of aortic stenosis on ECHO and the patient underwent a right and left heart cahterization on 06/19/2020 which showed moderate AS with mean gradient of 25 mmHg and PREM stenosis.  The patient underwent an open hemicolectomy on 06/24/2020. Xarelto was held on discharge. \par \par Since his discharge, the patient states he is feeling better. He denies any SOB at rest or with exertion, chest pain, syncope, LE edema, palpitations or any signs of bleeding. He recently saw his rectal surgeon, Dr. Acosta who recommend surveillance at this time. He has appt with Dr. Paulino coming up.

## 2020-07-20 ENCOUNTER — APPOINTMENT (OUTPATIENT)
Dept: HEART AND VASCULAR | Facility: CLINIC | Age: 70
End: 2020-07-20
Payer: COMMERCIAL

## 2020-07-20 VITALS
BODY MASS INDEX: 22.49 KG/M2 | TEMPERATURE: 98.8 F | OXYGEN SATURATION: 98 % | HEART RATE: 67 BPM | SYSTOLIC BLOOD PRESSURE: 140 MMHG | HEIGHT: 65 IN | WEIGHT: 134.99 LBS | DIASTOLIC BLOOD PRESSURE: 70 MMHG

## 2020-07-20 DIAGNOSIS — E78.00 PURE HYPERCHOLESTEROLEMIA, UNSPECIFIED: ICD-10-CM

## 2020-07-20 DIAGNOSIS — I35.0 NONRHEUMATIC AORTIC (VALVE) STENOSIS: ICD-10-CM

## 2020-07-20 DIAGNOSIS — I70.213 ATHEROSCLEROSIS OF NATIVE ARTERIES OF EXTREMITIES WITH INTERMITTENT CLAUDICATION, BILATERAL LEGS: ICD-10-CM

## 2020-07-20 DIAGNOSIS — I10 ESSENTIAL (PRIMARY) HYPERTENSION: ICD-10-CM

## 2020-07-20 DIAGNOSIS — I25.10 ATHEROSCLEROTIC HEART DISEASE OF NATIVE CORONARY ARTERY W/OUT ANGINA PECTORIS: ICD-10-CM

## 2020-07-20 DIAGNOSIS — E11.9 TYPE 2 DIABETES MELLITUS W/OUT COMPLICATIONS: ICD-10-CM

## 2020-07-20 DIAGNOSIS — Z78.9 OTHER SPECIFIED HEALTH STATUS: ICD-10-CM

## 2020-07-20 PROCEDURE — 99214 OFFICE O/P EST MOD 30 MIN: CPT

## 2020-07-20 PROCEDURE — 93000 ELECTROCARDIOGRAM COMPLETE: CPT

## 2020-07-20 NOTE — ASSESSMENT
[FreeTextEntry1] : ASHD- determined by cath June 2020, 30 % dLAD and 60-70 % OM 1. OMT\par \par AS- Echo June 2020 @ [facility] called severe AS, ANDRZEJ 0.9 cm2, cath called moderate AS with mean gradient of 25 mm Hg\par \par HTN- BP reasonable, no changes\par \par HLD- should be on a statin, I will introduce that on the next visit\par \par DM- Followed by Dr Mcgrath

## 2020-07-20 NOTE — REASON FOR VISIT
[FreeTextEntry1] : 70 year old male with a history of hypertension, hyperlipidemia, PAD s.p right and left FEM-pop bypass in 2016, occluded CFA-AK pop bypass s/p Jayme balloon embolectomy in 03/2020 (currently on ASA), recently admitted to [facility] for GI bleed with Hb of 4 s/p RBCs found to have small ulcerated malignant mass of cecum s/p open right hemicolectomy on 06/24/2020 (Stage 1 Adenocarcinoma), Coronary artery disease, Chronic diastolic heart failure with moderate aortic stenosis who presents for follow up via telephone. \par \par Incidental finding of aortic stenosis on ECHO(read as severe on echo) and the patient underwent a right and left heart catheterization on 06/19/2020 which showed moderate AS with mean gradient of 25 mmHg and 60-70 % OM1 stenosis. The patient underwent an open hemicolectomy on 06/24/2020. Xarelto was held on discharge. \par \par Since his discharge, the patient states he is feeling better. He denies any SOB at rest or with exertion, chest pain, syncope, LE edema, palpitations or any signs of bleeding. He recently saw his rectal surgeon, Dr. Acosta who recommend surveillance at this time.  He is here 7/20/20 with his daughter who states that he s back on Xarelto plus Aspirin.  She is questioning this and ZI told her to speak to Dr Gant since he is responsible for the vascular issues in the legs.  Pt smoking again.

## 2020-07-20 NOTE — PHYSICAL EXAM
[General Appearance - Well Developed] : well developed [Well Groomed] : well groomed [Normal Appearance] : normal appearance [General Appearance - In No Acute Distress] : no acute distress [General Appearance - Well Nourished] : well nourished [No Deformities] : no deformities [Normal Conjunctiva] : the conjunctiva exhibited no abnormalities [Eyelids - No Xanthelasma] : the eyelids demonstrated no xanthelasmas [Respiration, Rhythm And Depth] : normal respiratory rhythm and effort [Heart Rate And Rhythm] : heart rate and rhythm were normal [Auscultation Breath Sounds / Voice Sounds] : lungs were clear to auscultation bilaterally [Exaggerated Use Of Accessory Muscles For Inspiration] : no accessory muscle use [Abdomen Soft] : soft [Abdomen Mass (___ Cm)] : no abdominal mass palpated [Abdomen Tenderness] : non-tender [Abnormal Walk] : normal gait [Gait - Sufficient For Exercise Testing] : the gait was sufficient for exercise testing [Nail Clubbing] : no clubbing of the fingernails [Cyanosis, Localized] : no localized cyanosis [Petechial Hemorrhages (___cm)] : no petechial hemorrhages [Skin Color & Pigmentation] : normal skin color and pigmentation [No Venous Stasis] : no venous stasis [] : no rash [Skin Lesions] : no skin lesions [No Xanthoma] : no  xanthoma was observed [No Skin Ulcers] : no skin ulcer [FreeTextEntry1] : 2/6 AS murmur, A2 present but reduced, distal pulses not appreciated.

## 2020-10-16 ENCOUNTER — APPOINTMENT (OUTPATIENT)
Dept: VASCULAR SURGERY | Facility: CLINIC | Age: 70
End: 2020-10-16

## 2020-11-20 ENCOUNTER — APPOINTMENT (OUTPATIENT)
Dept: HEART AND VASCULAR | Facility: CLINIC | Age: 70
End: 2020-11-20

## 2021-03-16 NOTE — PROGRESS NOTE ADULT - PROBLEM SELECTOR PLAN 6
H/o PAD w. bypass procedure, takes NOAC, ASA and labetalol at home  - restart Nifedipine 90mg and Labetalol 200mg BID   - staples in R groin now removed by vascular  - consult vascular about restarting anticoagulation medication; they agree to hold anticoagulants for now H/o PAD w. bypass procedure, takes NOAC, ASA and labetalol at home  - C/w Nifedipine 90mg and Labetalol 200mg BID   - staples in R groin now removed by vascular  - Will discuss with GI about restarting anticoagulation medication H/o PAD w. bypass procedure, takes NOAC, ASA and labetalol at home  - C/w Nifedipine 90mg and Labetalol 200mg BID   - staples in R groin now removed by vascular  - Restart Xarelto, continue to hold Aspirin for now no

## 2021-04-22 ENCOUNTER — INPATIENT (INPATIENT)
Facility: HOSPITAL | Age: 71
LOS: 5 days | Discharge: HOME | DRG: 378 | End: 2021-04-28
Attending: SURGERY | Admitting: SURGERY
Payer: MEDICARE

## 2021-04-22 VITALS
DIASTOLIC BLOOD PRESSURE: 73 MMHG | WEIGHT: 164.91 LBS | RESPIRATION RATE: 18 BRPM | HEIGHT: 60 IN | HEART RATE: 95 BPM | OXYGEN SATURATION: 98 % | TEMPERATURE: 98 F | SYSTOLIC BLOOD PRESSURE: 109 MMHG

## 2021-04-22 DIAGNOSIS — Z98.89 OTHER SPECIFIED POSTPROCEDURAL STATES: Chronic | ICD-10-CM

## 2021-04-22 DIAGNOSIS — Z95.828 PRESENCE OF OTHER VASCULAR IMPLANTS AND GRAFTS: Chronic | ICD-10-CM

## 2021-04-22 DIAGNOSIS — Z41.9 ENCOUNTER FOR PROCEDURE FOR PURPOSES OTHER THAN REMEDYING HEALTH STATE, UNSPECIFIED: Chronic | ICD-10-CM

## 2021-04-22 DIAGNOSIS — Z90.49 ACQUIRED ABSENCE OF OTHER SPECIFIED PARTS OF DIGESTIVE TRACT: Chronic | ICD-10-CM

## 2021-04-22 LAB
HCT VFR BLD CALC: 20.4 % — CRITICAL LOW (ref 39–50)
HGB BLD-MCNC: 6.6 G/DL — CRITICAL LOW (ref 13–17)
LACTATE SERPL-SCNC: 3.3 MMOL/L — HIGH (ref 0.5–2)
LIDOCAIN IGE QN: 30 U/L — SIGNIFICANT CHANGE UP (ref 7–60)
MCHC RBC-ENTMCNC: 26.8 PG — LOW (ref 27–34)
MCHC RBC-ENTMCNC: 32.4 GM/DL — SIGNIFICANT CHANGE UP (ref 32–36)
MCV RBC AUTO: 82.9 FL — SIGNIFICANT CHANGE UP (ref 80–100)
NRBC # BLD: 0 /100 WBCS — SIGNIFICANT CHANGE UP (ref 0–0)
PLATELET # BLD AUTO: 189 K/UL — SIGNIFICANT CHANGE UP (ref 150–400)
RBC # BLD: 2.46 M/UL — LOW (ref 4.2–5.8)
RBC # FLD: 16.8 % — HIGH (ref 10.3–14.5)
WBC # BLD: 11.06 K/UL — HIGH (ref 3.8–10.5)
WBC # FLD AUTO: 11.06 K/UL — HIGH (ref 3.8–10.5)

## 2021-04-22 PROCEDURE — 93010 ELECTROCARDIOGRAM REPORT: CPT

## 2021-04-22 PROCEDURE — 71045 X-RAY EXAM CHEST 1 VIEW: CPT | Mod: 26

## 2021-04-22 PROCEDURE — 99285 EMERGENCY DEPT VISIT HI MDM: CPT | Mod: 25

## 2021-04-22 PROCEDURE — 74174 CTA ABD&PLVS W/CONTRAST: CPT | Mod: 26

## 2021-04-22 RX ORDER — SODIUM CHLORIDE 9 MG/ML
1000 INJECTION, SOLUTION INTRAVENOUS
Refills: 0 | Status: DISCONTINUED | OUTPATIENT
Start: 2021-04-22 | End: 2021-04-23

## 2021-04-22 RX ORDER — DEXTROSE 50 % IN WATER 50 %
25 SYRINGE (ML) INTRAVENOUS ONCE
Refills: 0 | Status: DISCONTINUED | OUTPATIENT
Start: 2021-04-22 | End: 2021-04-28

## 2021-04-22 RX ORDER — DEXTROSE 50 % IN WATER 50 %
12.5 SYRINGE (ML) INTRAVENOUS ONCE
Refills: 0 | Status: DISCONTINUED | OUTPATIENT
Start: 2021-04-22 | End: 2021-04-28

## 2021-04-22 RX ORDER — SODIUM CHLORIDE 9 MG/ML
1000 INJECTION INTRAMUSCULAR; INTRAVENOUS; SUBCUTANEOUS ONCE
Refills: 0 | Status: COMPLETED | OUTPATIENT
Start: 2021-04-22 | End: 2021-04-22

## 2021-04-22 RX ORDER — SOD SULF/SODIUM/NAHCO3/KCL/PEG
2000 SOLUTION, RECONSTITUTED, ORAL ORAL ONCE
Refills: 0 | Status: COMPLETED | OUTPATIENT
Start: 2021-04-22 | End: 2021-04-22

## 2021-04-22 RX ORDER — SODIUM CHLORIDE 9 MG/ML
1000 INJECTION, SOLUTION INTRAVENOUS
Refills: 0 | Status: DISCONTINUED | OUTPATIENT
Start: 2021-04-22 | End: 2021-04-28

## 2021-04-22 RX ORDER — GLUCAGON INJECTION, SOLUTION 0.5 MG/.1ML
1 INJECTION, SOLUTION SUBCUTANEOUS ONCE
Refills: 0 | Status: DISCONTINUED | OUTPATIENT
Start: 2021-04-22 | End: 2021-04-28

## 2021-04-22 RX ORDER — ONDANSETRON 8 MG/1
4 TABLET, FILM COATED ORAL EVERY 6 HOURS
Refills: 0 | Status: DISCONTINUED | OUTPATIENT
Start: 2021-04-22 | End: 2021-04-28

## 2021-04-22 RX ORDER — DEXTROSE 50 % IN WATER 50 %
15 SYRINGE (ML) INTRAVENOUS ONCE
Refills: 0 | Status: DISCONTINUED | OUTPATIENT
Start: 2021-04-22 | End: 2021-04-28

## 2021-04-22 RX ORDER — INSULIN LISPRO 100/ML
VIAL (ML) SUBCUTANEOUS EVERY 6 HOURS
Refills: 0 | Status: DISCONTINUED | OUTPATIENT
Start: 2021-04-22 | End: 2021-04-28

## 2021-04-22 RX ADMIN — Medication 2000 MILLILITER(S): at 22:12

## 2021-04-22 RX ADMIN — SODIUM CHLORIDE 2000 MILLILITER(S): 9 INJECTION INTRAMUSCULAR; INTRAVENOUS; SUBCUTANEOUS at 21:54

## 2021-04-22 NOTE — ED ADULT TRIAGE NOTE - CHIEF COMPLAINT QUOTE
x 3 days bright red bloody stool. on xarelto stopped x 1 day ago because of the bloody stool. denies fevers, chills, abdominal pain, n/v. PMH of GI bleeds. x 3 days bright red bloody stool. on xarelto stopped x 1 day ago because of the bloody stool. denies fevers, chills, abdominal pain, n/v, CP , SOB. PMH of GI bleeds.

## 2021-04-22 NOTE — ED PROVIDER NOTE - PSH
Elective surgery  right leg angiogram with bypass  july 2016  History of hernia repair  june 2014  S/P femoral-femoral bypass surgery  left 2016  S/P right hemicolectomy

## 2021-04-22 NOTE — H&P ADULT - NSHPPHYSICALEXAM_GEN_ALL_CORE
T(C): 36.6 (04-22-21 @ 19:19), Max: 36.6 (04-22-21 @ 19:19)  HR: 67 (04-22-21 @ 19:48) (67 - 95)  BP: 100/60 (04-22-21 @ 19:48) (100/60 - 109/73)  RR: 18 (04-22-21 @ 19:48) (18 - 18)  SpO2: 100% (04-22-21 @ 19:48) (98% - 100%)    GENERAL: NAD, Resting comfortably in bed, awake, opens eyes spontaneously  HEENT: NCAT, MMM, Normal conjunctiva, PERRL  RESP: Nonlabored breathing, No respiratory distress  CARD: Normal rate, Normal peripheral perfusion  GI: Soft, ND, NT, well healed midline incision, No guarding, No rebound tenderness  RECTAL: no external lesions, good rectal tone, no internal masses, bright red blood and clot on examiners fingertip  EXTREM: WWP, No edema, No gross deformity of extremities  SKIN: No rashes, no lesions  NEURO: AAOx3, No focal motor or sensory deficits  PSYCH: Affect and characteristics of appearance, verbalizations, and behaviors are appropriate

## 2021-04-22 NOTE — ED PROVIDER NOTE - PMH
(H25.13) Age-related nuclear cataract, bilateral - Assesment : Examination revealed cataract. Pt is bothered and symptomatic. PATIENT CAME IN WEARING CONTACT LENS OD.  ASCAN NOT DONE TODAY. PATIENT IS USED TO MONOVISION. TARGET OD FOR DISTANCE AND OS FOR READING. ANY CHOICE OF LENS HAS A COMPROMISE. SYMPHONY LENS FOR DISTANCE OD. PATIENT WILL COMPROMISE A LITTLE MORE FOR READING.  ****VERIFY ASCAN MEASUREMENTS PRIOR TO FINAL CATARACT SURGERY PLAN***  Monovision CL wearer. - Plan : Risks, Benefits and Alternatives were discussed with patient at length for Cataract Surgery. Visual symptoms are consistent with Cataract findings on examination and current refraction no longer provides satisfactory vision. Patient understands and desires surgery. All questions answered. Risks, Benefits and Alternatives discussed at length for IOL placement. Patient will need to wear glasses for.   EYE:OD IOL TYPE:ROF POST OPERATIVE TARGET: DISTANCE DR RECOMMENDED PACKAGE:  SYMPHONY LENS/ROF PT PREFERRED PACKAGE:  ROF  OS TO FOLLOW WITH A LENS THAT IS STRONGER FOR READING   RESCHEDULE ASCAN
CAD (coronary artery disease)    Colon cancer    DM (diabetes mellitus)    Essential hypertension  Hypertension  PAD (peripheral artery disease)

## 2021-04-22 NOTE — H&P ADULT - NSICDXPASTMEDICALHX_GEN_ALL_CORE_FT
PAST MEDICAL HISTORY:  CAD (coronary artery disease)     Colon cancer     DM (diabetes mellitus)     Essential hypertension Hypertension    PAD (peripheral artery disease)

## 2021-04-22 NOTE — ED ADULT NURSE NOTE - NSIMPLEMENTINTERV_GEN_ALL_ED
Implemented All Fall with Harm Risk Interventions:  [place] to call system. Call bell, personal items and telephone within reach. Instruct patient to call for assistance. Room bathroom lighting operational. Non-slip footwear when patient is off stretcher. Physically safe environment: no spills, clutter or unnecessary equipment. Stretcher in lowest position, wheels locked, appropriate side rails in place. Provide visual cue, wrist band, yellow gown, etc. Monitor gait and stability. Monitor for mental status changes and reorient to person, place, and time. Review medications for side effects contributing to fall risk. Reinforce activity limits and safety measures with patient and family. Provide visual clues: red socks.

## 2021-04-22 NOTE — H&P ADULT - NSHPLABSRESULTS_GEN_ALL_CORE
8.0    12.97 )-----------( 226      ( 22 Apr 2021 20:17 )             25.2   04-22    139  |  100  |  21  ----------------------------<  179<H>  3.8   |  26  |  1.09    Ca    9.3      22 Apr 2021 20:17    TPro  7.1  /  Alb  4.0  /  TBili  <0.2  /  DBili  x   /  AST  18  /  ALT  22  /  AlkPhos  125<H>  04-22

## 2021-04-22 NOTE — H&P ADULT - HISTORY OF PRESENT ILLNESS
70M (Romansh speaking, poor historian), PMHx HTN, DM, HLD, CAD, PAD s/p bilateral fem-pop bypasses (2016) s/p R embolectomy and angioplasty x2 for RLE ischemia (3/2020) on ASA and Xarelto, severe aortic stenosis, stage 1 cecal adenocarcinoma s/p open R hemicolectomy (06/2020), presents with BRBPR x 3 days. Pt stopped taking his xarelto yesterday given ongoing bleeding. Reports bright blood with loose stool and some clots multiple times per day. He endorses fatigue today, denies lightheadedness, syncope, palpitations. Has not had a colonoscopy postoperatively. He denies N/V, fevers/chills, CP, SOB, dysuria, PO intolerance.    In ED afebrile, HR 95 (on BB), /73, O2 98% on RA.  Labs: Hgb 8.0, WBC 12.97, INR 1.25, lactate 3.3

## 2021-04-22 NOTE — ED PROVIDER NOTE - OBJECTIVE STATEMENT
69 y/o M, Faroese speaking, with PMHx of HTN, DM, HLD, CAD, PAD (on aspirin and Xarelto), aortic stenosis, s/p R hemicolectomy, presenting to the ED due to BRBPR x3 days. Patient stopped taking Xarelto yesterday. No abdominal pain, denies syncope, lightheadedness, palpitations, chest pain, SOB, or any other acute complaints.

## 2021-04-22 NOTE — CONSULT NOTE ADULT - SUBJECTIVE AND OBJECTIVE BOX
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 Pt stopped taking his xarelto yesterday given ongoing bleeding. Reports bright blood with loose stool and some clots multiple times per day. He endorses fatigue today, denies lightheadedness, syncope, palpitations. Has not had a colonoscopy postoperatively. He denies N/V, fevers/chills, CP, SOB, dysuria, PO intolerance.    In ED afebrile, HR 95 (on BB), /73, O2 98% on RA.  Labs: Hgb 8.0, WBC 12.97, INR 1.25, lactate 3.3   (22 Apr 2021 21:14)      PAST MEDICAL & SURGICAL HISTORY:  Essential hypertension  Hypertension    CAD (coronary artery disease)    DM (diabetes mellitus)    PAD (peripheral artery disease)    Colon cancer    History of hernia repair  june 2014    Elective surgery  right leg angiogram with bypass  july 2016    S/P femoral-femoral bypass surgery  left 2016    S/P right hemicolectomy        ROS: See HPI    MEDICATIONS  (STANDING):  dextrose 40% Gel 15 Gram(s) Oral once  dextrose 5%. 1000 milliLiter(s) (50 mL/Hr) IV Continuous <Continuous>  dextrose 5%. 1000 milliLiter(s) (100 mL/Hr) IV Continuous <Continuous>  dextrose 50% Injectable 25 Gram(s) IV Push once  dextrose 50% Injectable 12.5 Gram(s) IV Push once  dextrose 50% Injectable 25 Gram(s) IV Push once  glucagon  Injectable 1 milliGRAM(s) IntraMuscular once  insulin lispro (ADMELOG) corrective regimen sliding scale   SubCutaneous Before meals and at bedtime  lactated ringers. 1000 milliLiter(s) (115 mL/Hr) IV Continuous <Continuous>  polyethylene glycol/electrolyte Solution 2000 milliLiter(s) Oral Once    MEDICATIONS  (PRN):  ondansetron Injectable 4 milliGRAM(s) IV Push every 6 hours PRN Nausea      Allergies    No Known Allergies    Intolerances        SOCIAL HISTORY:  Smoke: Never Smoker  EtOH: occasional    FAMILY HISTORY:  No pertinent family history in first degree relatives        Vital Signs Last 24 Hrs  T(C): 36.6 (22 Apr 2021 19:19), Max: 36.6 (22 Apr 2021 19:19)  T(F): 97.8 (22 Apr 2021 19:19), Max: 97.8 (22 Apr 2021 19:19)  HR: 67 (22 Apr 2021 19:48) (67 - 95)  BP: 100/60 (22 Apr 2021 19:48) (100/60 - 109/73)  BP(mean): --  RR: 18 (22 Apr 2021 19:48) (18 - 18)  SpO2: 100% (22 Apr 2021 19:48) (98% - 100%)    PHYSICAL EXAM    Gen: NAD   Neuro: A&oX3 no neuro deficits  CV: RRR reg s1s2 no M  Pulm: CTA B/L no w/w/r  Abd: Soft, NT/ND  Ext: No C/C/E Rectal exam as per Surgical resident - BRBPR  Skin: No rashes erythema or ecchymosis  MSK: No joint swelling noted  Psych: Normal affect     LABS:                        8.0    12.97 )-----------( 226      ( 22 Apr 2021 20:17 )             25.2     04-22    139  |  100  |  21  ----------------------------<  179<H>  3.8   |  26  |  1.09    Ca    9.3      22 Apr 2021 20:17    TPro  7.1  /  Alb  4.0  /  TBili  <0.2  /  DBili  x   /  AST  18  /  ALT  22  /  AlkPhos  125<H>  04-22    PT/INR - ( 22 Apr 2021 20:17 )   PT: 14.8 sec;   INR: 1.25          PTT - ( 22 Apr 2021 20:17 )  PTT:36.3 sec      RADIOLOGY & ADDITIONAL STUDIES:    Assessment and Plan:  70M (German speaking, poor historian), PMHx HTN, DM, HLD, CAD, PAD s/p bilateral fem-pop bypasses (2016) s/p R embolectomy and angioplasty x2 for RLE ischemia (3/2020) on ASA and Xarelto, severe aortic stenosis, 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 Afebrile, normal HR however on labetalol, hypotensive SBP low 100s, O2 high 90s on RA. Hgb 8.0, lactate 3.3    NEURO: pain/nausea control  CV: goal MAP >65, hold home xarelto and asa and antihypertensive meds given bleed. INR 1.25  PULM: ANA CRISTINA  GI/FEN: NPO, LR. Active GIB, GI consult, will give prep tonight for colonoscopy  : Parr  ENDO: ISS  HEME: Hgb 8.0. Maintain Hgb >8, q6 CBC, maintain T/S. F/u CTA abdomen; IR consult if active bleeding   ID: WBC 12.97  PPx: SCDs  LINES: PIVx2  PT/OT: Not ordered  Dispo: SICU   HPI:  70M (Swedish speaking, poor historian), PMHx HTN, DM, HLD, CAD, PAD s/p bilateral fem-pop bypasses (2016) s/p R embolectomy and angioplasty x2 for RLE ischemia (3/2020) on ASA and Xarelto, severe aortic stenosis, stage 1 cecal adenocarcinoma s/p open R hemicolectomy (06/2020), presents with BRBPR x 3 days. Pt stopped taking his xarelto yesterday given ongoing bleeding. Reports bright blood with loose stool and some clots multiple times per day. He endorses fatigue today, denies lightheadedness, syncope, palpitations. Has not had a colonoscopy postoperatively. He denies N/V, fevers/chills, CP, SOB, dysuria, PO intolerance.    In ED afebrile, HR 95 (on BB), /73, O2 98% on RA.  Labs: Hgb 8.0, WBC 12.97, INR 1.25, lactate 3.3   (22 Apr 2021 21:14)      PAST MEDICAL & SURGICAL HISTORY:  Essential hypertension  Hypertension    CAD (coronary artery disease)    DM (diabetes mellitus)    PAD (peripheral artery disease)    Colon cancer    History of hernia repair  june 2014    Elective surgery  right leg angiogram with bypass  july 2016    S/P femoral-femoral bypass surgery  left 2016    S/P right hemicolectomy        ROS: See HPI    MEDICATIONS  (STANDING):  dextrose 40% Gel 15 Gram(s) Oral once  dextrose 5%. 1000 milliLiter(s) (50 mL/Hr) IV Continuous <Continuous>  dextrose 5%. 1000 milliLiter(s) (100 mL/Hr) IV Continuous <Continuous>  dextrose 50% Injectable 25 Gram(s) IV Push once  dextrose 50% Injectable 12.5 Gram(s) IV Push once  dextrose 50% Injectable 25 Gram(s) IV Push once  glucagon  Injectable 1 milliGRAM(s) IntraMuscular once  insulin lispro (ADMELOG) corrective regimen sliding scale   SubCutaneous Before meals and at bedtime  lactated ringers. 1000 milliLiter(s) (115 mL/Hr) IV Continuous <Continuous>  polyethylene glycol/electrolyte Solution 2000 milliLiter(s) Oral Once    MEDICATIONS  (PRN):  ondansetron Injectable 4 milliGRAM(s) IV Push every 6 hours PRN Nausea      Allergies    No Known Allergies    Intolerances        SOCIAL HISTORY:  Smoke: Never Smoker  EtOH: occasional    FAMILY HISTORY:  No pertinent family history in first degree relatives        Vital Signs Last 24 Hrs  T(C): 36.6 (22 Apr 2021 19:19), Max: 36.6 (22 Apr 2021 19:19)  T(F): 97.8 (22 Apr 2021 19:19), Max: 97.8 (22 Apr 2021 19:19)  HR: 67 (22 Apr 2021 19:48) (67 - 95)  BP: 100/60 (22 Apr 2021 19:48) (100/60 - 109/73)  BP(mean): --  RR: 18 (22 Apr 2021 19:48) (18 - 18)  SpO2: 100% (22 Apr 2021 19:48) (98% - 100%)    PHYSICAL EXAM    Gen: NAD   Neuro: A&oX3 no neuro deficits  CV: RRR reg s1s2 no M  Pulm: CTA B/L no w/w/r  Abd: Soft, NT/ND  Ext: No C/C/E Rectal exam as per Surgical resident + BRBPR  Skin: No rashes erythema or ecchymosis  MSK: No joint swelling noted  Psych: Normal affect     LABS:                        8.0    12.97 )-----------( 226      ( 22 Apr 2021 20:17 )             25.2     04-22    139  |  100  |  21  ----------------------------<  179<H>  3.8   |  26  |  1.09    Ca    9.3      22 Apr 2021 20:17    TPro  7.1  /  Alb  4.0  /  TBili  <0.2  /  DBili  x   /  AST  18  /  ALT  22  /  AlkPhos  125<H>  04-22    PT/INR - ( 22 Apr 2021 20:17 )   PT: 14.8 sec;   INR: 1.25          PTT - ( 22 Apr 2021 20:17 )  PTT:36.3 sec      RADIOLOGY & ADDITIONAL STUDIES:    Assessment and Plan:  70M (Swedish speaking, poor historian), PMHx HTN, DM, HLD, CAD, PAD s/p bilateral fem-pop bypasses (2016) s/p R embolectomy and angioplasty x2 for RLE ischemia (3/2020) on ASA and Xarelto, severe aortic stenosis, 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 Afebrile, normal HR however on labetalol, hypotensive SBP low 100s, O2 high 90s on RA. Hgb 8.0, lactate 3.3    NEURO: pain/nausea control  CV: goal MAP >65, hold home xarelto and asa and antihypertensive meds given bleed. LR @115.   PULM: Satting well on RA   GI/FEN: NPO. Active GIB CTA negative: GI consult, will give prep tonight for colonoscopy   : Parr  ENDO: ISS  HEME: Hgb 8.0. Maintain Hgb >8, q6 CBC, maintain T/S. Total transfusions: PRBC: 2U.   ID: None   PPx: SCDsSQH on hold 2* GI Bleed.   LINES: PIVx2  PT/OT: Not ordered  Dispo: SICU     HPI:  70M (Russian speaking, poor historian), PMHx HTN, DM, HLD, CAD, PAD s/p bilateral fem-pop bypasses (2016) s/p R embolectomy and angioplasty x2 for RLE ischemia (3/2020) on ASA and Xarelto, severe aortic stenosis, stage 1 cecal adenocarcinoma s/p open R hemicolectomy (06/2020), presents with BRBPR x 3 days. Pt stopped taking his xarelto yesterday given ongoing bleeding. Reports bright blood with loose stool and some clots multiple times per day. He endorses fatigue today, denies lightheadedness, syncope, palpitations. Has not had a colonoscopy postoperatively. He denies N/V, fevers/chills, CP, SOB, dysuria, PO intolerance.    In ED afebrile, HR 95 (on BB), /73, O2 98% on RA.  Labs: Hgb 8.0, WBC 12.97, INR 1.25, lactate 3.3   (22 Apr 2021 21:14)      PAST MEDICAL & SURGICAL HISTORY:  Essential hypertension  Hypertension    CAD (coronary artery disease)    DM (diabetes mellitus)    PAD (peripheral artery disease)    Colon cancer    History of hernia repair  june 2014    Elective surgery  right leg angiogram with bypass  july 2016    S/P femoral-femoral bypass surgery  left 2016    S/P right hemicolectomy        ROS: See HPI    MEDICATIONS  (STANDING):  dextrose 40% Gel 15 Gram(s) Oral once  dextrose 5%. 1000 milliLiter(s) (50 mL/Hr) IV Continuous <Continuous>  dextrose 5%. 1000 milliLiter(s) (100 mL/Hr) IV Continuous <Continuous>  dextrose 50% Injectable 25 Gram(s) IV Push once  dextrose 50% Injectable 12.5 Gram(s) IV Push once  dextrose 50% Injectable 25 Gram(s) IV Push once  glucagon  Injectable 1 milliGRAM(s) IntraMuscular once  insulin lispro (ADMELOG) corrective regimen sliding scale   SubCutaneous Before meals and at bedtime  lactated ringers. 1000 milliLiter(s) (115 mL/Hr) IV Continuous <Continuous>  polyethylene glycol/electrolyte Solution 2000 milliLiter(s) Oral Once    MEDICATIONS  (PRN):  ondansetron Injectable 4 milliGRAM(s) IV Push every 6 hours PRN Nausea      Allergies    No Known Allergies    Intolerances        SOCIAL HISTORY:  Smoke: Never Smoker  EtOH: occasional    FAMILY HISTORY:  No pertinent family history in first degree relatives        Vital Signs Last 24 Hrs  T(C): 36.6 (22 Apr 2021 19:19), Max: 36.6 (22 Apr 2021 19:19)  T(F): 97.8 (22 Apr 2021 19:19), Max: 97.8 (22 Apr 2021 19:19)  HR: 67 (22 Apr 2021 19:48) (67 - 95)  BP: 100/60 (22 Apr 2021 19:48) (100/60 - 109/73)  BP(mean): --  RR: 18 (22 Apr 2021 19:48) (18 - 18)  SpO2: 100% (22 Apr 2021 19:48) (98% - 100%)    PHYSICAL EXAM    Gen: NAD   Neuro: A&oX3 no neuro deficits  CV: RRR reg s1s2 + 3/6 Holosystolic murmur  Pulm: CTA B/L no w/w/r  Abd: Soft, NT/ND + Hyperactive BS Rectal exam as per Surgical resident + BRBPR  Ext: No C/C/E , Warm well perfused. lower and upper ext.   Skin: No rashes erythema or ecchymosis  MSK: No joint swelling noted  Psych: Normal affect     LABS:                        8.0    12.97 )-----------( 226      ( 22 Apr 2021 20:17 )             25.2     04-22    139  |  100  |  21  ----------------------------<  179<H>  3.8   |  26  |  1.09    Ca    9.3      22 Apr 2021 20:17    TPro  7.1  /  Alb  4.0  /  TBili  <0.2  /  DBili  x   /  AST  18  /  ALT  22  /  AlkPhos  125<H>  04-22    PT/INR - ( 22 Apr 2021 20:17 )   PT: 14.8 sec;   INR: 1.25          PTT - ( 22 Apr 2021 20:17 )  PTT:36.3 sec      RADIOLOGY & ADDITIONAL STUDIES:    Assessment and Plan:  70M (Russian speaking, poor historian), PMHx HTN, DM, HLD, CAD, PAD s/p bilateral fem-pop bypasses (2016) s/p R embolectomy and angioplasty x2 for RLE ischemia (3/2020) on ASA and Xarelto, severe aortic stenosis, 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 Transferred to SICU for further monitoring.     NEURO: pain/nausea control  CV: Hypotensive 2* GI bleed goal MAP >65, hold home xarelto and asa and antihypertensive meds given bleed. Severe vs moderate AS will repeat Echo on this admission. LR @115. Hx of CAD and Previous fem pop bypasses ( xarelto and asa on hold).   PULM: Satting well on RA   GI/FEN: NPO. Active GIB CTA negative: GI consult, will give prep tonight for colonoscopy in am.   : Parr  ENDO: ISS  HEME: Hgb 8.0. Maintain Hgb >8, q6 CBC, maintain T/S. Total transfusions: PRBC: 2U.   ID: None   PPx: SCDs SQH on hold 2* GI Bleed.   LINES: PIVx2  PT/OT: Not ordered  Dispo: SICU     HPI:  70M (French speaking, poor historian), PMHx HTN, DM, HLD, CAD, PAD s/p bilateral fem-pop bypasses (2016) s/p R embolectomy and angioplasty x2 for RLE ischemia (3/2020) on ASA and Xarelto, severe aortic stenosis, stage 1 cecal adenocarcinoma s/p open R hemicolectomy (06/2020), presents with BRBPR x 3 days. Pt stopped taking his xarelto yesterday given ongoing bleeding. Reports bright blood with loose stool and some clots multiple times per day. He endorses fatigue today, denies lightheadedness, syncope, palpitations. Has not had a colonoscopy postoperatively. He denies N/V, fevers/chills, CP, SOB, dysuria, PO intolerance.    In ED afebrile, HR 95 (on BB), /73, O2 98% on RA.  Labs: Hgb 8.0, WBC 12.97, INR 1.25, lactate 3.3   (22 Apr 2021 21:14)      PAST MEDICAL & SURGICAL HISTORY:  Essential hypertension  Hypertension    CAD (coronary artery disease)    DM (diabetes mellitus)    PAD (peripheral artery disease)    Colon cancer    History of hernia repair  june 2014    Elective surgery  right leg angiogram with bypass  july 2016    S/P femoral-femoral bypass surgery  left 2016    S/P right hemicolectomy        ROS: See HPI    MEDICATIONS  (STANDING):  dextrose 40% Gel 15 Gram(s) Oral once  dextrose 5%. 1000 milliLiter(s) (50 mL/Hr) IV Continuous <Continuous>  dextrose 5%. 1000 milliLiter(s) (100 mL/Hr) IV Continuous <Continuous>  dextrose 50% Injectable 25 Gram(s) IV Push once  dextrose 50% Injectable 12.5 Gram(s) IV Push once  dextrose 50% Injectable 25 Gram(s) IV Push once  glucagon  Injectable 1 milliGRAM(s) IntraMuscular once  insulin lispro (ADMELOG) corrective regimen sliding scale   SubCutaneous Before meals and at bedtime  lactated ringers. 1000 milliLiter(s) (115 mL/Hr) IV Continuous <Continuous>  polyethylene glycol/electrolyte Solution 2000 milliLiter(s) Oral Once    MEDICATIONS  (PRN):  ondansetron Injectable 4 milliGRAM(s) IV Push every 6 hours PRN Nausea      Allergies    No Known Allergies    Intolerances        SOCIAL HISTORY:  Smoke: Never Smoker  EtOH: occasional    FAMILY HISTORY:  No pertinent family history in first degree relatives        Vital Signs Last 24 Hrs  T(C): 36.6 (22 Apr 2021 19:19), Max: 36.6 (22 Apr 2021 19:19)  T(F): 97.8 (22 Apr 2021 19:19), Max: 97.8 (22 Apr 2021 19:19)  HR: 67 (22 Apr 2021 19:48) (67 - 95)  BP: 100/60 (22 Apr 2021 19:48) (100/60 - 109/73)  BP(mean): --  RR: 18 (22 Apr 2021 19:48) (18 - 18)  SpO2: 100% (22 Apr 2021 19:48) (98% - 100%)    PHYSICAL EXAM    Gen: NAD   Neuro: A&oX3 no neuro deficits  CV: RRR reg s1s2 + 3/6 Holosystolic murmur  Pulm: CTA B/L no w/w/r  Abd: Soft, NT/ND + Hyperactive BS Rectal exam as per Surgical resident + BRBPR  Ext: No C/C/E , Warm well perfused. lower and upper ext.   Skin: No rashes erythema or ecchymosis  MSK: No joint swelling noted  Psych: Normal affect     LABS:                        8.0    12.97 )-----------( 226      ( 22 Apr 2021 20:17 )             25.2     04-22    139  |  100  |  21  ----------------------------<  179<H>  3.8   |  26  |  1.09    Ca    9.3      22 Apr 2021 20:17    TPro  7.1  /  Alb  4.0  /  TBili  <0.2  /  DBili  x   /  AST  18  /  ALT  22  /  AlkPhos  125<H>  04-22    PT/INR - ( 22 Apr 2021 20:17 )   PT: 14.8 sec;   INR: 1.25          PTT - ( 22 Apr 2021 20:17 )  PTT:36.3 sec      RADIOLOGY & ADDITIONAL STUDIES:    Assessment and Plan:  70M (French speaking, poor historian), PMHx HTN, DM, HLD, CAD, PAD s/p bilateral fem-pop bypasses (2016) s/p R embolectomy and angioplasty x2 for RLE ischemia (3/2020) on ASA and Xarelto, severe aortic stenosis, 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 Transferred to SICU for further monitoring.     NEURO: pain/nausea control  CV: Hypotensive 2* GI bleed goal MAP >65, hold home xarelto and asa and antihypertensive meds given bleed. Severe vs moderate AS will repeat Echo on this admission. LR @115. Hx of CAD and Previous fem pop bypasses ( xarelto and asa on hold).   PULM: Satting well on RA   GI/FEN: NPO. Active GIB CTA negative: GI consult, will give prep tonight for colonoscopy in am.   : Voids  ENDO: ISS  HEME: Hgb 8.0. Maintain Hgb >8, q6 CBC, maintain T/S. Total transfusions: PRBC: 2U.   ID: None   PPx: SCDs SQH on hold 2* GI Bleed.   LINES: PIVx2  PT/OT: Not ordered  Dispo: SICU

## 2021-04-22 NOTE — H&P ADULT - NSICDXPASTSURGICALHX_GEN_ALL_CORE_FT
PAST SURGICAL HISTORY:  Elective surgery right leg angiogram with bypass  july 2016    History of hernia repair june 2014    S/P femoral-femoral bypass surgery left 2016    S/P right hemicolectomy

## 2021-04-22 NOTE — ED ADULT NURSE NOTE - CHIEF COMPLAINT QUOTE
x 3 days bright red bloody stool. on xarelto stopped x 1 day ago because of the bloody stool. denies fevers, chills, abdominal pain, n/v, CP , SOB. PMH of GI bleeds.

## 2021-04-22 NOTE — ED ADULT NURSE NOTE - OBJECTIVE STATEMENT
Pt is a 70y male presented w/ 2 days of bloody stools associated w/ fatigue, pt on Xarelto. As per daughter, PCP advised to stop Xarelto and come to ED. Pt has hx of GI bleed, blood transfusion. Pt denies CP/SOB.

## 2021-04-22 NOTE — H&P ADULT - ASSESSMENT
70M (Danish speaking, poor historian), PMHx HTN, DM, HLD, CAD, PAD s/p bilateral fem-pop bypasses (2016) s/p R embolectomy and angioplasty x2 for RLE ischemia (3/2020) on ASA and Xarelto, severe aortic stenosis, 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 Afebrile, normal HR however on labetalol, hypotensive SBP low 100s, O2 high 90s on RA. Hgb 8.0, lactate 3.3    SICU admission, Dr. Acosta    NEURO: pain/nausea control  CV: goal MAP >65, hold home xarelto and asa and antihypertensive meds given bleed. INR 1.25  PULM: ANA CRISTINA  GI/FEN: NPO, LR  : Parr  ENDO: ISS  HEME: Hgb 8.0. Maintain Hgb >8, q6 CBC, maintain T/S. F/u CTA abdomen; IR consult if active bleeding   ID: WBC 12.97  PPx: SCDs  LINES: PIVx2  PT/OT: Not ordered   70M (Faroese speaking, poor historian), PMHx HTN, DM, HLD, CAD, PAD s/p bilateral fem-pop bypasses (2016) s/p R embolectomy and angioplasty x2 for RLE ischemia (3/2020) on ASA and Xarelto, severe aortic stenosis, 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 Afebrile, normal HR however on labetalol, hypotensive SBP low 100s, O2 high 90s on RA. Hgb 8.0, lactate 3.3    SICU admission, Dr. Acosta    NEURO: pain/nausea control  CV: goal MAP >65, hold home xarelto and asa and antihypertensive meds given bleed. INR 1.25  PULM: ANA CRISTINA  GI/FEN: NPO, LR. Active GIB, GI consult, will give prep tonight for colonoscopy  : Parr  ENDO: ISS  HEME: Hgb 8.0. Maintain Hgb >8, q6 CBC, maintain T/S. F/u CTA abdomen; IR consult if active bleeding   ID: WBC 12.97  PPx: SCDs  LINES: PIVx2  PT/OT: Not ordered

## 2021-04-22 NOTE — ED PROVIDER NOTE - CLINICAL SUMMARY MEDICAL DECISION MAKING FREE TEXT BOX
69 y/o M with PMHx as above presenting to the ED with BRBPR, on Xarelto. Given surgical hx and that patient is on blood thinners, will obtain labs, surgical consult for admission. Surgery requesting CT angio of abdomen and pelvis. Patient HD stable in ED. No active bleeding, will admit. 71 y/o M with PMHx as above presenting to the ED with BRBPR, on Xarelto. Given surgical hx and that patient is on blood thinners, will obtain labs, surgical consult for admission. Surgery requesting CT angio of abdomen and pelvis and they will fu on results. Patient HD stable in ED. No active bleeding, will admit.

## 2021-04-22 NOTE — ED PROVIDER NOTE - PHYSICAL EXAMINATION
CONSTITUTIONAL: Well appearing, well nourished, awake, alert and in no apparent distress.  HEENT: Head is atraumatic. Eyes clear bilaterally, normal EOMI. Airway patent.  CARDIAC: Normal rate, regular rhythm.  Heart sounds S1, S2.   RESPIRATORY: Breath sounds clear and equal bilaterally. no tachypnea, respiratory distress.   GASTROINTESTINAL: Abdomen soft, non-tender, no guarding, distension.  RECTAL: Dark red blood in rectal vault   MUSCULOSKELETAL: Spine appears normal, no midline spinal tenderness, range of motion is not limited, no muscle or joint tenderness. no bony tenderness. no JVD, peripheral edema.   NEUROLOGICAL: Alert and oriented, no focal deficits, no motor or sensory deficits.  SKIN: Skin normal color for race, warm, dry and intact. No evidence of rash.  PSYCHIATRIC: Alert and oriented to person, place, time/situation. normal mood and affect. no apparent risk to self or others.

## 2021-04-23 LAB
ANION GAP SERPL CALC-SCNC: 10 MMOL/L — SIGNIFICANT CHANGE UP (ref 5–17)
APPEARANCE UR: CLEAR — SIGNIFICANT CHANGE UP
APTT BLD: 31.4 SEC — SIGNIFICANT CHANGE UP (ref 27.5–35.5)
BILIRUB UR-MCNC: NEGATIVE — SIGNIFICANT CHANGE UP
BLD GP AB SCN SERPL QL: NEGATIVE — SIGNIFICANT CHANGE UP
BUN SERPL-MCNC: 13 MG/DL — SIGNIFICANT CHANGE UP (ref 7–23)
CALCIUM SERPL-MCNC: 8.9 MG/DL — SIGNIFICANT CHANGE UP (ref 8.4–10.5)
CHLORIDE SERPL-SCNC: 108 MMOL/L — SIGNIFICANT CHANGE UP (ref 96–108)
CO2 SERPL-SCNC: 26 MMOL/L — SIGNIFICANT CHANGE UP (ref 22–31)
COLOR SPEC: YELLOW — SIGNIFICANT CHANGE UP
CREAT SERPL-MCNC: 0.82 MG/DL — SIGNIFICANT CHANGE UP (ref 0.5–1.3)
DIFF PNL FLD: NEGATIVE — SIGNIFICANT CHANGE UP
GLUCOSE SERPL-MCNC: 126 MG/DL — HIGH (ref 70–99)
GLUCOSE UR QL: NEGATIVE — SIGNIFICANT CHANGE UP
HCT VFR BLD CALC: 27.2 % — LOW (ref 39–50)
HCT VFR BLD CALC: 29.5 % — LOW (ref 39–50)
HCT VFR BLD CALC: 29.9 % — LOW (ref 39–50)
HGB BLD-MCNC: 9 G/DL — LOW (ref 13–17)
HGB BLD-MCNC: 9.7 G/DL — LOW (ref 13–17)
HGB BLD-MCNC: 9.8 G/DL — LOW (ref 13–17)
INR BLD: 1.13 — SIGNIFICANT CHANGE UP (ref 0.88–1.16)
KETONES UR-MCNC: NEGATIVE — SIGNIFICANT CHANGE UP
LEUKOCYTE ESTERASE UR-ACNC: NEGATIVE — SIGNIFICANT CHANGE UP
MCHC RBC-ENTMCNC: 27.5 PG — SIGNIFICANT CHANGE UP (ref 27–34)
MCHC RBC-ENTMCNC: 27.6 PG — SIGNIFICANT CHANGE UP (ref 27–34)
MCHC RBC-ENTMCNC: 28.1 PG — SIGNIFICANT CHANGE UP (ref 27–34)
MCHC RBC-ENTMCNC: 32.8 GM/DL — SIGNIFICANT CHANGE UP (ref 32–36)
MCHC RBC-ENTMCNC: 32.9 GM/DL — SIGNIFICANT CHANGE UP (ref 32–36)
MCHC RBC-ENTMCNC: 33.1 GM/DL — SIGNIFICANT CHANGE UP (ref 32–36)
MCV RBC AUTO: 83.4 FL — SIGNIFICANT CHANGE UP (ref 80–100)
MCV RBC AUTO: 83.6 FL — SIGNIFICANT CHANGE UP (ref 80–100)
MCV RBC AUTO: 85.7 FL — SIGNIFICANT CHANGE UP (ref 80–100)
NITRITE UR-MCNC: NEGATIVE — SIGNIFICANT CHANGE UP
NRBC # BLD: 0 /100 WBCS — SIGNIFICANT CHANGE UP (ref 0–0)
PH UR: 6 — SIGNIFICANT CHANGE UP (ref 5–8)
PHOSPHATE SERPL-MCNC: 2.6 MG/DL — SIGNIFICANT CHANGE UP (ref 2.5–4.5)
PLATELET # BLD AUTO: 162 K/UL — SIGNIFICANT CHANGE UP (ref 150–400)
PLATELET # BLD AUTO: 163 K/UL — SIGNIFICANT CHANGE UP (ref 150–400)
PLATELET # BLD AUTO: 166 K/UL — SIGNIFICANT CHANGE UP (ref 150–400)
POTASSIUM SERPL-MCNC: 4 MMOL/L — SIGNIFICANT CHANGE UP (ref 3.5–5.3)
POTASSIUM SERPL-SCNC: 4 MMOL/L — SIGNIFICANT CHANGE UP (ref 3.5–5.3)
PROT UR-MCNC: NEGATIVE MG/DL — SIGNIFICANT CHANGE UP
PROTHROM AB SERPL-ACNC: 13.5 SEC — SIGNIFICANT CHANGE UP (ref 10.6–13.6)
RBC # BLD: 3.26 M/UL — LOW (ref 4.2–5.8)
RBC # BLD: 3.49 M/UL — LOW (ref 4.2–5.8)
RBC # BLD: 3.53 M/UL — LOW (ref 4.2–5.8)
RBC # FLD: 15.4 % — HIGH (ref 10.3–14.5)
RBC # FLD: 15.4 % — HIGH (ref 10.3–14.5)
RBC # FLD: 15.5 % — HIGH (ref 10.3–14.5)
RH IG SCN BLD-IMP: POSITIVE — SIGNIFICANT CHANGE UP
SODIUM SERPL-SCNC: 144 MMOL/L — SIGNIFICANT CHANGE UP (ref 135–145)
SP GR SPEC: <=1.005 — SIGNIFICANT CHANGE UP (ref 1–1.03)
UROBILINOGEN FLD QL: 0.2 E.U./DL — SIGNIFICANT CHANGE UP
WBC # BLD: 10.47 K/UL — SIGNIFICANT CHANGE UP (ref 3.8–10.5)
WBC # BLD: 11.38 K/UL — HIGH (ref 3.8–10.5)
WBC # BLD: 12.06 K/UL — HIGH (ref 3.8–10.5)
WBC # FLD AUTO: 10.47 K/UL — SIGNIFICANT CHANGE UP (ref 3.8–10.5)
WBC # FLD AUTO: 11.38 K/UL — HIGH (ref 3.8–10.5)
WBC # FLD AUTO: 12.06 K/UL — HIGH (ref 3.8–10.5)

## 2021-04-23 PROCEDURE — 93306 TTE W/DOPPLER COMPLETE: CPT | Mod: 26

## 2021-04-23 PROCEDURE — 99233 SBSQ HOSP IP/OBS HIGH 50: CPT | Mod: GC

## 2021-04-23 PROCEDURE — 99221 1ST HOSP IP/OBS SF/LOW 40: CPT

## 2021-04-23 PROCEDURE — 99222 1ST HOSP IP/OBS MODERATE 55: CPT | Mod: GC

## 2021-04-23 PROCEDURE — 99222 1ST HOSP IP/OBS MODERATE 55: CPT

## 2021-04-23 RX ORDER — ASPIRIN/CALCIUM CARB/MAGNESIUM 324 MG
81 TABLET ORAL DAILY
Refills: 0 | Status: DISCONTINUED | OUTPATIENT
Start: 2021-04-23 | End: 2021-04-28

## 2021-04-23 RX ADMIN — SODIUM CHLORIDE 115 MILLILITER(S): 9 INJECTION, SOLUTION INTRAVENOUS at 01:18

## 2021-04-23 RX ADMIN — Medication 2: at 17:22

## 2021-04-23 NOTE — CONSULT NOTE ADULT - ASSESSMENT
70M (Divehi speaking, poor historian), PMHx HTN, DM, HLD, CAD, PAD s/p bilateral fem-pop bypasses (2016) s/p R embolectomy and angioplasty x2 for RLE ischemia (3/2020) on ASA and Xarelto, severe aortic stenosis, stage 1 cecal adenocarcinoma s/p open R hemicolectomy (06/2020), presents with BRBPR x 3 days,  hemodynamically stable    - Improved LE pulse exam, palpable dp pulses b/l  - Please resume ASA and Xarelto whenever cleared from medical/GI point of view  - will continue to follow  - discussed with vascular chief resident.
70M (Ukrainian speaking, poor historian), PMHx HTN, DM, HLD, CAD, PAD s/p bilateral fem-pop bypasses (2016) s/p R embolectomy and angioplasty x2 for RLE ischemia (3/2020) on ASA and Xarelto, severe aortic stenosis, stage 1 cecal adenocarcinoma s/p open R hemicolectomy (06/2020), presents with BRBPR x 3 days. GI consulted for hematochezia.     #Hematochezia  - Maintain active T&S, large bore IV access  - Transfusion threshold per primary team  - patient now having brown stool and Hgb is stable  - will defer colonoscopy at this time and continue to monitor    Mele Quiñones MD  PGY-4, Gastroenterology Fellow  pager: [phone]

## 2021-04-23 NOTE — DIETITIAN INITIAL EVALUATION ADULT. - OTHER INFO
70M (Khmer speaking, poor historian), PMHx HTN, DM, HLD, CAD, PAD s/p bilateral fem-pop bypasses (2016) s/p R embolectomy and angioplasty x2 for RLE ischemia (3/2020) on ASA and Xarelto, severe aortic stenosis, 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 CTA negative. Transferred to SICU for further monitoring.     Pt currently w/ no active diet order 2/2 plan to have colonoscopy w/ GI today. Completed bowel prep this morning and having loose stool. S/p blood transfusion. Weight appears to be fluctuating- was 142lbs in June 2020 after a reported weight loss and now is 165lbs; will have to f/u with patient for further clarification and assess malnutrition. NKFA or dietary restrictions. Skin: intact pressure wise; GI WNL per flowsheet. RD to follow.

## 2021-04-23 NOTE — DIETITIAN INITIAL EVALUATION ADULT. - ADD RECOMMEND
Please adv. to DASH/TLC and CSTCHO as medically feasible 2. RD to follow up and assess need for nutrition therapy edu. 3 Fluids and bowel regimen per team.

## 2021-04-23 NOTE — CONSULT NOTE ADULT - SUBJECTIVE AND OBJECTIVE BOX
Vascular Attending:  Dr. Gant      HPI:  70M (Persian speaking, poor historian), PMHx HTN, DM, HLD, CAD, PAD s/p bilateral fem-pop bypasses (2016) s/p R embolectomy and angioplasty x2 for RLE ischemia (3/2020) on ASA and Xarelto, severe aortic stenosis, stage 1 cecal adenocarcinoma s/p open R hemicolectomy (06/2020), presents with BRBPR x 3 days. Pt stopped taking his xarelto yesterday given ongoing bleeding. Reports bright blood with loose stool and some clots multiple times per day. He endorses fatigue today, denies lightheadedness, syncope, palpitations. Has not had a colonoscopy postoperatively. He denies N/V, fevers/chills, CP, SOB, dysuria, PO intolerance.    Vascular Addendum:  Patient doesn't currently complain of any abdominal pain, he says he has never had any recent leg pain and has been able to walk. He has also quit smoking for the past year. No weakness, numbness, tingling    In ED afebrile, HR 95 (on BB), /73, O2 98% on RA.  Labs: Hgb 8.0, WBC 12.97, INR 1.25, lactate 3.3   (22 Apr 2021 21:14)      PAST MEDICAL & SURGICAL HISTORY:  Essential hypertension  Hypertension    CAD (coronary artery disease)    DM (diabetes mellitus)    PAD (peripheral artery disease)    Colon cancer    History of hernia repair  june 2014    Elective surgery  right leg angiogram with bypass  july 2016    S/P femoral-femoral bypass surgery  left 2016    S/P right hemicolectomy    MEDICATIONS  (STANDING):  dextrose 40% Gel 15 Gram(s) Oral once  dextrose 5%. 1000 milliLiter(s) (50 mL/Hr) IV Continuous <Continuous>  dextrose 5%. 1000 milliLiter(s) (100 mL/Hr) IV Continuous <Continuous>  dextrose 50% Injectable 25 Gram(s) IV Push once  dextrose 50% Injectable 12.5 Gram(s) IV Push once  dextrose 50% Injectable 25 Gram(s) IV Push once  glucagon  Injectable 1 milliGRAM(s) IntraMuscular once  insulin lispro (ADMELOG) corrective regimen sliding scale   SubCutaneous every 6 hours  lactated ringers. 1000 milliLiter(s) (115 mL/Hr) IV Continuous <Continuous>    MEDICATIONS  (PRN):  ondansetron Injectable 4 milliGRAM(s) IV Push every 6 hours PRN Nausea      Allergies    No Known Allergies    Intolerances        SOCIAL HISTORY:    FAMILY HISTORY:  No pertinent family history in first degree relatives        Vital Signs Last 24 Hrs  T(C): 37 (23 Apr 2021 05:00), Max: 37 (23 Apr 2021 05:00)  T(F): 98.6 (23 Apr 2021 05:00), Max: 98.6 (23 Apr 2021 05:00)  HR: 76 (23 Apr 2021 06:00) (67 - 96)  BP: 132/62 (23 Apr 2021 06:00) (100/60 - 147/95)  BP(mean): 92 (23 Apr 2021 05:00) (86 - 111)  RR: 20 (23 Apr 2021 06:00) (18 - 31)  SpO2: 95% (23 Apr 2021 06:00) (94% - 100%)    PHYSICAL EXAM:  Gen: NAD, resting in bad  Chest: CTA b/l, no respiratory distress  CV: RRR  Abd: soft, NT, ND  Extremities: warm and well perfused, motorsensory intact  Pulses: RLE: palp fem, triphasic pop, palpable dp, biphasic PT  LLE: palp fem, triphasic pop, palpable dp, biphasic PT      LABS:                        6.6    11.06 )-----------( 189      ( 22 Apr 2021 22:38 )             20.4     04-22    139  |  100  |  21  ----------------------------<  179<H>  3.8   |  26  |  1.09    Ca    9.3      22 Apr 2021 20:17    TPro  7.1  /  Alb  4.0  /  TBili  <0.2  /  DBili  x   /  AST  18  /  ALT  22  /  AlkPhos  125<H>  04-22    PT/INR - ( 22 Apr 2021 20:17 )   PT: 14.8 sec;   INR: 1.25          PTT - ( 22 Apr 2021 20:17 )  PTT:36.3 sec  Urinalysis Basic - ( 23 Apr 2021 01:10 )    Color: Yellow / Appearance: Clear / SG: <=1.005 / pH: x  Gluc: x / Ketone: NEGATIVE  / Bili: Negative / Urobili: 0.2 E.U./dL   Blood: x / Protein: NEGATIVE mg/dL / Nitrite: NEGATIVE   Leuk Esterase: NEGATIVE / RBC: x / WBC x   Sq Epi: x / Non Sq Epi: x / Bacteria: x        RADIOLOGY & ADDITIONAL STUDIES

## 2021-04-23 NOTE — DIETITIAN INITIAL EVALUATION ADULT. - OTHER CALCULATIONS
IBW used for calculations as pt >120% of IBW (156%). Nutrient needs based on [facility] standards of care for maintenance in older adults.

## 2021-04-23 NOTE — CONSULT NOTE ADULT - SUBJECTIVE AND OBJECTIVE BOX
GASTROENTEROLOGY CONSULT NOTE  HPI:  70M (Mohawk speaking, poor historian), PMHx HTN, DM, HLD, CAD, PAD s/p bilateral fem-pop bypasses (2016) s/p R embolectomy and angioplasty x2 for RLE ischemia (3/2020) on ASA and Xarelto, severe aortic stenosis, stage 1 cecal adenocarcinoma s/p open R hemicolectomy (06/2020), presents with BRBPR x 3 days. Pt stopped taking his xarelto yesterday given ongoing bleeding. Reports bright blood with loose stool and some clots multiple times per day. He endorses fatigue today, denies lightheadedness, syncope, palpitations. Has not had a colonoscopy postoperatively. He denies N/V, fevers/chills, CP, SOB, dysuria, PO intolerance.    In ED afebrile, HR 95 (on BB), /73, O2 98% on RA.  Labs: Hgb 8.0, WBC 12.97, INR 1.25, lactate 3.3   (22 Apr 2021 21:14)    GI consulted for hematochezia. Patient seen and examined at bedside.     Allergies    No Known Allergies    Intolerances      Home Medications:  aspirin 81 mg oral delayed release tablet: 1 tab(s) orally once a day (17 Jun 2020 15:46)  azilsartan-chlorthalidone 40 mg-12.5 mg oral tablet: 1 tab(s) orally once a day (17 Jun 2020 15:46)  labetalol 200 mg oral tablet: 1 tab(s) orally 2 times a day (17 Jun 2020 15:46)  metFORMIN 500 mg oral tablet: 1  orally 2 times a day (17 Jun 2020 15:46)  Multiple Vitamins oral tablet: 1 tab(s) orally once a day (17 Jun 2020 15:46)  NIFEdipine 90 mg oral tablet, extended release: 1 tab(s) orally once a day (17 Jun 2020 15:46)    MEDICATIONS:  MEDICATIONS  (STANDING):  aspirin enteric coated 81 milliGRAM(s) Oral daily  dextrose 40% Gel 15 Gram(s) Oral once  dextrose 5%. 1000 milliLiter(s) (50 mL/Hr) IV Continuous <Continuous>  dextrose 5%. 1000 milliLiter(s) (100 mL/Hr) IV Continuous <Continuous>  dextrose 50% Injectable 25 Gram(s) IV Push once  dextrose 50% Injectable 12.5 Gram(s) IV Push once  dextrose 50% Injectable 25 Gram(s) IV Push once  glucagon  Injectable 1 milliGRAM(s) IntraMuscular once  insulin lispro (ADMELOG) corrective regimen sliding scale   SubCutaneous every 6 hours    MEDICATIONS  (PRN):  ondansetron Injectable 4 milliGRAM(s) IV Push every 6 hours PRN Nausea    PAST MEDICAL & SURGICAL HISTORY:  Essential hypertension  Hypertension    CAD (coronary artery disease)    DM (diabetes mellitus)    PAD (peripheral artery disease)    Colon cancer    History of hernia repair  june 2014    Elective surgery  right leg angiogram with bypass  july 2016    S/P femoral-femoral bypass surgery  left 2016    S/P right hemicolectomy      FAMILY HISTORY:  No pertinent family history in first degree relatives      SOCIAL HISTORY:  Tobacco: [ ] Current, [ ] Former, [ ] Never; Pack Years:  Alcohol:  Illicit Drugs:    REVIEW OF SYSTEMS:  CONSTITUTIONAL: No weakness, fevers or chills  HEENT: No visual changes; No vertigo or throat pain   NECK: No pain or stiffness  RESPIRATORY: No cough, wheezing, hemoptysis; No shortness of breath  CARDIOVASCULAR: No chest pain or palpitations  GASTROINTESTINAL: As above.  GENITOURINARY: No dysuria, frequency or hematuria  NEUROLOGICAL: No numbness or weakness  SKIN: No itching, burning, rashes, or lesions   All other 10 review of systems is negative unless indicated above.    Vital Signs Last 24 Hrs  T(C): 37.4 (23 Apr 2021 13:00), Max: 37.4 (23 Apr 2021 13:00)  T(F): 99.4 (23 Apr 2021 13:00), Max: 99.4 (23 Apr 2021 13:00)  HR: 78 (23 Apr 2021 18:00) (67 - 96)  BP: 160/74 (23 Apr 2021 18:00) (100/60 - 160/74)  BP(mean): 106 (23 Apr 2021 18:00) (86 - 111)  RR: 17 (23 Apr 2021 18:00) (9 - 31)  SpO2: 96% (23 Apr 2021 18:00) (92% - 100%)    04-22 @ 07:01  -  04-23 @ 07:00  --------------------------------------------------------  IN: 2275 mL / OUT: 6 mL / NET: 2269 mL    04-23 @ 07:01  -  04-23 @ 18:35  --------------------------------------------------------  IN: 990 mL / OUT: 1 mL / NET: 989 mL        PHYSICAL EXAM:    General: Well developed; well nourished; in no acute distress  Eyes: Anicteric sclerae, moist conjunctivae  HENT: Moist mucous membranes  Neck: Trachea midline, supple  Lungs: Normal respiratory effort, no intercostal retractions  Cardiovascular: RRR  Abdomen: Soft, non-tender non-distended; Normal bowel sounds; No rebound or guarding  Rectal: empty vault  Extremities: Normal range of motion, No clubbing, cyanosis or edema  Neurological: Alert and oriented x3  Skin: Warm and dry. No obvious rash    LABS:                        9.7    11.38 )-----------( 163      ( 23 Apr 2021 17:10 )             29.5     04-23    144  |  108  |  13  ----------------------------<  126<H>  4.0   |  26  |  0.82    Ca    8.9      23 Apr 2021 11:14  Phos  2.6     04-23    TPro  7.1  /  Alb  4.0  /  TBili  <0.2  /  DBili  x   /  AST  18  /  ALT  22  /  AlkPhos  125<H>  04-22        PT/INR - ( 23 Apr 2021 11:14 )   PT: 13.5 sec;   INR: 1.13          PTT - ( 23 Apr 2021 11:14 )  PTT:31.4 sec    RADIOLOGY & ADDITIONAL STUDIES:     Reviewed GASTROENTEROLOGY CONSULT NOTE  HPI:  70M (Macedonian speaking, poor historian), PMHx HTN, DM, HLD, CAD, PAD s/p bilateral fem-pop bypasses (2016) s/p R embolectomy and angioplasty x2 for RLE ischemia (3/2020) on ASA and Xarelto, severe aortic stenosis, stage 1 cecal adenocarcinoma s/p open R hemicolectomy (06/2020), presents with BRBPR x 3 days. Pt stopped taking his xarelto yesterday given ongoing bleeding. Reports bright blood with loose stool and some clots multiple times per day. He endorses fatigue today, denies lightheadedness, syncope, palpitations. Has not had a colonoscopy postoperatively. He denies N/V, fevers/chills, CP, SOB, dysuria, PO intolerance.    In ED afebrile, HR 95 (on BB), /73, O2 98% on RA.  Labs: Hgb 8.0, WBC 12.97, INR 1.25, lactate 3.3   (22 Apr 2021 21:14)    GI consulted for hematochezia. Patient seen and examined at bedside.     Allergies    No Known Allergies    Intolerances      Home Medications:  aspirin 81 mg oral delayed release tablet: 1 tab(s) orally once a day (17 Jun 2020 15:46)  azilsartan-chlorthalidone 40 mg-12.5 mg oral tablet: 1 tab(s) orally once a day (17 Jun 2020 15:46)  labetalol 200 mg oral tablet: 1 tab(s) orally 2 times a day (17 Jun 2020 15:46)  metFORMIN 500 mg oral tablet: 1  orally 2 times a day (17 Jun 2020 15:46)  Multiple Vitamins oral tablet: 1 tab(s) orally once a day (17 Jun 2020 15:46)  NIFEdipine 90 mg oral tablet, extended release: 1 tab(s) orally once a day (17 Jun 2020 15:46)    MEDICATIONS:  MEDICATIONS  (STANDING):  aspirin enteric coated 81 milliGRAM(s) Oral daily  dextrose 40% Gel 15 Gram(s) Oral once  dextrose 5%. 1000 milliLiter(s) (50 mL/Hr) IV Continuous <Continuous>  dextrose 5%. 1000 milliLiter(s) (100 mL/Hr) IV Continuous <Continuous>  dextrose 50% Injectable 25 Gram(s) IV Push once  dextrose 50% Injectable 12.5 Gram(s) IV Push once  dextrose 50% Injectable 25 Gram(s) IV Push once  glucagon  Injectable 1 milliGRAM(s) IntraMuscular once  insulin lispro (ADMELOG) corrective regimen sliding scale   SubCutaneous every 6 hours    MEDICATIONS  (PRN):  ondansetron Injectable 4 milliGRAM(s) IV Push every 6 hours PRN Nausea    PAST MEDICAL & SURGICAL HISTORY:  Essential hypertension  Hypertension    CAD (coronary artery disease)    DM (diabetes mellitus)    PAD (peripheral artery disease)    Colon cancer    History of hernia repair  june 2014    Elective surgery  right leg angiogram with bypass  july 2016    S/P femoral-femoral bypass surgery  left 2016    S/P right hemicolectomy      FAMILY HISTORY:  No pertinent family history in first degree relatives      SOCIAL HISTORY:  Tobacco: none  Alcohol: none  Illicit Drugs:    REVIEW OF SYSTEMS:  CONSTITUTIONAL: No weakness, fevers or chills  HEENT: No visual changes; No vertigo or throat pain   NECK: No pain or stiffness  RESPIRATORY: No cough, wheezing, hemoptysis; No shortness of breath  CARDIOVASCULAR: No chest pain or palpitations  GASTROINTESTINAL: As above.  GENITOURINARY: No dysuria, frequency or hematuria  NEUROLOGICAL: No numbness or weakness  SKIN: No itching, burning, rashes, or lesions   All other 10 review of systems is negative unless indicated above.    Vital Signs Last 24 Hrs  T(C): 37.4 (23 Apr 2021 13:00), Max: 37.4 (23 Apr 2021 13:00)  T(F): 99.4 (23 Apr 2021 13:00), Max: 99.4 (23 Apr 2021 13:00)  HR: 78 (23 Apr 2021 18:00) (67 - 96)  BP: 160/74 (23 Apr 2021 18:00) (100/60 - 160/74)  BP(mean): 106 (23 Apr 2021 18:00) (86 - 111)  RR: 17 (23 Apr 2021 18:00) (9 - 31)  SpO2: 96% (23 Apr 2021 18:00) (92% - 100%)    04-22 @ 07:01  -  04-23 @ 07:00  --------------------------------------------------------  IN: 2275 mL / OUT: 6 mL / NET: 2269 mL    04-23 @ 07:01  -  04-23 @ 18:35  --------------------------------------------------------  IN: 990 mL / OUT: 1 mL / NET: 989 mL        PHYSICAL EXAM:    General: Well developed; well nourished; in no acute distress  Eyes: Anicteric sclerae, moist conjunctivae  HENT: Moist mucous membranes  Neck: Trachea midline, supple  Lungs: Normal respiratory effort, no intercostal retractions  Cardiovascular: RRR  Abdomen: Soft, non-tender non-distended; Normal bowel sounds; No rebound or guarding  Rectal: empty vault  Extremities: Normal range of motion, No clubbing, cyanosis or edema  Neurological: Alert and oriented x3  Skin: Warm and dry. No obvious rash    LABS:                        9.7    11.38 )-----------( 163      ( 23 Apr 2021 17:10 )             29.5     04-23    144  |  108  |  13  ----------------------------<  126<H>  4.0   |  26  |  0.82    Ca    8.9      23 Apr 2021 11:14  Phos  2.6     04-23    TPro  7.1  /  Alb  4.0  /  TBili  <0.2  /  DBili  x   /  AST  18  /  ALT  22  /  AlkPhos  125<H>  04-22        PT/INR - ( 23 Apr 2021 11:14 )   PT: 13.5 sec;   INR: 1.13          PTT - ( 23 Apr 2021 11:14 )  PTT:31.4 sec    RADIOLOGY & ADDITIONAL STUDIES:     Reviewed

## 2021-04-23 NOTE — CONSULT NOTE ADULT - ATTENDING COMMENTS
70M (Portuguese speaking, poor historian), PMHx HTN, DM, HLD, CAD, PAD s/p bilateral fem-pop bypasses (2016) s/p R embolectomy and angioplasty x2 for RLE ischemia (3/2020) on ASA and Xarelto, severe aortic stenosis, stage 1 cecal adenocarcinoma s/p open R hemicolectomy (06/2020), presents with BRBPR x 3 days. GI consulted for hematochezia.     Seen/discussed with fellow   Agree with above plan
71 y/o M admitted w LGI bleed, now getting tfused.  h/o PVD, DM, htn, colon ca, moderate AS in 6/20 cath.  hemodynamically stable.  plan for tfusion, avoid fluid overload given underlying valvular disease.  Labetalol may mask tachycardia

## 2021-04-23 NOTE — PROGRESS NOTE ADULT - ASSESSMENT
70M (Japanese speaking, poor historian), PMHx HTN, DM, HLD, CAD, PAD s/p bilateral fem-pop bypasses (2016) s/p R embolectomy and angioplasty x2 for RLE ischemia (3/2020) on ASA and Xarelto, severe aortic stenosis, 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 CTA negative. Transferred to SICU for further monitoring.     NEURO: pain/nausea control  CV: Hypotensive 2* GI bleed goal MAP >65, hold home xarelto and asa and antihypertensive meds given bleed. Severe vs moderate AS will repeat Echo on this admission. LR @115. Hx of CAD and Previous fem pop bypasses ( xarelto and asa on hold).   PULM: Satting well on RA   GI/FEN: NPO. Active GIB CTA negative: GI consult, will give prep tonight for colonoscopy in am.   : Voids  ENDO: ISS  HEME: Hgb 8.0. Maintain Hgb >8, q6 CBC, maintain T/S. Total transfusions: PRBC: 2U.   ID: None   PPx: SCDs SQH on hold 2* GI Bleed.   LINES: PIVx2  PT/OT: Not ordered  Dispo: SICU

## 2021-04-23 NOTE — CONSULT NOTE ADULT - CONSULT REASON
GI bleed h/o b/l fempop bypasses on AC
hematochezia
70M (Latvian speaking, poor historian), PMHx HTN, DM, HLD, CAD, PAD s/p bilateral fem-pop bypasses (2016) s/p R embolectomy and angioplasty x2 for RLE ischemia (3/2020) on ASA and Xarelto, severe aortic stenosis, 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 Afebrile, normal HR however on labetalol, hypotensive SBP low 100s, O2 high 90s on RA. Hgb 8.0, lactate 3.3

## 2021-04-23 NOTE — PROGRESS NOTE ADULT - SUBJECTIVE AND OBJECTIVE BOX
24 hr events:    SUBJECTIVE:    MEDICATIONS  (STANDING):  dextrose 40% Gel 15 Gram(s) Oral once  dextrose 5%. 1000 milliLiter(s) (50 mL/Hr) IV Continuous <Continuous>  dextrose 5%. 1000 milliLiter(s) (100 mL/Hr) IV Continuous <Continuous>  dextrose 50% Injectable 25 Gram(s) IV Push once  dextrose 50% Injectable 12.5 Gram(s) IV Push once  dextrose 50% Injectable 25 Gram(s) IV Push once  glucagon  Injectable 1 milliGRAM(s) IntraMuscular once  insulin lispro (ADMELOG) corrective regimen sliding scale   SubCutaneous Before meals and at bedtime  lactated ringers. 1000 milliLiter(s) (115 mL/Hr) IV Continuous <Continuous>    MEDICATIONS  (PRN):  ondansetron Injectable 4 milliGRAM(s) IV Push every 6 hours PRN Nausea      Parr:	  [ ] None	[ ] Daily Parr Order Placed	   Indication:	  [ ] Strict I and O's    [ ] Obstruction     [ ] Incontinence + Stage 3 or 4 Decubitus  Central Line:  [ ] None	   [ ]  Medication / TPN Administration     [ ] No Peripheral IV     ICU Vital Signs Last 24 Hrs  T(C): 36.7 (23 Apr 2021 03:00), Max: 36.9 (23 Apr 2021 01:00)  T(F): 98.1 (23 Apr 2021 03:00), Max: 98.5 (23 Apr 2021 01:00)  HR: 96 (23 Apr 2021 03:00) (67 - 96)  BP: 147/95 (23 Apr 2021 03:00) (100/60 - 147/95)  BP(mean): 111 (23 Apr 2021 03:00) (86 - 111)  ABP: --  ABP(mean): --  RR: 30 (23 Apr 2021 03:00) (18 - 30)  SpO2: 95% (23 Apr 2021 03:00) (94% - 100%)    PHYSICAL EXAM    Gen: NAD   Neuro: A&oX3 no neuro deficits  CV: RRR reg s1s2 + 3/6 Holosystolic murmur  Pulm: CTA B/L no w/w/r  Abd: Soft, NT/ND + Hyperactive BS Rectal exam as per Surgical resident + BRBPR  Ext: No C/C/E , Warm well perfused. lower and upper ext.   Skin: No rashes erythema or ecchymosis  MSK: No joint swelling noted  Psych: Normal affect     Lines/tubes/drains:    Vent settings:      I&O's Summary    22 Apr 2021 07:01  -  23 Apr 2021 03:58  --------------------------------------------------------  IN: 745 mL / OUT: 2 mL / NET: 743 mL        LABS:                        6.6    11.06 )-----------( 189      ( 22 Apr 2021 22:38 )             20.4     04-22    139  |  100  |  21  ----------------------------<  179<H>  3.8   |  26  |  1.09    Ca    9.3      22 Apr 2021 20:17    TPro  7.1  /  Alb  4.0  /  TBili  <0.2  /  DBili  x   /  AST  18  /  ALT  22  /  AlkPhos  125<H>  04-22    PT/INR - ( 22 Apr 2021 20:17 )   PT: 14.8 sec;   INR: 1.25          PTT - ( 22 Apr 2021 20:17 )  PTT:36.3 sec  Urinalysis Basic - ( 23 Apr 2021 01:10 )    Color: Yellow / Appearance: Clear / SG: <=1.005 / pH: x  Gluc: x / Ketone: NEGATIVE  / Bili: Negative / Urobili: 0.2 E.U./dL   Blood: x / Protein: NEGATIVE mg/dL / Nitrite: NEGATIVE   Leuk Esterase: NEGATIVE / RBC: x / WBC x   Sq Epi: x / Non Sq Epi: x / Bacteria: x      CAPILLARY BLOOD GLUCOSE        LIVER FUNCTIONS - ( 22 Apr 2021 20:17 )  Alb: 4.0 g/dL / Pro: 7.1 g/dL / ALK PHOS: 125 U/L / ALT: 22 U/L / AST: 18 U/L / GGT: x             Cultures:    Drips:    RADIOLOGY & ADDITIONAL STUDIES:     24 hr events:  4/23: TTE- severe aortic regurg, LA dilated, grade I LV diastolic dysfunction; post-transfusion hgb 9.0; per GI no scope 2/2 no bloody BMs, stable hgb- started CLD;   4/22 ON: Admitted to SICU bolused 2L in ED . 10pm cbc 6.6--ordered 2 units completed  repeat CBC after finished- approx 8am vs 9am.  Moviprep x2. holding IVF during transfusion    SUBJECTIVE: feeling well this AM. Denies pain. Denies further bloody BMs.     MEDICATIONS  (STANDING):  dextrose 40% Gel 15 Gram(s) Oral once  dextrose 5%. 1000 milliLiter(s) (50 mL/Hr) IV Continuous <Continuous>  dextrose 5%. 1000 milliLiter(s) (100 mL/Hr) IV Continuous <Continuous>  dextrose 50% Injectable 25 Gram(s) IV Push once  dextrose 50% Injectable 12.5 Gram(s) IV Push once  dextrose 50% Injectable 25 Gram(s) IV Push once  glucagon  Injectable 1 milliGRAM(s) IntraMuscular once  insulin lispro (ADMELOG) corrective regimen sliding scale   SubCutaneous Before meals and at bedtime  lactated ringers. 1000 milliLiter(s) (115 mL/Hr) IV Continuous <Continuous>    MEDICATIONS  (PRN):  ondansetron Injectable 4 milliGRAM(s) IV Push every 6 hours PRN Nausea        ICU Vital Signs Last 24 Hrs  T(C): 36.7 (23 Apr 2021 03:00), Max: 36.9 (23 Apr 2021 01:00)  T(F): 98.1 (23 Apr 2021 03:00), Max: 98.5 (23 Apr 2021 01:00)  HR: 96 (23 Apr 2021 03:00) (67 - 96)  BP: 147/95 (23 Apr 2021 03:00) (100/60 - 147/95)  BP(mean): 111 (23 Apr 2021 03:00) (86 - 111)  ABP: --  ABP(mean): --  RR: 30 (23 Apr 2021 03:00) (18 - 30)  SpO2: 95% (23 Apr 2021 03:00) (94% - 100%)    PHYSICAL EXAM    Gen: NAD   Neuro: A&oX3 no neuro deficits  CV: RRR reg s1s2 + 3/6 Holosystolic murmur  Pulm: CTA B/L no w/w/r  Abd: Soft, NT/ND, no additional BRBPR  Ext: No C/C/E , Warm well perfused. lower and upper ext, radial pulses 2+  Skin: No rashes erythema or ecchymosis  MSK: No joint swelling noted  Psych: Normal affect         I&O's Summary    22 Apr 2021 07:01  -  23 Apr 2021 03:58  --------------------------------------------------------  IN: 745 mL / OUT: 2 mL / NET: 743 mL        LABS:                        6.6    11.06 )-----------( 189      ( 22 Apr 2021 22:38 )             20.4     04-22    139  |  100  |  21  ----------------------------<  179<H>  3.8   |  26  |  1.09    Ca    9.3      22 Apr 2021 20:17    TPro  7.1  /  Alb  4.0  /  TBili  <0.2  /  DBili  x   /  AST  18  /  ALT  22  /  AlkPhos  125<H>  04-22    PT/INR - ( 22 Apr 2021 20:17 )   PT: 14.8 sec;   INR: 1.25          PTT - ( 22 Apr 2021 20:17 )  PTT:36.3 sec  Urinalysis Basic - ( 23 Apr 2021 01:10 )    Color: Yellow / Appearance: Clear / SG: <=1.005 / pH: x  Gluc: x / Ketone: NEGATIVE  / Bili: Negative / Urobili: 0.2 E.U./dL   Blood: x / Protein: NEGATIVE mg/dL / Nitrite: NEGATIVE   Leuk Esterase: NEGATIVE / RBC: x / WBC x   Sq Epi: x / Non Sq Epi: x / Bacteria: x      CAPILLARY BLOOD GLUCOSE        LIVER FUNCTIONS - ( 22 Apr 2021 20:17 )  Alb: 4.0 g/dL / Pro: 7.1 g/dL / ALK PHOS: 125 U/L / ALT: 22 U/L / AST: 18 U/L / GGT: x               RADIOLOGY & ADDITIONAL STUDIES:  CTA negative        70M (Serbian speaking, poor historian), PMHx HTN, DM, HLD, CAD, PAD s/p bilateral fem-pop bypasses (2016) s/p R embolectomy and angioplasty x2 for RLE ischemia (3/2020) on ASA and Xarelto, severe aortic stenosis, 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 CTA negative. Transferred to SICU for further monitoring.     NEURO: pain/nausea control  CV: Hx severe AR- TTE completed LA dilation, severe AR, hold meds given bleed. Hx of CAD and Previous fem pop bypasses ( xarelto and asa on hold). Echo severe aortic regurg, LA dilated, grade I LV diastolic dysfunction  PULM: Satting well on RA   GI/FEN: CLD. LGIB- resolving; GI following, prepped but no need for scope today given resolution of LGIB  : Voids  ENDO: ISS  HEME: Maintain Hgb >8, q6 CBC continuing, Hgb currently stable, maintain T/S. Total transfusions: PRBC: 2U.   ID: None   PPx: SCDs SQH on hold 2* GI Bleed.   LINES: PIVx2  PT/OT: Not ordered  Dispo: SICU, step down if H/H stable   24 hr events:  4/23: TTE- severe aortic regurg, LA dilated, grade I LV diastolic dysfunction; post-transfusion hgb 9.0; per GI no scope 2/2 no bloody BMs, stable hgb- started CLD;   4/22 ON: Admitted to SICU bolused 2L in ED . 10pm cbc 6.6--ordered 2 units completed  repeat CBC after finished- approx 8am vs 9am.  Moviprep x2. holding IVF during transfusion    SUBJECTIVE: feeling well this AM. Denies pain. Denies further bloody BMs.     MEDICATIONS  (STANDING):  dextrose 40% Gel 15 Gram(s) Oral once  dextrose 5%. 1000 milliLiter(s) (50 mL/Hr) IV Continuous <Continuous>  dextrose 5%. 1000 milliLiter(s) (100 mL/Hr) IV Continuous <Continuous>  dextrose 50% Injectable 25 Gram(s) IV Push once  dextrose 50% Injectable 12.5 Gram(s) IV Push once  dextrose 50% Injectable 25 Gram(s) IV Push once  glucagon  Injectable 1 milliGRAM(s) IntraMuscular once  insulin lispro (ADMELOG) corrective regimen sliding scale   SubCutaneous Before meals and at bedtime  lactated ringers. 1000 milliLiter(s) (115 mL/Hr) IV Continuous <Continuous>    MEDICATIONS  (PRN):  ondansetron Injectable 4 milliGRAM(s) IV Push every 6 hours PRN Nausea        ICU Vital Signs Last 24 Hrs  T(C): 36.7 (23 Apr 2021 03:00), Max: 36.9 (23 Apr 2021 01:00)  T(F): 98.1 (23 Apr 2021 03:00), Max: 98.5 (23 Apr 2021 01:00)  HR: 96 (23 Apr 2021 03:00) (67 - 96)  BP: 147/95 (23 Apr 2021 03:00) (100/60 - 147/95)  BP(mean): 111 (23 Apr 2021 03:00) (86 - 111)  ABP: --  ABP(mean): --  RR: 30 (23 Apr 2021 03:00) (18 - 30)  SpO2: 95% (23 Apr 2021 03:00) (94% - 100%)    PHYSICAL EXAM    Gen: NAD   Neuro: A&oX3 no neuro deficits  HEENT: PERRL, EOMI, MMM  CV: RRR reg s1s2 + 3/6 crescendo/decrescendo murmur extending into diastole  Pulm: CTA B/L no w/w/r  Abd: Soft, NT/ND, no additional BRBPR  Ext: No C/C/E , Warm well perfused. lower and upper ext, radial pulses 2+  Skin: No rashes erythema or ecchymosis  MSK: No joint swelling noted  Psych: Normal affect         I&O's Summary    22 Apr 2021 07:01  -  23 Apr 2021 03:58  --------------------------------------------------------  IN: 745 mL / OUT: 2 mL / NET: 743 mL        LABS:                        6.6    11.06 )-----------( 189      ( 22 Apr 2021 22:38 )             20.4     04-22    139  |  100  |  21  ----------------------------<  179<H>  3.8   |  26  |  1.09    Ca    9.3      22 Apr 2021 20:17    TPro  7.1  /  Alb  4.0  /  TBili  <0.2  /  DBili  x   /  AST  18  /  ALT  22  /  AlkPhos  125<H>  04-22    PT/INR - ( 22 Apr 2021 20:17 )   PT: 14.8 sec;   INR: 1.25          PTT - ( 22 Apr 2021 20:17 )  PTT:36.3 sec  Urinalysis Basic - ( 23 Apr 2021 01:10 )    Color: Yellow / Appearance: Clear / SG: <=1.005 / pH: x  Gluc: x / Ketone: NEGATIVE  / Bili: Negative / Urobili: 0.2 E.U./dL   Blood: x / Protein: NEGATIVE mg/dL / Nitrite: NEGATIVE   Leuk Esterase: NEGATIVE / RBC: x / WBC x   Sq Epi: x / Non Sq Epi: x / Bacteria: x      CAPILLARY BLOOD GLUCOSE        LIVER FUNCTIONS - ( 22 Apr 2021 20:17 )  Alb: 4.0 g/dL / Pro: 7.1 g/dL / ALK PHOS: 125 U/L / ALT: 22 U/L / AST: 18 U/L / GGT: x               RADIOLOGY & ADDITIONAL STUDIES:  CTA negative        70M (Cuban speaking, poor historian), PMHx HTN, DM, HLD, CAD, PAD s/p bilateral fem-pop bypasses (2016) s/p R embolectomy and angioplasty x2 for RLE ischemia (3/2020) on ASA and Xarelto, severe aortic stenosis, 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 CTA negative. Transferred to SICU for further monitoring.     NEURO: pain/nausea control  CV: Hx severe AR- TTE completed LA dilation, severe AR, hold meds given bleed. Hx of CAD and Previous fem pop bypasses ( xarelto and asa on hold). Echo severe aortic regurg, LA dilated, grade I LV diastolic dysfunction  PULM: Satting well on RA   GI/FEN: CLD. LGIB- resolving; GI following, prepped but no need for scope today given resolution of LGIB  : Voids  ENDO: ISS  HEME: Maintain Hgb >8, q6 CBC continuing, Hgb currently stable, maintain T/S. Total transfusions: PRBC: 2U.   ID: None   PPx: SCDs SQH on hold 2* GI Bleed.   LINES: PIVx2  PT/OT: Not ordered  Dispo: SICU, step down if H/H stable

## 2021-04-24 LAB
ANION GAP SERPL CALC-SCNC: 10 MMOL/L — SIGNIFICANT CHANGE UP (ref 5–17)
APTT BLD: 30 SEC — SIGNIFICANT CHANGE UP (ref 27.5–35.5)
BUN SERPL-MCNC: 10 MG/DL — SIGNIFICANT CHANGE UP (ref 7–23)
CALCIUM SERPL-MCNC: 8.5 MG/DL — SIGNIFICANT CHANGE UP (ref 8.4–10.5)
CHLORIDE SERPL-SCNC: 103 MMOL/L — SIGNIFICANT CHANGE UP (ref 96–108)
CO2 SERPL-SCNC: 26 MMOL/L — SIGNIFICANT CHANGE UP (ref 22–31)
CREAT SERPL-MCNC: 0.78 MG/DL — SIGNIFICANT CHANGE UP (ref 0.5–1.3)
GLUCOSE SERPL-MCNC: 125 MG/DL — HIGH (ref 70–99)
HCT VFR BLD CALC: 29.9 % — LOW (ref 39–50)
HGB BLD-MCNC: 9.7 G/DL — LOW (ref 13–17)
INR BLD: 1.15 — SIGNIFICANT CHANGE UP (ref 0.88–1.16)
MAGNESIUM SERPL-MCNC: 1.7 MG/DL — SIGNIFICANT CHANGE UP (ref 1.6–2.6)
MCHC RBC-ENTMCNC: 27.5 PG — SIGNIFICANT CHANGE UP (ref 27–34)
MCHC RBC-ENTMCNC: 32.4 GM/DL — SIGNIFICANT CHANGE UP (ref 32–36)
MCV RBC AUTO: 84.7 FL — SIGNIFICANT CHANGE UP (ref 80–100)
NRBC # BLD: 0 /100 WBCS — SIGNIFICANT CHANGE UP (ref 0–0)
PHOSPHATE SERPL-MCNC: 3 MG/DL — SIGNIFICANT CHANGE UP (ref 2.5–4.5)
PLATELET # BLD AUTO: 174 K/UL — SIGNIFICANT CHANGE UP (ref 150–400)
POTASSIUM SERPL-MCNC: 3.1 MMOL/L — LOW (ref 3.5–5.3)
POTASSIUM SERPL-SCNC: 3.1 MMOL/L — LOW (ref 3.5–5.3)
PROTHROM AB SERPL-ACNC: 13.7 SEC — HIGH (ref 10.6–13.6)
RBC # BLD: 3.53 M/UL — LOW (ref 4.2–5.8)
RBC # FLD: 15.2 % — HIGH (ref 10.3–14.5)
SODIUM SERPL-SCNC: 139 MMOL/L — SIGNIFICANT CHANGE UP (ref 135–145)
WBC # BLD: 10.45 K/UL — SIGNIFICANT CHANGE UP (ref 3.8–10.5)
WBC # FLD AUTO: 10.45 K/UL — SIGNIFICANT CHANGE UP (ref 3.8–10.5)

## 2021-04-24 PROCEDURE — 99232 SBSQ HOSP IP/OBS MODERATE 35: CPT | Mod: GC

## 2021-04-24 RX ORDER — LABETALOL HCL 100 MG
200 TABLET ORAL
Refills: 0 | Status: DISCONTINUED | OUTPATIENT
Start: 2021-04-24 | End: 2021-04-25

## 2021-04-24 RX ORDER — RIVAROXABAN 15 MG-20MG
20 KIT ORAL DAILY
Refills: 0 | Status: DISCONTINUED | OUTPATIENT
Start: 2021-04-24 | End: 2021-04-25

## 2021-04-24 RX ADMIN — Medication 2: at 17:20

## 2021-04-24 RX ADMIN — RIVAROXABAN 20 MILLIGRAM(S): KIT at 12:57

## 2021-04-24 RX ADMIN — Medication 81 MILLIGRAM(S): at 11:26

## 2021-04-24 RX ADMIN — Medication 200 MILLIGRAM(S): at 11:26

## 2021-04-24 RX ADMIN — Medication 200 MILLIGRAM(S): at 21:02

## 2021-04-24 NOTE — PROGRESS NOTE ADULT - SUBJECTIVE AND OBJECTIVE BOX
O/N:    Subjective:    aspirin enteric coated 81  labetalol 200  rivaroxaban 20      Allergies    No Known Allergies    Intolerances        Vital Signs Last 24 Hrs  T(C): 36 (24 Apr 2021 18:12), Max: 36.8 (23 Apr 2021 22:00)  T(F): 96.8 (24 Apr 2021 18:12), Max: 98.2 (23 Apr 2021 22:00)  HR: 70 (24 Apr 2021 13:00) (64 - 89)  BP: 155/75 (24 Apr 2021 13:00) (139/65 - 179/87)  BP(mean): 106 (24 Apr 2021 13:00) (93 - 125)  RR: 16 (24 Apr 2021 13:00) (15 - 18)  SpO2: 97% (24 Apr 2021 13:00) (95% - 97%)  I&O's Summary    23 Apr 2021 07:01  -  24 Apr 2021 07:00  --------------------------------------------------------  IN: 990 mL / OUT: 1 mL / NET: 989 mL    24 Apr 2021 07:01  -  24 Apr 2021 19:48  --------------------------------------------------------  IN: 600 mL / OUT: 0 mL / NET: 600 mL        Physical Exam:  Gen: NAD, resting in bad  Chest: No conversational dyspnea. Sating 96% on RA  CV: RRR  Abd: soft, NT, ND  Extremities: warm and well perfused, motorsensory intact  Pulses: RLE: palp fem, triphasic pop, palpable DP 1+, biphasic PT  LLE: palp fem, triphasic pop, palpable DP 1+, biphasic PT    LABS:                        9.7    10.45 )-----------( 174      ( 24 Apr 2021 06:51 )             29.9     04-24    139  |  103  |  10  ----------------------------<  125<H>  3.1<L>   |  26  |  0.78    Ca    8.5      24 Apr 2021 06:51  Phos  3.0     04-24  Mg     1.7     04-24    TPro  7.1  /  Alb  4.0  /  TBili  <0.2  /  DBili  x   /  AST  18  /  ALT  22  /  AlkPhos  125<H>  04-22    PT/INR - ( 24 Apr 2021 06:51 )   PT: 13.7 sec;   INR: 1.15          PTT - ( 24 Apr 2021 06:51 )  PTT:30.0 sec    Radiology and Additional Studies:   O/N: No acute events overnight. AVSS    Subjective: Seen and evaluated on telemetry floor. Resting comfortably in bed. Tolerating regular diet without abdominal pain, nausea or vomiting. Reports BM earlier today that was brown, denies black or bloody stool. Has been able to ambulate without lower extremity numbness, weakness or paresthesias. Anticipating discharge home tomorrow.     aspirin enteric coated 81  labetalol 200  rivaroxaban 20      Allergies    No Known Allergies    Intolerances        Vital Signs Last 24 Hrs  T(C): 36 (24 Apr 2021 18:12), Max: 36.8 (23 Apr 2021 22:00)  T(F): 96.8 (24 Apr 2021 18:12), Max: 98.2 (23 Apr 2021 22:00)  HR: 70 (24 Apr 2021 13:00) (64 - 89)  BP: 155/75 (24 Apr 2021 13:00) (139/65 - 179/87)  BP(mean): 106 (24 Apr 2021 13:00) (93 - 125)  RR: 16 (24 Apr 2021 13:00) (15 - 18)  SpO2: 97% (24 Apr 2021 13:00) (95% - 97%)  I&O's Summary    23 Apr 2021 07:01  -  24 Apr 2021 07:00  --------------------------------------------------------  IN: 990 mL / OUT: 1 mL / NET: 989 mL    24 Apr 2021 07:01  -  24 Apr 2021 19:48  --------------------------------------------------------  IN: 600 mL / OUT: 0 mL / NET: 600 mL        Physical Exam:  Gen: NAD, resting in bad  Chest: No conversational dyspnea. Sating 96% on RA  CV: RRR  Abd: soft, NT, ND  Extremities: warm and well perfused, motorsensory intact  Pulses: RLE: palp fem, triphasic pop, palpable DP 1+, biphasic PT  LLE: palp fem, triphasic pop, palpable DP 1+, biphasic PT    LABS:                        9.7    10.45 )-----------( 174      ( 24 Apr 2021 06:51 )             29.9     04-24    139  |  103  |  10  ----------------------------<  125<H>  3.1<L>   |  26  |  0.78    Ca    8.5      24 Apr 2021 06:51  Phos  3.0     04-24  Mg     1.7     04-24    TPro  7.1  /  Alb  4.0  /  TBili  <0.2  /  DBili  x   /  AST  18  /  ALT  22  /  AlkPhos  125<H>  04-22    PT/INR - ( 24 Apr 2021 06:51 )   PT: 13.7 sec;   INR: 1.15          PTT - ( 24 Apr 2021 06:51 )  PTT:30.0 sec    Radiology and Additional Studies:

## 2021-04-24 NOTE — PROGRESS NOTE ADULT - ASSESSMENT
70M (Faroese speaking, poor historian), PMHx HTN, DM, HLD, CAD, PAD s/p bilateral fem-pop bypasses (2016) s/p R embolectomy and angioplasty x2 for RLE ischemia (3/2020) on ASA and Xarelto, severe aortic stenosis, stage 1 cecal adenocarcinoma s/p open R hemicolectomy (06/2020), presents with BRBPR x 3 days. Serial hgb have been stable following initial transfusion of 2U pRBCs.     No acute vascular surgery intervention at this time  Started on ASA and Xarelto today, c/w home dose given no further LGIB and stable hemoglobin  May follow up with Dr. Gant outpatient upon discharge; office number    Further medical management per primary  Vascular surgery 3C will continue to follow  Discussed with chief vascular surgery resident  Please call [phone] for any further questions 70M (Japanese speaking, poor historian), PMHx HTN, DM, HLD, CAD, PAD s/p bilateral fem-pop bypasses (2016) s/p R embolectomy and angioplasty x2 for RLE ischemia (3/2020) on ASA and Xarelto, severe aortic stenosis, stage 1 cecal adenocarcinoma s/p open R hemicolectomy (06/2020), presents with BRBPR x 3 days. Serial hgb have been stable following initial transfusion of 2U pRBCs. No further LGIB bleeds at this time.     No acute vascular surgery intervention at this time  Started on ASA and Xarelto today, c/w home dose given no further LGIB and stable hemoglobin  May follow up with Dr. Gant outpatient upon discharge; office number    Further medical management per primary  Vascular surgery 3C will continue to follow  Discussed with chief vascular surgery resident  Please call [phone] for any further questions

## 2021-04-24 NOTE — PROGRESS NOTE ADULT - ASSESSMENT
70M (Tajik speaking, poor historian), PMHx HTN, DM, HLD, CAD, PAD s/p bilateral fem-pop bypasses (2016) s/p R embolectomy and angioplasty x2 for RLE ischemia (3/2020) on ASA and Xarelto, severe aortic stenosis, stage 1 cecal adenocarcinoma s/p open R hemicolectomy (06/2020), presents with BRBPR x 3 days. GI consulted for hematochezia.     #Hematochezia likely diverticular vs hemorrhoidal  now resolved  No BM reported today. Hgb stable at 9.7  - will sign off. call back PRN    Recommendations discussed with primary team  Plan discussed with GI service attending    Mukul Mckeon MD  PGY-4 GI fellow  Pager: [phone]

## 2021-04-24 NOTE — PROGRESS NOTE ADULT - SUBJECTIVE AND OBJECTIVE BOX
GASTROENTEROLOGY PROGRESS NOTE  Patient seen and examined at bedside. No BM reported today. Hgb stable at 9.7    PERTINENT REVIEW OF SYSTEMS:  As noted above    Allergies    No Known Allergies    Intolerances      MEDICATIONS:  MEDICATIONS  (STANDING):  aspirin enteric coated 81 milliGRAM(s) Oral daily  dextrose 40% Gel 15 Gram(s) Oral once  dextrose 5%. 1000 milliLiter(s) (50 mL/Hr) IV Continuous <Continuous>  dextrose 5%. 1000 milliLiter(s) (100 mL/Hr) IV Continuous <Continuous>  dextrose 50% Injectable 25 Gram(s) IV Push once  dextrose 50% Injectable 12.5 Gram(s) IV Push once  dextrose 50% Injectable 25 Gram(s) IV Push once  glucagon  Injectable 1 milliGRAM(s) IntraMuscular once  insulin lispro (ADMELOG) corrective regimen sliding scale   SubCutaneous every 6 hours  labetalol 200 milliGRAM(s) Oral two times a day  rivaroxaban 20 milliGRAM(s) Oral daily    MEDICATIONS  (PRN):  ondansetron Injectable 4 milliGRAM(s) IV Push every 6 hours PRN Nausea    Vital Signs Last 24 Hrs  T(C): 36 (24 Apr 2021 18:12), Max: 36.8 (23 Apr 2021 22:00)  T(F): 96.8 (24 Apr 2021 18:12), Max: 98.2 (23 Apr 2021 22:00)  HR: 70 (24 Apr 2021 13:00) (64 - 89)  BP: 155/75 (24 Apr 2021 13:00) (139/65 - 179/87)  BP(mean): 106 (24 Apr 2021 13:00) (93 - 125)  RR: 16 (24 Apr 2021 13:00) (15 - 18)  SpO2: 97% (24 Apr 2021 13:00) (95% - 97%)    04-23 @ 07:01  -  04-24 @ 07:00  --------------------------------------------------------  IN: 990 mL / OUT: 1 mL / NET: 989 mL    04-24 @ 07:01  -  04-24 @ 18:13  --------------------------------------------------------  IN: 600 mL / OUT: 0 mL / NET: 600 mL      PHYSICAL EXAM:    General: Well developed; well nourished; in no acute distress  HEENT: MMM, conjunctiva and sclera clear  Gastrointestinal: Soft non-tender non-distended; Normal bowel sounds; No hepatosplenomegaly. No rebound or guarding  Skin: Warm and dry. No obvious rash    LABS:                        9.7    10.45 )-----------( 174      ( 24 Apr 2021 06:51 )             29.9     04-24    139  |  103  |  10  ----------------------------<  125<H>  3.1<L>   |  26  |  0.78    Ca    8.5      24 Apr 2021 06:51  Phos  3.0     04-24  Mg     1.7     04-24    TPro  7.1  /  Alb  4.0  /  TBili  <0.2  /  DBili  x   /  AST  18  /  ALT  22  /  AlkPhos  125<H>  04-22    PT/INR - ( 24 Apr 2021 06:51 )   PT: 13.7 sec;   INR: 1.15          PTT - ( 24 Apr 2021 06:51 )  PTT:30.0 sec      Urinalysis Basic - ( 23 Apr 2021 01:10 )    Color: Yellow / Appearance: Clear / SG: <=1.005 / pH: x  Gluc: x / Ketone: NEGATIVE  / Bili: Negative / Urobili: 0.2 E.U./dL   Blood: x / Protein: NEGATIVE mg/dL / Nitrite: NEGATIVE   Leuk Esterase: NEGATIVE / RBC: x / WBC x   Sq Epi: x / Non Sq Epi: x / Bacteria: x                RADIOLOGY & ADDITIONAL STUDIES:  Reviewed

## 2021-04-24 NOTE — PROGRESS NOTE ADULT - ASSESSMENT
70M (Yi speaking, poor historian), PMHx HTN, DM, HLD, CAD, PAD s/p bilateral fem-pop bypasses (2016) s/p R embolectomy and angioplasty x2 for RLE ischemia (3/2020) on ASA and Xarelto, severe aortic stenosis, 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 CTA negative. Transferred to SICU for further monitoring. Stepped down, restarted Asa, Xeralto to restart tomorrow.     Regular Diet  Start Xarelto home dose and monitor   pain/nausea control  Maintain Hgb >8, q6 CBC, maintain T/S.   SCDs/hold SQH/OOBA   AM labs

## 2021-04-24 NOTE — PROGRESS NOTE ADULT - SUBJECTIVE AND OBJECTIVE BOX
Subjective: Patient seen and examined bedside, feels well, no acute complains. Has not had a BM since yesterday.     MEDICATIONS:  MEDICATIONS  (STANDING):  aspirin enteric coated 81 milliGRAM(s) Oral daily  dextrose 40% Gel 15 Gram(s) Oral once  dextrose 5%. 1000 milliLiter(s) (50 mL/Hr) IV Continuous <Continuous>  dextrose 5%. 1000 milliLiter(s) (100 mL/Hr) IV Continuous <Continuous>  dextrose 50% Injectable 25 Gram(s) IV Push once  dextrose 50% Injectable 12.5 Gram(s) IV Push once  dextrose 50% Injectable 25 Gram(s) IV Push once  glucagon  Injectable 1 milliGRAM(s) IntraMuscular once  insulin lispro (ADMELOG) corrective regimen sliding scale   SubCutaneous every 6 hours  labetalol 200 milliGRAM(s) Oral two times a day  rivaroxaban 20 milliGRAM(s) Oral daily    MEDICATIONS  (PRN):  ondansetron Injectable 4 milliGRAM(s) IV Push every 6 hours PRN Nausea    Vital Signs Last 24 Hrs  T(C): 36 (24 Apr 2021 18:12), Max: 36.8 (23 Apr 2021 22:00)  T(F): 96.8 (24 Apr 2021 18:12), Max: 98.2 (23 Apr 2021 22:00)  HR: 70 (24 Apr 2021 13:00) (64 - 89)  BP: 155/75 (24 Apr 2021 13:00) (139/65 - 179/87)  BP(mean): 106 (24 Apr 2021 13:00) (93 - 125)  RR: 16 (24 Apr 2021 13:00) (15 - 18)  SpO2: 97% (24 Apr 2021 13:00) (95% - 97%)    04-23 @ 07:01  -  04-24 @ 07:00  --------------------------------------------------------  IN: 990 mL / OUT: 1 mL / NET: 989 mL    04-24 @ 07:01 - 04-24 @ 18:13  --------------------------------------------------------  IN: 600 mL / OUT: 0 mL / NET: 600 mL      PHYSICAL EXAM:    General: Well developed; well nourished; in no acute distress  HEENT: MMM, conjunctiva and sclera clear  Gastrointestinal: Soft non-tender non-distended; Normal bowel sounds; No hepatosplenomegaly. No rebound or guarding  Skin: Warm and dry. No obvious rash    LABS:                        9.7    10.45 )-----------( 174      ( 24 Apr 2021 06:51 )             29.9     04-24    139  |  103  |  10  ----------------------------<  125<H>  3.1<L>   |  26  |  0.78    Ca    8.5      24 Apr 2021 06:51  Phos  3.0     04-24  Mg     1.7     04-24    TPro  7.1  /  Alb  4.0  /  TBili  <0.2  /  DBili  x   /  AST  18  /  ALT  22  /  AlkPhos  125<H>  04-22    PT/INR - ( 24 Apr 2021 06:51 )   PT: 13.7 sec;   INR: 1.15          PTT - ( 24 Apr 2021 06:51 )  PTT:30.0 sec      Urinalysis Basic - ( 23 Apr 2021 01:10 )    Color: Yellow / Appearance: Clear / SG: <=1.005 / pH: x  Gluc: x / Ketone: NEGATIVE  / Bili: Negative / Urobili: 0.2 E.U./dL   Blood: x / Protein: NEGATIVE mg/dL / Nitrite: NEGATIVE   Leuk Esterase: NEGATIVE / RBC: x / WBC x   Sq Epi: x / Non Sq Epi: x / Bacteria: x

## 2021-04-25 LAB
ANION GAP SERPL CALC-SCNC: 10 MMOL/L — SIGNIFICANT CHANGE UP (ref 5–17)
APTT BLD: 31 SEC — SIGNIFICANT CHANGE UP (ref 27.5–35.5)
BUN SERPL-MCNC: 21 MG/DL — SIGNIFICANT CHANGE UP (ref 7–23)
CALCIUM SERPL-MCNC: 8.4 MG/DL — SIGNIFICANT CHANGE UP (ref 8.4–10.5)
CHLORIDE SERPL-SCNC: 104 MMOL/L — SIGNIFICANT CHANGE UP (ref 96–108)
CO2 SERPL-SCNC: 27 MMOL/L — SIGNIFICANT CHANGE UP (ref 22–31)
CREAT SERPL-MCNC: 0.88 MG/DL — SIGNIFICANT CHANGE UP (ref 0.5–1.3)
GLUCOSE SERPL-MCNC: 140 MG/DL — HIGH (ref 70–99)
HCT VFR BLD CALC: 25.3 % — LOW (ref 39–50)
HCT VFR BLD CALC: 26.3 % — LOW (ref 39–50)
HGB BLD-MCNC: 8.1 G/DL — LOW (ref 13–17)
HGB BLD-MCNC: 8.6 G/DL — LOW (ref 13–17)
INR BLD: 1.42 — HIGH (ref 0.88–1.16)
MAGNESIUM SERPL-MCNC: 1.8 MG/DL — SIGNIFICANT CHANGE UP (ref 1.6–2.6)
MCHC RBC-ENTMCNC: 27.4 PG — SIGNIFICANT CHANGE UP (ref 27–34)
MCHC RBC-ENTMCNC: 27.9 PG — SIGNIFICANT CHANGE UP (ref 27–34)
MCHC RBC-ENTMCNC: 32 GM/DL — SIGNIFICANT CHANGE UP (ref 32–36)
MCHC RBC-ENTMCNC: 32.7 GM/DL — SIGNIFICANT CHANGE UP (ref 32–36)
MCV RBC AUTO: 85.4 FL — SIGNIFICANT CHANGE UP (ref 80–100)
MCV RBC AUTO: 85.5 FL — SIGNIFICANT CHANGE UP (ref 80–100)
NRBC # BLD: 0 /100 WBCS — SIGNIFICANT CHANGE UP (ref 0–0)
NRBC # BLD: 0 /100 WBCS — SIGNIFICANT CHANGE UP (ref 0–0)
PHOSPHATE SERPL-MCNC: 3.4 MG/DL — SIGNIFICANT CHANGE UP (ref 2.5–4.5)
PLATELET # BLD AUTO: 170 K/UL — SIGNIFICANT CHANGE UP (ref 150–400)
PLATELET # BLD AUTO: 199 K/UL — SIGNIFICANT CHANGE UP (ref 150–400)
POTASSIUM SERPL-MCNC: 3.4 MMOL/L — LOW (ref 3.5–5.3)
POTASSIUM SERPL-SCNC: 3.4 MMOL/L — LOW (ref 3.5–5.3)
PROTHROM AB SERPL-ACNC: 16.8 SEC — HIGH (ref 10.6–13.6)
RBC # BLD: 2.96 M/UL — LOW (ref 4.2–5.8)
RBC # BLD: 3.08 M/UL — LOW (ref 4.2–5.8)
RBC # FLD: 15.3 % — HIGH (ref 10.3–14.5)
RBC # FLD: 15.3 % — HIGH (ref 10.3–14.5)
SARS-COV-2 RNA SPEC QL NAA+PROBE: SIGNIFICANT CHANGE UP
SODIUM SERPL-SCNC: 141 MMOL/L — SIGNIFICANT CHANGE UP (ref 135–145)
WBC # BLD: 8.83 K/UL — SIGNIFICANT CHANGE UP (ref 3.8–10.5)
WBC # BLD: 9.02 K/UL — SIGNIFICANT CHANGE UP (ref 3.8–10.5)
WBC # FLD AUTO: 8.83 K/UL — SIGNIFICANT CHANGE UP (ref 3.8–10.5)
WBC # FLD AUTO: 9.02 K/UL — SIGNIFICANT CHANGE UP (ref 3.8–10.5)

## 2021-04-25 PROCEDURE — 99232 SBSQ HOSP IP/OBS MODERATE 35: CPT | Mod: GC

## 2021-04-25 RX ORDER — SODIUM CHLORIDE 9 MG/ML
1000 INJECTION, SOLUTION INTRAVENOUS
Refills: 0 | Status: DISCONTINUED | OUTPATIENT
Start: 2021-04-25 | End: 2021-04-27

## 2021-04-25 RX ORDER — POTASSIUM CHLORIDE 20 MEQ
40 PACKET (EA) ORAL ONCE
Refills: 0 | Status: COMPLETED | OUTPATIENT
Start: 2021-04-25 | End: 2021-04-25

## 2021-04-25 RX ORDER — POTASSIUM CHLORIDE 20 MEQ
10 PACKET (EA) ORAL
Refills: 0 | Status: COMPLETED | OUTPATIENT
Start: 2021-04-25 | End: 2021-04-25

## 2021-04-25 RX ORDER — SOD SULF/SODIUM/NAHCO3/KCL/PEG
2000 SOLUTION, RECONSTITUTED, ORAL ORAL ONCE
Refills: 0 | Status: COMPLETED | OUTPATIENT
Start: 2021-04-25 | End: 2021-04-25

## 2021-04-25 RX ORDER — MAGNESIUM SULFATE 500 MG/ML
1 VIAL (ML) INJECTION ONCE
Refills: 0 | Status: COMPLETED | OUTPATIENT
Start: 2021-04-25 | End: 2021-04-25

## 2021-04-25 RX ADMIN — Medication 2000 MILLILITER(S): at 11:23

## 2021-04-25 RX ADMIN — Medication 100 GRAM(S): at 09:37

## 2021-04-25 RX ADMIN — Medication 40 MILLIEQUIVALENT(S): at 10:29

## 2021-04-25 RX ADMIN — Medication 2: at 11:31

## 2021-04-25 RX ADMIN — Medication 100 MILLIEQUIVALENT(S): at 11:32

## 2021-04-25 RX ADMIN — Medication 81 MILLIGRAM(S): at 12:54

## 2021-04-25 RX ADMIN — Medication 100 MILLIEQUIVALENT(S): at 15:18

## 2021-04-25 RX ADMIN — Medication 100 MILLIEQUIVALENT(S): at 12:55

## 2021-04-25 NOTE — PROGRESS NOTE ADULT - ASSESSMENT
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 Serial hgb have been stable following initial transfusion of 2U pRBCs. No further LGIB bleeds at this time.   Plan for discharge home today.      No acute vascular surgery intervention at this time  On ASA and Xarelto c/w home dose given no further LGIB and stable hemoglobin  May follow up with Dr. Gant outpatient upon discharge; office number    Further medical management per primary team.

## 2021-04-25 NOTE — PROGRESS NOTE ADULT - SUBJECTIVE AND OBJECTIVE BOX
HX: POD  s/p      SUBJECTIVE: Patient seen and examined bedside by chief resident.    aspirin enteric coated 81 milliGRAM(s) Oral daily  labetalol 200 milliGRAM(s) Oral two times a day  rivaroxaban 20 milliGRAM(s) Oral daily    MEDICATIONS  (PRN):  ondansetron Injectable 4 milliGRAM(s) IV Push every 6 hours PRN Nausea      I&O's Detail    24 Apr 2021 07:01  -  25 Apr 2021 07:00  --------------------------------------------------------  IN:    Oral Fluid: 600 mL  Total IN: 600 mL    OUT:  Total OUT: 0 mL    Total NET: 600 mL          Vital Signs Last 24 Hrs  T(C): 36.9 (25 Apr 2021 05:10), Max: 37 (25 Apr 2021 02:00)  T(F): 98.5 (25 Apr 2021 05:10), Max: 98.6 (25 Apr 2021 02:00)  HR: 78 (25 Apr 2021 03:30) (66 - 89)  BP: 138/65 (25 Apr 2021 03:30) (135/69 - 179/87)  BP(mean): 93 (25 Apr 2021 03:30) (93 - 125)  RR: 18 (25 Apr 2021 03:30) (15 - 18)  SpO2: 97% (25 Apr 2021 03:30) (93% - 97%)    General: NAD, resting comfortably in bed  C/V: pulses present in b/l upper extremities   Pulm: Nonlabored breathing, no respiratory distress  Abd: soft, ND, NT, no rebound or guarding,   Extrem: WWP, no edema, SCDs in place    LABS:                        8.1    8.83  )-----------( 170      ( 25 Apr 2021 06:21 )             25.3     04-25    141  |  104  |  21  ----------------------------<  140<H>  3.4<L>   |  27  |  0.88    Ca    8.4      25 Apr 2021 06:21  Phos  3.4     04-25  Mg     1.8     04-25      PT/INR - ( 25 Apr 2021 06:21 )   PT: 16.8 sec;   INR: 1.42          PTT - ( 25 Apr 2021 06:21 )  PTT:31.0 sec      RADIOLOGY & ADDITIONAL STUDIES:        Plan:   Diet:   IVF:   Abx:   Pain/Nausea:   Home medications:   AM labs       HX: LGIB      SUBJECTIVE: Patient seen and examined bedside by chief resident. Denies N/V. Tolerating diet. No CP, SOB, lightheadedness/dizziness. Endorses normal BM yesterday but flushed before team or RN could look for blood. +voiding , +OOBA.     Bloody BM x1 this AM with H/H drop 8.1 form 9.7    aspirin enteric coated 81 milliGRAM(s) Oral daily  labetalol 200 milliGRAM(s) Oral two times a day  rivaroxaban 20 milliGRAM(s) Oral daily    MEDICATIONS  (PRN):  ondansetron Injectable 4 milliGRAM(s) IV Push every 6 hours PRN Nausea      I&O's Detail    24 Apr 2021 07:01  -  25 Apr 2021 07:00  --------------------------------------------------------  IN:    Oral Fluid: 600 mL  Total IN: 600 mL    OUT:  Total OUT: 0 mL    Total NET: 600 mL      Vital Signs Last 24 Hrs  T(C): 36.9 (25 Apr 2021 05:10), Max: 37 (25 Apr 2021 02:00)  T(F): 98.5 (25 Apr 2021 05:10), Max: 98.6 (25 Apr 2021 02:00)  HR: 78 (25 Apr 2021 03:30) (66 - 89)  BP: 138/65 (25 Apr 2021 03:30) (135/69 - 179/87)  BP(mean): 93 (25 Apr 2021 03:30) (93 - 125)  RR: 18 (25 Apr 2021 03:30) (15 - 18)  SpO2: 97% (25 Apr 2021 03:30) (93% - 97%)    General: NAD, resting comfortably in bed   Pulm: Nonlabored breathing, no respiratory distress on RA  Abd: soft, ND, NT, no rebound or guarding   Extrem: WWP, no edema    LABS:                        8.1    8.83  )-----------( 170      ( 25 Apr 2021 06:21 )             25.3     04-25    141  |  104  |  21  ----------------------------<  140<H>  3.4<L>   |  27  |  0.88    Ca    8.4      25 Apr 2021 06:21  Phos  3.4     04-25  Mg     1.8     04-25      PT/INR - ( 25 Apr 2021 06:21 )   PT: 16.8 sec;   INR: 1.42          PTT - ( 25 Apr 2021 06:21 )  PTT:31.0 sec    A/P:  70M (Kiswahili speaking, poor historian), PMHx HTN, DM, HLD, CAD, PAD s/p bilateral fem-pop bypasses (2016) s/p R embolectomy and angioplasty x2 for RLE ischemia (3/2020) on ASA and Xarelto, severe aortic stenosis, 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 CTA negative. Transferred to SICU for further monitoring. Stepped down, restarted Asa, Xeralto to restarted, 1x BRBPR, this AM associated with H/H drop, stopped Xeralto. Moviprep started, holding BP meds.     CLD   Moviprep   pain/nausea control  Maintain Hgb >8, q6 CBC, maintain T/S.   SCDs/hold SQH/OOBA  Holding home BP meds in setting of BRBPR  AM labs   GI consult    Plan discussed with attending and chief resident.   ____________________________________________________  Edilma Santiago MD     PGY1 - Surgery

## 2021-04-25 NOTE — PROGRESS NOTE ADULT - SUBJECTIVE AND OBJECTIVE BOX
Patient seen and examined at beside, NAD, reports going home today    aspirin enteric coated 81      Allergies    No Known Allergies    Intolerances        Vital Signs Last 24 Hrs  T(C): 37.1 (25 Apr 2021 09:57), Max: 37.1 (25 Apr 2021 09:57)  T(F): 98.7 (25 Apr 2021 09:57), Max: 98.7 (25 Apr 2021 09:57)  HR: 68 (25 Apr 2021 09:30) (66 - 78)  BP: 152/66 (25 Apr 2021 09:30) (135/69 - 167/74)  BP(mean): 95 (25 Apr 2021 09:30) (93 - 107)  RR: 23 (25 Apr 2021 09:30) (16 - 23)  SpO2: 96% (25 Apr 2021 09:30) (93% - 97%)  I&O's Summary    24 Apr 2021 07:01  -  25 Apr 2021 07:00  --------------------------------------------------------  IN: 600 mL / OUT: 0 mL / NET: 600 mL    25 Apr 2021 07:01  -  25 Apr 2021 11:32  --------------------------------------------------------  IN: 100 mL / OUT: 0 mL / NET: 100 mL        Physical Exam:  Gen: NAD, resting in bad  Chest: No conversational dyspnea. Sating 96% on RA  CV: RRR  Abd: soft, NT, ND  Extremities: warm and well perfused, motorsensory intact  Pulses: RLE: palp fem, triphasic pop, palpable DP 1+, biphasic PT  LLE: palp fem, triphasic pop, palpable DP 1+, biphasic PT      LABS:                        8.1    8.83  )-----------( 170      ( 25 Apr 2021 06:21 )             25.3     04-25    141  |  104  |  21  ----------------------------<  140<H>  3.4<L>   |  27  |  0.88    Ca    8.4      25 Apr 2021 06:21  Phos  3.4     04-25  Mg     1.8     04-25      PT/INR - ( 25 Apr 2021 06:21 )   PT: 16.8 sec;   INR: 1.42          PTT - ( 25 Apr 2021 06:21 )  PTT:31.0 sec    Radiology and Additional Studies:

## 2021-04-25 NOTE — PROGRESS NOTE ADULT - SUBJECTIVE AND OBJECTIVE BOX
GASTROENTEROLOGY PROGRESS NOTE  Patient seen and examined at bedside. Reported 2 episodes of BRBPR today Hb 8.1 (from 9.7 yday) however HD stable (in fact hypertensive)    PERTINENT REVIEW OF SYSTEMS:  As noted above    Allergies    No Known Allergies    Intolerances      MEDICATIONS:  MEDICATIONS  (STANDING):  aspirin enteric coated 81 milliGRAM(s) Oral daily  dextrose 40% Gel 15 Gram(s) Oral once  dextrose 5%. 1000 milliLiter(s) (50 mL/Hr) IV Continuous <Continuous>  dextrose 5%. 1000 milliLiter(s) (100 mL/Hr) IV Continuous <Continuous>  dextrose 50% Injectable 25 Gram(s) IV Push once  dextrose 50% Injectable 12.5 Gram(s) IV Push once  dextrose 50% Injectable 25 Gram(s) IV Push once  glucagon  Injectable 1 milliGRAM(s) IntraMuscular once  insulin lispro (ADMELOG) corrective regimen sliding scale   SubCutaneous every 6 hours    MEDICATIONS  (PRN):  ondansetron Injectable 4 milliGRAM(s) IV Push every 6 hours PRN Nausea    Vital Signs Last 24 Hrs  T(C): 37 (25 Apr 2021 14:03), Max: 37.1 (25 Apr 2021 09:57)  T(F): 98.6 (25 Apr 2021 14:03), Max: 98.7 (25 Apr 2021 09:57)  HR: 60 (25 Apr 2021 13:30) (60 - 78)  BP: 150/73 (25 Apr 2021 13:30) (135/69 - 167/74)  BP(mean): 102 (25 Apr 2021 13:30) (93 - 107)  RR: 19 (25 Apr 2021 13:30) (18 - 23)  SpO2: 97% (25 Apr 2021 13:30) (93% - 97%)    04-24 @ 07:01  -  04-25 @ 07:00  --------------------------------------------------------  IN: 600 mL / OUT: 0 mL / NET: 600 mL    04-25 @ 07:01  -  04-25 @ 17:29  --------------------------------------------------------  IN: 880 mL / OUT: 400 mL / NET: 480 mL      PHYSICAL EXAM:    General: Well developed; well nourished; in no acute distress  HEENT: MMM, conjunctiva and sclera clear  Gastrointestinal: Soft non-tender non-distended; Normal bowel sounds; No hepatosplenomegaly. No rebound or guarding  Skin: Warm and dry. No obvious rash    LABS:                        8.6    9.02  )-----------( 199      ( 25 Apr 2021 15:08 )             26.3     04-25    141  |  104  |  21  ----------------------------<  140<H>  3.4<L>   |  27  |  0.88    Ca    8.4      25 Apr 2021 06:21  Phos  3.4     04-25  Mg     1.8     04-25      PT/INR - ( 25 Apr 2021 06:21 )   PT: 16.8 sec;   INR: 1.42          PTT - ( 25 Apr 2021 06:21 )  PTT:31.0 sec                  RADIOLOGY & ADDITIONAL STUDIES:  Reviewed

## 2021-04-25 NOTE — PROGRESS NOTE ADULT - ASSESSMENT
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 GI consulted for hematochezia.     #Hematochezia likely diverticular vs hemorrhoidal  Reported 2 episodes of BRBPR today Hb 8.1 (from 9.7 yday) however HD stable (in fact hypertensive)  - Split bowel prep for possible colonoscopy tomorrow.  - monitor CBC    Recommendations discussed with primary team  Plan discussed with GI service attending    Mukul Mckeon MD  PGY-4 GI fellow  Pager: [phone]

## 2021-04-25 NOTE — PROGRESS NOTE ADULT - SUBJECTIVE AND OBJECTIVE BOX
bloody bm this am on xarelto  vss  abd- soft  stable  follow up cbc q 6  GI follow up- would plan for colonoscopy 4/26  CT angio if bleeding persists for site localization

## 2021-04-26 LAB
ANION GAP SERPL CALC-SCNC: 11 MMOL/L — SIGNIFICANT CHANGE UP (ref 5–17)
APTT BLD: 31.1 SEC — SIGNIFICANT CHANGE UP (ref 27.5–35.5)
BLD GP AB SCN SERPL QL: NEGATIVE — SIGNIFICANT CHANGE UP
BUN SERPL-MCNC: 11 MG/DL — SIGNIFICANT CHANGE UP (ref 7–23)
CALCIUM SERPL-MCNC: 8.5 MG/DL — SIGNIFICANT CHANGE UP (ref 8.4–10.5)
CHLORIDE SERPL-SCNC: 104 MMOL/L — SIGNIFICANT CHANGE UP (ref 96–108)
CO2 SERPL-SCNC: 24 MMOL/L — SIGNIFICANT CHANGE UP (ref 22–31)
CREAT SERPL-MCNC: 0.78 MG/DL — SIGNIFICANT CHANGE UP (ref 0.5–1.3)
GLUCOSE SERPL-MCNC: 130 MG/DL — HIGH (ref 70–99)
HCT VFR BLD CALC: 23.4 % — LOW (ref 39–50)
HCT VFR BLD CALC: 23.8 % — LOW (ref 39–50)
HGB BLD-MCNC: 7.6 G/DL — LOW (ref 13–17)
HGB BLD-MCNC: 7.7 G/DL — LOW (ref 13–17)
INR BLD: 1.15 — SIGNIFICANT CHANGE UP (ref 0.88–1.16)
MAGNESIUM SERPL-MCNC: 2.2 MG/DL — SIGNIFICANT CHANGE UP (ref 1.6–2.6)
MCHC RBC-ENTMCNC: 27.7 PG — SIGNIFICANT CHANGE UP (ref 27–34)
MCHC RBC-ENTMCNC: 28 PG — SIGNIFICANT CHANGE UP (ref 27–34)
MCHC RBC-ENTMCNC: 31.9 GM/DL — LOW (ref 32–36)
MCHC RBC-ENTMCNC: 32.9 GM/DL — SIGNIFICANT CHANGE UP (ref 32–36)
MCV RBC AUTO: 85.1 FL — SIGNIFICANT CHANGE UP (ref 80–100)
MCV RBC AUTO: 86.9 FL — SIGNIFICANT CHANGE UP (ref 80–100)
NRBC # BLD: 0 /100 WBCS — SIGNIFICANT CHANGE UP (ref 0–0)
NRBC # BLD: 0 /100 WBCS — SIGNIFICANT CHANGE UP (ref 0–0)
PHOSPHATE SERPL-MCNC: 2.9 MG/DL — SIGNIFICANT CHANGE UP (ref 2.5–4.5)
PLATELET # BLD AUTO: 198 K/UL — SIGNIFICANT CHANGE UP (ref 150–400)
PLATELET # BLD AUTO: 227 K/UL — SIGNIFICANT CHANGE UP (ref 150–400)
POTASSIUM SERPL-MCNC: 3.5 MMOL/L — SIGNIFICANT CHANGE UP (ref 3.5–5.3)
POTASSIUM SERPL-SCNC: 3.5 MMOL/L — SIGNIFICANT CHANGE UP (ref 3.5–5.3)
PROTHROM AB SERPL-ACNC: 13.7 SEC — HIGH (ref 10.6–13.6)
RBC # BLD: 2.74 M/UL — LOW (ref 4.2–5.8)
RBC # BLD: 2.75 M/UL — LOW (ref 4.2–5.8)
RBC # FLD: 15.6 % — HIGH (ref 10.3–14.5)
RBC # FLD: 15.6 % — HIGH (ref 10.3–14.5)
RH IG SCN BLD-IMP: POSITIVE — SIGNIFICANT CHANGE UP
SODIUM SERPL-SCNC: 139 MMOL/L — SIGNIFICANT CHANGE UP (ref 135–145)
WBC # BLD: 9.41 K/UL — SIGNIFICANT CHANGE UP (ref 3.8–10.5)
WBC # BLD: 9.84 K/UL — SIGNIFICANT CHANGE UP (ref 3.8–10.5)
WBC # FLD AUTO: 9.41 K/UL — SIGNIFICANT CHANGE UP (ref 3.8–10.5)
WBC # FLD AUTO: 9.84 K/UL — SIGNIFICANT CHANGE UP (ref 3.8–10.5)

## 2021-04-26 PROCEDURE — 45378 DIAGNOSTIC COLONOSCOPY: CPT

## 2021-04-26 PROCEDURE — 99232 SBSQ HOSP IP/OBS MODERATE 35: CPT

## 2021-04-26 RX ORDER — POTASSIUM CHLORIDE 20 MEQ
40 PACKET (EA) ORAL ONCE
Refills: 0 | Status: COMPLETED | OUTPATIENT
Start: 2021-04-26 | End: 2021-04-26

## 2021-04-26 RX ORDER — POTASSIUM CHLORIDE 20 MEQ
10 PACKET (EA) ORAL
Refills: 0 | Status: DISCONTINUED | OUTPATIENT
Start: 2021-04-26 | End: 2021-04-26

## 2021-04-26 RX ADMIN — Medication 2: at 06:13

## 2021-04-26 RX ADMIN — Medication 81 MILLIGRAM(S): at 11:46

## 2021-04-26 RX ADMIN — SODIUM CHLORIDE 50 MILLILITER(S): 9 INJECTION, SOLUTION INTRAVENOUS at 14:03

## 2021-04-26 RX ADMIN — Medication 40 MILLIEQUIVALENT(S): at 11:47

## 2021-04-26 RX ADMIN — Medication 4: at 17:47

## 2021-04-26 NOTE — CHART NOTE - NSCHARTNOTEFT_GEN_A_CORE
Patient is s/p colonoscopy w/ Dr. Tan in the Endoscopy Suite w/ the following findings and recommendations.    Impressions:  1. Healthy ileocolic anastomosis  2. Pancolonic diverticulosis         Recommendation:  1. Trend Hgb and monitor stool  2. Discuss anticoagulation regimen with Vascular Surgery        Mele Quiñones MD  PGY-4, Gastroenterology Fellow  pager: [phone]

## 2021-04-26 NOTE — PROGRESS NOTE ADULT - ASSESSMENT
SUBJECTIVE: Pt seen and examined at bedside with chief. Pt denies any complaints. Pain well controlled. Tolerating diet without N/V. Drinking the bowel prep with multiple clear bowel movements, denies any bloody BMs.     MEDICATIONS  (STANDING):  aspirin enteric coated 81 milliGRAM(s) Oral daily  dextrose 40% Gel 15 Gram(s) Oral once  dextrose 5%. 1000 milliLiter(s) (50 mL/Hr) IV Continuous <Continuous>  dextrose 5%. 1000 milliLiter(s) (100 mL/Hr) IV Continuous <Continuous>  dextrose 50% Injectable 25 Gram(s) IV Push once  dextrose 50% Injectable 12.5 Gram(s) IV Push once  dextrose 50% Injectable 25 Gram(s) IV Push once  glucagon  Injectable 1 milliGRAM(s) IntraMuscular once  insulin lispro (ADMELOG) corrective regimen sliding scale   SubCutaneous every 6 hours  lactated ringers. 1000 milliLiter(s) (40 mL/Hr) IV Continuous <Continuous>    MEDICATIONS  (PRN):  ondansetron Injectable 4 milliGRAM(s) IV Push every 6 hours PRN Nausea      Vital Signs Last 24 Hrs  T(C): 37 (26 Apr 2021 05:22), Max: 37.3 (25 Apr 2021 18:31)  T(F): 98.6 (26 Apr 2021 05:22), Max: 99.1 (25 Apr 2021 18:31)  HR: 78 (26 Apr 2021 04:55) (60 - 80)  BP: 113/55 (26 Apr 2021 04:55) (113/55 - 162/69)  BP(mean): 78 (26 Apr 2021 04:55) (78 - 102)  RR: 20 (26 Apr 2021 04:55) (19 - 23)  SpO2: 98% (26 Apr 2021 04:55) (96% - 98%)    PHYSICAL EXAM:      Constitutional: A&Ox3    Respiratory: non labored breathing, no respiratory distress    Cardiovascular: NSR, RRR    Gastrointestinal: Soft ND, NT    Genitourinary: VOIDING     Extremities: (-) edema                  I&O's Detail    25 Apr 2021 07:01  -  26 Apr 2021 07:00  --------------------------------------------------------  IN:    IV PiggyBack: 400 mL    Lactated Ringers: 280 mL    Oral Fluid: 480 mL  Total IN: 1160 mL    OUT:    Voided (mL): 1025 mL  Total OUT: 1025 mL    Total NET: 135 mL          LABS:                        7.7    9.84  )-----------( 198      ( 26 Apr 2021 06:02 )             23.4     04-26    139  |  104  |  11  ----------------------------<  130<H>  3.5   |  24  |  0.78    Ca    8.5      26 Apr 2021 06:02  Phos  2.9     04-26  Mg     2.2     04-26      PT/INR - ( 26 Apr 2021 06:02 )   PT: 13.7 sec;   INR: 1.15          PTT - ( 26 Apr 2021 06:02 )  PTT:31.1 sec      RADIOLOGY & ADDITIONAL STUDIES:

## 2021-04-26 NOTE — PROGRESS NOTE ADULT - NUTRITIONAL ASSESSMENT
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 CTA negative. Transferred to SICU for further monitoring. Stepped down, restarted Asa, Xeralto to restarted, 1x BRBPR, this AM associated with H/H drop, stopped Xeralto.     prepped for c-scope today  NPO for procedure  pain/nausea control  Maintain Hgb >8, q6 CBC, maintain active T+S   SCDs/hold SQH/OOBA  Holding home BP meds in setting of LGIB   AM labs

## 2021-04-26 NOTE — CHART NOTE - NSCHARTNOTEFT_GEN_A_CORE
Colonoscopy performed Pancolonic diverticulosis noted w/o active bleed. May restart Xarelto when determined suitable by primary team.

## 2021-04-27 LAB
ANION GAP SERPL CALC-SCNC: 9 MMOL/L — SIGNIFICANT CHANGE UP (ref 5–17)
BUN SERPL-MCNC: 7 MG/DL — SIGNIFICANT CHANGE UP (ref 7–23)
CALCIUM SERPL-MCNC: 8.3 MG/DL — LOW (ref 8.4–10.5)
CHLORIDE SERPL-SCNC: 106 MMOL/L — SIGNIFICANT CHANGE UP (ref 96–108)
CO2 SERPL-SCNC: 25 MMOL/L — SIGNIFICANT CHANGE UP (ref 22–31)
CREAT SERPL-MCNC: 0.78 MG/DL — SIGNIFICANT CHANGE UP (ref 0.5–1.3)
GLUCOSE SERPL-MCNC: 115 MG/DL — HIGH (ref 70–99)
HCT VFR BLD CALC: 25.4 % — LOW (ref 39–50)
HGB BLD-MCNC: 8.3 G/DL — LOW (ref 13–17)
MAGNESIUM SERPL-MCNC: 1.8 MG/DL — SIGNIFICANT CHANGE UP (ref 1.6–2.6)
MCHC RBC-ENTMCNC: 27.1 PG — SIGNIFICANT CHANGE UP (ref 27–34)
MCHC RBC-ENTMCNC: 32.7 GM/DL — SIGNIFICANT CHANGE UP (ref 32–36)
MCV RBC AUTO: 83 FL — SIGNIFICANT CHANGE UP (ref 80–100)
NRBC # BLD: 0 /100 WBCS — SIGNIFICANT CHANGE UP (ref 0–0)
PHOSPHATE SERPL-MCNC: 3 MG/DL — SIGNIFICANT CHANGE UP (ref 2.5–4.5)
PLATELET # BLD AUTO: 224 K/UL — SIGNIFICANT CHANGE UP (ref 150–400)
POTASSIUM SERPL-MCNC: 3.7 MMOL/L — SIGNIFICANT CHANGE UP (ref 3.5–5.3)
POTASSIUM SERPL-SCNC: 3.7 MMOL/L — SIGNIFICANT CHANGE UP (ref 3.5–5.3)
RBC # BLD: 3.06 M/UL — LOW (ref 4.2–5.8)
RBC # FLD: 19.4 % — HIGH (ref 10.3–14.5)
SODIUM SERPL-SCNC: 140 MMOL/L — SIGNIFICANT CHANGE UP (ref 135–145)
WBC # BLD: 8.93 K/UL — SIGNIFICANT CHANGE UP (ref 3.8–10.5)
WBC # FLD AUTO: 8.93 K/UL — SIGNIFICANT CHANGE UP (ref 3.8–10.5)

## 2021-04-27 PROCEDURE — 99232 SBSQ HOSP IP/OBS MODERATE 35: CPT

## 2021-04-27 RX ORDER — POTASSIUM CHLORIDE 20 MEQ
20 PACKET (EA) ORAL ONCE
Refills: 0 | Status: COMPLETED | OUTPATIENT
Start: 2021-04-27 | End: 2021-04-27

## 2021-04-27 RX ORDER — HEPARIN SODIUM 5000 [USP'U]/ML
5000 INJECTION INTRAVENOUS; SUBCUTANEOUS EVERY 8 HOURS
Refills: 0 | Status: DISCONTINUED | OUTPATIENT
Start: 2021-04-27 | End: 2021-04-28

## 2021-04-27 RX ORDER — MAGNESIUM SULFATE 500 MG/ML
1 VIAL (ML) INJECTION ONCE
Refills: 0 | Status: COMPLETED | OUTPATIENT
Start: 2021-04-27 | End: 2021-04-27

## 2021-04-27 RX ADMIN — HEPARIN SODIUM 5000 UNIT(S): 5000 INJECTION INTRAVENOUS; SUBCUTANEOUS at 13:39

## 2021-04-27 RX ADMIN — Medication 100 GRAM(S): at 09:49

## 2021-04-27 RX ADMIN — Medication 81 MILLIGRAM(S): at 11:38

## 2021-04-27 RX ADMIN — HEPARIN SODIUM 5000 UNIT(S): 5000 INJECTION INTRAVENOUS; SUBCUTANEOUS at 21:01

## 2021-04-27 RX ADMIN — Medication 20 MILLIEQUIVALENT(S): at 09:49

## 2021-04-27 NOTE — PROGRESS NOTE ADULT - SUBJECTIVE AND OBJECTIVE BOX
SUBJECTIVE: Pt seen and examined at bedside with chief. Pt denies any complaints. Pain well controlled. Tolerating diet without N/V. Admits to non bloody BM.    MEDICATIONS  (STANDING):  aspirin enteric coated 81 milliGRAM(s) Oral daily  dextrose 40% Gel 15 Gram(s) Oral once  dextrose 5%. 1000 milliLiter(s) (50 mL/Hr) IV Continuous <Continuous>  dextrose 5%. 1000 milliLiter(s) (100 mL/Hr) IV Continuous <Continuous>  dextrose 50% Injectable 25 Gram(s) IV Push once  dextrose 50% Injectable 12.5 Gram(s) IV Push once  dextrose 50% Injectable 25 Gram(s) IV Push once  glucagon  Injectable 1 milliGRAM(s) IntraMuscular once  heparin   Injectable 5000 Unit(s) SubCutaneous every 8 hours  insulin lispro (ADMELOG) corrective regimen sliding scale   SubCutaneous every 6 hours    MEDICATIONS  (PRN):  ondansetron Injectable 4 milliGRAM(s) IV Push every 6 hours PRN Nausea      Vital Signs Last 24 Hrs  T(C): 36.8 (27 Apr 2021 04:57), Max: 36.9 (26 Apr 2021 09:30)  T(F): 98.2 (27 Apr 2021 04:57), Max: 98.5 (26 Apr 2021 22:07)  HR: 76 (27 Apr 2021 04:57) (56 - 80)  BP: 166/84 (27 Apr 2021 04:57) (137/63 - 166/84)  BP(mean): 116 (27 Apr 2021 04:57) (90 - 116)  RR: 20 (27 Apr 2021 04:57) (14 - 22)  SpO2: 99% (27 Apr 2021 04:57) (93% - 100%)    PHYSICAL EXAM:      Constitutional: A&Ox3    Respiratory: non labored breathing, no respiratory distress    Cardiovascular: NSR, RRR    Gastrointestinal: soft ND, NT no rebound or guarding     Genitourinary: voiding      Extremities: (-) edema                  I&O's Detail    26 Apr 2021 07:01  -  27 Apr 2021 07:00  --------------------------------------------------------  IN:    Lactated Ringers: 930 mL    Oral Fluid: 120 mL    PRBCs (Packed Red Blood Cells): 310 mL  Total IN: 1360 mL    OUT:    Voided (mL): 1025 mL  Total OUT: 1025 mL    Total NET: 335 mL          LABS:                        8.3    8.93  )-----------( 224      ( 27 Apr 2021 05:52 )             25.4     04-27    140  |  106  |  7   ----------------------------<  115<H>  3.7   |  25  |  0.78    Ca    8.3<L>      27 Apr 2021 05:52  Phos  3.0     04-27  Mg     1.8     04-27      PT/INR - ( 26 Apr 2021 06:02 )   PT: 13.7 sec;   INR: 1.15          PTT - ( 26 Apr 2021 06:02 )  PTT:31.1 sec      RADIOLOGY & ADDITIONAL STUDIES:

## 2021-04-27 NOTE — PROGRESS NOTE ADULT - SUBJECTIVE AND OBJECTIVE BOX
GASTROENTEROLOGY PROGRESS NOTE  Patient seen and examined at bedside. Had brown, non-bloody BM. Hungry.     PERTINENT REVIEW OF SYSTEMS:  CONSTITUTIONAL: No weakness, fevers or chills  HEENT: No visual changes; No vertigo or throat pain   GASTROINTESTINAL: As above.  NEUROLOGICAL: No numbness or weakness  SKIN: No itching, burning, rashes, or lesions     Allergies    No Known Allergies    Intolerances      MEDICATIONS:  MEDICATIONS  (STANDING):  aspirin enteric coated 81 milliGRAM(s) Oral daily  dextrose 40% Gel 15 Gram(s) Oral once  dextrose 5%. 1000 milliLiter(s) (50 mL/Hr) IV Continuous <Continuous>  dextrose 5%. 1000 milliLiter(s) (100 mL/Hr) IV Continuous <Continuous>  dextrose 50% Injectable 25 Gram(s) IV Push once  dextrose 50% Injectable 12.5 Gram(s) IV Push once  dextrose 50% Injectable 25 Gram(s) IV Push once  glucagon  Injectable 1 milliGRAM(s) IntraMuscular once  heparin   Injectable 5000 Unit(s) SubCutaneous every 8 hours  insulin lispro (ADMELOG) corrective regimen sliding scale   SubCutaneous every 6 hours    MEDICATIONS  (PRN):  ondansetron Injectable 4 milliGRAM(s) IV Push every 6 hours PRN Nausea    Vital Signs Last 24 Hrs  T(C): 37 (27 Apr 2021 09:25), Max: 37 (27 Apr 2021 09:25)  T(F): 98.6 (27 Apr 2021 09:25), Max: 98.6 (27 Apr 2021 09:25)  HR: 82 (27 Apr 2021 11:44) (56 - 82)  BP: 155/64 (27 Apr 2021 11:44) (133/64 - 166/84)  BP(mean): 92 (27 Apr 2021 11:44) (90 - 116)  RR: 18 (27 Apr 2021 11:44) (14 - 22)  SpO2: 97% (27 Apr 2021 11:44) (93% - 100%)    04-26 @ 07:01  -  04-27 @ 07:00  --------------------------------------------------------  IN: 1360 mL / OUT: 1025 mL / NET: 335 mL    04-27 @ 07:01  - 04-27 @ 12:17  --------------------------------------------------------  IN: 820 mL / OUT: 1750 mL / NET: -930 mL      PHYSICAL EXAM:    General: Well developed; well nourished; in no acute distress  HEENT: MMM, conjunctiva and sclera clear  Gastrointestinal: Soft non-tender non-distended; Normal bowel sounds; No hepatosplenomegaly. No rebound or guarding  Skin: Warm and dry. No obvious rash    LABS:                        8.3    8.93  )-----------( 224      ( 27 Apr 2021 05:52 )             25.4     04-27    140  |  106  |  7   ----------------------------<  115<H>  3.7   |  25  |  0.78    Ca    8.3<L>      27 Apr 2021 05:52  Phos  3.0     04-27  Mg     1.8     04-27      PT/INR - ( 26 Apr 2021 06:02 )   PT: 13.7 sec;   INR: 1.15          PTT - ( 26 Apr 2021 06:02 )  PTT:31.1 sec                  RADIOLOGY & ADDITIONAL STUDIES:  Reviewed

## 2021-04-27 NOTE — PROGRESS NOTE ADULT - ASSESSMENT
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 CTA negative. Transferred to SICU for further monitoring. Stepped down, restarted Asa, Xeralto to restarted, 1x BRBPR, this AM associated with H/H drop, stopped Xeralto. Moviprep started, holding BP meds.     step down to regional  SQH  reg diet CC  pain/nausea control  Maintain Hgb >8, maintain active T+S   SCDs/hold SQH/OOBA  AM labs

## 2021-04-27 NOTE — PROGRESS NOTE ADULT - ASSESSMENT
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 GI consulted for hematochezia.     #Hematochezia likely diverticular vs hemorrhoidal  Reported 2 episodes of BRBPR Hb 8.1 (from 9.7 day prior) however HD stable (in fact hypertensive)  - s/p colonoscopy w/ non-bleeding pancolonic diverticulosis and healthy anastomosis   - agree w/ trialing patient on xarelto again and monitoring Hgb and stool  - if has more hematochezia, would consider switching to Eliquis  - Hgb stable, having brown stool    Thank you for allowing us to participate in the care of this patient.  GI will sign off. Please call back with any questions or concerns.     Mele Quiñones MD  PGY-4, Gastroenterology Fellow  pager: [phone]

## 2021-04-27 NOTE — PROGRESS NOTE ADULT - ATTENDING COMMENTS
No bleeding at time of cscope, but lots of diverticula.   Unclear etiology given can't confirm source.  on mutliple antiplt agents which likekly increases risk of this issue.
Now stable acute blood loss anemia. Await colonoscopy. Holding blood thinners for now.

## 2021-04-28 ENCOUNTER — TRANSCRIPTION ENCOUNTER (OUTPATIENT)
Age: 71
End: 2021-04-28

## 2021-04-28 VITALS
OXYGEN SATURATION: 99 % | HEART RATE: 70 BPM | SYSTOLIC BLOOD PRESSURE: 157 MMHG | TEMPERATURE: 98 F | RESPIRATION RATE: 18 BRPM | DIASTOLIC BLOOD PRESSURE: 84 MMHG

## 2021-04-28 LAB
HCT VFR BLD CALC: 25.5 % — LOW (ref 39–50)
HGB BLD-MCNC: 8.3 G/DL — LOW (ref 13–17)
MCHC RBC-ENTMCNC: 26.5 PG — LOW (ref 27–34)
MCHC RBC-ENTMCNC: 32.5 GM/DL — SIGNIFICANT CHANGE UP (ref 32–36)
MCV RBC AUTO: 81.5 FL — SIGNIFICANT CHANGE UP (ref 80–100)
NRBC # BLD: 0 /100 WBCS — SIGNIFICANT CHANGE UP (ref 0–0)
PLATELET # BLD AUTO: 266 K/UL — SIGNIFICANT CHANGE UP (ref 150–400)
RBC # BLD: 3.13 M/UL — LOW (ref 4.2–5.8)
RBC # FLD: 18.7 % — HIGH (ref 10.3–14.5)
WBC # BLD: 8.93 K/UL — SIGNIFICANT CHANGE UP (ref 3.8–10.5)
WBC # FLD AUTO: 8.93 K/UL — SIGNIFICANT CHANGE UP (ref 3.8–10.5)

## 2021-04-28 PROCEDURE — 86901 BLOOD TYPING SEROLOGIC RH(D): CPT

## 2021-04-28 PROCEDURE — 80053 COMPREHEN METABOLIC PANEL: CPT

## 2021-04-28 PROCEDURE — 85025 COMPLETE CBC W/AUTO DIFF WBC: CPT

## 2021-04-28 PROCEDURE — 85730 THROMBOPLASTIN TIME PARTIAL: CPT

## 2021-04-28 PROCEDURE — 83605 ASSAY OF LACTIC ACID: CPT

## 2021-04-28 PROCEDURE — 93306 TTE W/DOPPLER COMPLETE: CPT

## 2021-04-28 PROCEDURE — 85027 COMPLETE CBC AUTOMATED: CPT

## 2021-04-28 PROCEDURE — 80048 BASIC METABOLIC PNL TOTAL CA: CPT

## 2021-04-28 PROCEDURE — 86850 RBC ANTIBODY SCREEN: CPT

## 2021-04-28 PROCEDURE — U0005: CPT

## 2021-04-28 PROCEDURE — 86900 BLOOD TYPING SEROLOGIC ABO: CPT

## 2021-04-28 PROCEDURE — P9016: CPT

## 2021-04-28 PROCEDURE — 36415 COLL VENOUS BLD VENIPUNCTURE: CPT

## 2021-04-28 PROCEDURE — 84100 ASSAY OF PHOSPHORUS: CPT

## 2021-04-28 PROCEDURE — 83690 ASSAY OF LIPASE: CPT

## 2021-04-28 PROCEDURE — U0003: CPT

## 2021-04-28 PROCEDURE — 87635 SARS-COV-2 COVID-19 AMP PRB: CPT

## 2021-04-28 PROCEDURE — 82962 GLUCOSE BLOOD TEST: CPT

## 2021-04-28 PROCEDURE — 36430 TRANSFUSION BLD/BLD COMPNT: CPT

## 2021-04-28 PROCEDURE — 71045 X-RAY EXAM CHEST 1 VIEW: CPT

## 2021-04-28 PROCEDURE — 85610 PROTHROMBIN TIME: CPT

## 2021-04-28 PROCEDURE — 86923 COMPATIBILITY TEST ELECTRIC: CPT

## 2021-04-28 PROCEDURE — 99285 EMERGENCY DEPT VISIT HI MDM: CPT | Mod: 25

## 2021-04-28 PROCEDURE — 74174 CTA ABD&PLVS W/CONTRAST: CPT

## 2021-04-28 PROCEDURE — 83735 ASSAY OF MAGNESIUM: CPT

## 2021-04-28 PROCEDURE — 81003 URINALYSIS AUTO W/O SCOPE: CPT

## 2021-04-28 RX ADMIN — HEPARIN SODIUM 5000 UNIT(S): 5000 INJECTION INTRAVENOUS; SUBCUTANEOUS at 05:15

## 2021-04-28 NOTE — DISCHARGE NOTE PROVIDER - HOSPITAL COURSE
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 Afebrile, normal HR however on labetalol, hypotensive SBP low 100s, O2 high 90s on RA. Hgb 8.0, lactate 3.3, admitted to SICU for HD monitoring. Pt given IVF, transfused prn/CBC trended. TTE was obtained and showed severe aortic regurg, LA dilated, grade I LV diastolic dysfunction, EF 65%. Vascular surgery was consulted for GI bleed in the setting of history of bilateral fempop bypasses, no acute vascular intervention needed. Gastroenterology team followed the pt throughout their hospital stay, plan for scope however bloody BMs self resolved. Pt continued to clinically improve, pt was stepped down, diet advanced as tolerated, home Xarelto restarted on 4/24, surgery team continued to monitor vitals/labs closely. 4/25 drop in H/H , pt reported multiple bloody BMs, Xarelto discontinued, pt prepped for scope with GI. Colonoscopy on 4/26 showed healthy ileocolic anastomosis, pancolonic diverticulosis, no evidence of active bleeding. Surgery team continued to monitor pt closely, pt having normal nonbloody BMs, VSS, diet continued to be advanced, DVT ppx restarted. At time of discharge pt is tolerating diet, pt reports normal nonbloody BMs, hgb/VSS stable, pt is ambulating/voiding freely. Pt is HD stable and medically ready for discharge.

## 2021-04-28 NOTE — DISCHARGE NOTE PROVIDER - CARE PROVIDER_API CALL
David Acosta)  ColonRectal Surgery; Surgery  [address] [facility], [address] [place] [address]  Phone: [phone]  Fax: [phone]  Follow Up Time:     Varsha Gant)  Surgery; Vascular Surgery  [address] [place] [address]  Phone: [phone]  Fax: [phone]  Follow Up Time:     Daniel Tan)  Gastroenterology; Internal Medicine  [address] [place] [address]  Phone: [phone]  Fax: [phone]  Follow Up Time:

## 2021-04-28 NOTE — DISCHARGE NOTE PROVIDER - NSDCFUADDINST_GEN_ALL_CORE_FT
Follow up with Dr. Acosta in 1 week. Call the office at [phone] to schedule your appointment.  You may resume regular diet. You should be urinating at least 3-4x per day. Call the office if you experience increasing abdominal pain, nausea, vomiting, or temperature >101 F. Report to the ED if you experience bright red blood per rectum, dizziness, shortness of breath.    Please restart home Xarelto on 5/1/2021.

## 2021-04-28 NOTE — DISCHARGE NOTE PROVIDER - NSDCFUADDAPPT_GEN_ALL_CORE_FT
-Please follow up with Dr. Gant (vascular surgery) in office in 1 week after discharge. Call the office at [phone] to schedule appt.   -Please follow up with Dr. Tan (gastroenterology) in office in 1 week after discharge. Call the office at [phone] to schedule appt.

## 2021-04-28 NOTE — DISCHARGE NOTE NURSING/CASE MANAGEMENT/SOCIAL WORK - PATIENT PORTAL LINK FT
You can access the FollowMyHealth Patient Portal offered by [facility] by registering at the following website: [email or web address]. By joining [employer]’s FollowMyHealth portal, you will also be able to view your health information using other applications (apps) compatible with our system.

## 2021-04-28 NOTE — DISCHARGE NOTE PROVIDER - CARE PROVIDERS DIRECT ADDRESSES
[email or web address]

## 2021-04-28 NOTE — PROGRESS NOTE ADULT - ASSESSMENT
70M (Romansh speaking, poor historian), PMHx HTN, DM, HLD, CAD, PAD s/p bilateral fem-pop bypasses (2016) s/p R embolectomy and angioplasty x2 for RLE ischemia (3/2020) on ASA and Xarelto, severe aortic stenosis, 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 CTA negative. Transferred to SICU for further monitoring. Stepped down, restarted Asa, Xarelto to restarted, 1x BRBPR, this AM associated with H/H drop, stopped Xarelto. Moviprep started, holding BP meds.     Reg diet CC  Pain/nausea control prn  Maintain Hgb >8, maintain active T+S   ASA, Holding Xarelto   SCDs/SQH  OOBA  AM labs

## 2021-04-28 NOTE — PROGRESS NOTE ADULT - SUBJECTIVE AND OBJECTIVE BOX
INTERVAL HPI/OVERNIGHT EVENTS: daryn diet, VSS     SUBJECTIVE: Pt seen and examined at bedside this am by surgery team. No acute complaints. Reports having normal nonbloody BMs. Tolerating diet, pain well controlled. Denies f/n/v/cp/sob.    MEDICATIONS  (STANDING):  aspirin enteric coated 81 milliGRAM(s) Oral daily  dextrose 40% Gel 15 Gram(s) Oral once  dextrose 5%. 1000 milliLiter(s) (50 mL/Hr) IV Continuous <Continuous>  dextrose 5%. 1000 milliLiter(s) (100 mL/Hr) IV Continuous <Continuous>  dextrose 50% Injectable 25 Gram(s) IV Push once  dextrose 50% Injectable 12.5 Gram(s) IV Push once  dextrose 50% Injectable 25 Gram(s) IV Push once  glucagon  Injectable 1 milliGRAM(s) IntraMuscular once  heparin   Injectable 5000 Unit(s) SubCutaneous every 8 hours  insulin lispro (ADMELOG) corrective regimen sliding scale   SubCutaneous every 6 hours    MEDICATIONS  (PRN):  ondansetron Injectable 4 milliGRAM(s) IV Push every 6 hours PRN Nausea    Vital Signs Last 24 Hrs  T(C): 36.7 (28 Apr 2021 05:08), Max: 37 (27 Apr 2021 09:25)  T(F): 98.1 (28 Apr 2021 05:08), Max: 98.6 (27 Apr 2021 09:25)  HR: 71 (28 Apr 2021 05:08) (62 - 82)  BP: 136/68 (28 Apr 2021 05:08) (131/78 - 165/72)  BP(mean): 92 (27 Apr 2021 11:44) (92 - 92)  RR: 18 (28 Apr 2021 05:08) (17 - 20)  SpO2: 95% (28 Apr 2021 05:08) (95% - 98%)    PHYSICAL EXAM:    Constitutional: A&Ox3, NAD    Respiratory: non labored breathing, no respiratory distress    Cardiovascular: NSR, RRR    Gastrointestinal: abdomen soft ND, NT no rebound or guarding      Extremities: wwp, no calf tenderness or edema. SCDs in place       I&O's Detail    27 Apr 2021 07:01  -  28 Apr 2021 07:00  --------------------------------------------------------  IN:    IV PiggyBack: 100 mL    Oral Fluid: 1040 mL  Total IN: 1140 mL    OUT:    Voided (mL): 2400 mL  Total OUT: 2400 mL    Total NET: -1260 mL          LABS:                        8.3    8.93  )-----------( 266      ( 28 Apr 2021 06:31 )             25.5     04-27    140  |  106  |  7   ----------------------------<  115<H>  3.7   |  25  |  0.78    Ca    8.3<L>      27 Apr 2021 05:52  Phos  3.0     04-27  Mg     1.8     04-27            RADIOLOGY & ADDITIONAL STUDIES:

## 2021-04-28 NOTE — DISCHARGE NOTE NURSING/CASE MANAGEMENT/SOCIAL WORK - NSDCFUADDAPPT_GEN_ALL_CORE_FT
-Please follow up with Dr. Gant (vascular surgery) in office in 1 week after discharge. Call the office at [phone] to schedule appt.   -Please follow up with Dr. Tan (gastroenterology) in office in 1 week after discharge. Call the office at [phone] to schedule appt.

## 2021-04-28 NOTE — PROGRESS NOTE ADULT - PROVIDER SPECIALTY LIST ADULT
Gastroenterology
Surgery
Vascular Surgery
Colorectal Surgery
Surgery
Vascular Surgery
Gastroenterology
Gastroenterology
SICU

## 2021-04-28 NOTE — PROGRESS NOTE ADULT - NSICDXPILOT_GEN_ALL_CORE
[place]

## 2021-04-30 PROBLEM — C18.9 MALIGNANT NEOPLASM OF COLON, UNSPECIFIED: Chronic | Status: ACTIVE | Noted: 2021-04-22

## 2021-05-01 DIAGNOSIS — Z90.49 ACQUIRED ABSENCE OF OTHER SPECIFIED PARTS OF DIGESTIVE TRACT: ICD-10-CM

## 2021-05-01 DIAGNOSIS — I95.9 HYPOTENSION, UNSPECIFIED: ICD-10-CM

## 2021-05-01 DIAGNOSIS — Z79.84 LONG TERM (CURRENT) USE OF ORAL HYPOGLYCEMIC DRUGS: ICD-10-CM

## 2021-05-01 DIAGNOSIS — Z95.820 PERIPHERAL VASCULAR ANGIOPLASTY STATUS WITH IMPLANTS AND GRAFTS: ICD-10-CM

## 2021-05-01 DIAGNOSIS — K92.2 GASTROINTESTINAL HEMORRHAGE, UNSPECIFIED: ICD-10-CM

## 2021-05-01 DIAGNOSIS — D62 ACUTE POSTHEMORRHAGIC ANEMIA: ICD-10-CM

## 2021-05-01 DIAGNOSIS — Z79.82 LONG TERM (CURRENT) USE OF ASPIRIN: ICD-10-CM

## 2021-05-01 DIAGNOSIS — K57.31 DIVERTICULOSIS OF LARGE INTESTINE WITHOUT PERFORATION OR ABSCESS WITH BLEEDING: ICD-10-CM

## 2021-05-01 DIAGNOSIS — Z85.038 PERSONAL HISTORY OF OTHER MALIGNANT NEOPLASM OF LARGE INTESTINE: ICD-10-CM

## 2021-05-01 DIAGNOSIS — E11.51 TYPE 2 DIABETES MELLITUS WITH DIABETIC PERIPHERAL ANGIOPATHY WITHOUT GANGRENE: ICD-10-CM

## 2021-05-01 DIAGNOSIS — I25.10 ATHEROSCLEROTIC HEART DISEASE OF NATIVE CORONARY ARTERY WITHOUT ANGINA PECTORIS: ICD-10-CM

## 2021-05-01 DIAGNOSIS — I10 ESSENTIAL (PRIMARY) HYPERTENSION: ICD-10-CM

## 2021-05-01 DIAGNOSIS — Z79.01 LONG TERM (CURRENT) USE OF ANTICOAGULANTS: ICD-10-CM

## 2021-05-01 DIAGNOSIS — E78.5 HYPERLIPIDEMIA, UNSPECIFIED: ICD-10-CM

## 2021-05-07 ENCOUNTER — APPOINTMENT (OUTPATIENT)
Dept: VASCULAR SURGERY | Facility: CLINIC | Age: 71
End: 2021-05-07
Payer: MEDICARE

## 2021-05-07 PROCEDURE — 99213 OFFICE O/P EST LOW 20 MIN: CPT

## 2021-05-07 PROCEDURE — 93880 EXTRACRANIAL BILAT STUDY: CPT

## 2021-05-07 PROCEDURE — 99072 ADDL SUPL MATRL&STAF TM PHE: CPT

## 2021-05-10 NOTE — ASSESSMENT
[Arterial/Venous Disease] : arterial/venous disease [Medication Management] : medication management [Foot care/Footwear] : foot care/footwear [Leg Bypass] : leg bypass [FreeTextEntry1] : 69 y/o M with hx of severe PVD who had b/l fem pop bypasses on 2016 presents for routine follow up. On exam, skin well perfused, Toes are warm to touch with adequate capillary refill. Given patients hx of PVD carotids were evaluated today. Patient neurologically intact. \par \par Plan: \par - Discussed findings and treatment options. Carotid US ordered today with <50 stenosis bilaterally.\par - Continue current walking regimen. \par - No vascular surgery interventions recommended at this time\par - Will continue to monitor with repeat LE arterial US in 6 months

## 2021-05-10 NOTE — ADDENDUM
[FreeTextEntry1] : This note was written by Jamzín Boone on 05/07/2021 acting as scribe for Varsha Tello M.D.\par \par I, Varsha Sanabria have read and attest that all the information, medical decision making and discharge instructions within are true and accurate.

## 2021-05-10 NOTE — PHYSICAL EXAM
[Respiratory Effort] : normal respiratory effort [Normal Rate and Rhythm] : normal rate and rhythm [2+] : left 2+ [de-identified] : Well appearing  [de-identified] : NC/AT  [de-identified] : Supple

## 2021-05-10 NOTE — HISTORY OF PRESENT ILLNESS
[FreeTextEntry1] : 69 y/o M w/PMH of HTN, DM, HLD, CAD, PAD s/p bilateral fem-pop bypass 2016 s/p R embolectomy and angioplasty x 2 for RLE ischemia 2/3030 on ASA and Xarelto w/severe aortic stenosis, cecal adenocarcinoma s/p open R hemicolectomy on 6/2020 recently discharged from [facility] on 4/22/2021 with lower GI bleed s/p transfused prn/cbc trended with plan for colonoscopy but patient developed bloody bowel movements and Xarelto was discontinued. Colonoscopy on 4/26 pancolonic diverticulosis with no active bleeding presents to the office for routine follow up on b/l fem pop bypasses. \par \par Patient reports feeling well overall. He is accompanied by his daughter here today. Since discharge patient has not been on ASA or Xarelto. He has been staying active through walking with no issues. He denies any new episodes of bloody stools,denies any claudication, rest pain, open sores/ulcers.

## 2021-06-01 NOTE — DISCHARGE NOTE NURSING/CASE MANAGEMENT/SOCIAL WORK - NSDPACMPNY_GEN_ALL_CORE
Jennifer Erickson is calling to request a refill on the following medication(s):    Last Visit Date (If Applicable):  49/46/5140    Next Visit Date:    Visit date not found    Medication Request:  Requested Prescriptions     Pending Prescriptions Disp Refills    ibuprofen (ADVIL;MOTRIN) 800 MG tablet [Pharmacy Med Name: IBUPROFEN 800 MG TABLET] 40 tablet 0     Sig: TAKE ONE TABLET BY MOUTH EVERY 8 HOURS AS NEEDED FOR PAIN    acetaminophen (TYLENOL) 500 MG tablet [Pharmacy Med Name: ACETAMINOPHEN 500 MG TABLET] 28 tablet 0     Sig: TAKE ONE TABLET BY MOUTH TWICE A DAY FOR 14 DAYS
Family

## 2021-06-04 ENCOUNTER — APPOINTMENT (OUTPATIENT)
Dept: COLORECTAL SURGERY | Facility: CLINIC | Age: 71
End: 2021-06-04
Payer: MEDICARE

## 2021-06-18 ENCOUNTER — APPOINTMENT (OUTPATIENT)
Dept: COLORECTAL SURGERY | Facility: CLINIC | Age: 71
End: 2021-06-18
Payer: MEDICARE

## 2021-06-18 ENCOUNTER — LABORATORY RESULT (OUTPATIENT)
Age: 71
End: 2021-06-18

## 2021-06-18 VITALS
HEART RATE: 73 BPM | TEMPERATURE: 98 F | DIASTOLIC BLOOD PRESSURE: 80 MMHG | WEIGHT: 145 LBS | HEIGHT: 65 IN | BODY MASS INDEX: 24.16 KG/M2 | SYSTOLIC BLOOD PRESSURE: 173 MMHG

## 2021-06-18 DIAGNOSIS — C18.9 MALIGNANT NEOPLASM OF COLON, UNSPECIFIED: ICD-10-CM

## 2021-06-18 PROCEDURE — 99214 OFFICE O/P EST MOD 30 MIN: CPT

## 2021-06-18 NOTE — PHYSICAL EXAM
[Abdomen Masses] : No abdominal masses [Abdomen Tenderness] : ~T No ~M abdominal tenderness [No HSM] : no hepatosplenomegaly [JVD] : no jugular venous distention  [Normal Breath Sounds] : Normal breath sounds [No Rash or Lesion] : No rash or lesion [de-identified] : midline incisors well healed

## 2021-06-18 NOTE — HISTORY OF PRESENT ILLNESS
[FreeTextEntry1] : 70 yo East Timorese speaking M presents for f/u T2N0 colon cancer\par \par Last seen 7/10/2020 for postop follow up after hospitalization for altered consciousness, anemia s/p blood transfusions and underwent colonoscopy w/ ulcerations at cecum, biopsied c/w superficial fragments of at least adenocarcinoma\par s/p open R hemicolectomy\par Pathology T2N0\par MMR intact\par \par In interim, patient was hospitalized at [facility] 4/22-4/28 for lower GI bleed\par Colonoscopy on 4/26 showed healthy ileocolic anastomosis, pancolonic \par diverticulosis, no evidence of active bleeding. \par Pt has followed up w/ Vascular Dr. Gant, advised not to restart Xarelto or ASA until next follow up, scheduled 11/2021\par Pt is doing well, appetite and energy good\par Denies abd pain, n/v/c/d or BRBPR\par He is not followed by outpatient GI. Denies hx of diverticulitis\par BH: twice daily, no complaints\par Following regular diet, drinks adequate water\par Takes fiber supplement daily\par No ASA/NSAID use at present

## 2021-06-18 NOTE — ASSESSMENT
[FreeTextEntry1] : History of stage I colon cancer.\par Diverticulosis/lower GI bleed.\par \par Currently asymptomatic. Doing well. Excellent energy.\par \par Recommend surveillance interval colonoscopy in one year and CT scan for oncological surveillance screening.\par \par Labs drawn today.

## 2021-06-22 ENCOUNTER — NON-APPOINTMENT (OUTPATIENT)
Age: 71
End: 2021-06-22

## 2021-06-23 LAB
ALBUMIN SERPL ELPH-MCNC: 4.8 G/DL
ALP BLD-CCNC: 151 U/L
ALT SERPL-CCNC: 13 U/L
ANION GAP SERPL CALC-SCNC: 17 MMOL/L
AST SERPL-CCNC: 17 U/L
BASOPHILS # BLD AUTO: 0.13 K/UL
BASOPHILS NFR BLD AUTO: 1.1 %
BILIRUB SERPL-MCNC: <0.2 MG/DL
BUN SERPL-MCNC: 16 MG/DL
CALCIUM SERPL-MCNC: 9.9 MG/DL
CEA SERPL-MCNC: 2.7 NG/ML
CHLORIDE SERPL-SCNC: 99 MMOL/L
CO2 SERPL-SCNC: 21 MMOL/L
CREAT SERPL-MCNC: 0.9 MG/DL
EOSINOPHIL # BLD AUTO: 3.21 K/UL
EOSINOPHIL NFR BLD AUTO: 27.4 %
GLUCOSE SERPL-MCNC: 108 MG/DL
HCT VFR BLD CALC: 37.8 %
HGB BLD-MCNC: 10.6 G/DL
IMM GRANULOCYTES NFR BLD AUTO: 0.3 %
LYMPHOCYTES # BLD AUTO: 1.57 K/UL
LYMPHOCYTES NFR BLD AUTO: 13.4 %
MAN DIFF?: NORMAL
MCHC RBC-ENTMCNC: 21.6 PG
MCHC RBC-ENTMCNC: 28 GM/DL
MCV RBC AUTO: 77.1 FL
MONOCYTES # BLD AUTO: 0.64 K/UL
MONOCYTES NFR BLD AUTO: 5.5 %
NEUTROPHILS # BLD AUTO: 6.14 K/UL
NEUTROPHILS NFR BLD AUTO: 52.3 %
PLATELET # BLD AUTO: 390 K/UL
POTASSIUM SERPL-SCNC: 4.4 MMOL/L
PROT SERPL-MCNC: 7.9 G/DL
RBC # BLD: 4.9 M/UL
RBC # FLD: 18.5 %
SODIUM SERPL-SCNC: 138 MMOL/L
WBC # FLD AUTO: 11.72 K/UL

## 2021-07-01 ENCOUNTER — OUTPATIENT (OUTPATIENT)
Dept: OUTPATIENT SERVICES | Facility: HOSPITAL | Age: 71
LOS: 1 days | End: 2021-07-01

## 2021-07-01 ENCOUNTER — APPOINTMENT (OUTPATIENT)
Dept: CT IMAGING | Facility: CLINIC | Age: 71
End: 2021-07-01
Payer: MEDICARE

## 2021-07-01 ENCOUNTER — RESULT REVIEW (OUTPATIENT)
Age: 71
End: 2021-07-01

## 2021-07-01 DIAGNOSIS — Z41.9 ENCOUNTER FOR PROCEDURE FOR PURPOSES OTHER THAN REMEDYING HEALTH STATE, UNSPECIFIED: Chronic | ICD-10-CM

## 2021-07-01 DIAGNOSIS — Z98.89 OTHER SPECIFIED POSTPROCEDURAL STATES: Chronic | ICD-10-CM

## 2021-07-01 DIAGNOSIS — Z95.828 PRESENCE OF OTHER VASCULAR IMPLANTS AND GRAFTS: Chronic | ICD-10-CM

## 2021-07-01 DIAGNOSIS — Z90.49 ACQUIRED ABSENCE OF OTHER SPECIFIED PARTS OF DIGESTIVE TRACT: Chronic | ICD-10-CM

## 2021-07-01 PROCEDURE — 74177 CT ABD & PELVIS W/CONTRAST: CPT | Mod: 26

## 2021-07-01 PROCEDURE — 71260 CT THORAX DX C+: CPT | Mod: 26

## 2021-08-12 ENCOUNTER — OUTPATIENT (OUTPATIENT)
Dept: OUTPATIENT SERVICES | Facility: HOSPITAL | Age: 71
LOS: 1 days | End: 2021-08-12

## 2021-08-12 ENCOUNTER — APPOINTMENT (OUTPATIENT)
Dept: OPHTHALMOLOGY | Facility: CLINIC | Age: 71
End: 2021-08-12

## 2021-08-12 DIAGNOSIS — Z90.49 ACQUIRED ABSENCE OF OTHER SPECIFIED PARTS OF DIGESTIVE TRACT: Chronic | ICD-10-CM

## 2021-08-12 DIAGNOSIS — Z98.89 OTHER SPECIFIED POSTPROCEDURAL STATES: Chronic | ICD-10-CM

## 2021-08-12 DIAGNOSIS — Z95.828 PRESENCE OF OTHER VASCULAR IMPLANTS AND GRAFTS: Chronic | ICD-10-CM

## 2021-08-12 DIAGNOSIS — Z41.9 ENCOUNTER FOR PROCEDURE FOR PURPOSES OTHER THAN REMEDYING HEALTH STATE, UNSPECIFIED: Chronic | ICD-10-CM

## 2021-08-13 DIAGNOSIS — H40.031 ANATOMICAL NARROW ANGLE, RIGHT EYE: ICD-10-CM

## 2021-08-19 ENCOUNTER — APPOINTMENT (OUTPATIENT)
Dept: OPHTHALMOLOGY | Facility: CLINIC | Age: 71
End: 2021-08-19

## 2021-08-19 ENCOUNTER — OUTPATIENT (OUTPATIENT)
Dept: OUTPATIENT SERVICES | Facility: HOSPITAL | Age: 71
LOS: 1 days | End: 2021-08-19

## 2021-08-19 DIAGNOSIS — Z95.828 PRESENCE OF OTHER VASCULAR IMPLANTS AND GRAFTS: Chronic | ICD-10-CM

## 2021-08-19 DIAGNOSIS — Z98.89 OTHER SPECIFIED POSTPROCEDURAL STATES: Chronic | ICD-10-CM

## 2021-08-19 DIAGNOSIS — Z41.9 ENCOUNTER FOR PROCEDURE FOR PURPOSES OTHER THAN REMEDYING HEALTH STATE, UNSPECIFIED: Chronic | ICD-10-CM

## 2021-08-19 DIAGNOSIS — Z90.49 ACQUIRED ABSENCE OF OTHER SPECIFIED PARTS OF DIGESTIVE TRACT: Chronic | ICD-10-CM

## 2021-08-20 DIAGNOSIS — H40.032 ANATOMICAL NARROW ANGLE, LEFT EYE: ICD-10-CM

## 2021-10-29 ENCOUNTER — APPOINTMENT (OUTPATIENT)
Dept: VASCULAR SURGERY | Facility: CLINIC | Age: 71
End: 2021-10-29
Payer: MEDICARE

## 2021-10-29 PROCEDURE — 99213 OFFICE O/P EST LOW 20 MIN: CPT

## 2021-10-29 PROCEDURE — 93925 LOWER EXTREMITY STUDY: CPT

## 2021-11-05 NOTE — REVIEW OF SYSTEMS
[As Noted in HPI] : as noted in HPI [Negative] : Heme/Lymph [Limb Pain] : limb pain [Leg Claudication] : no intermittent leg claudication [Lower Ext Edema] : no extremity edema

## 2021-11-05 NOTE — HISTORY OF PRESENT ILLNESS
[FreeTextEntry1] : 70 y/o M w/PMH of HTN, DM, HLD, CAD, PAD s/p bilateral fem-pop bypass 2016 s/p R embolectomy and angioplasty x 2 for RLE ischemia presents to the office for routine follow up on b/l fem pop bypasses. \par He complains of fatigue and discomfort on his R leg that started about a month ago. He Denies rest pain or skin changes.\par \par

## 2021-11-05 NOTE — ASSESSMENT
[Arterial/Venous Disease] : arterial/venous disease [FreeTextEntry1] : 70 y/o M with hx of severe PVD who had b/l fem pop bypasses on 2016 presents for routine follow up. On exam, skin well perfused, toes are warm to touch with adequate capillary refill, palpable pulses on BLE. Given patients hx of PVD  B/L LE arterial US demonstrated fem pop bypass patent b/l.\par \par Plan: \par -I explained to the pt this symptoms are not vascular in origin and are likely secondary to musculoskeletal problems. I recommend he f/u with his PCP to further evaluate his symptoms. \par - Continue current walking regimen. \par - No vascular surgery interventions recommended at this time\par - Will continue to monitor with repeat LE arterial US in 6 months

## 2021-11-05 NOTE — ADDENDUM
[FreeTextEntry1] : This note was written by Sana Laura on 10/29/2021 acting as scribe for Varsha Tello M.D.\par I, Dr. Varsha Gant, personally performed the evaluation and management (E/M) services for this established patient who presents today with (a) new problem(s)/exacerbation of (an) existing condition(s).  That E/M includes conducting the examination, assessing all new/exacerbated conditions, and establishing a new plan of care.  Today, my ACP, Jacqueline MORENO, was here to observe my evaluation and management services for this new problem/exacerbated condition to be followed going forward.\par \par \par \par \par

## 2021-11-05 NOTE — PHYSICAL EXAM
[Respiratory Effort] : normal respiratory effort [Normal Rate and Rhythm] : normal rate and rhythm [2+] : left 2+ [1+] : left 1+ [No Rash or Lesion] : No rash or lesion [Alert] : alert [Oriented to Person] : oriented to person [Oriented to Place] : oriented to place [Oriented to Time] : oriented to time [Calm] : calm [de-identified] : Well appearing  [de-identified] : NC/AT  [de-identified] : Supple  [de-identified] : FROM. [de-identified] : grossly intact

## 2021-11-30 NOTE — H&P PST ADULT - PSH
Elective surgery  right leg angiogram with bypass  july 2016  History of hernia repair  june 2014  S/P femoral-femoral bypass surgery  left 2016 (4) walks frequently

## 2022-04-27 ENCOUNTER — APPOINTMENT (OUTPATIENT)
Dept: VASCULAR SURGERY | Facility: CLINIC | Age: 72
End: 2022-04-27
Payer: MEDICARE

## 2022-04-29 ENCOUNTER — APPOINTMENT (OUTPATIENT)
Dept: VASCULAR SURGERY | Facility: CLINIC | Age: 72
End: 2022-04-29

## 2022-05-04 ENCOUNTER — APPOINTMENT (OUTPATIENT)
Dept: VASCULAR SURGERY | Facility: CLINIC | Age: 72
End: 2022-05-04
Payer: MEDICARE

## 2022-05-04 VITALS
SYSTOLIC BLOOD PRESSURE: 144 MMHG | DIASTOLIC BLOOD PRESSURE: 87 MMHG | HEART RATE: 78 BPM | HEIGHT: 65 IN | WEIGHT: 145 LBS | BODY MASS INDEX: 24.16 KG/M2

## 2022-05-04 DIAGNOSIS — I65.29 OCCLUSION AND STENOSIS OF UNSPECIFIED CAROTID ARTERY: ICD-10-CM

## 2022-05-04 PROCEDURE — 99213 OFFICE O/P EST LOW 20 MIN: CPT

## 2022-05-04 PROCEDURE — 93925 LOWER EXTREMITY STUDY: CPT

## 2022-05-17 NOTE — HISTORY OF PRESENT ILLNESS
[FreeTextEntry1] : 72 y/o M, former pt of Dr Gant. He has a PMHx of HTN, T2DM, HLD, colon CA, aortic stenosis, CAD, PAD s/p bilateral fem-pop bypass (R in 2016 and L in 2017) c/b RLE acute thrombosis s/p R fem angio, embolectomy and angioplasty x 2 (2020), who presents to the office for routine follow up.\par \par Today, he reports feeling well. He reports a red, dry rash across the R shin that severely itches and flakes. \par He is able to walk a few blocks daily. Denies rest pain, leg coolness, ulcers or symptoms of claudication. He is on daily aspirin and Xarelto.  Daughter reports pt smokes 3-4 cigarettes a day. \par \par Accompanied by daughter

## 2022-05-17 NOTE — PROCEDURE
[FreeTextEntry1] : Arterial duplex LEs ordered to eval bypasses, shows: bilt fempop bypasses patent\par \par

## 2022-05-17 NOTE — PHYSICAL EXAM
[Carotid Bruits] : carotid bruit  [Murmur] : murmur was appreciated  [Left Carotid Bruit] : left carotid bruit heard [1+] : right 1+ [Right Carotid Bruit] : no bruit heard over the right carotid [Ankle Swelling (On Exam)] : not present [Abdomen Masses] : No abdominal masses [Abdomen Tenderness] : ~T ~M No abdominal tenderness [Abdominal Bruit] : no abdominal bruit  [de-identified] : WN/WD [FreeTextEntry1] : palpable pulses. loud aortic systolic murmur. soft bruit on L ICA . Rash on the R anterior shin,skin irritated and dry. No open wounds. [de-identified] : FROM [de-identified] : See cardiovascular section

## 2022-05-17 NOTE — ADDENDUM
[FreeTextEntry1] : I, Dr. Brent Schultz, personally performed the evaluation and management (E/M) services for this established patient who presents today with (a) new problem(s)/exacerbation of (an) existing condition(s).  That E/M includes conducting the examination, assessing all new/exacerbated conditions, and establishing a new plan of care.  Today, my ACP, Dianna Mello NP, was here to observe my evaluation and management services for this new problem/exacerbated condition to be followed going forward. \par \par The documentation for this encounter was entered by Katy Stoo acting as a scribe for Dr.Gary Schultz.\par

## 2022-05-17 NOTE — ASSESSMENT
[FreeTextEntry1] : 72 y/o M with hx of bl carotid stenosis (mild, asymptomatic), and PAD s/p bilateral fem-pop bypass (R in 2016 and L in 2017) c/b RLE acute thrombosis s/p R fem angio, embolectomy and angioplasty x 2 (2020).\par On exam, Loud aortic systolic murmur. Soft bruit on L ICA . Rash on the R anterior shin, Skin irritated and dry.  Toes are warm to touch with adequate capillary refill, palpable pulses on BLE. Arterial US demonstrated fem pop bypasses patent b/l.  \par \par Plan: \par - Continue current walking regimen. \par - Pt advised to stop smoking. \par - Continue daily ASA/Xarelto\par - No vascular surgery interventions recommended at this time\par - Will continue to monitor with repeat LE arterial US in 6 months.\par - For next visit f/u for carotid scan.

## 2022-05-26 NOTE — ED ADULT NURSE NOTE - CHIEF COMPLAINT
Pt scheduled for labs on 6/1/22. Can you please place labs for him? The patient is a 70y Male complaining of diarrhea.

## 2022-08-09 NOTE — PROGRESS NOTE ADULT - PROBLEM SELECTOR PROBLEM 6
[Normocephalic] : normocephalic
[Atraumatic] : atraumatic
[EOMI] : extra ocular movement intact
[Supple] : supple
[No Supraclavicular Adenopathy] : no supraclavicular adenopathy
[No Cervical Adenopathy] : no cervical adenopathy
[Normal Sinus Rhythm] : normal sinus rhythm
[Examined in the supine and seated position] : examined in the supine and seated position
[No dominant masses] : no dominant masses in right breast 
PAD (peripheral artery disease)
[No dominant masses] : no dominant masses left breast
PAD (peripheral artery disease)
PAD (peripheral artery disease)
[No Nipple Retraction] : no left nipple retraction
CAD (coronary artery disease)
[No Nipple Discharge] : no left nipple discharge
[Breast Mass Right Breast ___cm] : no masses
CAD (coronary artery disease)
[Breast Mass Left Breast ___cm] : no masses
PAD (peripheral artery disease)
[No Axillary Lymphadenopathy] : no left axillary lymphadenopathy
[No Edema] : no edema
[No Rashes] : no rashes
[No Ulceration] : no ulceration
[Breast Nipple Inversion] : nipples not inverted
[Breast Nipple Retraction] : nipples not retracted
[Breast Nipple Flattening] : nipples not flattened
[Breast Nipple Fissures] : nipples not fissured
[Breast Abnormal Lactation (Galactorrhea)] : no galactorrhea
[Breast Abnormal Secretion Bloody Fluid] : no bloody discharge
[Breast Abnormal Secretion Serous Fluid] : no serous discharge
[Breast Abnormal Secretion Opalescent Fluid] : no milky discharge
[de-identified] : On exam, the patient has the obvious bilateral reduction mastopexy's.  She did have radiation to the right breast and does have some increased skin thickening.  I cannot feel any evidence of recurrence in the right breast and her left breast is negative.  She has no axillary, supraclavicular, or cervical adenopathy.
[de-identified] : Status post partial mastectomy with reduction mastopexy and external beam radiation with some skin thickening and mild lymphedema
[de-identified] : Status post reduction mastopexy for symmetry

## 2022-10-11 NOTE — PATIENT PROFILE ADULT - FALL HARM RISK CONCLUSION
Fall with Harm Risk
Detail Level: Simple
Comment: Hx of laying out in sun.
Render Risk Assessment In Note?: no

## 2022-12-14 ENCOUNTER — APPOINTMENT (OUTPATIENT)
Dept: VASCULAR SURGERY | Facility: CLINIC | Age: 72
End: 2022-12-14

## 2022-12-14 DIAGNOSIS — F17.200 NICOTINE DEPENDENCE, UNSPECIFIED, UNCOMPLICATED: ICD-10-CM

## 2022-12-14 DIAGNOSIS — I73.9 PERIPHERAL VASCULAR DISEASE, UNSPECIFIED: ICD-10-CM

## 2022-12-14 PROCEDURE — 93925 LOWER EXTREMITY STUDY: CPT

## 2022-12-14 PROCEDURE — 99213 OFFICE O/P EST LOW 20 MIN: CPT

## 2022-12-15 PROBLEM — I73.9 PAD (PERIPHERAL ARTERY DISEASE): Status: ACTIVE | Noted: 2022-05-04

## 2022-12-15 NOTE — PHYSICAL EXAM
[Carotid Bruits] : carotid bruit  [Respiratory Effort] : normal respiratory effort [Normal Rate and Rhythm] : normal rate and rhythm [Murmur] : murmur was appreciated  [Left Carotid Bruit] : left carotid bruit heard [2+] : right 2+ [1+] : left 1+ [Alert] : alert [Oriented to Person] : oriented to person [Oriented to Place] : oriented to place [Oriented to Time] : oriented to time [Calm] : calm [Right Carotid Bruit] : no bruit heard over the right carotid [Ankle Swelling (On Exam)] : not present [Abdomen Masses] : No abdominal masses [Abdomen Tenderness] : ~T ~M No abdominal tenderness [Abdominal Bruit] : no abdominal bruit  [de-identified] : WN/WD [FreeTextEntry1] : Palpable pulses. Loud aortic systolic murmur. Soft bruit on L ICA .  [de-identified] : See cardiovascular section  [de-identified] : FROM

## 2022-12-15 NOTE — ADDENDUM
[FreeTextEntry1] : I, Dr. Brent Schultz, personally performed the evaluation and management (E/M) services for this established patient who presents today with (a) new problem(s)/exacerbation of (an) existing condition(s).  That E/M includes conducting the examination, assessing all new/exacerbated conditions, and establishing a new plan of care.  Today, my ACP, Dianna Mello NP, was here to observe my evaluation and management services for this new problem/exacerbated condition to be followed going forward. \par \par \par The documentation for this encounter was entered by Katy Soto acting as a scribe for Dr.Gary Schultz.\par

## 2022-12-15 NOTE — ASSESSMENT
[Arterial/Venous Disease] : arterial/venous disease [Medication Management] : medication management [FreeTextEntry1] : 71 y/o M with hx of bl carotid stenosis (mild, asymptomatic), and PAD s/p bilateral fem-pop bypass (R in 2016 and L in 2017) c/b RLE acute thrombosis s/p R fem angio, embolectomy and angioplasty x 2 (2020).\par On exam, loud aortic systolic murmur. Soft bruit on L ICA. Toes are warm to touch with adequate capillary refill, intact palpable pulses on BLE. Arterial US demonstrated fem pop bypasses patent b/l.  \par \par Plan: \par - Continue walking regimen daily. Discussed importance of walking daily\par - Pt advised to stop smoking\par - Continue daily ASA/Xarelto\par - No vascular surgery interventions recommended at this time\par - Will continue to monitor with repeat LE arterial US in 6 months\par - For next visit f/u for carotid scan\par - FU in 3 months, and discussed the importance of walking daily [Smoking Cessation] : smoking cessation

## 2022-12-15 NOTE — HISTORY OF PRESENT ILLNESS
[FreeTextEntry1] : 73 y/o M, former pt of Dr Gant. He has a PMHx of HTN, T2DM, HLD, colon CA, aortic stenosis, CAD, PAD s/p bilateral fem-pop bypass (R in 2016 and L in 2017) c/b RLE acute thrombosis s/p R fem angio, embolectomy and angioplasty x 2 (2020), who presents to the office for routine follow up.\par \par Today, he reports feeling well. He mentions his legs have been bothering him and experiencing leg tiredness at the end of the day. He also mentions he has not been walking as frequently as he normally walks. Denies any rest pain, leg coolness, ulcers. He is on daily aspirin and Xarelto.  Daughter reports pt smokes 3-4 cigarettes a day. \par \par Accompanied by daughter \par \par

## 2023-02-13 NOTE — ASU PREOP CHECKLIST - NOTHING BY MOUTH SINCE
27-Sep-2017 19:00 Otezla Counseling: The side effects of Otezla were discussed with the patient, including but not limited to worsening or new depression, weight loss, diarrhea, nausea, upper respiratory tract infection, and headache. Patient instructed to call the office should any adverse effect occur.  The patient verbalized understanding of the proper use and possible adverse effects of Otezla.  All the patient's questions and concerns were addressed.

## 2023-03-03 ENCOUNTER — APPOINTMENT (OUTPATIENT)
Dept: VASCULAR SURGERY | Facility: CLINIC | Age: 73
End: 2023-03-03
Payer: MEDICARE

## 2023-03-03 VITALS
BODY MASS INDEX: 24.16 KG/M2 | SYSTOLIC BLOOD PRESSURE: 187 MMHG | HEIGHT: 65 IN | WEIGHT: 145 LBS | DIASTOLIC BLOOD PRESSURE: 79 MMHG | HEART RATE: 66 BPM

## 2023-03-03 DIAGNOSIS — B35.3 TINEA PEDIS: ICD-10-CM

## 2023-03-03 PROCEDURE — 99213 OFFICE O/P EST LOW 20 MIN: CPT

## 2023-03-03 RX ORDER — KETOCONAZOLE 20 MG/G
2 CREAM TOPICAL DAILY
Qty: 1 | Refills: 2 | Status: DISCONTINUED | COMMUNITY
Start: 2023-03-03 | End: 2023-03-03

## 2023-03-05 NOTE — HISTORY OF PRESENT ILLNESS
[FreeTextEntry1] : 72yoM, former pt of Dr. Gant w/PMHx of HTN, T2DM, HLD, colon CA, aortic stenosis, CAD, PAD s/p bilateral fem-pop bypass (R in 2016 and L in 2017) c/b RLE acute thrombosis s/p R fem angio, embolectomy and angioplasty x 2 (2020), who returns to the office for complaints of redness in the L foot, denies pain/cramping/open wounds.  He continues to report fatigue in the legs at the end of the day, but his ambulation is not limited throughout the day.  He is compliant w/daily aspirin and Xarelto.\par \par He is accompanied today by his daughter, who contacted our office this morning reporting redness in the foot.

## 2023-03-05 NOTE — ADDENDUM
[FreeTextEntry1] : This note was written by Qasim Unger, acting as a scribe for Dr. Brent Schultz.  I, Dr. Brent Schultz, have read and attest that all the information, medical decision-making, and discharge instructions within are true and accurate.\par \par I, Dr. Brent Schultz, personally performed the evaluation and management (E/M) services for this established patient who presents today with (a) new problem(s)/exacerbation of (an) existing condition(s).  That E/M includes conducting the examination, assessing all new/exacerbated conditions, and establishing a new plan of care.  Today, my ACP, Qasim Unger, was here to observe my evaluation and management services for this new problem/exacerbated condition to be followed going forward.

## 2023-03-05 NOTE — ASSESSMENT
[FreeTextEntry1] : 72yoM, former pt of Dr. Gant w/PMHx of HTN, T2DM, HLD, colon CA, aortic stenosis, CAD, PAD s/p bilateral fem-pop bypass (R in 2016 and L in 2017) c/b RLE acute thrombosis s/p R fem angio, embolectomy and angioplasty x 2 (2020), who returns to the office for complaints of redness in the L foot, denies pain/cramping/open wounds.  He continues to report fatigue in the legs at the end of the day, but his ambulation is not limited throughout the day.  He is compliant w/daily aspirin and Xarelto.\par \par Redness in the L foot c/w fungal infection, legs well-perfused and circulation does not appear to be compromised in either extremity.  Will start pt on three days Fluconazole to treat his fungal infection, recommended he RTO in March-April 2023 for surveillance of his bypasses, or sooner if his infection does not improve w/PO meds. [Arterial/Venous Disease] : arterial/venous disease [Smoking Cessation] : smoking cessation [Medication Management] : medication management

## 2023-03-05 NOTE — PHYSICAL EXAM
[JVD] : no jugular venous distention  [Normal Thyroid] : the thyroid was normal [Carotid Bruits] : carotid bruit  [Normal Breath Sounds] : Normal breath sounds [Respiratory Effort] : normal respiratory effort [Normal Heart Sounds] : normal heart sounds [Normal Rate and Rhythm] : normal rate and rhythm [Murmur] : murmur was appreciated  [Left Carotid Bruit] : left carotid bruit heard [Right Carotid Bruit] : no bruit heard over the right carotid [2+] : right 2+ [1+] : left 1+ [Ankle Swelling (On Exam)] : not present [Varicose Veins Of Lower Extremities] : not present [] : not present [Abdomen Tenderness] : ~T ~M No abdominal tenderness [Abdomen Masses] : No abdominal masses [Abdominal Bruit] : no abdominal bruit  [Purpura] : no purpura  [Petechiae] : no petechiae [Skin Ulcer] : no ulcer [Skin Induration] : no induration [Alert] : alert [Oriented to Place] : oriented to place [Oriented to Person] : oriented to person [Oriented to Time] : oriented to time [Calm] : calm [de-identified] : WN/WD [de-identified] : NC/AT, anicteric [FreeTextEntry1] : Palpable pulses. Loud aortic systolic murmur. Soft bruit on L ICA .  [de-identified] : FROM throughout, strength 5/5x4, no palpable cords in LEs b/l [de-identified] : Erythematous skin noted in the L foot, scalloped edges w/raised borders, c/w fungal infection

## 2023-03-06 NOTE — H&P ADULT - NSHPSOCIALHISTORY_GEN_ALL_CORE
Form in your bin     Endorses smoking, undisclosed amount, alc, and drug use. Lives with wife and daughter

## 2023-06-15 ENCOUNTER — APPOINTMENT (OUTPATIENT)
Dept: GASTROENTEROLOGY | Facility: CLINIC | Age: 73
End: 2023-06-15
Payer: MEDICARE

## 2023-06-15 VITALS
SYSTOLIC BLOOD PRESSURE: 140 MMHG | HEIGHT: 65 IN | DIASTOLIC BLOOD PRESSURE: 80 MMHG | RESPIRATION RATE: 15 BRPM | WEIGHT: 138 LBS | BODY MASS INDEX: 22.99 KG/M2 | OXYGEN SATURATION: 96 % | HEART RATE: 74 BPM | TEMPERATURE: 97.9 F

## 2023-06-15 DIAGNOSIS — K92.1 MELENA: ICD-10-CM

## 2023-06-15 PROCEDURE — 99203 OFFICE O/P NEW LOW 30 MIN: CPT

## 2023-06-27 NOTE — HISTORY OF PRESENT ILLNESS
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 Patient has had this in the past, most recently in 2021, at which time he was admitted to [facility]. Underwent c'scope, which found no active bleeding, but many diverticula and a healthy appearing ileocolic anastomosis. His Hgb stabilized and he was discharged w/o further bleeding. He has since stopped the xarelto and is now on ASA monotherapy. His Hgb from today is 15. \par \par Family Hx: brother with ?throat CA (unsure if esophagus or trachea) \par \par Social Hx: current everyday smoker at pack/day, no EtOH, no recreational drugs, retired

## 2023-06-27 NOTE — ASSESSMENT
[FreeTextEntry1] : 73M (Wallisian speaking, poor historian) with a h/o HTN, DM, HLD, CAD, PAD s/p bilateral fem-pop bypasses (2016) s/p R embolectomy and angioplasty x2 for RLE ischemia (3/2020) on ASA and Xarelto, severe aortic stenosis, stage 1 cecal adenocarcinoma s/p open R hemicolectomy (06/2020), presents to clinic for 2 days of small volume hematochezia. \par \par #Hematochezia\par - small volume, CBC wnl today\par - rectal w/o hemorrhoids or blood\par - most likely from diverticula \par - can cont ASA\par - advised high fiber and adequate hydration\par - would be due for surveillance c'scope in 2024, but given advanced dementia, would have GOC decision making process with family based on progression of dementia\par - patient seen and overall care plan discussed with daughter, Michaela \par \par Mele Quiñones MD\par PGY-6, Gastroenterology Fellow

## 2023-06-27 NOTE — CONSULT LETTER
[Dear  ___] : Dear  [unfilled], [Courtesy Letter:] : I had the pleasure of seeing your patient, [unfilled], in my office today. [Please see my note below.] : Please see my note below. [Sincerely,] : Sincerely, [FreeTextEntry3] : Daniel Tan MD\par Professor of Medicine\par Chief of GI\par Director IBD Program\par [facility]\par

## 2023-06-27 NOTE — PHYSICAL EXAM
[Alert] : alert [No Acute Distress] : no acute distress [Sclera] : the sclera and conjunctiva were normal [Hearing Threshold Finger Rub Not [place]] : hearing was normal [No Respiratory Distress] : no respiratory distress [No Acc Muscle Use] : no accessory muscle use [Respiration, Rhythm And Depth] : normal respiratory rhythm and effort [Heart Rate And Rhythm] : heart rate was normal and rhythm regular [Abdomen Tenderness] : non-tender [Abdomen Soft] : soft [Normal Sphincter Tone] : normal sphincter tone [No External Hemorrhoid] : no external hemorrhoids [No Rectal Mass] : no rectal mass [] : no rash [No Focal Deficits] : no focal deficits [de-identified] : light brown stool, no blood [de-identified] : intermittently forgetful, AOx2

## 2023-06-30 ENCOUNTER — APPOINTMENT (OUTPATIENT)
Dept: COLORECTAL SURGERY | Facility: CLINIC | Age: 73
End: 2023-06-30

## 2023-08-18 NOTE — ED ADULT NURSE NOTE - VOIDING
SEE BELOW FOR OUR NEW PHONE NUMBER!!!      Thanks for visiting us today!    Remember these important phone numbers:    [phone] for phone nurses during the day and our nurse answering service at night    [phone]   for scheduling or changing future appointments    [phone] for the Poison Control Center    When leaving a message for our staff, please include:   the spelling of your child’s full name and date of birth  your full name and relationship to child  best phone number and time to reach you   reason for the call        We strongly recommend that all children age 6 months and older receive the COVID-19 vaccine. Call our office at [phone]  to schedule.        Is your child signed up for [employer]? If you do this, you can message us rather than playing phone tag! You can also look at labs, pay your bills, and do some scheduling. Go to your own account first. (If you don't have one yet, you can set one up at the website below or at your doctor's office).  Using a web browser (not the phone karly), on the right hand side of your page, click the button marked \"Request Access to my Child's Records.\" Fill out the information. In a few days your child's information will be linked to your account. It's that simple!! Here is the website for more information:     [email or web address]        --------------------------------------------------------------------------------------------------------------------      
without difficulty

## 2024-01-29 ENCOUNTER — INPATIENT (INPATIENT)
Facility: HOSPITAL | Age: 74
LOS: 2 days | Discharge: HOME HEALTH | DRG: 66 | End: 2024-02-01
Attending: PSYCHIATRY & NEUROLOGY | Admitting: PSYCHIATRY & NEUROLOGY
Payer: MEDICARE

## 2024-01-29 VITALS
OXYGEN SATURATION: 98 % | RESPIRATION RATE: 18 BRPM | SYSTOLIC BLOOD PRESSURE: 145 MMHG | WEIGHT: 139.33 LBS | TEMPERATURE: 99 F | DIASTOLIC BLOOD PRESSURE: 75 MMHG | HEART RATE: 81 BPM

## 2024-01-29 DIAGNOSIS — Z90.49 ACQUIRED ABSENCE OF OTHER SPECIFIED PARTS OF DIGESTIVE TRACT: Chronic | ICD-10-CM

## 2024-01-29 DIAGNOSIS — Z95.828 PRESENCE OF OTHER VASCULAR IMPLANTS AND GRAFTS: Chronic | ICD-10-CM

## 2024-01-29 DIAGNOSIS — Z98.89 OTHER SPECIFIED POSTPROCEDURAL STATES: Chronic | ICD-10-CM

## 2024-01-29 DIAGNOSIS — Z41.9 ENCOUNTER FOR PROCEDURE FOR PURPOSES OTHER THAN REMEDYING HEALTH STATE, UNSPECIFIED: Chronic | ICD-10-CM

## 2024-01-29 LAB
ALBUMIN SERPL ELPH-MCNC: 4.4 G/DL — SIGNIFICANT CHANGE UP (ref 3.3–5)
ALP SERPL-CCNC: 126 U/L — HIGH (ref 40–120)
ALT FLD-CCNC: 47 U/L — HIGH (ref 10–45)
ANION GAP SERPL CALC-SCNC: 11 MMOL/L — SIGNIFICANT CHANGE UP (ref 5–17)
APPEARANCE UR: CLEAR — SIGNIFICANT CHANGE UP
APTT BLD: 32.9 SEC — SIGNIFICANT CHANGE UP (ref 24.5–35.6)
AST SERPL-CCNC: 30 U/L — SIGNIFICANT CHANGE UP (ref 10–40)
BASOPHILS # BLD AUTO: 0.04 K/UL — SIGNIFICANT CHANGE UP (ref 0–0.2)
BASOPHILS NFR BLD AUTO: 0.4 % — SIGNIFICANT CHANGE UP (ref 0–2)
BILIRUB SERPL-MCNC: 0.2 MG/DL — SIGNIFICANT CHANGE UP (ref 0.2–1.2)
BILIRUB UR-MCNC: NEGATIVE — SIGNIFICANT CHANGE UP
BUN SERPL-MCNC: 25 MG/DL — HIGH (ref 7–23)
CALCIUM SERPL-MCNC: 9.3 MG/DL — SIGNIFICANT CHANGE UP (ref 8.4–10.5)
CHLORIDE SERPL-SCNC: 98 MMOL/L — SIGNIFICANT CHANGE UP (ref 96–108)
CO2 SERPL-SCNC: 29 MMOL/L — SIGNIFICANT CHANGE UP (ref 22–31)
COLOR SPEC: YELLOW — SIGNIFICANT CHANGE UP
CREAT SERPL-MCNC: 0.92 MG/DL — SIGNIFICANT CHANGE UP (ref 0.5–1.3)
DIFF PNL FLD: NEGATIVE — SIGNIFICANT CHANGE UP
EGFR: 88 ML/MIN/1.73M2 — SIGNIFICANT CHANGE UP
EOSINOPHIL # BLD AUTO: 1.05 K/UL — HIGH (ref 0–0.5)
EOSINOPHIL NFR BLD AUTO: 10.3 % — HIGH (ref 0–6)
GLUCOSE BLDC GLUCOMTR-MCNC: 122 MG/DL — HIGH (ref 70–99)
GLUCOSE SERPL-MCNC: 126 MG/DL — HIGH (ref 70–99)
GLUCOSE UR QL: NEGATIVE MG/DL — SIGNIFICANT CHANGE UP
HCT VFR BLD CALC: 42.3 % — SIGNIFICANT CHANGE UP (ref 39–50)
HGB BLD-MCNC: 14.3 G/DL — SIGNIFICANT CHANGE UP (ref 13–17)
IMM GRANULOCYTES NFR BLD AUTO: 0.5 % — SIGNIFICANT CHANGE UP (ref 0–0.9)
INR BLD: 1.05 — SIGNIFICANT CHANGE UP (ref 0.85–1.18)
KETONES UR-MCNC: NEGATIVE MG/DL — SIGNIFICANT CHANGE UP
LEUKOCYTE ESTERASE UR-ACNC: NEGATIVE — SIGNIFICANT CHANGE UP
LYMPHOCYTES # BLD AUTO: 1.07 K/UL — SIGNIFICANT CHANGE UP (ref 1–3.3)
LYMPHOCYTES # BLD AUTO: 10.5 % — LOW (ref 13–44)
MCHC RBC-ENTMCNC: 29.4 PG — SIGNIFICANT CHANGE UP (ref 27–34)
MCHC RBC-ENTMCNC: 33.8 GM/DL — SIGNIFICANT CHANGE UP (ref 32–36)
MCV RBC AUTO: 86.9 FL — SIGNIFICANT CHANGE UP (ref 80–100)
MONOCYTES # BLD AUTO: 1.26 K/UL — HIGH (ref 0–0.9)
MONOCYTES NFR BLD AUTO: 12.4 % — SIGNIFICANT CHANGE UP (ref 2–14)
NEUTROPHILS # BLD AUTO: 6.7 K/UL — SIGNIFICANT CHANGE UP (ref 1.8–7.4)
NEUTROPHILS NFR BLD AUTO: 65.9 % — SIGNIFICANT CHANGE UP (ref 43–77)
NITRITE UR-MCNC: NEGATIVE — SIGNIFICANT CHANGE UP
NRBC # BLD: 0 /100 WBCS — SIGNIFICANT CHANGE UP (ref 0–0)
PH UR: 5.5 — SIGNIFICANT CHANGE UP (ref 5–8)
PLATELET # BLD AUTO: 155 K/UL — SIGNIFICANT CHANGE UP (ref 150–400)
POTASSIUM SERPL-MCNC: 3.7 MMOL/L — SIGNIFICANT CHANGE UP (ref 3.5–5.3)
POTASSIUM SERPL-SCNC: 3.7 MMOL/L — SIGNIFICANT CHANGE UP (ref 3.5–5.3)
PROT SERPL-MCNC: 7.9 G/DL — SIGNIFICANT CHANGE UP (ref 6–8.3)
PROT UR-MCNC: NEGATIVE MG/DL — SIGNIFICANT CHANGE UP
PROTHROM AB SERPL-ACNC: 11.9 SEC — SIGNIFICANT CHANGE UP (ref 9.5–13)
RBC # BLD: 4.87 M/UL — SIGNIFICANT CHANGE UP (ref 4.2–5.8)
RBC # FLD: 13.9 % — SIGNIFICANT CHANGE UP (ref 10.3–14.5)
SODIUM SERPL-SCNC: 138 MMOL/L — SIGNIFICANT CHANGE UP (ref 135–145)
SP GR SPEC: >1.03 — HIGH (ref 1–1.03)
UROBILINOGEN FLD QL: 0.2 MG/DL — SIGNIFICANT CHANGE UP (ref 0.2–1)
WBC # BLD: 10.17 K/UL — SIGNIFICANT CHANGE UP (ref 3.8–10.5)
WBC # FLD AUTO: 10.17 K/UL — SIGNIFICANT CHANGE UP (ref 3.8–10.5)

## 2024-01-29 PROCEDURE — 71045 X-RAY EXAM CHEST 1 VIEW: CPT | Mod: 26

## 2024-01-29 PROCEDURE — 70450 CT HEAD/BRAIN W/O DYE: CPT | Mod: 26,MA,XU

## 2024-01-29 PROCEDURE — 0042T: CPT | Mod: MA

## 2024-01-29 PROCEDURE — 93010 ELECTROCARDIOGRAM REPORT: CPT

## 2024-01-29 PROCEDURE — 70496 CT ANGIOGRAPHY HEAD: CPT | Mod: 26,MA

## 2024-01-29 PROCEDURE — 70498 CT ANGIOGRAPHY NECK: CPT | Mod: 26,MA

## 2024-01-29 PROCEDURE — 99285 EMERGENCY DEPT VISIT HI MDM: CPT

## 2024-01-29 RX ORDER — SODIUM CHLORIDE 9 MG/ML
1000 INJECTION, SOLUTION INTRAVENOUS
Refills: 0 | Status: DISCONTINUED | OUTPATIENT
Start: 2024-01-29 | End: 2024-02-01

## 2024-01-29 RX ORDER — DONEPEZIL HYDROCHLORIDE 10 MG/1
5 TABLET, FILM COATED ORAL AT BEDTIME
Refills: 0 | Status: DISCONTINUED | OUTPATIENT
Start: 2024-01-29 | End: 2024-02-01

## 2024-01-29 RX ORDER — DEXTROSE 50 % IN WATER 50 %
25 SYRINGE (ML) INTRAVENOUS ONCE
Refills: 0 | Status: DISCONTINUED | OUTPATIENT
Start: 2024-01-29 | End: 2024-02-01

## 2024-01-29 RX ORDER — ATORVASTATIN CALCIUM 80 MG/1
80 TABLET, FILM COATED ORAL AT BEDTIME
Refills: 0 | Status: DISCONTINUED | OUTPATIENT
Start: 2024-01-29 | End: 2024-02-01

## 2024-01-29 RX ORDER — ENOXAPARIN SODIUM 100 MG/ML
40 INJECTION SUBCUTANEOUS EVERY 24 HOURS
Refills: 0 | Status: DISCONTINUED | OUTPATIENT
Start: 2024-01-29 | End: 2024-02-01

## 2024-01-29 RX ORDER — DEXTROSE 50 % IN WATER 50 %
15 SYRINGE (ML) INTRAVENOUS ONCE
Refills: 0 | Status: DISCONTINUED | OUTPATIENT
Start: 2024-01-29 | End: 2024-02-01

## 2024-01-29 RX ORDER — DONEPEZIL HYDROCHLORIDE 10 MG/1
1 TABLET, FILM COATED ORAL
Refills: 0 | DISCHARGE

## 2024-01-29 RX ORDER — LABETALOL HCL 100 MG
1 TABLET ORAL
Qty: 0 | Refills: 0 | DISCHARGE

## 2024-01-29 RX ORDER — INSULIN LISPRO 100/ML
VIAL (ML) SUBCUTANEOUS
Refills: 0 | Status: DISCONTINUED | OUTPATIENT
Start: 2024-01-29 | End: 2024-02-01

## 2024-01-29 RX ORDER — MEMANTINE HYDROCHLORIDE 10 MG/1
10 TABLET ORAL DAILY
Refills: 0 | Status: DISCONTINUED | OUTPATIENT
Start: 2024-01-30 | End: 2024-02-01

## 2024-01-29 RX ORDER — GLUCAGON INJECTION, SOLUTION 0.5 MG/.1ML
1 INJECTION, SOLUTION SUBCUTANEOUS ONCE
Refills: 0 | Status: DISCONTINUED | OUTPATIENT
Start: 2024-01-29 | End: 2024-02-01

## 2024-01-29 RX ORDER — ASPIRIN/CALCIUM CARB/MAGNESIUM 324 MG
1 TABLET ORAL
Qty: 0 | Refills: 0 | DISCHARGE

## 2024-01-29 RX ORDER — ASPIRIN/CALCIUM CARB/MAGNESIUM 324 MG
81 TABLET ORAL DAILY
Refills: 0 | Status: DISCONTINUED | OUTPATIENT
Start: 2024-01-29 | End: 2024-02-01

## 2024-01-29 RX ORDER — DEXTROSE 50 % IN WATER 50 %
12.5 SYRINGE (ML) INTRAVENOUS ONCE
Refills: 0 | Status: DISCONTINUED | OUTPATIENT
Start: 2024-01-29 | End: 2024-02-01

## 2024-01-29 RX ORDER — MEMANTINE HYDROCHLORIDE 10 MG/1
1 TABLET ORAL
Refills: 0 | DISCHARGE

## 2024-01-29 RX ORDER — METFORMIN HYDROCHLORIDE 850 MG/1
1 TABLET ORAL
Qty: 0 | Refills: 0 | DISCHARGE

## 2024-01-29 RX ORDER — NIFEDIPINE 30 MG
1 TABLET, EXTENDED RELEASE 24 HR ORAL
Qty: 0 | Refills: 0 | DISCHARGE

## 2024-01-29 RX ADMIN — ENOXAPARIN SODIUM 40 MILLIGRAM(S): 100 INJECTION SUBCUTANEOUS at 17:58

## 2024-01-29 RX ADMIN — ATORVASTATIN CALCIUM 80 MILLIGRAM(S): 80 TABLET, FILM COATED ORAL at 22:45

## 2024-01-29 RX ADMIN — Medication 1 TABLET(S): at 17:59

## 2024-01-29 RX ADMIN — Medication 81 MILLIGRAM(S): at 17:59

## 2024-01-29 RX ADMIN — DONEPEZIL HYDROCHLORIDE 5 MILLIGRAM(S): 10 TABLET, FILM COATED ORAL at 22:44

## 2024-01-29 NOTE — ED ADULT NURSE NOTE - NSFALLRISKINTERV_ED_ALL_ED
Assistance OOB with selected safe patient handling equipment if applicable/Assistance with ambulation/Communicate fall risk and risk factors to all staff, patient, and family/Monitor gait and stability/Monitor for mental status changes and reorient to person, place, and time, as needed/Provide patient with walking aids/Provide visual cue: yellow wristband, yellow gown, etc/Reinforce activity limits and safety measures with patient and family/Toileting schedule using arm’s reach rule for commode and bathroom/Use of alarms - bed, stretcher, chair and/or video monitoring/Call bell, personal items and telephone in reach/Instruct patient to call for assistance before getting out of bed/chair/stretcher/Non-slip footwear applied when patient is off stretcher/[place] to call system/Physically safe environment - no spills, clutter or unnecessary equipment/Purposeful Proactive Rounding/Room/bathroom lighting operational, light cord in reach

## 2024-01-29 NOTE — H&P ADULT - ASSESSMENT
73y Male (Lithuanian speaking, poor historian), current smoker, with PMHx vascular dementia, HTN, DM, HLD, CAD (was on Xarelto, discontinued 2/2 GI bleed, now just on ASA 81mg daily),  PAD s/p bilateral fem-pop bypasses (2016) s/p R embolectomy and angioplasty x2 for RLE ( ischemia (3/2020) on ASA, severe aortic stenosis, 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 ED after waking up this morning with AMS and reportedly urinated on himself. At baseline, patient is AOX2, able to recognize family members, however, this morning he did not know who his wife or daughter was. He would repeat himself and say "mary alice", appears confused. UA in ED negative for infection. On exam, appears to be easily distractible and having intermittent difficulty following simple commands and cannot follow complex. NIHSS 5. NCHCT performed and demonstrated chronic bilateral thalamic infarcts, right basal ganglia, and bilateral cerebellar infarcts. CTA head and neck with mild irregular narrowing in the distal basilar artery and poor visualization of th right vertebral artery in the proximal segment with threadlike visibility in the mid and distal areas, similar in appearance to his CTA's in 2020.    Neuro  #CVA workup  - continue aspirin 81mg, f/u AM accumetrics  - initiated atorvastatin 80mg daily  - q4hr stroke neuro checks and vitals  - obtain MRI Brain without contrast with GRE sequence  - ordered vEEG   - pending speech eval for ?aphasia  - Stroke Code HCT Results:  chronic bilateral thalamic infarcts, right basal ganglia, and bilateral cerebellar infarcts  - Stroke Code CTA Results: mild irregular narrowing in the distal basilar artery and poor visualization of th right vertebral artery in the proximal segment with threadlike visibility in the mid and distal areas,  - Stroke education    Cards  #HTN  - permissive hypertension, Goal -180  - hold home blood pressure medication for now  - obtain TTE      #HLD  - high dose statin as above in CVA  - LDL results: pending    Pulm  - call provider if SPO2 < 94%    GI  #Nutrition/Fluids/Electrolytes   - replete K<4 and Mg <2  - Diet: Regular      Renal  - daily BMP    Infectious Disease  - negative UA on admisision    Endocrine  #DM  - A1C results: pending  - ISS    - TSH results: pending    DVT Prophylaxis  - lovenox sq for DVT prophylaxis   - SCDs for DVT prophylaxis       Dispo: pending PT/OT eval     Discussed daily hospital plans and goals with patient and family at bedside.     Discussed with Neurology Attending Dr. Archuleta

## 2024-01-29 NOTE — ED PROVIDER NOTE - CADM POA URETHRAL CATHETER
Continue Oxybutynin XL 5 mg one at night   Return to clinic in one month for Urethral Dilatation   Follow up with Urology SAMSON Luciano in one month    No

## 2024-01-29 NOTE — H&P ADULT - NIH STROKE SCALE: 2. BEST GAZE, QM
SOCIAL WORK / DISCHARGE PLANNING:   Dc plan to return to [facility] [phone] fax # [phone]. Medicaid bedhold and will accept back. NO PRECERT needed. Vent/trach/PEG. IV Vanco/ IV Merrem  ID consulted. LEOBARDO will need signed by physician.             Electronically signed by DAMARIS Sevilla on 9/12/2023 at 11:10 AM (0) Normal

## 2024-01-29 NOTE — ED PROVIDER NOTE - NS ED ROS FT
Constitutional: No fever or chills  Eyes: No discharge or drainage  Ears, Nose, Mouth, Throat: No nasal discharge, no sore throat  Cardiovascular: No chest pain, no palpitations  Respiratory: No shortness of breath, no cough  Gastrointestinal: No nausea or vomiting, no abdominal pain, no diarrhea or constipation  Musculoskeletal: No joint pain, no swelling  Skin: No rashes or lesions  Neurological: No numbness, weakness, tingling, no headache, + confusion

## 2024-01-29 NOTE — ED ADULT NURSE NOTE - CHIEF COMPLAINT QUOTE
Patient to ED with daughter c/o worsening AMS since waking up this AM. Per daughter, patient confused at baseline but able to follow commands and converse. Since this AM, patient not speaking or following commands, daughter reports significant change in mental status today. , [facility] NAD.

## 2024-01-29 NOTE — ED PROVIDER NOTE - PHYSICAL EXAMINATION
general: Well appearing, in no acute distress  HEENT: Normocephalic, atraumatic, extraocular movements intact  CV: Regular rate  Pulm: No respiratory distress, no tachypnea  Abd: Flat, no gross distension  Ext: warm and well perfused  Skin: No gross rashes or lesions  Neuro: Moving all extremities, answering inappropriately, not following commands

## 2024-01-29 NOTE — H&P ADULT - NSHPPHYSICALEXAM_GEN_ALL_CORE
Physical exam:  General: No acute distress, awake and alert  Cardiovascular: Regular rate and rhythm, no murmurs, rubs, or gallops. No bruits  Pulmonary: Anterior breath sounds clear bilaterally, no crackles or wheezing. No use of accessory muscles  GI: Abdomen soft, non-tender, non-distended    Neurologic:  -Mental status: Awake, alert, oriented to person, place, and time. Speech is fluent with intact naming, repetition, and comprehension, no dysarthria. Recent and remote memory intact. Follows commands. Attention/concentration intact. Fund of knowledge appropriate.  -Cranial nerves:   II: Visual fields are full to confrontation.  III, IV, VI: Extraocular movements are intact without nystagmus. Pupils equally round and reactive to light  V:  Facial sensation V1-V3 equal and intact   VII: Face is symmetric with normal eye closure and smile  VIII: Hearing is bilaterally intact to finger rub  IX, X: Uvula is midline and soft palate rises symmetrically  XI: Head turning and shoulder shrug are intact.  XII: Tongue protrudes midline  Motor: Normal bulk and tone. No pronator drift. Strength bilateral upper extremity 5/5, bilateral lower extremities 5/5.  Rapid alternating movements intact and symmetric  Sensation: Intact to light touch bilaterally. No neglect or extinction on double simultaneous testing.  Coordination: No dysmetria on finger-to-nose and heel-to-shin bilaterally  Reflexes: Downgoing toes bilaterally   Gait: Narrow gait and steady    NIHSS: **** ASPECT Score: *** ICH Score: *** (GCS) Physical exam:  General: No acute distress, awake and alert  Cardiovascular: Regular rate and rhythm, no murmurs, rubs, or gallops. No bruits  Pulmonary:  No use of accessory muscles  GI: Abdomen soft, non-tender, non-distended    Neurologic:  -Mental status: Awake, alert, oriented to person, does not know if he is in the hospital or the year. Repeats "mary alice mary alice". Will close his eyes with several prompts and show two fingers. Able to accurately identify a watch, but cannot identify a pen or phone.  -Cranial nerves:   II: Visual fields are full to confrontation.  III, IV, VI: Extraocular movements are intact without nystagmus. Pupils equally round and reactive to light  V:  Facial sensation V1-V3 equal and intact   VII: R facial asymmetry, daughter reports this is his baseline  Motor: Normal bulk and tone. Bilateral upper extremities 5/5. Bilateral lower extremities at least 3/5 but will not maintain antigravity for 5 seconds.   Sensation: Intact to light touch bilaterally.  Coordination: Does not understand how to perform    NIHSS: 5

## 2024-01-29 NOTE — H&P ADULT - HISTORY OF PRESENT ILLNESS
**STROKE HPI***    HPI: 73y Male with PMHx of     PAST MEDICAL & SURGICAL HISTORY:  Essential hypertension  Hypertension      CAD (coronary artery disease)      DM (diabetes mellitus)      PAD (peripheral artery disease)      Colon cancer      History of hernia repair  june 2014      Elective surgery  right leg angiogram with bypass  july 2016      S/P femoral-femoral bypass surgery  left 2016      S/P right hemicolectomy          FAMILY HISTORY:  No pertinent family history in first degree relatives      T(C): 36.7 (01-29-24 @ 16:35), Max: 37.1 (01-29-24 @ 14:32)  HR: 70 (01-29-24 @ 17:30) (70 - 81)  BP: 140/103 (01-29-24 @ 17:30) (116/57 - 156/69)  RR: 18 (01-29-24 @ 17:30) (17 - 18)  SpO2: 92% (01-29-24 @ 17:30) (92% - 98%)    MEDICATION RECONCILIATION   MEDICATIONS  (STANDING):  aspirin enteric coated 81 milliGRAM(s) Oral daily  atorvastatin 80 milliGRAM(s) Oral at bedtime  dextrose 5%. 1000 milliLiter(s) (100 mL/Hr) IV Continuous <Continuous>  dextrose 5%. 1000 milliLiter(s) (50 mL/Hr) IV Continuous <Continuous>  dextrose 50% Injectable 12.5 Gram(s) IV Push once  dextrose 50% Injectable 25 Gram(s) IV Push once  dextrose 50% Injectable 25 Gram(s) IV Push once  donepezil 5 milliGRAM(s) Oral at bedtime  enoxaparin Injectable 40 milliGRAM(s) SubCutaneous every 24 hours  glucagon  Injectable 1 milliGRAM(s) IntraMuscular once  insulin lispro (ADMELOG) corrective regimen sliding scale   SubCutaneous Before meals and at bedtime  multivitamin 1 Tablet(s) Oral daily    MEDICATIONS  (PRN):  dextrose Oral Gel 15 Gram(s) Oral once PRN Blood Glucose LESS THAN 70 milliGRAM(s)/deciliter    Allergies    No Known Allergies    Intolerances      Vital Signs Last 24 Hrs  T(C): 36.7 (29 Jan 2024 16:35), Max: 37.1 (29 Jan 2024 14:32)  T(F): 98 (29 Jan 2024 16:35), Max: 98.8 (29 Jan 2024 14:32)  HR: 70 (29 Jan 2024 17:30) (70 - 81)  BP: 140/103 (29 Jan 2024 17:30) (116/57 - 156/69)  BP(mean): 114 (29 Jan 2024 17:30) (114 - 114)  RR: 18 (29 Jan 2024 17:30) (17 - 18)  SpO2: 92% (29 Jan 2024 17:30) (92% - 98%)    Parameters below as of 29 Jan 2024 17:30  Patient On (Oxygen Delivery Method): room air      **STROKE HPI***    HPI: 73y Male (Mongolian speaking, poor historian), current smoker, with PMHx vascular dementia, HTN, DM, HLD, CAD (was on Xarelto, discontinued 2/2 GI bleed, now just on ASA 81mg daily),  PAD s/p bilateral fem-pop bypasses (2016) s/p R embolectomy and angioplasty x2 for RLE ( ischemia (3/2020) on ASA, severe aortic stenosis, stage 1 cecal adenocarcinoma s/p open R hemicolectomy (06/2020), presenting to [facility] ED after waking up this morning with AMS and reportedly urinated on himself. At baseline, patient is AOX2, able to recognize family members, however, this morning he did not know who his wife or daughter was. He would repeat himself and say "mary alice", appears confused. UA in ED negative for infection. On exam, appears to be easily distractible and having intermittent difficulty following simple commands and cannot follow complex. NIHSS 5. NCHCT performed and demonstrated chronic bilateral thalamic infarcts, right basal ganglia, and bilateral cerebellar infarcts. CTA head and neck with mild irregular narrowing in the distal basilar artery and poor visualization of th right vertebral artery in the proximal segment with threadlike visibility in the mid and distal areas, similar in appearance to his CTA's in 2020.     PAST MEDICAL & SURGICAL HISTORY:  Essential hypertension  Hypertension      CAD (coronary artery disease)      DM (diabetes mellitus)      PAD (peripheral artery disease)      Colon cancer      History of hernia repair  june 2014      Elective surgery  right leg angiogram with bypass  july 2016      S/P femoral-femoral bypass surgery  left 2016      S/P right hemicolectomy          FAMILY HISTORY:  No pertinent family history in first degree relatives      T(C): 36.7 (01-29-24 @ 16:35), Max: 37.1 (01-29-24 @ 14:32)  HR: 70 (01-29-24 @ 17:30) (70 - 81)  BP: 140/103 (01-29-24 @ 17:30) (116/57 - 156/69)  RR: 18 (01-29-24 @ 17:30) (17 - 18)  SpO2: 92% (01-29-24 @ 17:30) (92% - 98%)    MEDICATION RECONCILIATION   MEDICATIONS  (STANDING):  aspirin enteric coated 81 milliGRAM(s) Oral daily  atorvastatin 80 milliGRAM(s) Oral at bedtime  dextrose 5%. 1000 milliLiter(s) (100 mL/Hr) IV Continuous <Continuous>  dextrose 5%. 1000 milliLiter(s) (50 mL/Hr) IV Continuous <Continuous>  dextrose 50% Injectable 12.5 Gram(s) IV Push once  dextrose 50% Injectable 25 Gram(s) IV Push once  dextrose 50% Injectable 25 Gram(s) IV Push once  donepezil 5 milliGRAM(s) Oral at bedtime  enoxaparin Injectable 40 milliGRAM(s) SubCutaneous every 24 hours  glucagon  Injectable 1 milliGRAM(s) IntraMuscular once  insulin lispro (ADMELOG) corrective regimen sliding scale   SubCutaneous Before meals and at bedtime  multivitamin 1 Tablet(s) Oral daily    MEDICATIONS  (PRN):  dextrose Oral Gel 15 Gram(s) Oral once PRN Blood Glucose LESS THAN 70 milliGRAM(s)/deciliter    Allergies    No Known Allergies    Intolerances      Vital Signs Last 24 Hrs  T(C): 36.7 (29 Jan 2024 16:35), Max: 37.1 (29 Jan 2024 14:32)  T(F): 98 (29 Jan 2024 16:35), Max: 98.8 (29 Jan 2024 14:32)  HR: 70 (29 Jan 2024 17:30) (70 - 81)  BP: 140/103 (29 Jan 2024 17:30) (116/57 - 156/69)  BP(mean): 114 (29 Jan 2024 17:30) (114 - 114)  RR: 18 (29 Jan 2024 17:30) (17 - 18)  SpO2: 92% (29 Jan 2024 17:30) (92% - 98%)    Parameters below as of 29 Jan 2024 17:30  Patient On (Oxygen Delivery Method): room air

## 2024-01-29 NOTE — H&P ADULT - NSHPLABSRESULTS_GEN_ALL_CORE
Fingerstick Blood Glucose: CAPILLARY BLOOD GLUCOSE  124 (29 Jan 2024 15:30)      POCT Blood Glucose.: 124 mg/dL (29 Jan 2024 14:33)    LABS:                        14.3   10.17 )-----------( 155      ( 29 Jan 2024 15:08 )             42.3     01-29    138  |  98  |  25<H>  ----------------------------<  126<H>  3.7   |  29  |  0.92    Ca    9.3      29 Jan 2024 15:08    TPro  7.9  /  Alb  4.4  /  TBili  0.2  /  DBili  x   /  AST  30  /  ALT  47<H>  /  AlkPhos  126<H>  01-29    PT/INR - ( 29 Jan 2024 15:08 )   PT: 11.9 sec;   INR: 1.05          PTT - ( 29 Jan 2024 15:08 )  PTT:32.9 sec      Urinalysis Basic - ( 29 Jan 2024 16:14 )    Color: Yellow / Appearance: Clear / SG: >1.030 / pH: x  Gluc: x / Ketone: Negative mg/dL  / Bili: Negative / Urobili: 0.2 mg/dL   Blood: x / Protein: Negative mg/dL / Nitrite: Negative   Leuk Esterase: Negative / RBC: x / WBC x   Sq Epi: x / Non Sq Epi: x / Bacteria: x        RADIOLOGY & ADDITIONAL STUDIES:  < from: CT Brain Stroke Protocol (01.29.24 @ 14:54) >    No intracranial mass, acute hemorrhage, or midline shift is   present.There is non-specific decreased attenuation in the white matter   likely microvascular disease. Chronic bilateral thalamic lacunar   infarcts. Chronic right basal ganglia lacunar infarct. Encephalomalacia   and gliosis in the bilateral cerebellar hemispheres, not significantly   changed.    < end of copied text >    < from: CT Angio Brain Stroke Protocol  w/ IV Cont (01.29.24 @ 15:02) >    IMPRESSION:    CT PERFUSION demonstrated: No core infarct. No active ischemia. Motion   artifact decreases reliability.  If symptoms persist consider follow up head CT or MRI, MRA  if no   contraindication.    CTA COW:  Patent intracranial circulation without flow limiting stenosis.  Mild irregular narrowing in the distal basilar artery.      CTA NECK: Calcified plaque with less than 50% stenosis at the left ICA   origin by NASCET criteria.  Poor visibility of the right vertebral artery in the proximal segment.   Threadlike visibility in the mid and distal right vertebral artery. Not   significantly changed compared to 6/13/2020.    < end of copied text >

## 2024-01-29 NOTE — ED PROVIDER NOTE - OBJECTIVE STATEMENT
72 yo M (Paraguayan speaking, poor historian), PMHx HTN, DM, HLD, CAD, PAD s/p bilateral fem-pop bypasses (2016) s/p R embolectomy and angioplasty x2 for RLE ischemia (3/2020) on ASA, severe aortic stenosis, stage 1 cecal adenocarcinoma s/p open R hemicolectomy (06/2020) presenting last known well around 10 PM yesterday. Pt reportedly responsive, communicative at baseline. Woke up this AM and was not understanding, unable to follow commands, kept repeating himself. No fever. No other acute complaints. No trauma. ROS as above.

## 2024-01-29 NOTE — ED ADULT TRIAGE NOTE - CHIEF COMPLAINT QUOTE
Patient to ED with daughter c/o worsening AMS since waking up this AM. Per daughter, patient confused at baseline but able to follow commands and converse. Since this AM, patient not speaking or following commands, daughter reports significant change in mental status today. , [facility] NAD.

## 2024-01-29 NOTE — ED ADULT NURSE NOTE - NSFALLRISKASMTTYPE_ED_ALL_ED
Satisfactory Initial (On Arrival) Opioid Pregnancy And Lactation Text: These medications can lead to premature delivery and should be avoided during pregnancy. These medications are also present in breast milk in small amounts.

## 2024-01-29 NOTE — ED ADULT NURSE NOTE - OBJECTIVE STATEMENT
Pt a&Ox2 and able to speak in complete sentences. pt breathing even and unlabored with equal chest rise and fall. pt arrived d/t new onset confusion. pt pmh of TIA, angioplasty, and renal adenocarcinoma. pt acutely confused. pt denies cp, n, v, lightheadedness, dizziness, sob, fevers, chills. no trauma.

## 2024-01-29 NOTE — H&P ADULT - NS ATTEND AMEND GEN_ALL_CORE FT
73y Male (Iraqi speaking, poor historian), current smoker, with PMHx vascular dementia, HTN, DM, HLD, CAD (was on Xarelto, discontinued 2/2 GI bleed, now just on ASA 81mg daily),  PAD s/p bilateral fem-pop bypasses (2016) s/p R embolectomy and angioplasty x2 for RLE ( ischemia (3/2020) on ASA, severe aortic stenosis, 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 ED after waking up this morning with AMS and reportedly urinated/defecated on himself.     On exam, patient oriented to self, but not place or date. Doesn't recognize family members. Appears fluent though difficult to ascertain given paucity of speech. Follows some simple commands. No drift x4.     CTH no acute pathology; Chronic bilateral thalamic infarctions, R. BG and bilateral cerebellar infarction  CTA h/n no hemodynamically significant stenosis; Hypoplastic r. vert throughout.     Imp:   1. AMS likely secondary to vascular dementia   2. ?urination/defecation without obvious loss of conciousness, unclear etiology. Will evaluate for seizures.     Plan:   MR brain w/o contrast  VEEG  Continue ASA; ARU pending   TTE  PT     75 minutes spent on total encounter. The necessity of the time spent during the encounter on this date of service was due to:     Review of imaging and chart; obtaining history; examination of pt; discussion and coordination of care, and discussion of lifestyle modification and risk factor control.

## 2024-01-30 DIAGNOSIS — E11.9 TYPE 2 DIABETES MELLITUS WITHOUT COMPLICATIONS: ICD-10-CM

## 2024-01-30 DIAGNOSIS — F01.50 VASCULAR DEMENTIA WITHOUT BEHAVIORAL DISTURBANCE: ICD-10-CM

## 2024-01-30 DIAGNOSIS — I73.9 PERIPHERAL VASCULAR DISEASE, UNSPECIFIED: ICD-10-CM

## 2024-01-30 DIAGNOSIS — I25.10 ATHEROSCLEROTIC HEART DISEASE OF NATIVE CORONARY ARTERY WITHOUT ANGINA PECTORIS: ICD-10-CM

## 2024-01-30 DIAGNOSIS — I10 ESSENTIAL (PRIMARY) HYPERTENSION: ICD-10-CM

## 2024-01-30 DIAGNOSIS — I35.0 NONRHEUMATIC AORTIC (VALVE) STENOSIS: ICD-10-CM

## 2024-01-30 DIAGNOSIS — R41.0 DISORIENTATION, UNSPECIFIED: ICD-10-CM

## 2024-01-30 LAB
A1C WITH ESTIMATED AVERAGE GLUCOSE RESULT: 6.3 % — HIGH (ref 4–5.6)
ANION GAP SERPL CALC-SCNC: 13 MMOL/L — SIGNIFICANT CHANGE UP (ref 5–17)
BASOPHILS # BLD AUTO: 0.06 K/UL — SIGNIFICANT CHANGE UP (ref 0–0.2)
BASOPHILS NFR BLD AUTO: 0.6 % — SIGNIFICANT CHANGE UP (ref 0–2)
BUN SERPL-MCNC: 23 MG/DL — SIGNIFICANT CHANGE UP (ref 7–23)
CALCIUM SERPL-MCNC: 9.1 MG/DL — SIGNIFICANT CHANGE UP (ref 8.4–10.5)
CHLORIDE SERPL-SCNC: 98 MMOL/L — SIGNIFICANT CHANGE UP (ref 96–108)
CHOLEST SERPL-MCNC: 125 MG/DL — SIGNIFICANT CHANGE UP
CO2 SERPL-SCNC: 23 MMOL/L — SIGNIFICANT CHANGE UP (ref 22–31)
CREAT SERPL-MCNC: 0.82 MG/DL — SIGNIFICANT CHANGE UP (ref 0.5–1.3)
EGFR: 93 ML/MIN/1.73M2 — SIGNIFICANT CHANGE UP
EOSINOPHIL # BLD AUTO: 0.26 K/UL — SIGNIFICANT CHANGE UP (ref 0–0.5)
EOSINOPHIL NFR BLD AUTO: 2.6 % — SIGNIFICANT CHANGE UP (ref 0–6)
ESTIMATED AVERAGE GLUCOSE: 134 MG/DL — HIGH (ref 68–114)
GLUCOSE BLDC GLUCOMTR-MCNC: 114 MG/DL — HIGH (ref 70–99)
GLUCOSE BLDC GLUCOMTR-MCNC: 126 MG/DL — HIGH (ref 70–99)
GLUCOSE BLDC GLUCOMTR-MCNC: 150 MG/DL — HIGH (ref 70–99)
GLUCOSE BLDC GLUCOMTR-MCNC: 224 MG/DL — HIGH (ref 70–99)
GLUCOSE SERPL-MCNC: 136 MG/DL — HIGH (ref 70–99)
HCT VFR BLD CALC: 41.9 % — SIGNIFICANT CHANGE UP (ref 39–50)
HDLC SERPL-MCNC: 47 MG/DL — SIGNIFICANT CHANGE UP
HGB BLD-MCNC: 14.4 G/DL — SIGNIFICANT CHANGE UP (ref 13–17)
IMM GRANULOCYTES NFR BLD AUTO: 0.3 % — SIGNIFICANT CHANGE UP (ref 0–0.9)
LIPID PNL WITH DIRECT LDL SERPL: 63 MG/DL — SIGNIFICANT CHANGE UP
LYMPHOCYTES # BLD AUTO: 1.56 K/UL — SIGNIFICANT CHANGE UP (ref 1–3.3)
LYMPHOCYTES # BLD AUTO: 15.3 % — SIGNIFICANT CHANGE UP (ref 13–44)
MAGNESIUM SERPL-MCNC: 1.9 MG/DL — SIGNIFICANT CHANGE UP (ref 1.6–2.6)
MCHC RBC-ENTMCNC: 29.4 PG — SIGNIFICANT CHANGE UP (ref 27–34)
MCHC RBC-ENTMCNC: 34.4 GM/DL — SIGNIFICANT CHANGE UP (ref 32–36)
MCV RBC AUTO: 85.5 FL — SIGNIFICANT CHANGE UP (ref 80–100)
MONOCYTES # BLD AUTO: 1.37 K/UL — HIGH (ref 0–0.9)
MONOCYTES NFR BLD AUTO: 13.5 % — SIGNIFICANT CHANGE UP (ref 2–14)
NEUTROPHILS # BLD AUTO: 6.9 K/UL — SIGNIFICANT CHANGE UP (ref 1.8–7.4)
NEUTROPHILS NFR BLD AUTO: 67.7 % — SIGNIFICANT CHANGE UP (ref 43–77)
NON HDL CHOLESTEROL: 78 MG/DL — SIGNIFICANT CHANGE UP
NRBC # BLD: 0 /100 WBCS — SIGNIFICANT CHANGE UP (ref 0–0)
PHOSPHATE SERPL-MCNC: 3.7 MG/DL — SIGNIFICANT CHANGE UP (ref 2.5–4.5)
PLATELET # BLD AUTO: 157 K/UL — SIGNIFICANT CHANGE UP (ref 150–400)
PLATELET RESPONSE ASPIRIN RESULT: 396 ARU — SIGNIFICANT CHANGE UP
POTASSIUM SERPL-MCNC: 3.6 MMOL/L — SIGNIFICANT CHANGE UP (ref 3.5–5.3)
POTASSIUM SERPL-SCNC: 3.6 MMOL/L — SIGNIFICANT CHANGE UP (ref 3.5–5.3)
RBC # BLD: 4.9 M/UL — SIGNIFICANT CHANGE UP (ref 4.2–5.8)
RBC # FLD: 13.8 % — SIGNIFICANT CHANGE UP (ref 10.3–14.5)
SODIUM SERPL-SCNC: 134 MMOL/L — LOW (ref 135–145)
TRIGL SERPL-MCNC: 77 MG/DL — SIGNIFICANT CHANGE UP
TSH SERPL-MCNC: 1.62 UIU/ML — SIGNIFICANT CHANGE UP (ref 0.27–4.2)
WBC # BLD: 10.18 K/UL — SIGNIFICANT CHANGE UP (ref 3.8–10.5)
WBC # FLD AUTO: 10.18 K/UL — SIGNIFICANT CHANGE UP (ref 3.8–10.5)

## 2024-01-30 PROCEDURE — 99223 1ST HOSP IP/OBS HIGH 75: CPT

## 2024-01-30 PROCEDURE — 95720 EEG PHY/QHP EA INCR W/VEEG: CPT

## 2024-01-30 PROCEDURE — 99222 1ST HOSP IP/OBS MODERATE 55: CPT

## 2024-01-30 PROCEDURE — 93306 TTE W/DOPPLER COMPLETE: CPT | Mod: 26

## 2024-01-30 RX ORDER — HYDRALAZINE HCL 50 MG
5 TABLET ORAL ONCE
Refills: 0 | Status: COMPLETED | OUTPATIENT
Start: 2024-01-30 | End: 2024-01-30

## 2024-01-30 RX ADMIN — ATORVASTATIN CALCIUM 80 MILLIGRAM(S): 80 TABLET, FILM COATED ORAL at 22:03

## 2024-01-30 RX ADMIN — Medication 81 MILLIGRAM(S): at 11:46

## 2024-01-30 RX ADMIN — Medication 2: at 12:05

## 2024-01-30 RX ADMIN — MEMANTINE HYDROCHLORIDE 10 MILLIGRAM(S): 10 TABLET ORAL at 11:46

## 2024-01-30 RX ADMIN — ENOXAPARIN SODIUM 40 MILLIGRAM(S): 100 INJECTION SUBCUTANEOUS at 17:45

## 2024-01-30 RX ADMIN — Medication 1 TABLET(S): at 11:47

## 2024-01-30 RX ADMIN — Medication 5 MILLIGRAM(S): at 10:29

## 2024-01-30 RX ADMIN — DONEPEZIL HYDROCHLORIDE 5 MILLIGRAM(S): 10 TABLET, FILM COATED ORAL at 22:05

## 2024-01-30 NOTE — PHYSICAL THERAPY INITIAL EVALUATION ADULT - ADDITIONAL COMMENTS
social hx comes from daughter. pt lives w/ wife and daughter in an elevator access apt building. Denies use of DME for amb prior to this admission. Denies hx of recent falls. no home health aide. denies hx of recent falls.

## 2024-01-30 NOTE — OCCUPATIONAL THERAPY INITIAL EVALUATION ADULT - PERTINENT HX OF CURRENT PROBLEM, REHAB EVAL
73y Male (Barbadian speaking, poor historian), current smoker, with PMHx vascular dementia, HTN, DM, HLD, CAD (was on Xarelto, discontinued 2/2 GI bleed, now just on ASA 81mg daily),  PAD s/p bilateral fem-pop bypasses (2016) s/p R embolectomy and angioplasty x2 for RLE ( ischemia (3/2020) on ASA, severe aortic stenosis, stage 1 cecal adenocarcinoma s/p open R hemicolectomy (06/2020), presenting to [facility] ED after waking up this morning with AMS and reportedly urinated on himself. At baseline, patient is AOX2, able to recognize family members, however, this morning he did not know who his wife or daughter was. He would repeat himself and say "mary alice", appears confused. UA in ED negative for infection. On exam, appears to be easily distractible and having intermittent difficulty following simple commands and cannot follow complex. NIHSS 5. NCHCT performed and demonstrated chronic bilateral thalamic infarcts, right basal ganglia, and bilateral cerebellar infarcts. CTA head and neck with mild irregular narrowing in the distal basilar artery and poor visualization of th right vertebral artery in the proximal segment with threadlike visibility in the mid and distal areas, similar in appearance to his CTA's in 2020.

## 2024-01-30 NOTE — OCCUPATIONAL THERAPY INITIAL EVALUATION ADULT - GENERAL OBSERVATIONS, REHAB EVAL
Pt cleared by AXEL Rhodes for OOB with OT. Pt received semisupine +vEEG +tele +hep lock +daughter at bedside +PT Blanquita present.

## 2024-01-30 NOTE — OCCUPATIONAL THERAPY INITIAL EVALUATION ADULT - ADDITIONAL COMMENTS
Pr r handed and does not wear glasses. Pt lives with family in private home with 3 LUC[address]A and ambulates independently without device. Per daughter, pt benefits from assist at baseline to engage in ADL 2/2 cog decline however physically capable of performing tasks with prompting and cues.

## 2024-01-30 NOTE — PHYSICAL THERAPY INITIAL EVALUATION ADULT - GAIT DEVIATIONS NOTED, PT EVAL
decreased ina/increased time in double stance/decreased step length/decreased weight-shifting ability

## 2024-01-30 NOTE — OCCUPATIONAL THERAPY INITIAL EVALUATION ADULT - SENSORY TESTS
Cranial Nerves II - XII: II: PERRLA; visual fields are full to confrontation III, IV, VI: EOMI, no nystagmus appreciated V: facial sensation intact to light touch V1-V3 b/l VII: no ptosis, no facial droop, symmetric eyebrow raise and closure VIII: hearing intact to finger rub b/l  XI: head turning and shoulder shrug intact b/l XII: tongue protrusion midline Vision H-Test: bilateral tracking and smooth pursuit intact;  Vision Quadrant Test: intact bilaterally.

## 2024-01-30 NOTE — PHYSICAL THERAPY INITIAL EVALUATION ADULT - BALANCE DISTURBANCE, SYSTEM IMPAIRMENT CONTRIBUTE, REHAB EVAL
Electrodesiccation Text: The wound bed was treated with electrodesiccation after the biopsy was performed. cognitive/neuromuscular

## 2024-01-30 NOTE — CONSULT NOTE ADULT - PROBLEM SELECTOR RECOMMENDATION 5
stable; s/p B/L fem-pop bypasses (2016) and s/p R embolectomy + angioplasty x2 for RLE (2020)   -- cont. ASA, statin

## 2024-01-30 NOTE — EEG REPORT - NS EEG TEXT BOX
Patient Name:	KALPANA COLLINS    :	1950  MRN:	[id number]    Study Start Date/Time:	2024, 6:17:59 PM  Study End Date/Time:    Referred by:  Choose an item.    Brief Clinical History:  KALPANA COLLINS is a 73 year old Male; study performed to investigate for seizures or markers of epilepsy.   Technologist notes: -  Diagnosis Code:  R56.9 convulsions/seizure    Pertinent Medication:  n/a    Acquisition Details:  Electroencephalography was acquired using a minimum of 21 channels on an [employer] Neurology system v 9.3.1 with electrode placement according to the standard International 10-20 system following ACNS (American Clinical Neurophysiology Society) guidelines.  Anterior temporal T1 and T2 electrodes were utilized whenever possible.  The [employer]TEK automated spike & seizure detections were all reviewed in detail, in addition to the entire raw EEG.    Findings:  Day 1:  2024, 6:17:59 PM to next morning at 07:00 AM   Background:  continuous, with predominantly alpha and beta frequencies.  Generalized Slowing:  None  Symmetry/Focality: No persistent asymmetries of voltage or frequency.                    Voltage:  Normal (20+ uV)  Organization:  Appropriate anterior-posterior gradient  Posterior Dominant Rhythm:  7-8 Hz symmetric, well-organized, and well-modulated  Sleep:  Symmetric, synchronous spindles and K complexes.  Variability:   Yes		Reactivity:  Yes    Spontaneous Activity:  No epileptiform discharges                 Events:  1)	No electrographic seizures or significant clinical events occurred during this study.  Provocations:  •	Hyperventilation: was not performed.  •	Photic stimulation: was not performed.  Daily Summary:    1)	Unremarkable awake and sleep recording.  2)	There were no findings of active epilepsy, however this alone does not rule out the diagnosis.         Jim Murphy MD  Attending Neurologist, [facility]

## 2024-01-30 NOTE — CONSULT NOTE ADULT - SUBJECTIVE AND OBJECTIVE BOX
Patient is a 73y old  Male who presents with a chief complaint of confusion, ?aphasia (29 Jan 2024 18:12)    HPI:   **STROKE HPI***    HPI: 73y Male ([place] speaking, poor historian), current smoker, with PMHx vascular dementia, HTN, DM, HLD, CAD (was on Xarelto, discontinued 2/2 GI bleed, now just on ASA 81mg daily),  PAD s/p bilateral fem-pop bypasses (2016) s/p R embolectomy and angioplasty x2 for RLE ( ischemia (3/2020) on ASA, severe aortic stenosis, stage 1 cecal adenocarcinoma s/p open R hemicolectomy (06/2020), presenting to [facility] ED after waking up this morning with AMS and reportedly urinated on himself. At baseline, patient is AOX2, able to recognize family members, however, this morning he did not know who his wife or daughter was. He would repeat himself and say "mary alice", appears confused. UA in ED negative for infection. On exam, appears to be easily distractible and having intermittent difficulty following simple commands and cannot follow complex. NIHSS 5. NCHCT performed and demonstrated chronic bilateral thalamic infarcts, right basal ganglia, and bilateral cerebellar infarcts. CTA head and neck with mild irregular narrowing in the distal basilar artery and poor visualization of th right vertebral artery in the proximal segment with threadlike visibility in the mid and distal areas, similar in appearance to his CTA's in 2020.   (29 Jan 2024 18:12)    Review of Systems: 12 point review of systems otherwise negative    PAST MEDICAL & SURGICAL HISTORY:  Essential hypertension  Hypertension      CAD (coronary artery disease)      DM (diabetes mellitus)      PAD (peripheral artery disease)      Colon cancer      History of hernia repair  june 2014      Elective surgery  right leg angiogram with bypass  july 2016      S/P femoral-femoral bypass surgery  left 2016      S/P right hemicolectomy    Social History:  SOCIAL HISTORY:   Patient lives with wife  Smoking status: current (29 Jan 2024 18:12)      MEDICATIONS  (STANDING):  aspirin enteric coated 81 milliGRAM(s) Oral daily  atorvastatin 80 milliGRAM(s) Oral at bedtime  dextrose 5%. 1000 milliLiter(s) (100 mL/Hr) IV Continuous <Continuous>  dextrose 5%. 1000 milliLiter(s) (50 mL/Hr) IV Continuous <Continuous>  dextrose 50% Injectable 25 Gram(s) IV Push once  dextrose 50% Injectable 25 Gram(s) IV Push once  dextrose 50% Injectable 12.5 Gram(s) IV Push once  donepezil 5 milliGRAM(s) Oral at bedtime  enoxaparin Injectable 40 milliGRAM(s) SubCutaneous every 24 hours  glucagon  Injectable 1 milliGRAM(s) IntraMuscular once  insulin lispro (ADMELOG) corrective regimen sliding scale   SubCutaneous Before meals and at bedtime  memantine 10 milliGRAM(s) Oral daily  multivitamin 1 Tablet(s) Oral daily    MEDICATIONS  (PRN):  dextrose Oral Gel 15 Gram(s) Oral once PRN Blood Glucose LESS THAN 70 milliGRAM(s)/deciliter      Allergies    No Known Allergies    Intolerances          Vital Signs Last 24 Hrs  T(C): 37.4 (30 Jan 2024 05:06), Max: 37.4 (30 Jan 2024 05:06)  T(F): 99.3 (30 Jan 2024 05:06), Max: 99.3 (30 Jan 2024 05:06)  HR: 66 (30 Jan 2024 11:45) (51 - 81)  BP: 153/70 (30 Jan 2024 11:45) (116/57 - 199/90)  BP(mean): 101 (30 Jan 2024 11:45) (96 - 132)  RR: 18 (30 Jan 2024 11:45) (17 - 18)  SpO2: 96% (30 Jan 2024 11:45) (92% - 98%)    Parameters below as of 30 Jan 2024 11:45  Patient On (Oxygen Delivery Method): room air      CAPILLARY BLOOD GLUCOSE  124 (29 Jan 2024 15:30)      POCT Blood Glucose.: 224 mg/dL (30 Jan 2024 11:22)  POCT Blood Glucose.: 126 mg/dL (30 Jan 2024 06:45)  POCT Blood Glucose.: 122 mg/dL (29 Jan 2024 22:42)  POCT Blood Glucose.: 124 mg/dL (29 Jan 2024 14:33)      01-29 @ 07:01  -  01-30 @ 07:00  --------------------------------------------------------  IN: 0 mL / OUT: 400 mL / NET: -400 mL        Physical Exam:  (earlier today)  Daily     Daily   General:  comfortable-appearing in NAD, calm and cooperative  HEENT:  MMM +EEG  CV:  RRR  Lungs:  CTA B/L  Abdomen:  soft NT ND  Extremities:  no edema B/L LE  Skin:  WWP  Neuro:  alert    LABS:                        14.4   10.18 )-----------( 157      ( 30 Jan 2024 05:30 )             41.9     01-30    134<L>  |  98  |  23  ----------------------------<  136<H>  3.6   |  23  |  0.82    Ca    9.1      30 Jan 2024 05:30  Phos  3.7     01-30  Mg     1.9     01-30    TPro  7.9  /  Alb  4.4  /  TBili  0.2  /  DBili  x   /  AST  30  /  ALT  47<H>  /  AlkPhos  126<H>  01-29    PT/INR - ( 29 Jan 2024 15:08 )   PT: 11.9 sec;   INR: 1.05          PTT - ( 29 Jan 2024 15:08 )  PTT:32.9 sec  Urinalysis Basic - ( 30 Jan 2024 05:30 )    Color: x / Appearance: x / SG: x / pH: x  Gluc: 136 mg/dL / Ketone: x  / Bili: x / Urobili: x   Blood: x / Protein: x / Nitrite: x   Leuk Esterase: x / RBC: x / WBC x   Sq Epi: x / Non Sq Epi: x / Bacteria: x

## 2024-01-30 NOTE — PHYSICAL THERAPY INITIAL EVALUATION ADULT - MODALITIES TREATMENT COMMENTS
smile, cheek puff, eyebrow raise: symmetrical. tongue protrusion: midline. unable to assess visual tracking or visual fields 2/2 decreased command following

## 2024-01-30 NOTE — CONSULT NOTE ADULT - PROBLEM SELECTOR RECOMMENDATION 9
Pt. brought in by family for acute-onset AMS (e.g. not following simple commands, unable to recognize family members, repeating himself); Pt. has vascular dementia, A&Ox2 at baseline; unclear etiology, no apparent toxic-metabolic derangements   -- cont. work-up and mgmt per Neuro  -- PT/OT, speech therapy  -- plan for MRI brain  -- f/u EEG  -- on ASA and high-intensity statin for now

## 2024-01-30 NOTE — OCCUPATIONAL THERAPY INITIAL EVALUATION ADULT - DIAGNOSIS, OT EVAL
Pt presents to [facility] with AMS; imaging negative for acute intracranial injury. OT consulted for impaired functional cognition, decreased balance impacting ADL and functional mobility.

## 2024-01-30 NOTE — PHYSICAL THERAPY INITIAL EVALUATION ADULT - IMPAIRMENTS FOUND, PT EVAL
aerobic capacity/endurance/gait, locomotion, and balance/joint integrity and mobility/muscle strength/neuromotor development and sensory integration/poor safety awareness/posture

## 2024-01-30 NOTE — PROGRESS NOTE ADULT - SUBJECTIVE AND OBJECTIVE BOX
Neurology Stroke Progress Note    INTERVAL HPI/OVERNIGHT EVENTS:  Patient seen and examined. Patient remains altered, answers questions wrong, not at baseline. Continues to be on EEG.     MEDICATIONS  (STANDING):  aspirin enteric coated 81 milliGRAM(s) Oral daily  atorvastatin 80 milliGRAM(s) Oral at bedtime  dextrose 5%. 1000 milliLiter(s) (50 mL/Hr) IV Continuous <Continuous>  dextrose 5%. 1000 milliLiter(s) (100 mL/Hr) IV Continuous <Continuous>  dextrose 50% Injectable 25 Gram(s) IV Push once  dextrose 50% Injectable 25 Gram(s) IV Push once  dextrose 50% Injectable 12.5 Gram(s) IV Push once  donepezil 5 milliGRAM(s) Oral at bedtime  enoxaparin Injectable 40 milliGRAM(s) SubCutaneous every 24 hours  glucagon  Injectable 1 milliGRAM(s) IntraMuscular once  insulin lispro (ADMELOG) corrective regimen sliding scale   SubCutaneous Before meals and at bedtime  memantine 10 milliGRAM(s) Oral daily  multivitamin 1 Tablet(s) Oral daily    MEDICATIONS  (PRN):  dextrose Oral Gel 15 Gram(s) Oral once PRN Blood Glucose LESS THAN 70 milliGRAM(s)/deciliter      Allergies    No Known Allergies    Intolerances        Vital Signs Last 24 Hrs  T(C): 37.4 (30 Jan 2024 05:06), Max: 37.4 (30 Jan 2024 05:06)  T(F): 99.3 (30 Jan 2024 05:06), Max: 99.3 (30 Jan 2024 05:06)  HR: 66 (30 Jan 2024 11:45) (51 - 81)  BP: 153/70 (30 Jan 2024 11:45) (116/57 - 199/90)  BP(mean): 101 (30 Jan 2024 11:45) (96 - 132)  RR: 18 (30 Jan 2024 11:45) (17 - 18)  SpO2: 96% (30 Jan 2024 11:45) (92% - 98%)    Parameters below as of 30 Jan 2024 11:45  Patient On (Oxygen Delivery Method): room air        Physical exam:  General: No acute distress, awake and alert  Eyes: Anicteric sclerae, moist conjunctivae, see below for CNs  Neck: trachea midline, FROM, supple, no thyromegaly or lymphadenopathy  Cardiovascular: Regular rate and rhythm on monitor   Pulmonary: No use of accessory muscles  GI: Abdomen soft, non-distended, non-tender  Extremities: no edema    Neurologic:  -Mental status: Awake, alert, oriented to person only. Speech is fluent no dysarthria. Follows simple one commands, struggles with complex cross commands.   -Cranial nerves:   II: Visual fields are full to confrontation.  III, IV, VI: Extraocular movements are intact without nystagmus. Pupils equally round and reactive to light  V:  Facial sensation V1-V3 equal and intact   VII: Baseline R facial asymmetry  VIII: Hearing is bilaterally intact  Motor: Normal bulk and tone. Subtle RUE drift. Strength bilateral upper extremity 5/5, bilateral lower extremities at least 3/5.  Sensation: Intact to light touch bilaterally. Unable to comprehend DST  Coordination: Unable to comprehend    LABS:                        14.4   10.18 )-----------( 157      ( 30 Jan 2024 05:30 )             41.9     01-30    134<L>  |  98  |  23  ----------------------------<  136<H>  3.6   |  23  |  0.82    Ca    9.1      30 Jan 2024 05:30  Phos  3.7     01-30  Mg     1.9     01-30    TPro  7.9  /  Alb  4.4  /  TBili  0.2  /  DBili  x   /  AST  30  /  ALT  47<H>  /  AlkPhos  126<H>  01-29    PT/INR - ( 29 Jan 2024 15:08 )   PT: 11.9 sec;   INR: 1.05          PTT - ( 29 Jan 2024 15:08 )  PTT:32.9 sec  Urinalysis Basic - ( 30 Jan 2024 05:30 )    Color: x / Appearance: x / SG: x / pH: x  Gluc: 136 mg/dL / Ketone: x  / Bili: x / Urobili: x   Blood: x / Protein: x / Nitrite: x   Leuk Esterase: x / RBC: x / WBC x   Sq Epi: x / Non Sq Epi: x / Bacteria: x        RADIOLOGY & ADDITIONAL TESTS:

## 2024-01-30 NOTE — PHYSICAL THERAPY INITIAL EVALUATION ADULT - PERTINENT HX OF CURRENT PROBLEM, REHAB EVAL
73y Male (Greenlandic speaking, poor historian), current smoker, with PMHx vascular dementia, HTN, DM, HLD, CAD (was on Xarelto, discontinued 2/2 GI bleed, now just on ASA 81mg daily),  PAD s/p bilateral fem-pop bypasses (2016) s/p R embolectomy and angioplasty x2 for RLE ( ischemia (3/2020) on ASA, severe aortic stenosis, stage 1 cecal adenocarcinoma s/p open R hemicolectomy (06/2020), presenting to [facility] ED after waking up this morning with AMS and reportedly urinated on himself. At baseline, patient is AOX2, able to recognize family members, however, this morning he did not know who his wife or daughter was. He would repeat himself and say "mary alice", appears confused. UA in ED negative for infection. On exam, appears to be easily distractible and having intermittent difficulty following simple commands and cannot follow complex. NIHSS 5. NCHCT performed and demonstrated chronic bilateral thalamic infarcts, right basal ganglia, and bilateral cerebellar infarcts. CTA head and neck with mild irregular narrowing in the distal basilar artery and poor visualization of th right vertebral artery in the proximal segment with threadlike visibility in the mid and distal areas, similar in appearance to his CTA's in 2020.

## 2024-01-30 NOTE — PROGRESS NOTE ADULT - ASSESSMENT
73y Male (St [place] speaking, poor historian), current smoker, with PMHx vascular dementia, HTN, DM, HLD, CAD (was on Xarelto, discontinued 2/2 GI bleed, now just on ASA 81mg daily),  PAD s/p bilateral fem-pop bypasses (2016) s/p R embolectomy and angioplasty x2 for RLE ( ischemia (3/2020) on ASA, severe aortic stenosis, stage 1 cecal adenocarcinoma s/p open R hemicolectomy (06/2020), presenting to [facility] ED after waking up this morning with AMS and reportedly urinated on himself. At baseline, patient is AOX2, able to recognize family members, however, this morning he did not know who his wife or daughter was. He would repeat himself and say "mary alice", appears confused. UA in ED negative for infection. On exam, appears to be easily distractible and having intermittent difficulty following simple commands and cannot follow complex. NIHSS 5. NCHCT performed and demonstrated chronic bilateral thalamic infarcts, right basal ganglia, and bilateral cerebellar infarcts. CTA head and neck with mild irregular narrowing in the distal basilar artery and poor visualization of th right vertebral artery in the proximal segment with threadlike visibility in the mid and distal areas, similar in appearance to his CTA's in 2020.    Neuro  #CVA workup vs seizure vs neurocognitive process  - continue aspirin 81mg, ASA accumetric therapeutic  - initiated atorvastatin 80mg daily  - q4hr stroke neuro checks and vitals  - obtain MRI Brain without contrast with GRE sequence r/o stroke and CAA  - c/w EEG until tomorrow  - Stroke Code HCT Results:  chronic bilateral thalamic infarcts, right basal ganglia, and bilateral cerebellar infarcts  - Stroke Code CTA Results: mild irregular narrowing in the distal basilar artery and poor visualization of th right vertebral artery in the proximal segment with threadlike visibility in the mid and distal areas,  - Stroke education    Cards  #HTN  - permissive hypertension, Goal -180  - hold home blood pressure medication for now  - TTE: PFO vs AVM, basal inferolateral akinesis, basal inferoseptal and basal inferior segment hypokineses, EF 60-65%      #HLD  - high dose statin as above in CVA  - LDL results: 63    Pulm  - call provider if SPO2 < 94%    GI  #Nutrition/Fluids/Electrolytes   - replete K<4 and Mg <2  - Diet: DASH, CC      Infectious Disease  - negative UA on admisision    Endocrine  #DM  - A1C results: 6.3  - ISS    - TSH results: 1.62    DVT Prophylaxis  - lovenox sq for DVT prophylaxis   - SCDs for DVT prophylaxis       Dispo: pending PT/OT eval     Discussed daily hospital plans and goals with patient and family at bedside.     Discussed with Neurology Attending Dr. Rasheed

## 2024-01-30 NOTE — PATIENT PROFILE ADULT - FALL HARM RISK - RISK INTERVENTIONS
Assistance OOB with selected safe patient handling equipment/Assistance with ambulation/Communicate Fall Risk and Risk Factors to all staff, patient, and family/Monitor for mental status changes/Move patient closer to nurses' station/Reinforce activity limits and safety measures with patient and family/Reorient to person, place and time as needed/Toileting schedule using arm’s reach rule for commode and bathroom/Use of alarms - bed, chair and/or voice tab/Visual Cue: Yellow wristband/Bed in lowest position, wheels locked, appropriate side rails in place/Call bell, personal items and telephone in reach/Instruct patient to call for assistance before getting out of bed or chair/Non-slip footwear when patient is out of bed/[place] to call system/Physically safe environment - no spills, clutter or unnecessary equipment/Purposeful Proactive Rounding/Room/bathroom lighting operational, light cord in reach

## 2024-01-31 LAB
GLUCOSE BLDC GLUCOMTR-MCNC: 129 MG/DL — HIGH (ref 70–99)
GLUCOSE BLDC GLUCOMTR-MCNC: 146 MG/DL — HIGH (ref 70–99)
GLUCOSE BLDC GLUCOMTR-MCNC: 154 MG/DL — HIGH (ref 70–99)
GLUCOSE BLDC GLUCOMTR-MCNC: 181 MG/DL — HIGH (ref 70–99)

## 2024-01-31 PROCEDURE — 99233 SBSQ HOSP IP/OBS HIGH 50: CPT

## 2024-01-31 PROCEDURE — 95720 EEG PHY/QHP EA INCR W/VEEG: CPT

## 2024-01-31 PROCEDURE — 93970 EXTREMITY STUDY: CPT | Mod: 26

## 2024-01-31 PROCEDURE — 70551 MRI BRAIN STEM W/O DYE: CPT | Mod: 26

## 2024-01-31 RX ORDER — NIFEDIPINE 30 MG
60 TABLET, EXTENDED RELEASE 24 HR ORAL ONCE
Refills: 0 | Status: COMPLETED | OUTPATIENT
Start: 2024-01-31 | End: 2024-01-31

## 2024-01-31 RX ORDER — NIFEDIPINE 30 MG
90 TABLET, EXTENDED RELEASE 24 HR ORAL EVERY 24 HOURS
Refills: 0 | Status: DISCONTINUED | OUTPATIENT
Start: 2024-02-01 | End: 2024-02-01

## 2024-01-31 RX ORDER — NIFEDIPINE 30 MG
30 TABLET, EXTENDED RELEASE 24 HR ORAL EVERY 24 HOURS
Refills: 0 | Status: DISCONTINUED | OUTPATIENT
Start: 2024-01-31 | End: 2024-01-31

## 2024-01-31 RX ADMIN — Medication 30 MILLIGRAM(S): at 09:40

## 2024-01-31 RX ADMIN — Medication 1: at 21:43

## 2024-01-31 RX ADMIN — Medication 1: at 11:11

## 2024-01-31 RX ADMIN — MEMANTINE HYDROCHLORIDE 10 MILLIGRAM(S): 10 TABLET ORAL at 11:10

## 2024-01-31 RX ADMIN — Medication 1 TABLET(S): at 11:10

## 2024-01-31 RX ADMIN — ENOXAPARIN SODIUM 40 MILLIGRAM(S): 100 INJECTION SUBCUTANEOUS at 17:08

## 2024-01-31 RX ADMIN — DONEPEZIL HYDROCHLORIDE 5 MILLIGRAM(S): 10 TABLET, FILM COATED ORAL at 21:42

## 2024-01-31 RX ADMIN — ATORVASTATIN CALCIUM 80 MILLIGRAM(S): 80 TABLET, FILM COATED ORAL at 21:43

## 2024-01-31 RX ADMIN — Medication 60 MILLIGRAM(S): at 13:05

## 2024-01-31 RX ADMIN — Medication 81 MILLIGRAM(S): at 11:11

## 2024-01-31 NOTE — PROGRESS NOTE ADULT - SUBJECTIVE AND OBJECTIVE BOX
Patient is a 73y old  Male who presents with a chief complaint of confusion, ?aphasia (31 Jan 2024 07:57)      INTERVAL HPI/OVERNIGHT EVENTS:    Pt. seen and examined earlier today  No complaints elicited  +BM, reportedly loose per staff report  Pt.'s mental status returning to baseline, per family report    Review of Systems: 12 point review of systems otherwise negative    MEDICATIONS  (STANDING):  aspirin enteric coated 81 milliGRAM(s) Oral daily  atorvastatin 80 milliGRAM(s) Oral at bedtime  dextrose 5%. 1000 milliLiter(s) (100 mL/Hr) IV Continuous <Continuous>  dextrose 5%. 1000 milliLiter(s) (50 mL/Hr) IV Continuous <Continuous>  dextrose 50% Injectable 25 Gram(s) IV Push once  dextrose 50% Injectable 25 Gram(s) IV Push once  dextrose 50% Injectable 12.5 Gram(s) IV Push once  donepezil 5 milliGRAM(s) Oral at bedtime  enoxaparin Injectable 40 milliGRAM(s) SubCutaneous every 24 hours  glucagon  Injectable 1 milliGRAM(s) IntraMuscular once  insulin lispro (ADMELOG) corrective regimen sliding scale   SubCutaneous Before meals and at bedtime  memantine 10 milliGRAM(s) Oral daily  multivitamin 1 Tablet(s) Oral daily    MEDICATIONS  (PRN):  dextrose Oral Gel 15 Gram(s) Oral once PRN Blood Glucose LESS THAN 70 milliGRAM(s)/deciliter      Allergies    No Known Allergies    Intolerances          Vital Signs Last 24 Hrs  T(C): 37.4 (31 Jan 2024 08:23), Max: 37.4 (31 Jan 2024 08:23)  T(F): 99.3 (31 Jan 2024 08:23), Max: 99.3 (31 Jan 2024 08:23)  HR: 62 (31 Jan 2024 13:55) (58 - 70)  BP: 182/79 (31 Jan 2024 13:55) (174/75 - 190/81)  BP(mean): 113 (31 Jan 2024 13:55) (108 - 122)  RR: 17 (31 Jan 2024 11:56) (17 - 18)  SpO2: 98% (31 Jan 2024 11:56) (95% - 99%)    Parameters below as of 31 Jan 2024 11:56  Patient On (Oxygen Delivery Method): room air      CAPILLARY BLOOD GLUCOSE      POCT Blood Glucose.: 181 mg/dL (31 Jan 2024 10:49)  POCT Blood Glucose.: 129 mg/dL (31 Jan 2024 06:42)  POCT Blood Glucose.: 150 mg/dL (30 Jan 2024 21:44)  POCT Blood Glucose.: 114 mg/dL (30 Jan 2024 17:14)      01-30 @ 07:01  -  01-31 @ 07:00  --------------------------------------------------------  IN: 0 mL / OUT: 300 mL / NET: -300 mL        Physical Exam:  (earlier today)  Daily     Daily   General:  comfortable-appearing in NAD, calm and cooperative  HEENT:  MMM  CV:  RRR  Lungs:  CTA B/L  Abdomen:  soft NT ND  Extremities:  no edema B/L LE  Skin:  WWP  Neuro:  A&Ox1, no dysarthria, follows simple commands    LABS:                        14.4   10.18 )-----------( 157      ( 30 Jan 2024 05:30 )             41.9     01-30    134<L>  |  98  |  23  ----------------------------<  136<H>  3.6   |  23  |  0.82    Ca    9.1      30 Jan 2024 05:30  Phos  3.7     01-30  Mg     1.9     01-30        Urinalysis Basic - ( 30 Jan 2024 05:30 )    Color: x / Appearance: x / SG: x / pH: x  Gluc: 136 mg/dL / Ketone: x  / Bili: x / Urobili: x   Blood: x / Protein: x / Nitrite: x   Leuk Esterase: x / RBC: x / WBC x   Sq Epi: x / Non Sq Epi: x / Bacteria: x

## 2024-01-31 NOTE — EEG REPORT - NS EEG TEXT BOX
Patient Name:	KALPANA COLLINS    :	1950  MRN:	[id number]    Study Start Date/Time:	2024, 6:17:59 PM  Study End Date/Time:    Referred by:  Choose an item.    Brief Clinical History:  KALPANA COLLINS is a 73 year old Male; study performed to investigate for seizures or markers of epilepsy.   Technologist notes: -  Diagnosis Code:  R56.9 convulsions/seizure    Pertinent Medication:  n/a    Acquisition Details:  Electroencephalography was acquired using a minimum of 21 channels on an [employer] system v 9.3.1 with electrode placement according to the standard International 10-20 system following ACNS (American Clinical Neurophysiology Society) guidelines.  Anterior temporal T1 and T2 electrodes were utilized whenever possible.  The [employer] automated spike & seizure detections were all reviewed in detail, in addition to the entire raw EEG.    Daily Updates (from 07:00 am until 07:00 am):  Day 2    2024, 7 AM to next morning at 07:00 AM  2024  Background:  continuous, with predominantly alpha and beta frequencies.  Generalized Slowing:  None  Symmetry/Focality: No persistent asymmetries of voltage or frequency.                    Voltage:  Normal (20+ uV)  Organization:  Appropriate anterior-posterior gradient  Posterior Dominant Rhythm:  7-8 Hz symmetric, well-organized, and well-modulated  Sleep:  Symmetric, synchronous spindles and K complexes.  Variability:   Yes		Reactivity:  Yes    Spontaneous Activity:  No epileptiform discharges                 Events:  2)	No electrographic seizures or significant clinical events occurred during this study.  Provocations:  •	Hyperventilation: was not performed.  •	Photic stimulation: was not performed.  Daily Summary:    3)	Unremarkable awake and sleep recording.  4)	There were no findings of active epilepsy, however this alone does not rule out the diagnosis.         Jim Murphy MD  Attending Neurologist, [facility]

## 2024-01-31 NOTE — PROGRESS NOTE ADULT - ASSESSMENT
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 ED after waking up with confusion and reportedly urinated on himself. NIHSS 5. NCHCT performed and demonstrated chronic bilateral thalamic infarcts, right basal ganglia, and bilateral cerebellar infarcts. CTA head and neck with mild irregular narrowing in the distal basilar artery and poor visualization of th right vertebral artery in the proximal segment with threadlike visibility in the mid and distal areas, similar in appearance to his CTA's in 2020. Admitted for seizure vs stroke workup.    Neuro  #CVA workup vs seizure vs neurocognitive process  - continue aspirin 81mg, ASA accumetric therapeutic  - initiated atorvastatin 80mg daily  - q4hr stroke neuro checks and vitals  - obtain MRI Brain without contrast with GRE sequence r/o stroke and CAA  - EEG reading - unremarkable  - Stroke Code HCT Results:  chronic bilateral thalamic infarcts, right basal ganglia, and bilateral cerebellar infarcts  - Stroke Code CTA Results: mild irregular narrowing in the distal basilar artery and poor visualization of th right vertebral artery in the proximal segment with threadlike visibility in the mid and distal areas,  - Stroke education    Cards  #HTN  - Goal -180  -will restart a lower dose of patient's home Nifedipine and uptitrate as needed  - hold other home blood pressure medication for now  - TTE: PFO vs AVM, basal inferolateral akinesis, basal inferoseptal and basal inferior segment hypokineses, EF 60-65%      #HLD  - high dose statin as above in CVA  - LDL results: 63    Pulm  - call provider if SPO2 < 94%    GI  #Nutrition/Fluids/Electrolytes   - replete K<4 and Mg <2  - Diet: DASH, CC      Infectious Disease  - negative UA on admisision    Endocrine  #DM  - A1C results: 6.3  - ISS    - TSH results: 1.62    DVT Prophylaxis  - lovenox sq for DVT prophylaxis   - SCDs for DVT prophylaxis       Dispo: pending PT/OT eval     Discussed daily hospital plans and goals with patient and family at bedside.     Discussed with Neurology Attending Dr. Archuleta   73y Male (Armenian speaking, poor historian), current smoker, with PMHx vascular dementia, HTN, DM, HLD, CAD (was on Xarelto, discontinued 2/2 GI bleed, now just on ASA 81mg daily),  PAD s/p bilateral fem-pop bypasses (2016) s/p R embolectomy and angioplasty x2 for RLE ( ischemia (3/2020) on ASA, severe aortic stenosis, stage 1 cecal adenocarcinoma s/p open R hemicolectomy (06/2020), presenting to [facility] ED after waking up with confusion and reportedly urinated on himself. NIHSS 5. NCHCT performed and demonstrated chronic bilateral thalamic infarcts, right basal ganglia, and bilateral cerebellar infarcts. CTA head and neck with mild irregular narrowing in the distal basilar artery and poor visualization of th right vertebral artery in the proximal segment with threadlike visibility in the mid and distal areas, similar in appearance to his CTA's in 2020. Admitted for seizure vs stroke workup.    Neuro  #CVA workup vs seizure vs neurocognitive process  - continue aspirin 81mg, ASA accumetric therapeutic  - initiated atorvastatin 80mg daily  - q4hr stroke neuro checks and vitals  - obtain MRI Brain without contrast with GRE sequence r/o stroke and CAA  - EEG reading - unremarkable  - Stroke Code HCT Results:  chronic bilateral thalamic infarcts, right basal ganglia, and bilateral cerebellar infarcts  - Stroke Code CTA Results: mild irregular narrowing in the distal basilar artery and poor visualization of th right vertebral artery in the proximal segment with threadlike visibility in the mid and distal areas,  - Stroke education    Cards  #HTN  - Goal -180  -will restart a lower dose of patient's home Nifedipine and uptitrate as needed  - hold other home blood pressure medication for now  - TTE: PFO vs AVM, basal inferolateral akinesis, basal inferoseptal and basal inferior segment hypokineses, EF 60-65%      #HLD  - high dose statin as above in CVA  - LDL results: 63    Pulm  - call provider if SPO2 < 94%    GI  #Nutrition/Fluids/Electrolytes   - replete K<4 and Mg <2  - Diet: DASH, CC      Infectious Disease  - negative UA on admisision    Endocrine  #DM  - A1C results: 6.3  - ISS    - TSH results: 1.62    DVT Prophylaxis  - lovenox sq for DVT prophylaxis   - SCDs for DVT prophylaxis       Dispo: home with family     Discussed daily hospital plans and goals with patient and family at bedside.     Discussed with Neurology Attending Dr. Archuleta

## 2024-01-31 NOTE — PROGRESS NOTE ADULT - SUBJECTIVE AND OBJECTIVE BOX
Neurology Stroke Progress Note    INTERVAL HPI/OVERNIGHT EVENTS:  Patient seen and examined.  number ______ used.    MEDICATIONS  (STANDING):  aspirin enteric coated 81 milliGRAM(s) Oral daily  atorvastatin 80 milliGRAM(s) Oral at bedtime  dextrose 5%. 1000 milliLiter(s) (100 mL/Hr) IV Continuous <Continuous>  dextrose 5%. 1000 milliLiter(s) (50 mL/Hr) IV Continuous <Continuous>  dextrose 50% Injectable 12.5 Gram(s) IV Push once  dextrose 50% Injectable 25 Gram(s) IV Push once  dextrose 50% Injectable 25 Gram(s) IV Push once  donepezil 5 milliGRAM(s) Oral at bedtime  enoxaparin Injectable 40 milliGRAM(s) SubCutaneous every 24 hours  glucagon  Injectable 1 milliGRAM(s) IntraMuscular once  insulin lispro (ADMELOG) corrective regimen sliding scale   SubCutaneous Before meals and at bedtime  memantine 10 milliGRAM(s) Oral daily  multivitamin 1 Tablet(s) Oral daily    MEDICATIONS  (PRN):  dextrose Oral Gel 15 Gram(s) Oral once PRN Blood Glucose LESS THAN 70 milliGRAM(s)/deciliter      Allergies    No Known Allergies    Intolerances        Vital Signs Last 24 Hrs  T(C): 36.8 (31 Jan 2024 05:04), Max: 37.3 (30 Jan 2024 13:43)  T(F): 98.2 (31 Jan 2024 05:04), Max: 99.2 (30 Jan 2024 13:43)  HR: 70 (31 Jan 2024 04:36) (51 - 70)  BP: 179/77 (31 Jan 2024 04:36) (153/70 - 199/90)  BP(mean): 110 (31 Jan 2024 04:36) (101 - 132)  RR: 18 (31 Jan 2024 04:36) (17 - 18)  SpO2: 97% (31 Jan 2024 04:36) (95% - 99%)    Parameters below as of 31 Jan 2024 04:36  Patient On (Oxygen Delivery Method): room air        Physical exam:  General: No acute distress  Neurologic:  -Mental status: Awake, alert, oriented to person only. Speech is fluent no dysarthria. Follows simple one commands, struggles with complex cross commands.   -Cranial nerves:   II: Visual fields are full to confrontation.  III, IV, VI: Extraocular movements are intact without nystagmus. Pupils equally round and reactive to light  V:  Facial sensation V1-V3 equal and intact   VII: Baseline R facial asymmetry  VIII: Hearing is bilaterally intact  Motor: Normal bulk and tone. Subtle RUE drift. Strength bilateral upper extremity 5/5, bilateral lower extremities at least 3/5.  Sensation: Intact to light touch bilaterally. Unable to comprehend DST  Coordination: Unable to comprehend    LABS:                        14.4   10.18 )-----------( 157      ( 30 Jan 2024 05:30 )             41.9     01-30    134<L>  |  98  |  23  ----------------------------<  136<H>  3.6   |  23  |  0.82    Ca    9.1      30 Jan 2024 05:30  Phos  3.7     01-30  Mg     1.9     01-30    TPro  7.9  /  Alb  4.4  /  TBili  0.2  /  DBili  x   /  AST  30  /  ALT  47<H>  /  AlkPhos  126<H>  01-29    PT/INR - ( 29 Jan 2024 15:08 )   PT: 11.9 sec;   INR: 1.05          PTT - ( 29 Jan 2024 15:08 )  PTT:32.9 sec  Urinalysis Basic - ( 30 Jan 2024 05:30 )    Color: x / Appearance: x / SG: x / pH: x  Gluc: 136 mg/dL / Ketone: x  / Bili: x / Urobili: x   Blood: x / Protein: x / Nitrite: x   Leuk Esterase: x / RBC: x / WBC x   Sq Epi: x / Non Sq Epi: x / Bacteria: x        RADIOLOGY & ADDITIONAL TESTS:    Daily Summary:    1)	Unremarkable awake and sleep recording.  2)	There were no findings of active epilepsy, however this alone does not rule out the diagnosis.  Neurology Stroke Progress Note    INTERVAL HPI/OVERNIGHT EVENTS:  Patient seen and examined.  number [id number] used. Pt seen with daughter at bedside.   Pt agreeable to everything, contining to say "mary alice" when asked if he is at home, or if he is at the hospital. Denies any pain.     MEDICATIONS  (STANDING):  aspirin enteric coated 81 milliGRAM(s) Oral daily  atorvastatin 80 milliGRAM(s) Oral at bedtime  dextrose 5%. 1000 milliLiter(s) (100 mL/Hr) IV Continuous <Continuous>  dextrose 5%. 1000 milliLiter(s) (50 mL/Hr) IV Continuous <Continuous>  dextrose 50% Injectable 12.5 Gram(s) IV Push once  dextrose 50% Injectable 25 Gram(s) IV Push once  dextrose 50% Injectable 25 Gram(s) IV Push once  donepezil 5 milliGRAM(s) Oral at bedtime  enoxaparin Injectable 40 milliGRAM(s) SubCutaneous every 24 hours  glucagon  Injectable 1 milliGRAM(s) IntraMuscular once  insulin lispro (ADMELOG) corrective regimen sliding scale   SubCutaneous Before meals and at bedtime  memantine 10 milliGRAM(s) Oral daily  multivitamin 1 Tablet(s) Oral daily    MEDICATIONS  (PRN):  dextrose Oral Gel 15 Gram(s) Oral once PRN Blood Glucose LESS THAN 70 milliGRAM(s)/deciliter      Allergies    No Known Allergies    Intolerances        Vital Signs Last 24 Hrs  T(C): 36.8 (31 Jan 2024 05:04), Max: 37.3 (30 Jan 2024 13:43)  T(F): 98.2 (31 Jan 2024 05:04), Max: 99.2 (30 Jan 2024 13:43)  HR: 70 (31 Jan 2024 04:36) (51 - 70)  BP: 179/77 (31 Jan 2024 04:36) (153/70 - 199/90)  BP(mean): 110 (31 Jan 2024 04:36) (101 - 132)  RR: 18 (31 Jan 2024 04:36) (17 - 18)  SpO2: 97% (31 Jan 2024 04:36) (95% - 99%)    Parameters below as of 31 Jan 2024 04:36  Patient On (Oxygen Delivery Method): room air        Physical exam:  General: No acute distress  Neurologic:  -Mental status: Awake, alert, oriented to person only. Mostly saying "mary alice" to any direct questions, says "I am here" when asked where he is. Speech is fluent no dysarthria. Follows simple one step commands, struggles with complex cross commands.   -Cranial nerves:   II: Visual fields are full to confrontation.  III, IV, VI: Extraocular movements are intact without nystagmus. Pupils equally round and reactive to light  V:  Facial sensation V1-V3 equal and intact   VII: Baseline R facial asymmetry  VIII: Hearing is bilaterally intact  Motor: Normal bulk and tone. Subtle RUE drift. Strength bilateral upper extremity 5/5, bilateral lower extremities at least 4/5.  Sensation: Intact to light touch bilaterally. Unable to comprehend DST  Coordination: Unable to comprehend    LABS:                        14.4   10.18 )-----------( 157      ( 30 Jan 2024 05:30 )             41.9     01-30    134<L>  |  98  |  23  ----------------------------<  136<H>  3.6   |  23  |  0.82    Ca    9.1      30 Jan 2024 05:30  Phos  3.7     01-30  Mg     1.9     01-30    TPro  7.9  /  Alb  4.4  /  TBili  0.2  /  DBili  x   /  AST  30  /  ALT  47<H>  /  AlkPhos  126<H>  01-29    PT/INR - ( 29 Jan 2024 15:08 )   PT: 11.9 sec;   INR: 1.05          PTT - ( 29 Jan 2024 15:08 )  PTT:32.9 sec  Urinalysis Basic - ( 30 Jan 2024 05:30 )    Color: x / Appearance: x / SG: x / pH: x  Gluc: 136 mg/dL / Ketone: x  / Bili: x / Urobili: x   Blood: x / Protein: x / Nitrite: x   Leuk Esterase: x / RBC: x / WBC x   Sq Epi: x / Non Sq Epi: x / Bacteria: x        RADIOLOGY & ADDITIONAL TESTS:    Daily Summary:    1)	Unremarkable awake and sleep recording.  2)	There were no findings of active epilepsy, however this alone does not rule out the diagnosis.

## 2024-02-01 ENCOUNTER — TRANSCRIPTION ENCOUNTER (OUTPATIENT)
Age: 74
End: 2024-02-01

## 2024-02-01 VITALS
RESPIRATION RATE: 18 BRPM | OXYGEN SATURATION: 96 % | SYSTOLIC BLOOD PRESSURE: 162 MMHG | DIASTOLIC BLOOD PRESSURE: 82 MMHG | HEART RATE: 80 BPM

## 2024-02-01 LAB
ANION GAP SERPL CALC-SCNC: 12 MMOL/L — SIGNIFICANT CHANGE UP (ref 5–17)
BUN SERPL-MCNC: 23 MG/DL — SIGNIFICANT CHANGE UP (ref 7–23)
CALCIUM SERPL-MCNC: 9.2 MG/DL — SIGNIFICANT CHANGE UP (ref 8.4–10.5)
CHLORIDE SERPL-SCNC: 99 MMOL/L — SIGNIFICANT CHANGE UP (ref 96–108)
CO2 SERPL-SCNC: 24 MMOL/L — SIGNIFICANT CHANGE UP (ref 22–31)
CREAT SERPL-MCNC: 0.91 MG/DL — SIGNIFICANT CHANGE UP (ref 0.5–1.3)
EGFR: 90 ML/MIN/1.73M2 — SIGNIFICANT CHANGE UP
GLUCOSE BLDC GLUCOMTR-MCNC: 125 MG/DL — HIGH (ref 70–99)
GLUCOSE BLDC GLUCOMTR-MCNC: 132 MG/DL — HIGH (ref 70–99)
GLUCOSE SERPL-MCNC: 126 MG/DL — HIGH (ref 70–99)
HCT VFR BLD CALC: 46.5 % — SIGNIFICANT CHANGE UP (ref 39–50)
HGB BLD-MCNC: 15.7 G/DL — SIGNIFICANT CHANGE UP (ref 13–17)
MAGNESIUM SERPL-MCNC: 1.7 MG/DL — SIGNIFICANT CHANGE UP (ref 1.6–2.6)
MCHC RBC-ENTMCNC: 29.2 PG — SIGNIFICANT CHANGE UP (ref 27–34)
MCHC RBC-ENTMCNC: 33.8 GM/DL — SIGNIFICANT CHANGE UP (ref 32–36)
MCV RBC AUTO: 86.4 FL — SIGNIFICANT CHANGE UP (ref 80–100)
NRBC # BLD: 0 /100 WBCS — SIGNIFICANT CHANGE UP (ref 0–0)
PHOSPHATE SERPL-MCNC: 2.9 MG/DL — SIGNIFICANT CHANGE UP (ref 2.5–4.5)
PLATELET # BLD AUTO: 202 K/UL — SIGNIFICANT CHANGE UP (ref 150–400)
POTASSIUM SERPL-MCNC: 3.4 MMOL/L — LOW (ref 3.5–5.3)
POTASSIUM SERPL-SCNC: 3.4 MMOL/L — LOW (ref 3.5–5.3)
RBC # BLD: 5.38 M/UL — SIGNIFICANT CHANGE UP (ref 4.2–5.8)
RBC # FLD: 13.4 % — SIGNIFICANT CHANGE UP (ref 10.3–14.5)
SODIUM SERPL-SCNC: 135 MMOL/L — SIGNIFICANT CHANGE UP (ref 135–145)
WBC # BLD: 4.95 K/UL — SIGNIFICANT CHANGE UP (ref 3.8–10.5)
WBC # FLD AUTO: 4.95 K/UL — SIGNIFICANT CHANGE UP (ref 3.8–10.5)

## 2024-02-01 PROCEDURE — 99221 1ST HOSP IP/OBS SF/LOW 40: CPT | Mod: GC

## 2024-02-01 PROCEDURE — 99232 SBSQ HOSP IP/OBS MODERATE 35: CPT

## 2024-02-01 RX ORDER — AZILSARTAN KAMEDOXOMIL AND CHLORTHALIDONE 40; 12.5 MG/1; MG/1
1 TABLET ORAL
Qty: 0 | Refills: 0 | DISCHARGE

## 2024-02-01 RX ORDER — ATORVASTATIN CALCIUM 80 MG/1
1 TABLET, FILM COATED ORAL
Refills: 0 | DISCHARGE

## 2024-02-01 RX ORDER — POTASSIUM CHLORIDE 20 MEQ
40 PACKET (EA) ORAL ONCE
Refills: 0 | Status: COMPLETED | OUTPATIENT
Start: 2024-02-01 | End: 2024-02-01

## 2024-02-01 RX ORDER — ATORVASTATIN CALCIUM 80 MG/1
1 TABLET, FILM COATED ORAL
Qty: 30 | Refills: 0
Start: 2024-02-01

## 2024-02-01 RX ORDER — CLOPIDOGREL BISULFATE 75 MG/1
1 TABLET, FILM COATED ORAL
Qty: 30 | Refills: 0
Start: 2024-02-01

## 2024-02-01 RX ORDER — CLOPIDOGREL BISULFATE 75 MG/1
75 TABLET, FILM COATED ORAL DAILY
Refills: 0 | Status: DISCONTINUED | OUTPATIENT
Start: 2024-02-01 | End: 2024-02-01

## 2024-02-01 RX ORDER — CLOPIDOGREL BISULFATE 75 MG/1
300 TABLET, FILM COATED ORAL ONCE
Refills: 0 | Status: COMPLETED | OUTPATIENT
Start: 2024-02-01 | End: 2024-02-01

## 2024-02-01 RX ADMIN — Medication 81 MILLIGRAM(S): at 12:21

## 2024-02-01 RX ADMIN — Medication 1 TABLET(S): at 12:21

## 2024-02-01 RX ADMIN — Medication 40 MILLIEQUIVALENT(S): at 09:35

## 2024-02-01 RX ADMIN — CLOPIDOGREL BISULFATE 300 MILLIGRAM(S): 75 TABLET, FILM COATED ORAL at 12:20

## 2024-02-01 RX ADMIN — MEMANTINE HYDROCHLORIDE 10 MILLIGRAM(S): 10 TABLET ORAL at 12:21

## 2024-02-01 RX ADMIN — Medication 90 MILLIGRAM(S): at 09:35

## 2024-02-01 NOTE — DISCHARGE NOTE NURSING/CASE MANAGEMENT/SOCIAL WORK - NSDCPEFALRISK_GEN_ALL_CORE
For information on Fall & Injury Prevention, visit: [email or web address] OR  [email or web address] OR  https://www.cdc.gov/steadi/patient.html

## 2024-02-01 NOTE — DISCHARGE NOTE PROVIDER - NSDCCPCAREPLAN_GEN_ALL_CORE_FT
PRINCIPAL DISCHARGE DIAGNOSIS  Diagnosis: Stroke  Assessment and Plan of Treatment: During this hospital admission, you had an ischemic stroke. During an ischemic stroke, blood stops flowing to part of your brain because of a blockage in the blood vessel. This can damage areas in the brain that control other parts of the body.  Please take your [aspirin and plavix///Eliquis] for blood thinning and Atorvastatin for cholesterol medication/blood vessel protection as prescribed to prevent further strokes. Do not skip doses and do not run low on your medication. If you run low on your medication, please contact your doctor. You will follow up outpatient with a member of the stroke team.   Doing your regular tasks may be difficult after you've had a stroke, but you can learn new ways to manage your daily activities. In fact, doing daily activities may help you to regain muscle strength. Be patient, give yourself time to adjust, and appreciate the progress you make. For example, when showering or bathing, test the water temperature with a hand or foot that was not affected by the stroke, use grab bars, a shower seat, a hand-held showerhead, etc. It is normal to feel fatigue after a stroke, while some days may be worse than others, you will continue to improve.  Call 911 right away if you have any of the following symptoms of another stroke:  B: Balance: Sudden: Dizziness, loss of balance, or a sense of falling, difficulty with coordinating movement  E: Eyes: Sudden double vision or trouble seeing in one or both eyes  F: Face: Sudden uneven face  A: Arms (Legs): Sudden weakness, tingling, or loss of feeling on one side of your face or body  S: Speech: Sudden trouble talking or slurred speech, sudden difficulty understanding others  T: Time: Please call 911 right away and go to the emergency room  •Sudden, severe headache  •Blackouts or seizures     PRINCIPAL DISCHARGE DIAGNOSIS  Diagnosis: Stroke  Assessment and Plan of Treatment: During this hospital admission, you had an ischemic stroke. During an ischemic stroke, blood stops flowing to part of your brain because of a blockage in the blood vessel. This can damage areas in the brain that control other parts of the body.  Please take your aspirin and plavix for blood thinning and Atorvastatin for cholesterol medication/blood vessel protection as prescribed to prevent further strokes. Do not skip doses and do not run low on your medication. If you run low on your medication, please contact your doctor. You will follow up outpatient with a member of the stroke team.   Doing your regular tasks may be difficult after you've had a stroke, but you can learn new ways to manage your daily activities. In fact, doing daily activities may help you to regain muscle strength. Be patient, give yourself time to adjust, and appreciate the progress you make. For example, when showering or bathing, test the water temperature with a hand or foot that was not affected by the stroke, use grab bars, a shower seat, a hand-held showerhead, etc. It is normal to feel fatigue after a stroke, while some days may be worse than others, you will continue to improve.  Call 911 right away if you have any of the following symptoms of another stroke:  B: Balance: Sudden: Dizziness, loss of balance, or a sense of falling, difficulty with coordinating movement  E: Eyes: Sudden double vision or trouble seeing in one or both eyes  F: Face: Sudden uneven face  A: Arms (Legs): Sudden weakness, tingling, or loss of feeling on one side of your face or body  S: Speech: Sudden trouble talking or slurred speech, sudden difficulty understanding others  T: Time: Please call 911 right away and go to the emergency room  •Sudden, severe headache  •Blackouts or seizures     PRINCIPAL DISCHARGE DIAGNOSIS  Diagnosis: Stroke  Assessment and Plan of Treatment: During this hospital admission, you had an ischemic stroke. During an ischemic stroke, blood stops flowing to part of your brain because of a blockage in the blood vessel. This can damage areas in the brain that control other parts of the body.  Please take your aspirin and plavix for blood thinning and Atorvastatin for cholesterol medication/blood vessel protection as prescribed to prevent further strokes. Do not skip doses and do not run low on your medication. If you run low on your medication, please contact your doctor. You will follow up outpatient with a member of the stroke team.   Doing your regular tasks may be difficult after you've had a stroke, but you can learn new ways to manage your daily activities. In fact, doing daily activities may help you to regain muscle strength. Be patient, give yourself time to adjust, and appreciate the progress you make. For example, when showering or bathing, test the water temperature with a hand or foot that was not affected by the stroke, use grab bars, a shower seat, a hand-held showerhead, etc. It is normal to feel fatigue after a stroke, while some days may be worse than others, you will continue to improve.  Call 911 right away if you have any of the following symptoms of another stroke:  B: Balance: Sudden: Dizziness, loss of balance, or a sense of falling, difficulty with coordinating movement  E: Eyes: Sudden double vision or trouble seeing in one or both eyes  F: Face: Sudden uneven face  A: Arms (Legs): Sudden weakness, tingling, or loss of feeling on one side of your face or body  S: Speech: Sudden trouble talking or slurred speech, sudden difficulty understanding others  T: Time: Please call 911 right away and go to the emergency room  •Sudden, severe headache  •Blackouts or seizures      SECONDARY DISCHARGE DIAGNOSES  Diagnosis: Hypertension  Assessment and Plan of Treatment: Hypertension is the medical term for high blood pressure. Blood pressure refers to the pressure that blood applies to the inner walls of the arteries. Arteries carry blood from the heart to other organs and parts of the body. Untreated high blood pressure increases the strain on the heart and arteries, eventually causing organ damage. High blood pressure increases the risk of heart failure, heart attack (myocardial infarction), stroke, and kidney failure. High blood pressure does not usually cause any symptoms. Treatment of hypertension usually begins with lifestyle changes. Making these lifestyle changes involves little or no risk. Recommended changes often include reducing the amount of salt in your diet, losing weight if you are overweight or obese, avoiding drinking too much alcohol, stopping smoking and exercising at least 30 minutes per day most days of the week. If you are prescribed medication for your hypertension it is important to take these as prescribed to prevent the possible complications of uncontrolled hypertension.   Please continue your Nifedipine and Labetalol. Hold your Edarbyclor for now until you see your outpatient providers.

## 2024-02-01 NOTE — DISCHARGE NOTE PROVIDER - NPI NUMBER (FOR SYSADMIN USE ONLY) :
Goal Outcome Evaluation:  Plan of Care Reviewed With: patient        Progress: improving  Outcome Summary: Patient c/o fatigue after dialysis but agreeable to ambulation. Patient able to tolerate increased ambulation distance, monieer today. Anticipate d/c to SNU soon  Patient was intermittently wearing a face mask during this therapy encounter. Therapist used appropriate personal protective equipment including eye protection, mask, and gloves.  Mask used was standard procedure mask. Appropriate PPE was worn during the entire therapy session. Hand hygiene was completed before and after therapy session. Patient is not in enhanced droplet precautions.     [[id number]]

## 2024-02-01 NOTE — DISCHARGE NOTE PROVIDER - NSDCMRMEDTOKEN_GEN_ALL_CORE_FT
aspirin 81 mg oral delayed release tablet: 1 tab(s) orally once a day  atorvastatin 20 mg oral tablet: 1 tab(s) orally once a day  azilsartan-chlorthalidone 40 mg-12.5 mg oral tablet: 1 tab(s) orally once a day  donepezil 5 mg oral tablet: 1 tab(s) orally once a day (at bedtime)  labetalol 200 mg oral tablet: 1 tab(s) orally 2 times a day  memantine 10 mg oral tablet: 1 tab(s) orally once a day  metFORMIN 500 mg oral tablet: 1  orally 2 times a day  Multiple Vitamins oral tablet: 1 tab(s) orally once a day  NIFEdipine 90 mg oral tablet, extended release: 1 tab(s) orally once a day   aspirin 81 mg oral delayed release tablet: 1 tab(s) orally once a day  atorvastatin 80 mg oral tablet: 1 tab(s) orally once a day (at bedtime)  clopidogrel 75 mg oral tablet: 1 tab(s) orally once a day  donepezil 5 mg oral tablet: 1 tab(s) orally once a day (at bedtime)  labetalol 200 mg oral tablet: 1 tab(s) orally 2 times a day  memantine 10 mg oral tablet: 1 tab(s) orally once a day  metFORMIN 500 mg oral tablet: 1  orally 2 times a day  Multiple Vitamins oral tablet: 1 tab(s) orally once a day  NIFEdipine 90 mg oral tablet, extended release: 1 tab(s) orally once a day

## 2024-02-01 NOTE — PROGRESS NOTE ADULT - SUBJECTIVE AND OBJECTIVE BOX
No issues overnight.  Daughter at bedside.  States that he is doing much better than when he came in.  Discussed plan with the stroke team, and patient to be discharged today.     Remaining ROS negative       PHYSICAL EXAM:    General: nad, lying in bed  HEENT: NC/AT; MMM  Cardiovascular: +S1/S2, RRR  Respiratory: CTA B/L; no W/R/R  Gastrointestinal: soft, NT/ND; +BSx4  Extremities: WWP;   Neurological: responds to questions, follows commands (wiggle your toes), no acute deficits noted  Psychiatric: pleasant mood and affect  Dermatologic: no appreciable wounds or damage to the skin    VITAL SIGNS:  Vital Signs Last 24 Hrs  T(C): 36.8 (01 Feb 2024 09:03), Max: 37.1 (31 Jan 2024 21:50)  T(F): 98.2 (01 Feb 2024 09:03), Max: 98.8 (31 Jan 2024 21:50)  HR: 62 (01 Feb 2024 09:19) (62 - 86)  BP: 156/76 (01 Feb 2024 09:19) (125/59 - 182/79)  BP(mean): 107 (01 Feb 2024 09:19) (83 - 114)  RR: 19 (01 Feb 2024 09:19) (16 - 19)  SpO2: 98% (01 Feb 2024 09:19) (97% - 99%)    Parameters below as of 01 Feb 2024 09:19  Patient On (Oxygen Delivery Method): room air          MEDICATIONS:  MEDICATIONS  (STANDING):  aspirin enteric coated 81 milliGRAM(s) Oral daily  atorvastatin 80 milliGRAM(s) Oral at bedtime  clopidogrel Tablet 300 milliGRAM(s) Oral once  dextrose 5%. 1000 milliLiter(s) (50 mL/Hr) IV Continuous <Continuous>  dextrose 5%. 1000 milliLiter(s) (100 mL/Hr) IV Continuous <Continuous>  dextrose 50% Injectable 25 Gram(s) IV Push once  dextrose 50% Injectable 25 Gram(s) IV Push once  dextrose 50% Injectable 12.5 Gram(s) IV Push once  donepezil 5 milliGRAM(s) Oral at bedtime  enoxaparin Injectable 40 milliGRAM(s) SubCutaneous every 24 hours  glucagon  Injectable 1 milliGRAM(s) IntraMuscular once  insulin lispro (ADMELOG) corrective regimen sliding scale   SubCutaneous Before meals and at bedtime  memantine 10 milliGRAM(s) Oral daily  multivitamin 1 Tablet(s) Oral daily  NIFEdipine XL 90 milliGRAM(s) Oral every 24 hours    MEDICATIONS  (PRN):  dextrose Oral Gel 15 Gram(s) Oral once PRN Blood Glucose LESS THAN 70 milliGRAM(s)/deciliter      ALLERGIES:  Allergies    No Known Allergies    Intolerances        LABS:                        15.7   4.95  )-----------( 202      ( 01 Feb 2024 06:54 )             46.5     02-01    135  |  99  |  23  ----------------------------<  126<H>  3.4<L>   |  24  |  0.91    Ca    9.2      01 Feb 2024 06:54  Phos  2.9     02-01  Mg     1.7     02-01        Urinalysis Basic - ( 01 Feb 2024 06:54 )    Color: x / Appearance: x / SG: x / pH: x  Gluc: 126 mg/dL / Ketone: x  / Bili: x / Urobili: x   Blood: x / Protein: x / Nitrite: x   Leuk Esterase: x / RBC: x / WBC x   Sq Epi: x / Non Sq Epi: x / Bacteria: x      CAPILLARY BLOOD GLUCOSE      POCT Blood Glucose.: 125 mg/dL (01 Feb 2024 11:17)      RADIOLOGY & ADDITIONAL TESTS: Reviewed.

## 2024-02-01 NOTE — PROGRESS NOTE ADULT - PROBLEM SELECTOR PLAN 6
HbA1c 6.3% at goal  -- monitor blood glucose  -- hold metformin while inpatient
HbA1c 6.3% at goal  -- monitor blood glucose  -- hold metformin while inpatient, can resume home regimen on discharge

## 2024-02-01 NOTE — DISCHARGE NOTE PROVIDER - CARE PROVIDER_API CALL
Sana Norton NP in [facility]  [address]  [place] [address]  Phone: [phone]  Fax: [phone]  Scheduled Appointment: 02/21/2024 02:00 PM

## 2024-02-01 NOTE — PROGRESS NOTE ADULT - ASSESSMENT
73y Male (Y[place]i speaking, poor historian), current smoker, with PMHx vascular dementia, HTN, DM, HLD, CAD (was on Xarelto, discontinued 2/2 GI bleed, now just on ASA 81mg daily),  PAD s/p bilateral fem-pop bypasses (2016) s/p R embolectomy and angioplasty x2 for RLE ( ischemia (3/2020) on ASA, severe aortic stenosis, stage 1 cecal adenocarcinoma s/p open R hemicolectomy (06/2020), presenting to [facility] ED after waking up with confusion and reportedly urinated on himself. NIHSS 5. NCHCT performed and demonstrated chronic bilateral thalamic infarcts, right basal ganglia, and bilateral cerebellar infarcts. CTA head and neck with mild irregular narrowing in the distal basilar artery and poor visualization of th right vertebral artery in the proximal segment with threadlike visibility in the mid and distal areas, similar in appearance to his CTA's in 2020. MRI showed two punctate recent cerebellar strokes.    Neuro  #CVA workup vs seizure vs neurocognitive process  - continue aspirin 81mg, ASA accumetric therapeutic. Add plavix 75mg daily  - initiated atorvastatin 80mg daily  - q4hr stroke neuro checks and vitals  - MRI brain showed two recent punctate cerebellar strokes. No evidence of cerebral amyloid angiopathy  - EEG reading - unremarkable  - Stroke Code HCT Results:  chronic bilateral thalamic infarcts, right basal ganglia, and bilateral cerebellar infarcts  - Stroke Code CTA Results: mild irregular narrowing in the distal basilar artery and poor visualization of th right vertebral artery in the proximal segment with threadlike visibility in the mid and distal areas,  - Stroke education    Cards  #HTN  - Goal -180  - on home Nifedipine, restart other home medications as needed  - TTE: PFO vs AVM, basal inferolateral akinesis, basal inferoseptal and basal inferior segment hypokineses, EF 60-65%      #HLD  - high dose statin as above in CVA  - LDL results: 63    Pulm  - call provider if SPO2 < 94%    GI  #Nutrition/Fluids/Electrolytes   - replete K<4 and Mg <2  - Diet: DASH, CC      Infectious Disease  - negative UA on admisision    Endocrine  #DM  - A1C results: 6.3  - ISS    - TSH results: 1.62    DVT Prophylaxis  - lovenox sq for DVT prophylaxis   - SCDs for DVT prophylaxis       Dispo: home with family     Discussed daily hospital plans and goals with patient    Discussed with Neurology Attending Dr. Archuleta

## 2024-02-01 NOTE — CONSULT NOTE ADULT - ASSESSMENT
73 year old male with significant medical comorbidities found to have new bilateral cerebellar infarcts with cognitive, ADL, and functional impairments.     PROBLEMS / IMPAIRMENTS:   * New Cerebellar CVA, hx of strokes - ASA, Plavix, Statin  * Cognitive impairment, hx of vascular dementia  * DM2  * Cardiac comorbidities: CAD, HTN, HTLD, Aortic stenosis, PAD s/p fem-pop bypass - Nifedipine    PLAN / RECOMMENDATIONS:     Rehab / Mobilization:   - Initial therapy assessment: [X] PT  [X] OT   - Continue skilled therapy while admitted to prevent secondary complications of immobility focus on transfer training, bed mobility, progressive ambulation, and equipment evaluation.   - Educated patient and family members on post-acute rehabilitation. Discussed anticipated rehab course.  - OOB throughout the day with staff or OOB in chair with meals   - Therapy precautions: falls    Bracing/Splinting:   - None indicated at this time      Pain Management:   - Currently well controlled on current regimen    - Avoid/ monitor use sedating medications that may interfere with cognitive recovery     Speech/ Swallow:   - Dysphagia screen [X]  - Diet:  reg + thins    GI/ Bowel:  - Continue to monitor bowel patterns.     / Bladder:  - Continue to monitor bladder patterns, avoid constipation.       DVT Prophylaxis:   -SCDs, chemoprophylaxis - Lovenox    Disposition: Home with home PT/OT 73 year old male with significant medical comorbidities found to have new bilateral cerebellar infarcts with cognitive, ADL, and functional impairments.     PROBLEMS / IMPAIRMENTS:   * New Cerebellar CVA, hx of strokes - ASA, Plavix, Statin  * Cognitive impairment, hx of vascular dementia - memantine, Donepezil   * DM2  * Cardiac comorbidities: CAD, HTN, HTLD, Aortic stenosis, PAD s/p fem-pop bypass - Nifedipine    PLAN / RECOMMENDATIONS:     Rehab / Mobilization:   - Initial therapy assessment: [X] PT  [X] OT   - Continue skilled therapy while admitted to prevent secondary complications of immobility focus on transfer training, bed mobility, progressive ambulation, and equipment evaluation.   - Educated patient and family members on post-acute rehabilitation. Discussed anticipated rehab course.  - OOB throughout the day with staff or OOB in chair with meals   - Therapy precautions: falls    Bracing/Splinting:   - None indicated at this time      Pain Management:   - Currently well controlled on current regimen    - Avoid/ monitor use sedating medications that may interfere with cognitive recovery     Speech/ Swallow:   - Dysphagia screen [X]  - Diet:  reg + thins    GI/ Bowel:  - Continue to monitor bowel patterns.     / Bladder:  - Continue to monitor bladder patterns, avoid constipation.       DVT Prophylaxis:   -SCDs, chemoprophylaxis - Lovenox    Disposition: Home with home PT/OT

## 2024-02-01 NOTE — CONSULT NOTE ADULT - SUBJECTIVE AND OBJECTIVE BOX
HPI:   **STROKE HPI***  HPI: 73y Male (Swedish speaking, poor historian), current smoker, with PMHx vascular dementia, HTN, DM, HLD, CAD (was on Xarelto, discontinued 2/2 GI bleed, now just on ASA 81mg daily),  PAD s/p bilateral fem-pop bypasses (2016) s/p R embolectomy and angioplasty x2 for RLE ( ischemia (3/2020) on ASA, severe aortic stenosis, stage 1 cecal adenocarcinoma s/p open R hemicolectomy (06/2020), presenting to [facility] ED after waking up this morning with AMS and reportedly urinated on himself. At baseline, patient is AOX2, able to recognize family members, however, this morning he did not know who his wife or daughter was. He would repeat himself and say "mary alice", appears confused. UA in ED negative for infection. On exam, appears to be easily distractible and having intermittent difficulty following simple commands and cannot follow complex. NIHSS 5. NCHCT performed and demonstrated chronic bilateral thalamic infarcts, right basal ganglia, and bilateral cerebellar infarcts. CTA head and neck with mild irregular narrowing in the distal basilar artery and poor visualization of th right vertebral artery in the proximal segment with threadlike visibility in the mid and distal areas, similar in appearance to his CTA's in 2020.     HCT noted chronic bilateral thalamic infarcts, right basal ganglia, and bilateral cerebellar infarcts.   CTA head/Neck: with mild irregular narrowing in the distal basilar artery and poor visualization of th right vertebral artery in the proximal segment with threadlike visibility in the mid and distal areas  TTE: PFO vs AVM, basal inferolateral akinesis, basal inferoseptal and basal inferior segment hypokineses, EF 60-65%  MRI brain with 2 punctate cerebellar bihemispheric infarcts  EEG negative    -----------------------------------------------------------------------  SUBJECTIVE:      -----------------------------------------------------------------------  REVIEW OF SYSTEMS  Constitutional - No fever,  +fatigue  Neurological -   Pain -   Bowel -   Bladder -   -----------------------------------------------------------------------  FUNCTIONAL HISTORY:  Per chart review - Pt lives with family in private home with 3 LUCÍA and ambulates independently without device. Per daughter, pt benefits from assist at baseline to engage in ADL 2/2 cog decline however physically capable of performing tasks with prompting and cues    CURRENT FUNCTIONAL STATUS:    Physical Therapy: 1/31    Bed Mobility  Bed Mobility Training Rolling/Turning: independent  Bed Mobility Training Sit-to-Supine: independent  Bed Mobility Training Supine-to-Sit: independent    Sit-Stand Transfer Training  Transfer Training Sit-to-Stand Transfer: supervsion;  1 person assist;  nonverbal cues (demo/gestures);  verbal cues  Transfer Training Stand-to-Sit Transfer: supervsion;  1 person assist;  nonverbal cues (demo/gestures);  verbal cues  Sit-to-Stand Transfer Training Transfer Safety Analysis: decreased balance;  decreased sequencing ability;  decreased step length;  decreased weight-shifting ability;  decreased strength;  impaired balance;  impaired postural control;  cognitive, decreased safety awareness    Gait Training  Gait Training: supervsion;  contact guard;  1 person assist;  nonverbal cues (demo/gestures);  verbal cues;  75 feet  Gait Analysis: 2-point gait   decreased ina;  increased time in double stance;  decreased hip/knee flexion;  decreased step length;  decreased trunk rotation;  decreased strength;  impaired balance;  impaired postural control;  cognitive, decreased safety awareness;  75 feet      Occupational Therapy: 1/31  Bed Mobility  Bed Mobility Training Symptoms Noted During/After Treatment: fatigue  Bed Mobility Training Rolling/Turning: verbal cues;  supervsion;  supervision  Bed Mobility Training Scooting: supervsion;  supervision  Bed Mobility Training Sit-to-Supine: supervsion  Bed Mobility Training Supine-to-Sit: supervsion;  supervision;  verbal cues  Bed Mobility Training Limitations: cognitive, decreased safety awareness    Sit-Stand Transfer Training  Transfer Training Sit-to-Stand Transfer: supervsion;  supervision;  verbal cues  Transfer Training Stand-to-Sit Transfer: supervsion;  supervision  Sit-to-Stand Transfer Training Transfer Safety Analysis: decreased balance;  decreased sequencing ability;  decreased flexibility;  decreased strength;  impaired coordination    Toilet Transfer Training  Transfer Training Toilet Transfer: supervsion;  supervision;  verbal cues  Toilet Transfer Training Transfer Safety Analysis: decreased balance;  decreased sequencing ability;  decreased flexibility;  decreased strength;  impaired balance;  impaired coordination;  rolling walker    Therapeutic Exercise  Therapeutic Exercise Charges: Pt ambulated in hallway ~35ftx2 - +bouts of unsteadiness w/ decreased visuo spatial awareness noted     Lower Body Dressing Training  Lower Body Dressing Training Assistance: minimum assist (75% patient effort);  1 person assist;  verbal cues;  w/ comp tech edu - +long sitting in bed;  decreased flexibility;  decreased strength;  impaired balance;  impaired coordination;  cognitive, decreased safety awareness    Upper Body Dressing Training  Upper Body Dressing Training Assistance: minimum assist (75% patient effort);  1 person assist;  verbal cues;  decreased strength;  cognitive, decreased safety awareness    Grooming Training  Grooming Training Symptoms Noted During/After Treatment: fatigue  Grooming Training Assistance: contact guard;  minimum assist (75% patient effort);  1 person assist;  verbal cues;  perform hand washing at sink;  decreased flexibility;  impaired balance;  impaired coordination      -----------------------------------------------------------------------  PAST MEDICAL & SURGICAL HISTORY  Essential hypertension    CAD (coronary artery disease)    DM (diabetes mellitus)    PAD (peripheral artery disease)    Colon cancer    Other postprocedural status    History of hernia repair    Elective surgery    S/P femoral-femoral bypass surgery    S/P right hemicolectomy      FAMILY HISTORY   No pertinent family history in first degree relatives      SOCIAL HISTORY  As above  -----------------------------------------------------------------------  VITALS  T(C): 36.8 (02-01-24 @ 09:03), Max: 37.1 (01-31-24 @ 21:50)  HR: 62 (02-01-24 @ 09:19) (62 - 86)  BP: 156/76 (02-01-24 @ 09:19) (125/59 - 182/79)  RR: 19 (02-01-24 @ 09:19) (16 - 19)  SpO2: 98% (02-01-24 @ 09:19) (97% - 99%)  Wt(kg): --    PHYSICAL EXAM    -----------------------------------------------------------------------  RECENT LABS  CBC Full  -  ( 01 Feb 2024 06:54 )  WBC Count : 4.95 K/uL  RBC Count : 5.38 M/uL  Hemoglobin : 15.7 g/dL  Hematocrit : 46.5 %  Platelet Count - Automated : 202 K/uL  Mean Cell Volume : 86.4 fl  Mean Cell Hemoglobin : 29.2 pg  Mean Cell Hemoglobin Concentration : 33.8 gm/dL  Auto Neutrophil # : x  Auto Lymphocyte # : x  Auto Monocyte # : x  Auto Eosinophil # : x  Auto Basophil # : x  Auto Neutrophil % : x  Auto Lymphocyte % : x  Auto Monocyte % : x  Auto Eosinophil % : x  Auto Basophil % : x    02-01    135  |  99  |  23  ----------------------------<  126<H>  3.4<L>   |  24  |  0.91    Ca    9.2      01 Feb 2024 06:54  Phos  2.9     02-01  Mg     1.7     02-01      Urinalysis Basic - ( 01 Feb 2024 06:54 )    Color: x / Appearance: x / SG: x / pH: x  Gluc: 126 mg/dL / Ketone: x  / Bili: x / Urobili: x   Blood: x / Protein: x / Nitrite: x   Leuk Esterase: x / RBC: x / WBC x   Sq Epi: x / Non Sq Epi: x / Bacteria: x      -----------------------------------------------------------------------  IMAGING:  < from: MR Head No Cont (01.31.24 @ 19:19) >  FINDINGS:    Diffusion imaging is positive for two small cerebellar acute versus   subacute infarcts, right superior vermis on image 10 and left   inferolaterally on images 3-4.    There are more numerous chronic bilateral cerebellar infarcts, few   bilateral basal ganglia chronic lacunar infarcts, and scattered pontine   and cerebral white matter foci are seen on the T2-weighted images.   T2-FLAIR imaging was inadvertently omitted.    There is mild-moderate diffuse parenchymal volume loss. No extra-axial   collection is visible. No hemorrhage on GRE. There are preserved large   vessel flow-voids.    Mastoids and paranasal sinuses are free of acute disease. No gross   orbital or skull base pathology.      IMPRESSION:    Two punctate recent cerebellar infarcts, one on each side.    More numerous chronic cerebellar lacunar infarcts, and numerous basal   ganglia and cerebral white matter sites. Distribution compatible with   advanced microangiopathic sequela. No hemorrhage on GRE.    < end of copied text >    -----------------------------------------------------------------------  ALLERGIES  No Known Allergies      MEDICATIONS   aspirin enteric coated 81 milliGRAM(s) Oral daily  atorvastatin 80 milliGRAM(s) Oral at bedtime  clopidogrel Tablet 300 milliGRAM(s) Oral once  dextrose 5%. 1000 milliLiter(s) IV Continuous <Continuous>  dextrose 5%. 1000 milliLiter(s) IV Continuous <Continuous>  dextrose 50% Injectable 12.5 Gram(s) IV Push once  dextrose 50% Injectable 25 Gram(s) IV Push once  dextrose 50% Injectable 25 Gram(s) IV Push once  dextrose Oral Gel 15 Gram(s) Oral once PRN  donepezil 5 milliGRAM(s) Oral at bedtime  enoxaparin Injectable 40 milliGRAM(s) SubCutaneous every 24 hours  glucagon  Injectable 1 milliGRAM(s) IntraMuscular once  insulin lispro (ADMELOG) corrective regimen sliding scale   SubCutaneous Before meals and at bedtime  memantine 10 milliGRAM(s) Oral daily  multivitamin 1 Tablet(s) Oral daily  NIFEdipine XL 90 milliGRAM(s) Oral every 24 hours    -----------------------------------------------------------------------   HPI:   **STROKE HPI***  HPI: 73y Male (Albanian speaking, poor historian), current smoker, with PMHx vascular dementia, HTN, DM, HLD, CAD (was on Xarelto, discontinued 2/2 GI bleed, now just on ASA 81mg daily),  PAD s/p bilateral fem-pop bypasses (2016) s/p R embolectomy and angioplasty x2 for RLE ( ischemia (3/2020) on ASA, severe aortic stenosis, stage 1 cecal adenocarcinoma s/p open R hemicolectomy (06/2020), presenting to [facility] ED after waking up this morning with AMS and reportedly urinated on himself. At baseline, patient is AOX2, able to recognize family members, however, this morning he did not know who his wife or daughter was. He would repeat himself and say "mary alice", appears confused. UA in ED negative for infection. On exam, appears to be easily distractible and having intermittent difficulty following simple commands and cannot follow complex. NIHSS 5. NCHCT performed and demonstrated chronic bilateral thalamic infarcts, right basal ganglia, and bilateral cerebellar infarcts. CTA head and neck with mild irregular narrowing in the distal basilar artery and poor visualization of th right vertebral artery in the proximal segment with threadlike visibility in the mid and distal areas, similar in appearance to his CTA's in 2020.     HCT noted chronic bilateral thalamic infarcts, right basal ganglia, and bilateral cerebellar infarcts.   CTA head/Neck: with mild irregular narrowing in the distal basilar artery and poor visualization of th right vertebral artery in the proximal segment with threadlike visibility in the mid and distal areas  TTE: PFO vs AVM, basal inferolateral akinesis, basal inferoseptal and basal inferior segment hypokineses, EF 60-65%  MRI brain with 2 punctate cerebellar bihemispheric infarcts  EEG negative    -----------------------------------------------------------------------  SUBJECTIVE:  Patient seen and evaluated this afternoon using [employer]  service ID #[id number].  No complaints at this time. States he worked with therapy today without issues.   -----------------------------------------------------------------------  REVIEW OF SYSTEMS  Constitutional - No fever  Neurological - stable  Pain - denies  -----------------------------------------------------------------------  FUNCTIONAL HISTORY:  Per chart review - Pt lives with family in private home with 3 LUCÍA and ambulates independently without device. Per daughter, pt benefits from assist at baseline to engage in ADL 2/2 cog decline however physically capable of performing tasks with prompting and cues    CURRENT FUNCTIONAL STATUS:    Physical Therapy: 1/31    Bed Mobility  Bed Mobility Training Rolling/Turning: independent  Bed Mobility Training Sit-to-Supine: independent  Bed Mobility Training Supine-to-Sit: independent    Sit-Stand Transfer Training  Transfer Training Sit-to-Stand Transfer: supervsion;  1 person assist;  nonverbal cues (demo/gestures);  verbal cues  Transfer Training Stand-to-Sit Transfer: supervsion;  1 person assist;  nonverbal cues (demo/gestures);  verbal cues  Sit-to-Stand Transfer Training Transfer Safety Analysis: decreased balance;  decreased sequencing ability;  decreased step length;  decreased weight-shifting ability;  decreased strength;  impaired balance;  impaired postural control;  cognitive, decreased safety awareness    Gait Training  Gait Training: supervsion;  contact guard;  1 person assist;  nonverbal cues (demo/gestures);  verbal cues;  75 feet  Gait Analysis: 2-point gait   decreased ina;  increased time in double stance;  decreased hip/knee flexion;  decreased step length;  decreased trunk rotation;  decreased strength;  impaired balance;  impaired postural control;  cognitive, decreased safety awareness;  75 feet      Occupational Therapy: 1/31  Bed Mobility  Bed Mobility Training Symptoms Noted During/After Treatment: fatigue  Bed Mobility Training Rolling/Turning: verbal cues;  supervsion;  supervision  Bed Mobility Training Scooting: supervsion;  supervision  Bed Mobility Training Sit-to-Supine: supervsion  Bed Mobility Training Supine-to-Sit: supervsion;  supervision;  verbal cues  Bed Mobility Training Limitations: cognitive, decreased safety awareness    Sit-Stand Transfer Training  Transfer Training Sit-to-Stand Transfer: supervsion;  supervision;  verbal cues  Transfer Training Stand-to-Sit Transfer: supervsion;  supervision  Sit-to-Stand Transfer Training Transfer Safety Analysis: decreased balance;  decreased sequencing ability;  decreased flexibility;  decreased strength;  impaired coordination    Toilet Transfer Training  Transfer Training Toilet Transfer: supervsion;  supervision;  verbal cues  Toilet Transfer Training Transfer Safety Analysis: decreased balance;  decreased sequencing ability;  decreased flexibility;  decreased strength;  impaired balance;  impaired coordination;  rolling walker    Therapeutic Exercise  Therapeutic Exercise Charges: Pt ambulated in hallway ~35ftx2 - +bouts of unsteadiness w/ decreased visuo spatial awareness noted     Lower Body Dressing Training  Lower Body Dressing Training Assistance: minimum assist (75% patient effort);  1 person assist;  verbal cues;  w/ comp tech edu - +long sitting in bed;  decreased flexibility;  decreased strength;  impaired balance;  impaired coordination;  cognitive, decreased safety awareness    Upper Body Dressing Training  Upper Body Dressing Training Assistance: minimum assist (75% patient effort);  1 person assist;  verbal cues;  decreased strength;  cognitive, decreased safety awareness    Grooming Training  Grooming Training Symptoms Noted During/After Treatment: fatigue  Grooming Training Assistance: contact guard;  minimum assist (75% patient effort);  1 person assist;  verbal cues;  perform hand washing at sink;  decreased flexibility;  impaired balance;  impaired coordination      -----------------------------------------------------------------------  PAST MEDICAL & SURGICAL HISTORY  Essential hypertension    CAD (coronary artery disease)    DM (diabetes mellitus)    PAD (peripheral artery disease)    Colon cancer    Other postprocedural status    History of hernia repair    Elective surgery    S/P femoral-femoral bypass surgery    S/P right hemicolectomy      FAMILY HISTORY   No pertinent family history in first degree relatives      SOCIAL HISTORY  As above  -----------------------------------------------------------------------  VITALS  T(C): 36.8 (02-01-24 @ 09:03), Max: 37.1 (01-31-24 @ 21:50)  HR: 62 (02-01-24 @ 09:19) (62 - 86)  BP: 156/76 (02-01-24 @ 09:19) (125/59 - 182/79)  RR: 19 (02-01-24 @ 09:19) (16 - 19)  SpO2: 98% (02-01-24 @ 09:19) (97% - 99%)  Wt(kg): --    PHYSICAL EXAM  Constitutional - NAD, Comfortable  HEENT - NCAT  Neck - Supple, No limited ROM  Chest - Breathing comfortably, No Respiratory distress  Cardiovascular - Regular pulse  Abdomen - No visible abdominal distension  Extremities - No deformities of upper or lower limbs. No calf tenderness   MSK - ROM within functional limits  Neurologic Exam -                    Cognitive - Awake, Alert, AAO to self and, situation; follows commands 75 % of the time. Needs frequent cuing.      Communication - Fluent, No dysarthria, distractable     Cranial Nerves -  EOMI, No facial asymmetry     Motor - No focal deficits                    LEFT    UE - ShAB 5/5, EF 5/5, EE 5/5, WE 5/5,  5/5                    LEFT    LE - HF 5/5, KE 5/5, DF 5/5, PF 5/5                    RIGHT UE - ShAB 5/5, EF 5/5, EE 5/5, WE 5/5,  5/5                    RIGHT LE - HF 5/5, KE 5/5, DF 5/5, PF 5/5        Sensory - Intact to LT     Reflexes - no clonus     Coordination - slight dysmetria bilaterally  Psychiatric - Mood stable, Affect WNL   -----------------------------------------------------------------------  RECENT LABS  CBC Full  -  ( 01 Feb 2024 06:54 )  WBC Count : 4.95 K/uL  RBC Count : 5.38 M/uL  Hemoglobin : 15.7 g/dL  Hematocrit : 46.5 %  Platelet Count - Automated : 202 K/uL  Mean Cell Volume : 86.4 fl  Mean Cell Hemoglobin : 29.2 pg  Mean Cell Hemoglobin Concentration : 33.8 gm/dL  Auto Neutrophil # : x  Auto Lymphocyte # : x  Auto Monocyte # : x  Auto Eosinophil # : x  Auto Basophil # : x  Auto Neutrophil % : x  Auto Lymphocyte % : x  Auto Monocyte % : x  Auto Eosinophil % : x  Auto Basophil % : x    02-01    135  |  99  |  23  ----------------------------<  126<H>  3.4<L>   |  24  |  0.91    Ca    9.2      01 Feb 2024 06:54  Phos  2.9     02-01  Mg     1.7     02-01      Urinalysis Basic - ( 01 Feb 2024 06:54 )    Color: x / Appearance: x / SG: x / pH: x  Gluc: 126 mg/dL / Ketone: x  / Bili: x / Urobili: x   Blood: x / Protein: x / Nitrite: x   Leuk Esterase: x / RBC: x / WBC x   Sq Epi: x / Non Sq Epi: x / Bacteria: x      -----------------------------------------------------------------------  IMAGING:  < from: MR Head No Cont (01.31.24 @ 19:19) >  FINDINGS:    Diffusion imaging is positive for two small cerebellar acute versus   subacute infarcts, right superior vermis on image 10 and left   inferolaterally on images 3-4.    There are more numerous chronic bilateral cerebellar infarcts, few   bilateral basal ganglia chronic lacunar infarcts, and scattered pontine   and cerebral white matter foci are seen on the T2-weighted images.   T2-FLAIR imaging was inadvertently omitted.    There is mild-moderate diffuse parenchymal volume loss. No extra-axial   collection is visible. No hemorrhage on GRE. There are preserved large   vessel flow-voids.    Mastoids and paranasal sinuses are free of acute disease. No gross   orbital or skull base pathology.      IMPRESSION:    Two punctate recent cerebellar infarcts, one on each side.    More numerous chronic cerebellar lacunar infarcts, and numerous basal   ganglia and cerebral white matter sites. Distribution compatible with   advanced microangiopathic sequela. No hemorrhage on GRE.    < end of copied text >    -----------------------------------------------------------------------  ALLERGIES  No Known Allergies      MEDICATIONS   aspirin enteric coated 81 milliGRAM(s) Oral daily  atorvastatin 80 milliGRAM(s) Oral at bedtime  clopidogrel Tablet 300 milliGRAM(s) Oral once  dextrose 5%. 1000 milliLiter(s) IV Continuous <Continuous>  dextrose 5%. 1000 milliLiter(s) IV Continuous <Continuous>  dextrose 50% Injectable 12.5 Gram(s) IV Push once  dextrose 50% Injectable 25 Gram(s) IV Push once  dextrose 50% Injectable 25 Gram(s) IV Push once  dextrose Oral Gel 15 Gram(s) Oral once PRN  donepezil 5 milliGRAM(s) Oral at bedtime  enoxaparin Injectable 40 milliGRAM(s) SubCutaneous every 24 hours  glucagon  Injectable 1 milliGRAM(s) IntraMuscular once  insulin lispro (ADMELOG) corrective regimen sliding scale   SubCutaneous Before meals and at bedtime  memantine 10 milliGRAM(s) Oral daily  multivitamin 1 Tablet(s) Oral daily  NIFEdipine XL 90 milliGRAM(s) Oral every 24 hours    -----------------------------------------------------------------------

## 2024-02-01 NOTE — DISCHARGE NOTE NURSING/CASE MANAGEMENT/SOCIAL WORK - PATIENT PORTAL LINK FT
You can access the FollowMyHealth Patient Portal offered by [facility] by registering at the following website: [email or web address]. By joining [facility]’s FollowMyHealth portal, you will also be able to view your health information using other applications (apps) compatible with our system.

## 2024-02-01 NOTE — PROGRESS NOTE ADULT - PROBLEM SELECTOR PLAN 5
stable; s/p B/L fem-pop bypasses (2016) and s/p R embolectomy + angioplasty x2 for RLE (2020)   -- cont. ASA, statin
stable; s/p B/L fem-pop bypasses (2016) and s/p R embolectomy + angioplasty x2 for RLE (2020)   -- cont. ASA, statin

## 2024-02-01 NOTE — PROGRESS NOTE ADULT - PROBLEM SELECTOR PLAN 2
reportedly A&Ox2 at baseline  -- cont. Aricept and Namenda  -- frequent reorientation
reportedly A&Ox2 at baseline  -- cont. Aricept and Namenda  -- frequent reorientation

## 2024-02-01 NOTE — PROGRESS NOTE ADULT - SUBJECTIVE AND OBJECTIVE BOX
Neurology Stroke Progress Note    INTERVAL HPI/OVERNIGHT EVENTS:  Patient seen and examined.  number [id number] used.  Pt states he feels very well, no complaints.    MEDICATIONS  (STANDING):  aspirin enteric coated 81 milliGRAM(s) Oral daily  atorvastatin 80 milliGRAM(s) Oral at bedtime  dextrose 5%. 1000 milliLiter(s) (50 mL/Hr) IV Continuous <Continuous>  dextrose 5%. 1000 milliLiter(s) (100 mL/Hr) IV Continuous <Continuous>  dextrose 50% Injectable 25 Gram(s) IV Push once  dextrose 50% Injectable 25 Gram(s) IV Push once  dextrose 50% Injectable 12.5 Gram(s) IV Push once  donepezil 5 milliGRAM(s) Oral at bedtime  enoxaparin Injectable 40 milliGRAM(s) SubCutaneous every 24 hours  glucagon  Injectable 1 milliGRAM(s) IntraMuscular once  insulin lispro (ADMELOG) corrective regimen sliding scale   SubCutaneous Before meals and at bedtime  memantine 10 milliGRAM(s) Oral daily  multivitamin 1 Tablet(s) Oral daily  NIFEdipine XL 90 milliGRAM(s) Oral every 24 hours  potassium chloride   Powder 40 milliEquivalent(s) Oral once    MEDICATIONS  (PRN):  dextrose Oral Gel 15 Gram(s) Oral once PRN Blood Glucose LESS THAN 70 milliGRAM(s)/deciliter      Allergies    No Known Allergies    Intolerances        Vital Signs Last 24 Hrs  T(C): 36.8 (01 Feb 2024 09:03), Max: 37.1 (31 Jan 2024 21:50)  T(F): 98.2 (01 Feb 2024 09:03), Max: 98.8 (31 Jan 2024 21:50)  HR: 82 (01 Feb 2024 04:45) (58 - 86)  BP: 125/59 (01 Feb 2024 04:45) (125/59 - 189/85)  BP(mean): 83 (01 Feb 2024 04:45) (83 - 122)  RR: 18 (01 Feb 2024 04:45) (16 - 18)  SpO2: 97% (01 Feb 2024 04:45) (97% - 99%)    Parameters below as of 01 Feb 2024 04:45  Patient On (Oxygen Delivery Method): room air        Physical exam:  General: No acute distress, awake and alert  Eyes: Anicteric sclerae  Neurologic:  -Mental status: Awake, alert, oriented to person, but not place or time. Unable to name objects but smiles and nods and says "mary alice" at most questions. No dysarthria. Recent and remote memory intact. Follows commands. Attention/concentration intact. Fund of knowledge appropriate.  -Cranial nerves:   II: Visual fields are full to confrontation.  III, IV, VI: Extraocular movements are intact without nystagmus. Pupils equally round and reactive to light  VII: Face is symmetric   Motor: Normal bulk and tone. No pronator drift. Strength bilateral upper extremity 5/5, bilateral lower extremities 5/5.  Sensation: Intact to light touch bilaterally. No neglect or extinction on double simultaneous testing.  Coordination: No dysmetria on finger-to-nose    LABS:                        15.7   4.95  )-----------( 202      ( 01 Feb 2024 06:54 )             46.5     02-01    135  |  99  |  23  ----------------------------<  126<H>  3.4<L>   |  24  |  0.91    Ca    9.2      01 Feb 2024 06:54  Phos  2.9     02-01  Mg     1.7     02-01        Urinalysis Basic - ( 01 Feb 2024 06:54 )    Color: x / Appearance: x / SG: x / pH: x  Gluc: 126 mg/dL / Ketone: x  / Bili: x / Urobili: x   Blood: x / Protein: x / Nitrite: x   Leuk Esterase: x / RBC: x / WBC x   Sq Epi: x / Non Sq Epi: x / Bacteria: x        RADIOLOGY & ADDITIONAL TESTS:    < from: MR Head No Cont (01.31.24 @ 19:19) >  IMPRESSION:    Two punctate recent cerebellar infarcts, one on each side.    More numerous chronic cerebellar lacunar infarcts, and numerous basal   ganglia and cerebral white matter sites. Distribution compatible with   advanced microangiopathic sequela. No hemorrhage on GRE.    < end of copied text >

## 2024-02-01 NOTE — DISCHARGE NOTE PROVIDER - NSDCFUADDAPPT_GEN_ALL_CORE_FT
You will follow up with Dr. Archuleta's Nurse Practitioner Sana as scheduled on 2/21. If you are unable to make this appointment, please call [phone] to reschedule You will follow up with Dr. Archuleta's Nurse Practitioner Sana as scheduled on 2/21. If you are unable to make this appointment, please call [phone] to reschedule    Please follow up with your primary care doctor within 1 week of discharge

## 2024-02-01 NOTE — PROGRESS NOTE ADULT - PROBLEM SELECTOR PLAN 3
BP goal per Neuro; BP has been elevated, but asymptomatic  -- Nifedipine resumed  -- cont. DASH diet  -- holding beta-blocker, ARB, and thiazide for now; use diuretics w/ caution in setting of AS
BP goal per Neuro; BP has been elevated, but asymptomatic  -- Nifedipine resumed  -- cont. DASH diet  -- holding beta-blocker, ARB, and thiazide for now; use diuretics w/ caution in setting of AS - has known severe aortic stenosis documented on prior ECHO from 2021. Can resume on discharge, unless otherwise specified by stroke team  --needs follow up with cardiology outpatient

## 2024-02-01 NOTE — DISCHARGE NOTE PROVIDER - NSDCFUSCHEDAPPT_GEN_ALL_CORE_FT
[facility]  NEUROLOGY [address]  Scheduled Appointment: 02/21/2024

## 2024-02-01 NOTE — DISCHARGE NOTE PROVIDER - HOSPITAL COURSE
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 NIHSS 5. NCHCT performed and demonstrated chronic bilateral thalamic infarcts, right basal ganglia, and bilateral cerebellar infarcts. CTA head and neck with mild irregular narrowing in the distal basilar artery and poor visualization of th right vertebral artery in the proximal segment with threadlike visibility in the mid and distal areas, similar in appearance to his CTA's in 2020. Admitted for seizure vs stroke workup. vEEG negative for epileptiform discharges. TTE with PFO vs AVM, basal inferolateral akinesis, basal inferoseptal and basal inferior segment hypokineses, EF 60-65%. LE dopplers *** MRI brain with 2 punctate bihemispheric cerebellar acute infarcts. ELIZABETH ***    During this hospital course, patient had 2 ischemic stroke located in both left/right hemisphere as seen on MRI  The stroke etiology is likely secondary to:  []atrial fibrillation  []small vessel disease from atherosclerotic risk factors  []other:  []etiology workup still in progress    Patient had the following workup done in house:  CT Head: chronic bilateral thalamic infarcts, right basal ganglia, and bilateral cerebellar infarcts.  MR Head Non Contrast: 2 punctate bihemispheric cerebellar acute infarcts.  CT Angio Head/Neck: mild irregular narrowing in the distal basilar artery and poor visualization of th right vertebral artery in the proximal segment with threadlike visibility in the mid and distal areas  Echo: PFO vs AVM, basal inferolateral akinesis, basal inferoseptal and basal inferior segment hypokineses, EF 60-65%  Labs: A1C: 6.3, LDL: 63, TSH: 1.620    Physical exam at discharge:    New medications on discharge:  Labs to be followed up:  Imaging to be done as outpatient:  Further outpatient workup:   Hospital course:  73y Male (Serbian speaking, poor historian), current smoker, with PMHx vascular dementia, HTN, DM, HLD, CAD (was on Xarelto, discontinued 2/2 GI bleed, now just on ASA 81mg daily),  PAD s/p bilateral fem-pop bypasses (2016) s/p R embolectomy and angioplasty x2 for RLE ( ischemia (3/2020) on ASA, severe aortic stenosis, 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 ED after waking up with confusion and reportedly urinated on himself. NIHSS 5. NCHCT performed and demonstrated chronic bilateral thalamic infarcts, right basal ganglia, and bilateral cerebellar infarcts. CTA head and neck with mild irregular narrowing in the distal basilar artery and poor visualization of th right vertebral artery in the proximal segment with threadlike visibility in the mid and distal areas, similar in appearance to his CTA's in 2020. Admitted for seizure vs stroke workup. vEEG negative for epileptiform discharges. TTE with PFO vs AVM, basal inferolateral akinesis, basal inferoseptal and basal inferior segment hypokineses, EF 60-65%. LE dopplers *** MRI brain with 2 punctate bihemispheric cerebellar acute infarcts. ELIZABETH ***    During this hospital course, patient had 2 ischemic stroke located in both left/right hemisphere as seen on MRI  The stroke etiology is likely secondary to:  []atrial fibrillation  [x]small vessel disease from atherosclerotic risk factors  []other:  []etiology workup still in progress    Patient had the following workup done in house:  CT Head: chronic bilateral thalamic infarcts, right basal ganglia, and bilateral cerebellar infarcts.  MR Head Non Contrast: 2 punctate bihemispheric cerebellar acute infarcts.  CT Angio Head/Neck: mild irregular narrowing in the distal basilar artery and poor visualization of th right vertebral artery in the proximal segment with threadlike visibility in the mid and distal areas  Echo: PFO vs AVM, basal inferolateral akinesis, basal inferoseptal and basal inferior segment hypokineses, EF 60-65%  Labs: A1C: 6.3, LDL: 63, TSH: 1.620    Physical exam at discharge:  General: No acute distress, awake and alert  Eyes: Anicteric sclerae  Neurologic:  -Mental status: Awake, alert, oriented to person, but not place or time. Unable to name objects but smiles and nods and says "mary alice" at most questions. No dysarthria. Recent and remote memory intact. Follows only simple commands. Attention/concentration intact. Fund of knowledge appropriate.  -Cranial nerves:   II: Visual fields are full to confrontation.  III, IV, VI: Extraocular movements are intact without nystagmus. Pupils equally round and reactive to light  VII: Face is symmetric   Motor: Normal bulk and tone. No pronator drift. Strength bilateral upper extremity 5/5, bilateral lower extremities 5/5.  Sensation: Intact to light touch bilaterally. No neglect or extinction on double simultaneous testing.  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 NIHSS 5. NCHCT performed and demonstrated chronic bilateral thalamic infarcts, right basal ganglia, and bilateral cerebellar infarcts. CTA head and neck with mild irregular narrowing in the distal basilar artery and poor visualization of th right vertebral artery in the proximal segment with threadlike visibility in the mid and distal areas, similar in appearance to his CTA's in 2020. Admitted for seizure vs stroke workup. vEEG negative for epileptiform discharges. TTE with PFO vs AVM, basal inferolateral akinesis, basal inferoseptal and basal inferior segment hypokineses, EF 60-65%. LE dopplers MRI brain with 2 punctate bihemispheric cerebellar acute infarcts. This is likely due to atherosclerotic posterior circulation. Pt will be discharged on dual antiplatelets    During this hospital course, patient had 2 ischemic stroke located in both left/right hemisphere as seen on MRI  The stroke etiology is likely secondary to:  []atrial fibrillation  [x]small vessel disease from atherosclerotic risk factors  []other:  []etiology workup still in progress    Patient had the following workup done in house:  CT Head: chronic bilateral thalamic infarcts, right basal ganglia, and bilateral cerebellar infarcts.  MR Head Non Contrast: 2 punctate bihemispheric cerebellar acute infarcts.  CT Angio Head/Neck: mild irregular narrowing in the distal basilar artery and poor visualization of th right vertebral artery in the proximal segment with threadlike visibility in the mid and distal areas  Echo: PFO vs AVM, basal inferolateral akinesis, basal inferoseptal and basal inferior segment hypokineses, EF 60-65%  Labs: A1C: 6.3, LDL: 63, TSH: 1.620    Physical exam at discharge:  General: No acute distress, awake and alert  Eyes: Anicteric sclerae  Neurologic:  -Mental status: Awake, alert, oriented to person, but not place or time. Unable to name objects but smiles and nods and says "mary alice" at most questions. No dysarthria. Recent and remote memory intact. Follows only simple commands. Attention/concentration intact. Fund of knowledge appropriate.  -Cranial nerves:   II: Visual fields are full to confrontation.  III, IV, VI: Extraocular movements are intact without nystagmus. Pupils equally round and reactive to light  VII: Face is symmetric   Motor: Normal bulk and tone. No pronator drift. Strength bilateral upper extremity 5/5, bilateral lower extremities 5/5.  Sensation: Intact to light touch bilaterally. No neglect or extinction on double simultaneous testing.  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 NIHSS 5. NCHCT performed and demonstrated chronic bilateral thalamic infarcts, right basal ganglia, and bilateral cerebellar infarcts. CTA head and neck with mild irregular narrowing in the distal basilar artery and poor visualization of th right vertebral artery in the proximal segment with threadlike visibility in the mid and distal areas, similar in appearance to his CTA's in 2020. Admitted for seizure vs stroke workup. vEEG negative for epileptiform discharges. TTE with PFO vs AVM, basal inferolateral akinesis, basal inferoseptal and basal inferior segment hypokineses, EF 60-65%. LE dopplers MRI brain with 2 punctate bihemispheric cerebellar acute infarcts. This is likely due to atherosclerotic posterior circulation. Pt will be discharged on dual antiplatelets  Pt will need aspirin and plavix accumetrics drawn at his follow up appointment    During this hospital course, patient had 2 ischemic stroke located in both left/right hemisphere as seen on MRI  The stroke etiology is likely secondary to:  []atrial fibrillation  [x]small vessel disease from atherosclerotic risk factors  []other:  []etiology workup still in progress    Patient had the following workup done in house:  CT Head: chronic bilateral thalamic infarcts, right basal ganglia, and bilateral cerebellar infarcts.  MR Head Non Contrast: 2 punctate bihemispheric cerebellar acute infarcts.  CT Angio Head/Neck: mild irregular narrowing in the distal basilar artery and poor visualization of th right vertebral artery in the proximal segment with threadlike visibility in the mid and distal areas  Echo: PFO vs AVM, basal inferolateral akinesis, basal inferoseptal and basal inferior segment hypokineses, EF 60-65%  Labs: A1C: 6.3, LDL: 63, TSH: 1.620    Physical exam at discharge:  General: No acute distress, awake and alert  Eyes: Anicteric sclerae  Neurologic:  -Mental status: Awake, alert, oriented to person, but not place or time. Unable to name objects but smiles and nods and says "mary alice" at most questions. No dysarthria. Recent and remote memory intact. Follows only simple commands. Attention/concentration intact. Fund of knowledge appropriate.  -Cranial nerves:   II: Visual fields are full to confrontation.  III, IV, VI: Extraocular movements are intact without nystagmus. Pupils equally round and reactive to light  VII: Face is symmetric   Motor: Normal bulk and tone. No pronator drift. Strength bilateral upper extremity 5/5, bilateral lower extremities 5/5.  Sensation: Intact to light touch bilaterally. No neglect or extinction on double simultaneous testing.  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 NIHSS 5. NCHCT performed and demonstrated chronic bilateral thalamic infarcts, right basal ganglia, and bilateral cerebellar infarcts. CTA head and neck with mild irregular narrowing in the distal basilar artery and poor visualization of th right vertebral artery in the proximal segment with threadlike visibility in the mid and distal areas, similar in appearance to his CTA's in 2020. Admitted for seizure vs stroke workup. vEEG negative for epileptiform discharges. TTE with PFO vs AVM, basal inferolateral akinesis, basal inferoseptal and basal inferior segment hypokineses, EF 60-65%. LE dopplers MRI brain with 2 punctate bihemispheric cerebellar acute infarcts. This is likely due to atherosclerotic posterior circulation. Pt will be discharged on dual antiplatelets  Pt will need aspirin and plavix accumetrics drawn at his follow up appointment    During this hospital course, patient had 2 ischemic stroke located in both left/right hemisphere as seen on MRI  The stroke etiology is likely secondary to:  []atrial fibrillation  [x]small vessel disease from atherosclerotic risk factors  []other:  []etiology workup still in progress    Patient had the following workup done in house:  CT Head: chronic bilateral thalamic infarcts, right basal ganglia, and bilateral cerebellar infarcts.  MR Head Non Contrast: 2 punctate bihemispheric cerebellar acute infarcts.  CT Angio Head/Neck: mild irregular narrowing in the distal basilar artery and poor visualization of th right vertebral artery in the proximal segment with threadlike visibility in the mid and distal areas  Echo: PFO vs AVM, basal inferolateral akinesis, basal inferoseptal and basal inferior segment hypokineses, EF 60-65%  Labs: A1C: 6.3, LDL: 63, TSH: 1.620    Physical exam at discharge:  General: No acute distress, awake and alert  Eyes: Anicteric sclerae  Neurologic:  -Mental status: Awake, alert, oriented to person, but not place or time. Unable to name objects but smiles and nods and says "mary alice" at most questions. No dysarthria. Recent and remote memory intact. Follows only simple commands. Attention/concentration intact. Fund of knowledge appropriate.  -Cranial nerves:   II: Visual fields are full to confrontation.  III, IV, VI: Extraocular movements are intact without nystagmus. Pupils equally round and reactive to light  VII: Face is symmetric   Motor: Normal bulk and tone. No pronator drift. Strength bilateral upper extremity 5/5, bilateral lower extremities 5/5.  Sensation: Intact to light touch bilaterally. No neglect or extinction on double simultaneous testing.  Coordination: No dysmetria on finger-to-nose  Discharge NIHSS 2    New medications on discharge: plavix, atorvastatin  Medications held on discharge: Edarbyclor (can be restarted as an outpatient  Labs to be followed up:  Imaging to be done as outpatient:  Further outpatient workup: aspirin and P2Y12 accumetrics

## 2024-02-01 NOTE — PROGRESS NOTE ADULT - PROBLEM SELECTOR PLAN 1
Pt. brought in by family for acute-onset AMS (e.g. not following simple commands, unable to recognize family members, repeating himself); Pt. has vascular dementia, A&Ox2 at baseline; unclear etiology, no apparent toxic-metabolic derangements; EEG unrevealing; mental status slowly improving  -- cont. work-up and mgmt per Neuro  -- PT/OT, speech therapy  -- plan for MRI brain  -- on ASA and high-intensity statin for now
Pt. brought in by family for acute-onset AMS (e.g. not following simple commands, unable to recognize family members, repeating himself); Pt. has vascular dementia, A&Ox2 at baseline; unclear etiology, no apparent toxic-metabolic derangements; EEG unrevealing; mental status slowly improving  -- cont. work-up and mgmt per Neuro  -- PT/OT, speech therapy  -- plan for MRI brain  -- on ASA and high-intensity statin for now

## 2024-02-01 NOTE — DISCHARGE NOTE NURSING/CASE MANAGEMENT/SOCIAL WORK - NSDCFUADDAPPT_GEN_ALL_CORE_FT
You will follow up with Dr. Archuleta's Nurse Practitioner Sana as scheduled on 2/21. If you are unable to make this appointment, please call [phone] to reschedule    Please follow up with your primary care doctor within 1 week of discharge

## 2024-02-02 LAB — GLUCOSE BLDC GLUCOMTR-MCNC: 192 MG/DL — HIGH (ref 70–99)

## 2024-02-09 DIAGNOSIS — I25.10 ATHEROSCLEROTIC HEART DISEASE OF NATIVE CORONARY ARTERY WITHOUT ANGINA PECTORIS: ICD-10-CM

## 2024-02-09 DIAGNOSIS — Z90.49 ACQUIRED ABSENCE OF OTHER SPECIFIED PARTS OF DIGESTIVE TRACT: ICD-10-CM

## 2024-02-09 DIAGNOSIS — Z79.82 LONG TERM (CURRENT) USE OF ASPIRIN: ICD-10-CM

## 2024-02-09 DIAGNOSIS — R47.01 APHASIA: ICD-10-CM

## 2024-02-09 DIAGNOSIS — I10 ESSENTIAL (PRIMARY) HYPERTENSION: ICD-10-CM

## 2024-02-09 DIAGNOSIS — I35.0 NONRHEUMATIC AORTIC (VALVE) STENOSIS: ICD-10-CM

## 2024-02-09 DIAGNOSIS — I63.9 CEREBRAL INFARCTION, UNSPECIFIED: ICD-10-CM

## 2024-02-09 DIAGNOSIS — R29.705 NIHSS SCORE 5: ICD-10-CM

## 2024-02-09 DIAGNOSIS — Z79.899 OTHER LONG TERM (CURRENT) DRUG THERAPY: ICD-10-CM

## 2024-02-09 DIAGNOSIS — I67.89 OTHER CEREBROVASCULAR DISEASE: ICD-10-CM

## 2024-02-09 DIAGNOSIS — R41.82 ALTERED MENTAL STATUS, UNSPECIFIED: ICD-10-CM

## 2024-02-09 DIAGNOSIS — E78.5 HYPERLIPIDEMIA, UNSPECIFIED: ICD-10-CM

## 2024-02-09 DIAGNOSIS — Z79.84 LONG TERM (CURRENT) USE OF ORAL HYPOGLYCEMIC DRUGS: ICD-10-CM

## 2024-02-09 DIAGNOSIS — F01.50 VASCULAR DEMENTIA WITHOUT BEHAVIORAL DISTURBANCE: ICD-10-CM

## 2024-02-09 DIAGNOSIS — Z95.820 PERIPHERAL VASCULAR ANGIOPLASTY STATUS WITH IMPLANTS AND GRAFTS: ICD-10-CM

## 2024-02-09 DIAGNOSIS — Z85.038 PERSONAL HISTORY OF OTHER MALIGNANT NEOPLASM OF LARGE INTESTINE: ICD-10-CM

## 2024-02-09 DIAGNOSIS — E11.51 TYPE 2 DIABETES MELLITUS WITH DIABETIC PERIPHERAL ANGIOPATHY WITHOUT GANGRENE: ICD-10-CM

## 2024-02-21 ENCOUNTER — NON-APPOINTMENT (OUTPATIENT)
Age: 74
End: 2024-02-21

## 2024-02-21 ENCOUNTER — APPOINTMENT (OUTPATIENT)
Dept: NEUROLOGY | Facility: CLINIC | Age: 74
End: 2024-02-21
Payer: MEDICARE

## 2024-02-21 VITALS
DIASTOLIC BLOOD PRESSURE: 71 MMHG | HEART RATE: 67 BPM | SYSTOLIC BLOOD PRESSURE: 139 MMHG | TEMPERATURE: 97.9 F | OXYGEN SATURATION: 95 % | BODY MASS INDEX: 22.66 KG/M2 | WEIGHT: 136 LBS | HEIGHT: 65 IN

## 2024-02-21 DIAGNOSIS — R26.81 UNSTEADINESS ON FEET: ICD-10-CM

## 2024-02-21 DIAGNOSIS — I63.9 CEREBRAL INFARCTION, UNSPECIFIED: ICD-10-CM

## 2024-02-21 PROCEDURE — 99205 OFFICE O/P NEW HI 60 MIN: CPT

## 2024-02-21 RX ORDER — CLOPIDOGREL BISULFATE 75 MG/1
75 TABLET, FILM COATED ORAL DAILY
Refills: 0 | Status: ACTIVE | COMMUNITY

## 2024-02-21 RX ORDER — MEMANTINE HYDROCHLORIDE 10 MG/1
10 TABLET, FILM COATED ORAL DAILY
Refills: 0 | Status: ACTIVE | COMMUNITY

## 2024-02-21 RX ORDER — FLUCONAZOLE 150 MG/1
150 TABLET ORAL DAILY
Qty: 3 | Refills: 0 | Status: DISCONTINUED | COMMUNITY
Start: 2023-03-03 | End: 2024-02-21

## 2024-02-21 RX ORDER — ATORVASTATIN CALCIUM 80 MG/1
80 TABLET, FILM COATED ORAL DAILY
Refills: 0 | Status: ACTIVE | COMMUNITY

## 2024-02-21 RX ORDER — DONEPEZIL HYDROCHLORIDE 5 MG/1
5 TABLET ORAL
Refills: 0 | Status: ACTIVE | COMMUNITY

## 2024-02-28 PROCEDURE — 0042T: CPT | Mod: MA

## 2024-02-28 PROCEDURE — 70496 CT ANGIOGRAPHY HEAD: CPT | Mod: MA

## 2024-02-28 PROCEDURE — 80048 BASIC METABOLIC PNL TOTAL CA: CPT

## 2024-02-28 PROCEDURE — 71045 X-RAY EXAM CHEST 1 VIEW: CPT

## 2024-02-28 PROCEDURE — 95700 EEG CONT REC W/VID EEG TECH: CPT

## 2024-02-28 PROCEDURE — 93970 EXTREMITY STUDY: CPT

## 2024-02-28 PROCEDURE — 82962 GLUCOSE BLOOD TEST: CPT

## 2024-02-28 PROCEDURE — 85730 THROMBOPLASTIN TIME PARTIAL: CPT

## 2024-02-28 PROCEDURE — 93306 TTE W/DOPPLER COMPLETE: CPT

## 2024-02-28 PROCEDURE — 70551 MRI BRAIN STEM W/O DYE: CPT

## 2024-02-28 PROCEDURE — 85576 BLOOD PLATELET AGGREGATION: CPT

## 2024-02-28 PROCEDURE — 81003 URINALYSIS AUTO W/O SCOPE: CPT

## 2024-02-28 PROCEDURE — 95708 EEG WO VID EA 12-26HR UNMNTR: CPT

## 2024-02-28 PROCEDURE — 70498 CT ANGIOGRAPHY NECK: CPT | Mod: MA

## 2024-02-28 PROCEDURE — 93005 ELECTROCARDIOGRAM TRACING: CPT

## 2024-02-28 PROCEDURE — 83735 ASSAY OF MAGNESIUM: CPT

## 2024-02-28 PROCEDURE — 83036 HEMOGLOBIN GLYCOSYLATED A1C: CPT

## 2024-02-28 PROCEDURE — 70450 CT HEAD/BRAIN W/O DYE: CPT | Mod: MA

## 2024-02-28 PROCEDURE — 97116 GAIT TRAINING THERAPY: CPT

## 2024-02-28 PROCEDURE — 84100 ASSAY OF PHOSPHORUS: CPT

## 2024-02-28 PROCEDURE — 99285 EMERGENCY DEPT VISIT HI MDM: CPT

## 2024-02-28 PROCEDURE — 84443 ASSAY THYROID STIM HORMONE: CPT

## 2024-02-28 PROCEDURE — 97165 OT EVAL LOW COMPLEX 30 MIN: CPT

## 2024-02-28 PROCEDURE — 36415 COLL VENOUS BLD VENIPUNCTURE: CPT

## 2024-02-28 PROCEDURE — 85610 PROTHROMBIN TIME: CPT

## 2024-02-28 PROCEDURE — 97530 THERAPEUTIC ACTIVITIES: CPT

## 2024-02-28 PROCEDURE — 97162 PT EVAL MOD COMPLEX 30 MIN: CPT

## 2024-02-28 PROCEDURE — 80061 LIPID PANEL: CPT

## 2024-02-28 PROCEDURE — 85027 COMPLETE CBC AUTOMATED: CPT

## 2024-02-28 PROCEDURE — 85025 COMPLETE CBC W/AUTO DIFF WBC: CPT

## 2024-02-28 PROCEDURE — 80053 COMPREHEN METABOLIC PANEL: CPT

## 2024-02-28 PROCEDURE — 97535 SELF CARE MNGMENT TRAINING: CPT

## 2024-03-12 ENCOUNTER — NON-APPOINTMENT (OUTPATIENT)
Age: 74
End: 2024-03-12

## 2024-03-12 LAB
APO LP(A) SERPL-MCNC: 229.9 NMOL/L
PA ADP PRP-ACNC: 176 PRU
PLATELET RESPONSE ASPIRIN: 533 ARU

## 2024-05-07 ENCOUNTER — APPOINTMENT (OUTPATIENT)
Dept: NEUROLOGY | Facility: CLINIC | Age: 74
End: 2024-05-07

## 2024-05-07 NOTE — PHYSICAL EXAM
[FreeTextEntry1] : Alert. Oriented to self. Speech and language are intact, often repeats the same phases such as I feel well in [place]. Cranial nerves II-XII are intact. Motor exam reveals intact strength with individual muscle testing in bilateral upper and lower extremities. Sensation is intact to light touch in distal extremities. Gait is normal.

## 2024-05-07 NOTE — HISTORY OF PRESENT ILLNESS
[FreeTextEntry1] : Rangel Fitzgerald is a 73 year old male with PMH of tobacco dependence, vascular dementia, HTN, T2DM, HLD, CAD (on Xarelto, discontinued 2/2 GI bleed, now on Aspirin daily), PAD s/p bilateral fem-pop bypass (2016) s/p R embolectomy and angioplasty x2 for RLE, severe aortic stenosis, stage 1 cecal adenocarcinoma s/p open right hemicolectomy (6/2020) who presented to [facility] ED for confusion and incontinence with imaging confirming bilateral strokes (1/2024) presents for neurological follow up.  Since last visit, patient reports no new stroke-like symptoms. He is tolerating his Aspirin, Plavix and Atorvastatin without bleeding, bruising or myalgias.  BP: Diet: Exercise: PT: Memory: Memantine and Donepezil  only need Plavix from stroke perspective and increase Atorvastatin to 80 mg

## 2024-05-07 NOTE — ASSESSMENT
[FreeTextEntry1] : Rangel Fitzgerald is a 73 year old male with PMH of tobacco dependence, vascular dementia, HTN, T2DM, HLD, CAD (on Xarelto, discontinued 2/2 GI bleed, now on Aspirin daily), PAD s/p bilateral fem-pop bypass (2016) s/p R embolectomy and angioplasty x2 for RLE, severe aortic stenosis, stage 1 cecal adenocarcinoma s/p open right hemicolectomy (6/2020) who presented to [facility] ED for confusion and incontinence with imaging confirming bilateral strokes (1/2024) presents for neurological follow up. Stroke etiology likely due to atherosclerotic posterior circulation.  Plan: -Continue Plavix for secondary stroke prevention; Aspirin not needed from neurological perspective -Increase Atorvastatin to 80 mg for elevated Lipoprotein A -Continue aggressive vascular risk factor control with PCP/Cardiologist, BP goal <140/90 -Continue PT as tolerated -Continue Memantine and Donepezil for vascular dementia -Counselled on healthy eating (DASH/Mediterranean diet, limiting red meats and fried foods) -Counselled on importance of regular exercise and remaining active -Counselled on f/u with PCP regarding regular health maintenance and prevention, including routine screening -Counselled on signs of stroke BEFAST and to call 911 with any new or worsening neurological symptoms -RTO in 6 months

## 2024-07-21 NOTE — PROGRESS NOTE ADULT - PROBLEM/PLAN-7
DISPLAY PLAN FREE TEXT
Urinary Tract Infection    A urinary tract infection (UTI) is an infection of any part of the urinary tract, which includes the kidneys, ureters, bladder, and urethra. Risk factors include ignoring your need to urinate, wiping back to front if female, being an uncircumcised male, and having diabetes or a weak immune system. Symptoms include frequent urination, pain or burning with urination, foul smelling urine, cloudy urine, pain in the lower abdomen, blood in the urine, and fever. If you were prescribed an antibiotic medicine, take it as told by your health care provider. Do not stop taking the antibiotic even if you start to feel better.    SEEK IMMEDIATE MEDICAL CARE IF YOU HAVE ANY OF THE FOLLOWING SYMPTOMS: severe back or abdominal pain, fever, inability to keep fluids or medicine down, dizziness/lightheadedness, or a change in mental status.

## 2024-08-07 NOTE — CONSULT NOTE ADULT - TIME-BASED BILLING (NON-CRITICAL CARE)
Pt informed of message below and verbalized understanding with no questions.   Time-based billing (NON-critical care) none

## 2024-08-15 NOTE — PATIENT PROFILE ADULT - NSPROMUTANXFEARADDRESSFT_GEN_A_NUR
Juan Scales  Oncology Social Work Encounter    [x] Med-Onc [facility] [] Med-Onc [facility] [] Med-Onc [facility] [] Rad-Onc [facility] [] Rad-Onc [facility] [] Rad-Onc [facility] [] Rad-Onc [facility] [] [facility]    Patient: Beulah Luis    Encounter Type:    [] Initial SW Encounter  [] Patient Initiated  [] Referral  [] Distress/PHQ Screening  [x] Other:      Concern(s)/Barrier(s) to Care:     Narrative: follow up as pt states she needs 1 week off post chemo and from a number of days the FMLA works but they may want it stated 5 days in a row.      Referral/Handouts:   Insurance/Entitlements referral    Plan:  provide ongoing support as needed.    n/a

## 2024-11-20 NOTE — DISCHARGE NOTE PROVIDER - NSDCQMPCI_CARD_ALL_CORE
No
Pt presents to ED c/o R testicular pain radiating to his RLQ and back x 2 hours ago. Pt reports + swelling R testicle. Pt A&Ox4, NAD.

## 2024-11-20 NOTE — STROKE CODE NOTE - INITIAL PRESENTATION
Patient: Reema Easley    Procedure Summary       Date: 11/20/24 Room / Location:  [facility] [address] [place] [address] [facility]    Anesthesia Start: 1339 Anesthesia Stop: 1500    Procedure: TOTAL KNEE ARTHROPLASTY REVISION (Left: Knee) Diagnosis:       Total knee replacement status, left      (Total knee replacement status, left [Z96.652])    Surgeons: Tal Gonzalez MD Provider: Torin Paz MD    Anesthesia Type: general with block ASA Status: 3            Anesthesia Type: general with block    Vitals  Vitals Value Taken Time   /90 11/20/24 1700   Temp 36.4 °C (97.5 °F) 11/20/24 1458   Pulse 76 11/20/24 1711   Resp 16 11/20/24 1630   SpO2 98 % 11/20/24 1711   Vitals shown include unfiled device data.        Post Anesthesia Care and Evaluation    Patient location during evaluation: bedside  Patient participation: complete - patient participated  Level of consciousness: awake and alert  Pain management: adequate    Airway patency: patent  Anesthetic complications: No anesthetic complications  PONV Status: controlled  Cardiovascular status: blood pressure returned to baseline and acceptable  Respiratory status: acceptable  Hydration status: acceptable    
ED

## 2024-12-31 ENCOUNTER — NON-APPOINTMENT (OUTPATIENT)
Age: 74
End: 2024-12-31

## 2025-01-02 ENCOUNTER — APPOINTMENT (OUTPATIENT)
Dept: UROLOGY | Facility: CLINIC | Age: 75
End: 2025-01-02

## 2025-01-09 ENCOUNTER — NON-APPOINTMENT (OUTPATIENT)
Age: 75
End: 2025-01-09

## 2025-01-10 ENCOUNTER — APPOINTMENT (OUTPATIENT)
Dept: UROLOGY | Facility: CLINIC | Age: 75
End: 2025-01-10

## 2025-03-24 NOTE — STROKE CODE NOTE - NIH STROKE SCALE: 6A. MOTOR LEG, LEFT, QM
(0) No drift; leg holds 30 degree position for full 5 secs
Bed in lowest position, wheels locked, appropriate side rails in place/Call bell, personal items and telephone in reach/Instruct patient to call for assistance before getting out of bed or chair/Non-slip footwear when patient is out of bed/[place] to call system/Physically safe environment - no spills, clutter or unnecessary equipment/Purposeful Proactive Rounding/Room/bathroom lighting operational, light cord in reach

## 2025-04-28 ENCOUNTER — INPATIENT (INPATIENT)
Facility: HOSPITAL | Age: 75
LOS: 0 days | Discharge: HOME | DRG: 189 | End: 2025-04-29
Attending: INTERNAL MEDICINE | Admitting: STUDENT IN AN ORGANIZED HEALTH CARE EDUCATION/TRAINING PROGRAM
Payer: MEDICARE

## 2025-04-28 VITALS
SYSTOLIC BLOOD PRESSURE: 142 MMHG | HEIGHT: 61 IN | WEIGHT: 139.99 LBS | DIASTOLIC BLOOD PRESSURE: 68 MMHG | OXYGEN SATURATION: 95 % | RESPIRATION RATE: 22 BRPM | TEMPERATURE: 97 F | HEART RATE: 82 BPM

## 2025-04-28 DIAGNOSIS — I10 ESSENTIAL (PRIMARY) HYPERTENSION: ICD-10-CM

## 2025-04-28 DIAGNOSIS — Z90.49 ACQUIRED ABSENCE OF OTHER SPECIFIED PARTS OF DIGESTIVE TRACT: Chronic | ICD-10-CM

## 2025-04-28 DIAGNOSIS — J44.1 CHRONIC OBSTRUCTIVE PULMONARY DISEASE WITH (ACUTE) EXACERBATION: ICD-10-CM

## 2025-04-28 DIAGNOSIS — I25.10 ATHEROSCLEROTIC HEART DISEASE OF NATIVE CORONARY ARTERY WITHOUT ANGINA PECTORIS: ICD-10-CM

## 2025-04-28 DIAGNOSIS — Z98.89 OTHER SPECIFIED POSTPROCEDURAL STATES: Chronic | ICD-10-CM

## 2025-04-28 DIAGNOSIS — Z95.828 PRESENCE OF OTHER VASCULAR IMPLANTS AND GRAFTS: Chronic | ICD-10-CM

## 2025-04-28 DIAGNOSIS — Z29.9 ENCOUNTER FOR PROPHYLACTIC MEASURES, UNSPECIFIED: ICD-10-CM

## 2025-04-28 DIAGNOSIS — C18.9 MALIGNANT NEOPLASM OF COLON, UNSPECIFIED: ICD-10-CM

## 2025-04-28 DIAGNOSIS — I73.9 PERIPHERAL VASCULAR DISEASE, UNSPECIFIED: ICD-10-CM

## 2025-04-28 DIAGNOSIS — N40.0 BENIGN PROSTATIC HYPERPLASIA WITHOUT LOWER URINARY TRACT SYMPTOMS: ICD-10-CM

## 2025-04-28 DIAGNOSIS — Z41.9 ENCOUNTER FOR PROCEDURE FOR PURPOSES OTHER THAN REMEDYING HEALTH STATE, UNSPECIFIED: Chronic | ICD-10-CM

## 2025-04-28 DIAGNOSIS — G30.9 ALZHEIMER'S DISEASE, UNSPECIFIED: ICD-10-CM

## 2025-04-28 DIAGNOSIS — J96.01 ACUTE RESPIRATORY FAILURE WITH HYPOXIA: ICD-10-CM

## 2025-04-28 DIAGNOSIS — E11.9 TYPE 2 DIABETES MELLITUS WITHOUT COMPLICATIONS: ICD-10-CM

## 2025-04-28 LAB
ALBUMIN SERPL ELPH-MCNC: 4.6 G/DL — SIGNIFICANT CHANGE UP (ref 3.3–5)
ALP SERPL-CCNC: 120 U/L — SIGNIFICANT CHANGE UP (ref 40–120)
ALT FLD-CCNC: 21 U/L — SIGNIFICANT CHANGE UP (ref 10–45)
ANION GAP SERPL CALC-SCNC: 18 MMOL/L — HIGH (ref 5–17)
AST SERPL-CCNC: 20 U/L — SIGNIFICANT CHANGE UP (ref 10–40)
BASE EXCESS BLDV CALC-SCNC: 0.7 MMOL/L — SIGNIFICANT CHANGE UP (ref -2–3)
BASOPHILS # BLD AUTO: 0 K/UL — SIGNIFICANT CHANGE UP (ref 0–0.2)
BASOPHILS NFR BLD AUTO: 0 % — SIGNIFICANT CHANGE UP (ref 0–2)
BILIRUB SERPL-MCNC: 0.2 MG/DL — SIGNIFICANT CHANGE UP (ref 0.2–1.2)
BUN SERPL-MCNC: 22 MG/DL — SIGNIFICANT CHANGE UP (ref 7–23)
CALCIUM SERPL-MCNC: 9.9 MG/DL — SIGNIFICANT CHANGE UP (ref 8.4–10.5)
CHLORIDE SERPL-SCNC: 102 MMOL/L — SIGNIFICANT CHANGE UP (ref 96–108)
CO2 BLDV-SCNC: 27 MMOL/L — HIGH (ref 22–26)
CO2 SERPL-SCNC: 21 MMOL/L — LOW (ref 22–31)
CREAT SERPL-MCNC: 0.86 MG/DL — SIGNIFICANT CHANGE UP (ref 0.5–1.3)
EGFR: 91 ML/MIN/1.73M2 — SIGNIFICANT CHANGE UP
EGFR: 91 ML/MIN/1.73M2 — SIGNIFICANT CHANGE UP
EOSINOPHIL # BLD AUTO: 3.18 K/UL — HIGH (ref 0–0.5)
EOSINOPHIL NFR BLD AUTO: 29.2 % — HIGH (ref 0–6)
FLUAV AG NPH QL: SIGNIFICANT CHANGE UP
FLUBV AG NPH QL: SIGNIFICANT CHANGE UP
GLUCOSE SERPL-MCNC: 226 MG/DL — HIGH (ref 70–99)
HCO3 BLDV-SCNC: 25 MMOL/L — SIGNIFICANT CHANGE UP (ref 22–29)
HCT VFR BLD CALC: 38.6 % — LOW (ref 39–50)
HGB BLD-MCNC: 11.5 G/DL — LOW (ref 13–17)
LYMPHOCYTES # BLD AUTO: 1.45 K/UL — SIGNIFICANT CHANGE UP (ref 1–3.3)
LYMPHOCYTES # BLD AUTO: 13.3 % — SIGNIFICANT CHANGE UP (ref 13–44)
MCHC RBC-ENTMCNC: 22.4 PG — LOW (ref 27–34)
MCHC RBC-ENTMCNC: 29.8 G/DL — LOW (ref 32–36)
MCV RBC AUTO: 75.2 FL — LOW (ref 80–100)
MONOCYTES # BLD AUTO: 0.77 K/UL — SIGNIFICANT CHANGE UP (ref 0–0.9)
MONOCYTES NFR BLD AUTO: 7.1 % — SIGNIFICANT CHANGE UP (ref 2–14)
NEUTROPHILS # BLD AUTO: 5.4 K/UL — SIGNIFICANT CHANGE UP (ref 1.8–7.4)
NEUTROPHILS NFR BLD AUTO: 49.5 % — SIGNIFICANT CHANGE UP (ref 43–77)
NT-PROBNP SERPL-SCNC: 1631 PG/ML — HIGH (ref 0–300)
PCO2 BLDV: 40 MMHG — LOW (ref 42–55)
PH BLDV: 7.41 — SIGNIFICANT CHANGE UP (ref 7.32–7.43)
PLATELET # BLD AUTO: 216 K/UL — SIGNIFICANT CHANGE UP (ref 150–400)
PO2 BLDV: 48 MMHG — HIGH (ref 25–45)
POTASSIUM SERPL-MCNC: 3.8 MMOL/L — SIGNIFICANT CHANGE UP (ref 3.5–5.3)
POTASSIUM SERPL-SCNC: 3.8 MMOL/L — SIGNIFICANT CHANGE UP (ref 3.5–5.3)
PROT SERPL-MCNC: 8.1 G/DL — SIGNIFICANT CHANGE UP (ref 6–8.3)
RBC # BLD: 5.13 M/UL — SIGNIFICANT CHANGE UP (ref 4.2–5.8)
RBC # FLD: 25.1 % — HIGH (ref 10.3–14.5)
RSV RNA NPH QL NAA+NON-PROBE: SIGNIFICANT CHANGE UP
SAO2 % BLDV: 79.9 % — SIGNIFICANT CHANGE UP (ref 67–88)
SARS-COV-2 RNA SPEC QL NAA+PROBE: SIGNIFICANT CHANGE UP
SODIUM SERPL-SCNC: 141 MMOL/L — SIGNIFICANT CHANGE UP (ref 135–145)
SOURCE RESPIRATORY: SIGNIFICANT CHANGE UP
TROPONIN T, HIGH SENSITIVITY RESULT: 24 NG/L — SIGNIFICANT CHANGE UP (ref 0–51)
TROPONIN T, HIGH SENSITIVITY RESULT: 30 NG/L — SIGNIFICANT CHANGE UP (ref 0–51)
WBC # BLD: 10.9 K/UL — HIGH (ref 3.8–10.5)
WBC # FLD AUTO: 10.9 K/UL — HIGH (ref 3.8–10.5)

## 2025-04-28 PROCEDURE — 71250 CT THORAX DX C-: CPT | Mod: 26

## 2025-04-28 PROCEDURE — 93010 ELECTROCARDIOGRAM REPORT: CPT

## 2025-04-28 PROCEDURE — 99285 EMERGENCY DEPT VISIT HI MDM: CPT | Mod: 25

## 2025-04-28 PROCEDURE — 99223 1ST HOSP IP/OBS HIGH 75: CPT | Mod: GC

## 2025-04-28 PROCEDURE — 71045 X-RAY EXAM CHEST 1 VIEW: CPT | Mod: 26

## 2025-04-28 RX ORDER — TAMSULOSIN HYDROCHLORIDE 0.4 MG/1
0.4 CAPSULE ORAL AT BEDTIME
Refills: 0 | Status: DISCONTINUED | OUTPATIENT
Start: 2025-04-28 | End: 2025-04-29

## 2025-04-28 RX ORDER — IPRATROPIUM BROMIDE AND ALBUTEROL SULFATE .5; 2.5 MG/3ML; MG/3ML
3 SOLUTION RESPIRATORY (INHALATION) ONCE
Refills: 0 | Status: COMPLETED | OUTPATIENT
Start: 2025-04-28 | End: 2025-04-28

## 2025-04-28 RX ORDER — LABETALOL HYDROCHLORIDE 200 MG/1
200 TABLET, FILM COATED ORAL EVERY 24 HOURS
Refills: 0 | Status: DISCONTINUED | OUTPATIENT
Start: 2025-04-28 | End: 2025-04-29

## 2025-04-28 RX ORDER — ATORVASTATIN CALCIUM 80 MG/1
80 TABLET, FILM COATED ORAL AT BEDTIME
Refills: 0 | Status: DISCONTINUED | OUTPATIENT
Start: 2025-04-28 | End: 2025-04-29

## 2025-04-28 RX ORDER — NIFEDIPINE 30 MG
90 TABLET, EXTENDED RELEASE 24 HR ORAL EVERY 24 HOURS
Refills: 0 | Status: DISCONTINUED | OUTPATIENT
Start: 2025-04-29 | End: 2025-04-29

## 2025-04-28 RX ORDER — IPRATROPIUM BROMIDE AND ALBUTEROL SULFATE .5; 2.5 MG/3ML; MG/3ML
3 SOLUTION RESPIRATORY (INHALATION) EVERY 4 HOURS
Refills: 0 | Status: DISCONTINUED | OUTPATIENT
Start: 2025-04-28 | End: 2025-04-29

## 2025-04-28 RX ORDER — MEMANTINE HYDROCHLORIDE 21 MG/1
10 CAPSULE, EXTENDED RELEASE ORAL EVERY 24 HOURS
Refills: 0 | Status: DISCONTINUED | OUTPATIENT
Start: 2025-04-29 | End: 2025-04-29

## 2025-04-28 RX ORDER — CLOPIDOGREL BISULFATE 75 MG/1
75 TABLET, FILM COATED ORAL EVERY 24 HOURS
Refills: 0 | Status: DISCONTINUED | OUTPATIENT
Start: 2025-04-28 | End: 2025-04-29

## 2025-04-28 RX ORDER — AZITHROMYCIN 250 MG
500 CAPSULE ORAL ONCE
Refills: 0 | Status: COMPLETED | OUTPATIENT
Start: 2025-04-28 | End: 2025-04-28

## 2025-04-28 RX ORDER — DONEPEZIL HYDROCHLORIDE 5 MG/1
5 TABLET ORAL AT BEDTIME
Refills: 0 | Status: DISCONTINUED | OUTPATIENT
Start: 2025-04-28 | End: 2025-04-29

## 2025-04-28 RX ORDER — INSULIN LISPRO 100 U/ML
INJECTION, SOLUTION INTRAVENOUS; SUBCUTANEOUS
Refills: 0 | Status: DISCONTINUED | OUTPATIENT
Start: 2025-04-28 | End: 2025-04-29

## 2025-04-28 RX ORDER — METHYLPREDNISOLONE ACETATE 80 MG/ML
125 INJECTION, SUSPENSION INTRA-ARTICULAR; INTRALESIONAL; INTRAMUSCULAR; SOFT TISSUE ONCE
Refills: 0 | Status: COMPLETED | OUTPATIENT
Start: 2025-04-28 | End: 2025-04-28

## 2025-04-28 RX ORDER — ASPIRIN 325 MG
81 TABLET ORAL EVERY 24 HOURS
Refills: 0 | Status: DISCONTINUED | OUTPATIENT
Start: 2025-04-29 | End: 2025-04-29

## 2025-04-28 RX ORDER — PREDNISONE 20 MG/1
40 TABLET ORAL EVERY 24 HOURS
Refills: 0 | Status: DISCONTINUED | OUTPATIENT
Start: 2025-04-29 | End: 2025-04-29

## 2025-04-28 RX ADMIN — CLOPIDOGREL BISULFATE 75 MILLIGRAM(S): 75 TABLET, FILM COATED ORAL at 20:41

## 2025-04-28 RX ADMIN — LABETALOL HYDROCHLORIDE 200 MILLIGRAM(S): 200 TABLET, FILM COATED ORAL at 20:41

## 2025-04-28 RX ADMIN — METHYLPREDNISOLONE ACETATE 125 MILLIGRAM(S): 80 INJECTION, SUSPENSION INTRA-ARTICULAR; INTRALESIONAL; INTRAMUSCULAR; SOFT TISSUE at 11:57

## 2025-04-28 RX ADMIN — IPRATROPIUM BROMIDE AND ALBUTEROL SULFATE 3 MILLILITER(S): .5; 2.5 SOLUTION RESPIRATORY (INHALATION) at 10:58

## 2025-04-28 RX ADMIN — Medication 500 MILLIGRAM(S): at 19:49

## 2025-04-28 RX ADMIN — IPRATROPIUM BROMIDE AND ALBUTEROL SULFATE 3 MILLILITER(S): .5; 2.5 SOLUTION RESPIRATORY (INHALATION) at 20:50

## 2025-04-28 RX ADMIN — IPRATROPIUM BROMIDE AND ALBUTEROL SULFATE 3 MILLILITER(S): .5; 2.5 SOLUTION RESPIRATORY (INHALATION) at 11:57

## 2025-04-28 NOTE — H&P ADULT - PROBLEM SELECTOR PLAN 3
Home med: asp , atorvasttin 81mg, clopidegrel 75mg   - c/w home meds Home med: asp , atorvastatin 80mg, clopidogrel 75mg   - c/w home meds Home meds: nifedipine 90, labetalol 200mg BID , furosemide 40mg prn    - c/w home meds

## 2025-04-28 NOTE — ED PROVIDER NOTE - OBJECTIVE STATEMENT
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  Taking lasix since yesterday.  Recently had pna several months ago and improved.  No chest pain.

## 2025-04-28 NOTE — H&P ADULT - NSHPLABSRESULTS_GEN_ALL_CORE
LABS:                         11.5   10.90 )-----------( 216      ( 28 Apr 2025 08:09 )             38.6     04-28    141  |  102  |  22  ----------------------------<  226[H]  3.8   |  21[L]  |  0.86    Ca    9.9      28 Apr 2025 08:09    TPro  8.1  /  Alb  4.6  /  TBili  0.2  /  DBili  x   /  AST  20  /  ALT  21  /  AlkPhos  120  04-28      Urinalysis Basic - ( 28 Apr 2025 08:09 )    Color: x / Appearance: x / SG: x / pH: x  Gluc: 226 mg/dL / Ketone: x  / Bili: x / Urobili: x   Blood: x / Protein: x / Nitrite: x   Leuk Esterase: x / RBC: x / WBC x   Sq Epi: x / Non Sq Epi: x / Bacteria: x    RADIOLOGY, EKG & ADDITIONAL TESTS: Reviewed.

## 2025-04-28 NOTE — H&P ADULT - PROBLEM SELECTOR PLAN 8
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

## 2025-04-28 NOTE — H&P ADULT - PROBLEM SELECTOR PLAN 1
Presents with shortness of breath. hospitalized PNA jan 2025 at the time was started on home 2L oxygen, according to daughter has only required it twice. Does not have outpatient pulm, interetsed in being set up with one. DDx includes COPD vs HF vs PNA   Currently with SpO2 89% on 3L O2    - f/u full RVP  - bedside spirometry  - early mobility w/ PT, OOB to chair daily   -Albuterol 2.5mg via neb q1h PRN  -Ipratropium 500mcg via neb q4h PRN  -Methylprednisolone 60-125mg q6-12h Presents with shortness of breath. hospitalized PNA jan 2025 at the time was started on home 2L oxygen, according to daughter has only required it twice. Does not have outpatient pulm, interested in being set up with one. DDx includes  HF vs PNA   Currently with SpO2 89% on 3L O2    -Albuterol 2.5mg via neb PRN  -Ipratropium 500mcg via neb q4h PRN  -Methylprednisolone 60-125mg q6-12h  - f/u full RVP   - bedside spirometry  - early mobility w/ PT, OOB to chair daily Presents with shortness of breath. hospitalized PNA jan 2025 at the time was started on home 2L oxygen, according to daughter has only required it twice. Does not have outpatient pulm, interested in being set up with one. DDx includes  HF vs PNA   Currently with SpO2 89% on 3L O2    - duonbes q6   - prednisone 40  - f/u full RVP   - bedside spirometry Presents with shortness of breath. hospitalized PNA jan 2025 at the time was started on home 2L oxygen, according to daughter has only required it twice. Does not have outpatient pulm, interested in being set up with one. DDx includes  HF vs PNA  - wean down O2 as tolerated  - per family bedside, ptn ambulates without O2 at home independently w/o assistive devices, ambulating to bathroom ok in ED

## 2025-04-28 NOTE — ED ADULT NURSE NOTE - HOW OFTEN DO YOU HAVE A DRINK CONTAINING ALCOHOL?
Never Zoryve Counseling:  I discussed with the patient that Zoryve is not for use in the eyes, mouth or vagina. The most commonly reported side effects include diarrhea, headache, insomnia, application site pain, upper respiratory tract infections, and urinary tract infections.  All of the patient's questions and concerns were addressed.

## 2025-04-28 NOTE — ED PROVIDER NOTE - PHYSICAL EXAMINATION
Physical Exam:    CONSTITUTIONAL:  Generally well appearing, no acute distress, alert, awake and oriented x1 (baseline), smiling, mild tachypnea  HEENT:  Moist mucous membranes, normal voice  PULM:  Occasional bibasilar crackle, good air movement, no wheezing, no acc m use.  CV:  Soft heart sounds, occasional irregularity, slight holosytolic murmur, + mild JVD and symmetric b/l LE edema  GI:  Belly soft nontender  SKIN:  Normal in appearance, normal color   MSK:  No calf ttp, symmetric pedal edema 1+ bl, nl pulses

## 2025-04-28 NOTE — H&P ADULT - PROBLEM SELECTOR PROBLEM 7
Lmom to rs appts as requested   Prophylactic measure BPH (benign prostatic hyperplasia) Alzheimer dementia

## 2025-04-28 NOTE — H&P ADULT - ASSESSMENT
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), presenting to [facility] ED for 3 days of cough with productive yellow and clear sputum without blood, and 2 days of PERERA and mild bl pedal edema according to daughter, admitted for COPD exacerbation  73y Male with PMHx  COPD on home oxygen, dementia, HTN, DM, HLD, CAD (was on Xarelto, discontinued 2/2 GI bleed, now just on ASA 81mg daily), PAD s/p bilateral fem-pop bypasses (2016) s/p R embolectomy and angioplasty x2 for RLE ( ischemia (3/2020) on ASA, severe aortic stenosis, HFpEF, 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

## 2025-04-28 NOTE — H&P ADULT - PROBLEM SELECTOR PLAN 5
s/p B/L fem-pop bypasses (2016) and s/p R embolectomy + angioplasty x2 for RLE (2020)   -- cont. ASA, statin. home meds; metformin 500mg     - iss   - ctm fs

## 2025-04-28 NOTE — H&P ADULT - ATTENDING COMMENTS
73M PMH  COPD (intermittently on home O2 2L NC), dementia, HTN, DM, HLD, CAD (on baby ASA daily), PAD s/p bilateral fem-pop bypasses (2016) s/p R embolectomy and angioplasty x2 for RLE ischemia (2020) (on clopidogrel, xarelto dced 2/2 GI bleed), severe aortic stenosis sp TAVR, HFpEF, stage 1 cecal adenoCA s/p open R hemicolectomy (2020), stroke, pw 3d of productive cough and PERERA, admitted for COPD exacerbation.  Ptn poor historian, per daughter bedside, baseline mentation status this admission, ptn ambulates without O2 at home independently w/o assistive devices, ambulating to bathroom ok in ED.  Medical plan discussed w daughter bedside, all questions answered  On exam: pleasant elderly gentleman, well groomed, well nourished, A+Ox1 (baseline), answering appropriately to simple questions, following commands, lungs CTAB on exam, minor wheezing upper L fields, no crackles  -standing duonebs q4, sp methylpred today, can finish 5d course w prednisone 40 PO starting tomorrow, f/up full RVP  -per collateral, received 2d of clarithromycin outptn, finish 3d course w azithro, clarify ptn home meds w pharmacy/family  -CT chest without consolidations, will benefit from outptn pulm f/up, may benefit from pulm inpatient consult for guidance on outpatient regimen  -suspect etiology of hypoxia and PERERA likely 2/2 COPD exacerbation, however, given ptn edema on exam and history of HFpEF and elevated BNP, would obtain TTE to assess IVC and EF.    -If cf for volume overload or symptoms do not improve w COPD management, can initiate IV diuresis, caution w IVF  -wean O2 as tolerated, assess ptn ambulation

## 2025-04-28 NOTE — ED ADULT NURSE NOTE - NSFALLRISKASMT_ED_ALL_ED_DT
28-Apr-2025 07:39 Oculoplastic Surgeon Procedure Text (A): After obtaining clear surgical margins the patient was sent to oculoplastics for surgical repair.  The patient understands they will receive post-surgical care and follow-up from the referring physician's office.

## 2025-04-28 NOTE — ED ADULT TRIAGE NOTE - CHIEF COMPLAINT QUOTE
Difficulty breathing since Friday.  As per daughter, pt has a Hx of CHF & pt appears to be swollen. Productive cough with phlegm, denies any fever

## 2025-04-28 NOTE — H&P ADULT - PROBLEM SELECTOR PLAN 4
home meds; metformin 500mg     - iss   - ctm fs Home med: asp , atorvastatin 80mg, clopidogrel 75mg   - c/w home meds

## 2025-04-28 NOTE — H&P ADULT - PROBLEM SELECTOR PLAN 2
Home meds: nifedipine 90, labetalol 200mg BID , furosemide 40mg prn    - c/w home meds as above   Currently with SpO2 89% on 3L O2    - duonbes q6   - prednisone 40  - f/u full RVP   - bedside spirometry

## 2025-04-28 NOTE — ED PROVIDER NOTE - CLINICAL SUMMARY MEDICAL DECISION MAKING FREE TEXT BOX
73 yo poor historian secondary to baseline dementia with extensive CV disease and risk factors, including afib not on AC (GI bleed hx), HFpEF on 20mg lasix prn, severe aortic stenosis as well as COPD not on home O2, now with PERERA x 2 days and cough that began Friday with some sputum.  VS noted, mild hypoxia on exam 91%, given O2, bibasilar crackles and 1+ symmetric new pitting edema to LEs.  Good aeration and no wheezing.  DDx includes CHF exacerbation +/- pna +/- COPD exacerbation, with favor to CHF exacerbation.  Plan check labs, ekg, cxr, CT chest, reassess need for additional dieuresis, abx, and or tx and likely admit. 75 yo poor historian secondary to baseline dementia with extensive CV disease and risk factors, including afib not on AC (GI bleed hx), HFpEF on 20mg lasix prn, severe aortic stenosis as well as COPD not on home O2, now with PERERA x 2 days and cough that began Friday with some sputum.  VS noted, mild hypoxia on exam 91%, given O2, bibasilar crackles and 1+ symmetric new pitting edema to LEs.  Good aeration and no wheezing.  DDx includes CHF exacerbation +/- pna +/- COPD exacerbation, with favor to CHF exacerbation.  Plan check labs, ekg, cxr, CT chest, reassess need for additional dieuresis, abx, and or tx and likely admit.    Labs and CT chest results reviewed. Most c/w copd/emphysema exacerbation. Do not suspect ACS, PE, dissection or other acute life-threatening pathology at this time.  Pt was given nebs and steroids and reassessed. trop stable x 2. He still has some increased WOB and is satting 88% on 3L NC (per [facility] pt uses 2-3 occasionally at home but usually does not wear it due to dementia. Pt seen by his attending PCP Dr. Golden in the ER who agrees with admission to F for further mgmt of COPD exacerbation. Stable for RMF at this time.

## 2025-04-28 NOTE — H&P ADULT - HISTORY OF PRESENT ILLNESS
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 ED for 3 days of cough with productive yellow and clear sputum without blood, and 2 days of PERERA and mild bl pedal edema according to daughter. The family did a televisit with PCP and patient was prescribed azithromycin 500mg with minimal improvement of symptoms. Taking lasix since yesterday.  Recently had pna several months ago and improved.  No chest pain, fevers chills, n. v. d.     In the ED:  Initial vital signs: T:  97.4F, HR: 82 , BP: 142/68 , R: 22, SpO2: 95% on 5L NC   ED course:   Labs: significant for wbc 10.9, hgb 11.5, platelet count 216, AG 18 probnp 1631   Imaging:  CXR: right atelectasis tavr, cardiomegaly   CT chest: pulmonary emphysema    EKG:   Medications: duoneb solumedrol 125mg IV,    PCP:   Pharmacy: [facility]   - - - - -    HPI:  73y Male (Sami speaking, poor historian AOx1 baseline), PMHx  COPD on home oxygen, vascular dementia, HTN, DM, HLD, CAD (was on Xarelto, discontinued 2/2 GI bleed, now just on ASA 81mg daily), PAD s/p bilateral fem-pop bypasses (2016) s/p R embolectomy and angioplasty x2 for RLE ( ischemia (3/2020) on ASA, severe aortic stenosis, HFpEF, 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 ED for 3 days of cough with productive yellow and clear sputum without blood, and 2 days of PERERA and mild bl pedal edema according to daughter. The family did a televisit with PCP and patient was prescribed azithromycin 500mg with minimal improvement of symptoms. Taking lasix since yesterday.  Recently had pna several months ago and improved.  No chest pain, fevers chills, n. v. d.     In the ED:  Initial vital signs: T:  97.4F, HR: 82 , BP: 142/68 , R: 22, SpO2: 95% on 5L NC   ED course:   Labs: significant for wbc 10.9, hgb 11.5, platelet count 216, AG 18 probnp 1631   Imaging:  CXR: right atelectasis tavr, cardiomegaly   CT chest: pulmonary emphysema    EKG:   Medications: duoneb solumedrol 125mg IV,    PCP:   Pharmacy: [facility]   - - - - -    HPI:  73y Male (Korean speaking, poor historian AOx1 baseline), PMHx  COPD on home oxygen, vascular dementia, HTN, DM, HLD, CAD. 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 The family did a televisit with PCP and patient was prescribed azithromycin 500mg with minimal improvement of symptoms. Had pna several months ago and improved.  No chest pain, fevers chills, n. v. d.     In the ED:  Initial vital signs: T:  97.4F, HR: 82 , BP: 142/68 , R: 22, SpO2: 95% on 5L NC   ED course:   Labs: significant for wbc 10.9, hgb 11.5, platelet count 216, AG 18 probnp 1631   Imaging:  CXR: right atelectasis tavr, cardiomegaly   CT chest: pulmonary emphysema    EKG:   Medications: duoneb solumedrol 125mg IV,

## 2025-04-28 NOTE — H&P ADULT - NSHPPHYSICALEXAM_GEN_ALL_CORE
PHYSICAL EXAM  Constitutional:  resting comfortably in bed; NAD, AOX1   Eyes: anicteric sclera  Respiratory: crackles bilaterally bases   Cardiac: +S1/S2; RRR  Gastrointestinal: soft, NT/ND; no rebound or guarding;  Extremities: +1 edema PHYSICAL EXAM  Constitutional:  resting comfortably in bed; NAD, AOX1   Eyes: anicteric sclera  Respiratory: crackles bilaterally bases   Cardiac: +S1/S2; RRR  Gastrointestinal: soft, NT/ND; no rebound or guarding  Extremities: +1 edema

## 2025-04-28 NOTE — H&P ADULT - PROBLEM SELECTOR PLAN 9
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

## 2025-04-28 NOTE — H&P ADULT - PROBLEM SELECTOR PLAN 6
baseline AO1-2. Home meds: memantine 10, donepezil 5mg s/p B/L fem-pop bypasses (2016) and s/p R embolectomy + angioplasty x2 for RLE (2020)   -- cont. ASA, statin.

## 2025-04-28 NOTE — H&P ADULT - PROBLEM SELECTOR PLAN 7
Home med: tamsulosin 0.4mg     - c/w home med baseline AO1-2. Home meds: memantine 10, donepezil 5mg

## 2025-04-29 ENCOUNTER — TRANSCRIPTION ENCOUNTER (OUTPATIENT)
Age: 75
End: 2025-04-29

## 2025-04-29 VITALS
OXYGEN SATURATION: 94 % | DIASTOLIC BLOOD PRESSURE: 59 MMHG | RESPIRATION RATE: 18 BRPM | SYSTOLIC BLOOD PRESSURE: 145 MMHG | TEMPERATURE: 97 F | HEART RATE: 96 BPM

## 2025-04-29 LAB
A1C WITH ESTIMATED AVERAGE GLUCOSE RESULT: 6 % — HIGH (ref 4–5.6)
ALBUMIN SERPL ELPH-MCNC: 3.6 G/DL — SIGNIFICANT CHANGE UP (ref 3.3–5)
ALP SERPL-CCNC: 103 U/L — SIGNIFICANT CHANGE UP (ref 40–120)
ALT FLD-CCNC: 18 U/L — SIGNIFICANT CHANGE UP (ref 10–45)
ANION GAP SERPL CALC-SCNC: 15 MMOL/L — SIGNIFICANT CHANGE UP (ref 5–17)
AST SERPL-CCNC: 17 U/L — SIGNIFICANT CHANGE UP (ref 10–40)
BASOPHILS # BLD AUTO: 0 K/UL — SIGNIFICANT CHANGE UP (ref 0–0.2)
BASOPHILS NFR BLD AUTO: 0 % — SIGNIFICANT CHANGE UP (ref 0–2)
BILIRUB SERPL-MCNC: 0.2 MG/DL — SIGNIFICANT CHANGE UP (ref 0.2–1.2)
BUN SERPL-MCNC: 20 MG/DL — SIGNIFICANT CHANGE UP (ref 7–23)
CALCIUM SERPL-MCNC: 9.3 MG/DL — SIGNIFICANT CHANGE UP (ref 8.4–10.5)
CHLORIDE SERPL-SCNC: 103 MMOL/L — SIGNIFICANT CHANGE UP (ref 96–108)
CO2 SERPL-SCNC: 25 MMOL/L — SIGNIFICANT CHANGE UP (ref 22–31)
CREAT SERPL-MCNC: 0.66 MG/DL — SIGNIFICANT CHANGE UP (ref 0.5–1.3)
EGFR: 99 ML/MIN/1.73M2 — SIGNIFICANT CHANGE UP
EGFR: 99 ML/MIN/1.73M2 — SIGNIFICANT CHANGE UP
EOSINOPHIL # BLD AUTO: 0 K/UL — SIGNIFICANT CHANGE UP (ref 0–0.5)
EOSINOPHIL NFR BLD AUTO: 0 % — SIGNIFICANT CHANGE UP (ref 0–6)
ESTIMATED AVERAGE GLUCOSE: 126 MG/DL — HIGH (ref 68–114)
GLUCOSE SERPL-MCNC: 167 MG/DL — HIGH (ref 70–99)
HCT VFR BLD CALC: 33.9 % — LOW (ref 39–50)
HGB BLD-MCNC: 10.2 G/DL — LOW (ref 13–17)
LYMPHOCYTES # BLD AUTO: 0.84 K/UL — LOW (ref 1–3.3)
LYMPHOCYTES # BLD AUTO: 7.9 % — LOW (ref 13–44)
MAGNESIUM SERPL-MCNC: 1.7 MG/DL — SIGNIFICANT CHANGE UP (ref 1.6–2.6)
MCHC RBC-ENTMCNC: 22.3 PG — LOW (ref 27–34)
MCHC RBC-ENTMCNC: 30.1 G/DL — LOW (ref 32–36)
MCV RBC AUTO: 74 FL — LOW (ref 80–100)
MONOCYTES # BLD AUTO: 0 K/UL — SIGNIFICANT CHANGE UP (ref 0–0.9)
MONOCYTES NFR BLD AUTO: 0 % — LOW (ref 2–14)
NEUTROPHILS # BLD AUTO: 9.7 K/UL — HIGH (ref 1.8–7.4)
NEUTROPHILS NFR BLD AUTO: 91.2 % — HIGH (ref 43–77)
PHOSPHATE SERPL-MCNC: 3.9 MG/DL — SIGNIFICANT CHANGE UP (ref 2.5–4.5)
PLATELET # BLD AUTO: 209 K/UL — SIGNIFICANT CHANGE UP (ref 150–400)
POTASSIUM SERPL-MCNC: 3.8 MMOL/L — SIGNIFICANT CHANGE UP (ref 3.5–5.3)
POTASSIUM SERPL-SCNC: 3.8 MMOL/L — SIGNIFICANT CHANGE UP (ref 3.5–5.3)
PROT SERPL-MCNC: 6.9 G/DL — SIGNIFICANT CHANGE UP (ref 6–8.3)
RBC # BLD: 4.58 M/UL — SIGNIFICANT CHANGE UP (ref 4.2–5.8)
RBC # FLD: 24.9 % — HIGH (ref 10.3–14.5)
SODIUM SERPL-SCNC: 143 MMOL/L — SIGNIFICANT CHANGE UP (ref 135–145)
WBC # BLD: 10.64 K/UL — HIGH (ref 3.8–10.5)
WBC # FLD AUTO: 10.64 K/UL — HIGH (ref 3.8–10.5)

## 2025-04-29 PROCEDURE — 83036 HEMOGLOBIN GLYCOSYLATED A1C: CPT

## 2025-04-29 PROCEDURE — 83735 ASSAY OF MAGNESIUM: CPT

## 2025-04-29 PROCEDURE — 82962 GLUCOSE BLOOD TEST: CPT

## 2025-04-29 PROCEDURE — 71045 X-RAY EXAM CHEST 1 VIEW: CPT

## 2025-04-29 PROCEDURE — 87637 SARSCOV2&INF A&B&RSV AMP PRB: CPT

## 2025-04-29 PROCEDURE — 99239 HOSP IP/OBS DSCHRG MGMT >30: CPT | Mod: GC

## 2025-04-29 PROCEDURE — 71250 CT THORAX DX C-: CPT | Mod: MC

## 2025-04-29 PROCEDURE — 80053 COMPREHEN METABOLIC PANEL: CPT

## 2025-04-29 PROCEDURE — 99285 EMERGENCY DEPT VISIT HI MDM: CPT | Mod: 25

## 2025-04-29 PROCEDURE — 36415 COLL VENOUS BLD VENIPUNCTURE: CPT

## 2025-04-29 PROCEDURE — 82803 BLOOD GASES ANY COMBINATION: CPT

## 2025-04-29 PROCEDURE — 96374 THER/PROPH/DIAG INJ IV PUSH: CPT

## 2025-04-29 PROCEDURE — 85025 COMPLETE CBC W/AUTO DIFF WBC: CPT

## 2025-04-29 PROCEDURE — 93005 ELECTROCARDIOGRAM TRACING: CPT

## 2025-04-29 PROCEDURE — 94640 AIRWAY INHALATION TREATMENT: CPT

## 2025-04-29 PROCEDURE — 84484 ASSAY OF TROPONIN QUANT: CPT

## 2025-04-29 PROCEDURE — 83880 ASSAY OF NATRIURETIC PEPTIDE: CPT

## 2025-04-29 PROCEDURE — 84100 ASSAY OF PHOSPHORUS: CPT

## 2025-04-29 RX ORDER — TIOTROPIUM BROMIDE INHALATION SPRAY 3.12 UG/1
2 SPRAY, METERED RESPIRATORY (INHALATION)
Qty: 1 | Refills: 0
Start: 2025-04-29 | End: 2025-07-27

## 2025-04-29 RX ORDER — BUDESONIDE AND FORMOTEROL FUMARATE DIHYDRATE 80; 4.5 UG/1; UG/1
2 AEROSOL RESPIRATORY (INHALATION)
Qty: 1 | Refills: 0
Start: 2025-04-29 | End: 2025-07-27

## 2025-04-29 RX ORDER — PREDNISONE 20 MG/1
2 TABLET ORAL
Qty: 8 | Refills: 0
Start: 2025-04-29 | End: 2025-05-02

## 2025-04-29 RX ORDER — MAGNESIUM SULFATE 500 MG/ML
1 SYRINGE (ML) INJECTION ONCE
Refills: 0 | Status: COMPLETED | OUTPATIENT
Start: 2025-04-29 | End: 2025-04-29

## 2025-04-29 RX ADMIN — TAMSULOSIN HYDROCHLORIDE 0.4 MILLIGRAM(S): 0.4 CAPSULE ORAL at 01:27

## 2025-04-29 RX ADMIN — Medication 40 MILLIEQUIVALENT(S): at 11:34

## 2025-04-29 RX ADMIN — Medication 81 MILLIGRAM(S): at 07:28

## 2025-04-29 RX ADMIN — PREDNISONE 40 MILLIGRAM(S): 20 TABLET ORAL at 09:37

## 2025-04-29 RX ADMIN — MEMANTINE HYDROCHLORIDE 10 MILLIGRAM(S): 21 CAPSULE, EXTENDED RELEASE ORAL at 07:27

## 2025-04-29 RX ADMIN — Medication 90 MILLIGRAM(S): at 07:28

## 2025-04-29 RX ADMIN — IPRATROPIUM BROMIDE AND ALBUTEROL SULFATE 3 MILLILITER(S): .5; 2.5 SOLUTION RESPIRATORY (INHALATION) at 07:24

## 2025-04-29 RX ADMIN — DONEPEZIL HYDROCHLORIDE 5 MILLIGRAM(S): 5 TABLET ORAL at 01:27

## 2025-04-29 RX ADMIN — Medication 100 GRAM(S): at 11:34

## 2025-04-29 RX ADMIN — IPRATROPIUM BROMIDE AND ALBUTEROL SULFATE 3 MILLILITER(S): .5; 2.5 SOLUTION RESPIRATORY (INHALATION) at 01:52

## 2025-04-29 RX ADMIN — ATORVASTATIN CALCIUM 80 MILLIGRAM(S): 80 TABLET, FILM COATED ORAL at 01:28

## 2025-04-29 RX ADMIN — INSULIN LISPRO 1: 100 INJECTION, SOLUTION INTRAVENOUS; SUBCUTANEOUS at 09:37

## 2025-04-29 NOTE — PROGRESS NOTE ADULT - ASSESSMENT
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 Wheezing improved s/p Prednisone and Duoneb treatments. O2 sat at baseline.

## 2025-04-29 NOTE — PROGRESS NOTE ADULT - PROBLEM SELECTOR PLAN 6
s/p B/L fem-pop bypasses (2016) and s/p R embolectomy + angioplasty x2 for RLE (2020)   -- cont. ASA, statin.

## 2025-04-29 NOTE — PROGRESS NOTE ADULT - PROBLEM SELECTOR PLAN 1
Presents with shortness of breath. hospitalized PNA jan 2025 at the time was started on home 2L oxygen, according to daughter has only required it twice. Does not have outpatient pulm, interested in being set up with one. DDx includes  HF vs PNA  - wean down O2 as tolerated  - per family bedside, ptn ambulates without O2 at home independently w/o assistive devices, ambulating to bathroom ok in ED Weaned down to 3L overnight now doing well on room air, ambulating without issue in hallway     - abranbes q6   - prednisone 40 PO x 5 day course   - f/u full RVP   - bedside spirometry  - will obtain med rec to see which inhalers patient is on at home Weaned down to 3L overnight now doing well on room air, ambulating without issue in hallway     - duonbes q6   - prednisone 40 PO x 5 day course   - f/u full RVP   - bedside spirometry  - not on any inhalers at home (per [facility] as well as discussion with daughter). Diagnosed with COPD during prior hospitalization in the past after treatment for pneumonia (per daugther)      - breath has gradually worsened over the past year   - Based on symptoms - currently meets GOLD group A - will need long acting bronchodilator on discharge as well as pulmonology followup.

## 2025-04-29 NOTE — DISCHARGE NOTE PROVIDER - NSDCMRMEDTOKEN_GEN_ALL_CORE_FT
aspirin 81 mg oral delayed release tablet: 1 tab(s) orally once a day  atorvastatin 80 mg oral tablet: 1 tab(s) orally once a day (at bedtime)  clopidogrel 75 mg oral tablet: 1 tab(s) orally once a day  donepezil 5 mg oral tablet: 1 tab(s) orally once a day (at bedtime)  labetalol 200 mg oral tablet: 1 tab(s) orally 2 times a day  memantine 10 mg oral tablet: 1 tab(s) orally once a day  metFORMIN 500 mg oral tablet: 1  orally 2 times a day  Multiple Vitamins oral tablet: 1 tab(s) orally once a day  NIFEdipine 90 mg oral tablet, extended release: 1 tab(s) orally once a day   aspirin 81 mg oral delayed release tablet: 1 tab(s) orally once a day  atorvastatin 80 mg oral tablet: 1 tab(s) orally once a day (at bedtime)  clopidogrel 75 mg oral tablet: 1 tab(s) orally once a day  donepezil 5 mg oral tablet: 1 tab(s) orally once a day (at bedtime)  labetalol 200 mg oral tablet: 1 tab(s) orally 2 times a day  memantine 10 mg oral tablet: 1 tab(s) orally once a day  metFORMIN 500 mg oral tablet: 1  orally 2 times a day  Multiple Vitamins oral tablet: 1 tab(s) orally once a day  predniSONE 20 mg oral tablet: 2 tab(s) orally every 24 hours  Symbicort 160 mcg-4.5 mcg/inh inhalation aerosol: 2 puff(s) inhaled once a day  tiotropium 2.5 mcg/inh inhalation aerosol: 2 inhaler(s) inhaled once a day

## 2025-04-29 NOTE — DISCHARGE NOTE NURSING/CASE MANAGEMENT/SOCIAL WORK - PATIENT PORTAL LINK FT
You can access the FollowMyHealth Patient Portal offered by [facility] by registering at the following website: [email or web address]. By joining [facility] FollowMyHealth portal, you will also be able to view your health information using other applications (apps) compatible with our system.

## 2025-04-29 NOTE — DISCHARGE NOTE NURSING/CASE MANAGEMENT/SOCIAL WORK - FINANCIAL ASSISTANCE
[facility] provides services at a reduced cost to those who are determined to be eligible through [facility]’s financial assistance program. Information regarding [facility]’s financial assistance program can be found by going to [email or web address] or by calling [phone].

## 2025-04-29 NOTE — PROGRESS NOTE ADULT - PROBLEM SELECTOR PLAN 3
Home meds: nifedipine 90, labetalol 200mg BID , furosemide 40mg prn    - c/w home meds Home meds:  labetalol 200mg BID , furosemide 20mg    - c/w home meds

## 2025-04-29 NOTE — PROGRESS NOTE ADULT - PROBLEM SELECTOR PLAN 10
F: NI  E: Replete as needed  N: dash diet  Dvt ppx: scd Plan:  F: PO   E: replete K<4, Mg<2  N: regular  VTE Prophylaxis: ASA, Plavix   C: Full Code  D: [facility]

## 2025-04-29 NOTE — PROGRESS NOTE ADULT - PROBLEM SELECTOR PLAN 5
home meds; metformin 500mg     - iss   - ctm fs home meds; metformin 500mg, A1C: 6    - iss   - ctm fs

## 2025-04-29 NOTE — DISCHARGE NOTE PROVIDER - DISCHARGING ATTENDING PHYSICIAN:
-- DO NOT REPLY / DO NOT REPLY ALL --  -- Message is from [employer] ([employer]) --    General Patient Message: See 12/14 encounter.  Jose M is calling for status.  Writer unable to confirm fax received.   Writer confirmed the fax number he sent to was incorrect.  Provided correct number [phone]. He will be re-faxing that now.       Alternative phone number:     Can a detailed message be left? Yes    Message Turnaround:     Is it Working Hours? Yes - Working Hours     [place]:    Please give this turnaround time to the caller:   \"This message will be sent to [state Provider's name]. The clinical team will fulfill your request as soon as they review your message.\"                
Form was not received yesterday called today and left message requesting form be re-faxed  
Shravan Esquivel

## 2025-04-29 NOTE — PROGRESS NOTE ADULT - PROBLEM SELECTOR PLAN 2
as above   Currently with SpO2 89% on 3L O2    - duonbes q6   - prednisone 40  - f/u full RVP   - bedside spirometry Presents with shortness of breath. hospitalized [facility]NA jan 2025 at the time was started on home 2L oxygen, according to daughter has only required it twice. Does not have outpatient pulm, interested in being set up with one. DDx: COPD exacerbation vs HF (less likely)   - patient breathing 94% on room air while ambulatory today     - per family  ptn ambulates without O2 at home independently w/o assistive devices,  - ***RESOLVED**** - appears to be at baseline today

## 2025-04-29 NOTE — DISCHARGE NOTE PROVIDER - NSDCCPCAREPLAN_GEN_ALL_CORE_FT
PRINCIPAL DISCHARGE DIAGNOSIS  Diagnosis: COPD exacerbation  Assessment and Plan of Treatment: COPD is a lung disease that makes it hard to breathe. In people with COPD, the airways (the branching tubes that carry air within the lungs) become narrow and damaged. This makes people feel out of breath and tired. COPD can be a serious illness. It cannot be cured and it usually gets worse over time. But there are treatments that can help. The most common cause of COPD is smoking. Smoke can damage the lungs forever and cause COPD. Common symptoms include shortness of breath, wheezing, and worsening cough and mucus production. COPD can put you at an increased risk for lung infections, lung cancer and heart problems. If you smoke, the most important thing you can do for your COPD is to stop smoking. There are a lot of medicines to treat COPD. Most people use inhalers that help open up their airways or decrease swelling in the airways. Often people need more than one inhaler at a time. You might need to take a steroid medicine in a pill for a flare of COPD. If the disease gets worse, you might need to use oxygen. Pulmonary rehabilitation may be recommended and can teach you how to improve your symptoms through exercises and different ways to breathe. RARELY, people with severe COPD will have surgery to remove the most damaged parts of their lung. This surgery can reduce symptoms, but it does not always work. In order to prevent COPD exacerbations it is important that you take your prescribed medications and follow the recommendations of your primary care and pulmonary doctors. If your shortness of breath worsens or your experience an increase in or change of your sputum production you should seek evaluation by your primary care doctor, or, if severe, an emergency room.       PRINCIPAL DISCHARGE DIAGNOSIS  Diagnosis: COPD exacerbation  Assessment and Plan of Treatment: COPD is a lung disease that makes it hard to breathe. In people with COPD, the airways (the branching tubes that carry air within the lungs) become narrow and damaged. This makes people feel out of breath and tired. COPD can be a serious illness. It cannot be cured and it usually gets worse over time. But there are treatments that can help. The most common cause of COPD is smoking. Smoke can damage the lungs forever and cause COPD. Common symptoms include shortness of breath, wheezing, and worsening cough and mucus production. COPD can put you at an increased risk for lung infections, lung cancer and heart problems. If you smoke, the most important thing you can do for your COPD is to stop smoking. There are a lot of medicines to treat COPD. Most people use inhalers that help open up their airways or decrease swelling in the airways. Often people need more than one inhaler at a time. You might need to take a steroid medicine in a pill for a flare of COPD. If the disease gets worse, you might need to use oxygen. Pulmonary rehabilitation may be recommended and can teach you how to improve your symptoms through exercises and different ways to breathe. RARELY, people with severe COPD will have surgery to remove the most damaged parts of their lung. This surgery can reduce symptoms, but it does not always work. In order to prevent COPD exacerbations it is important that you take your prescribed medications and follow the recommendations of your primary care and pulmonary doctors. If your shortness of breath worsens or your experience an increase in or change of your sputum production you should seek evaluation by your primary care doctor, or, if severe, an emergency room.  --------------------------------------------------------------------------  Please followup with a pulmonologist in the next 2-4 weeks after discharge.    You can call Dr. Addison Kirk, Pulmonary Disease, [phone], and make an appointment with a [facility] provider if you prefer.

## 2025-04-29 NOTE — DISCHARGE NOTE PROVIDER - CARE PROVIDER_API CALL
Addison Kirk  Pulmonary Disease  [address] [place], [place] [address]  Phone: [phone]  Fax: [phone]  Follow Up Time: 2 weeks

## 2025-04-29 NOTE — DISCHARGE NOTE PROVIDER - HOSPITAL COURSE
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 Wheezing improved s/p Prednisone and Duoneb treatments. O2 sat at baseline. Discharged on LABA+ LAMA and 4 days of prednisone       Hospital course (by problem):   COPD exacerbation.   ·  Plan: Weaned down to 3L overnight now doing well on room air, ambulating without issue in hallway   - duonbes q6   - prednisone 40 PO x 5 day course   - f/u full RVP   - bedside spirometry  - not on any inhalers at home (per med [facility] as well as discussion with daughter). Diagnosed with COPD during prior hospitalization in the past after treatment for pneumonia (per daugther)      - breath has gradually worsened over the past year   - Based on symptoms - currently meets GOLD group B - will need long acting bronchodilator on discharge as well as pulmonology followup.  - Discharged home on spiriva + formoterol     #Acute hypoxic respiratory failure.   ·  Plan: Presents with shortness of breath. hospitalized PNA jan 2025 at the time was started on home 2L oxygen, according to daughter has only required it twice. Does not have outpatient pulm, interested in being set up with one. DDx: COPD exacerbation vs HF (less likely)   - patient breathing 94% on room air while ambulatory today     - per family  ptn ambulates without O2 at home independently w/o assistive devices,  - ***RESOLVED**** - appears to be at baseline today.    #Hypertension.    Home meds:  labetalol 200mg BID , furosemide 20mg  - c/w home meds.    #CAD (coronary artery disease).   ·  Plan: Home med: asp , atorvastatin 80mg, clopidogrel 75mg   - c/w home meds.    #Diabetes.   home meds; metformin 500mg, A1C: 6  - iss   - ctm fs.    #PAD (peripheral artery disease).   ·  Plan: s/p B/L fem-pop bypasses (2016) and s/p R embolectomy + angioplasty x2 for RLE (2020)   -- cont. ASA, statin.    #Alzheimer dementia.   ·  Plan: baseline AO1-2. Home meds: memantine 10, donepezil 5mg.    #BPH (benign prostatic hyperplasia).   Home med: tamsulosin 0.4mg   - c/w home med.    #Colon cancer.    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        Patient was discharged to: home     New medications:   Changes to old medications: None   Medications that were stopped: None     Items to follow up as outpatient:    Physical exam at the time of discharge:    General:NAD.   HEENT: NC/AT; PERRL, anicteric sclera; MMM  Neck: supple  Cardiovascular: +S1/S2, RRR  Respiratory: + sparse crackles in b/l lung fields; no W/R/R  Gastrointestinal: soft, NT/ND; +BSx4  Extremities: WWP; + trace lower extremity edema, clubbing or cyanosis  Vascular: 2+ radial, DP/PT pulses B/L  Neurological: AAOx1; no focal deficits  Psychiatric: pleasant mood and affect  Dermatologic: no appreciable wounds or damage to the skin         #Discharge: do not delete    73y Male with PMHx  COPD on home oxygen, dementia, HTN, DM, HLD, CAD (was on Xarelto, discontinued 2/2 GI bleed, now just on ASA 81mg daily), PAD s/p bilateral fem-pop bypasses (2016) s/p R embolectomy and angioplasty x2 for RLE ( ischemia (3/2020) on ASA, severe aortic stenosis, HFpEF, 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 Wheezing improved s/p Prednisone and Duoneb treatments. O2 sat at baseline. Discharged on LABA+ LAMA and 4 days of prednisone       Hospital course (by problem):     #COPD exacerbation.   ·  Plan: Weaned down to 3L overnight now doing well on room air, ambulating without issue in hallway   - duonbes q6   - prednisone 40 PO x 5 day course   - f/u full RVP   - bedside spirometry  - not on any inhalers at home (per med [facility] as well as discussion with daughter). Diagnosed with COPD during prior hospitalization in the past after treatment for pneumonia (per daugther)      - breath has gradually worsened over the past year   - Based on symptoms - currently meets GOLD group B - will need long acting bronchodilator on discharge as well as pulmonology followup.  - Discharged home on spiriva + formoterol     #Acute hypoxic respiratory failure.   ·  Plan: Presents with shortness of breath. hospitalized PNA jan 2025 at the time was started on home 2L oxygen, according to daughter has only required it twice. Does not have outpatient pulm, interested in being set up with one. DDx: COPD exacerbation vs HF (less likely)   - patient breathing 94% on room air while ambulatory today     - per family  ptn ambulates without O2 at home independently w/o assistive devices,  - ***RESOLVED**** - appears to be at baseline today.    #Hypertension.    Home meds:  labetalol 200mg BID , furosemide 20mg  - c/w home meds.    #CAD (coronary artery disease).   ·  Plan: Home med: asp , atorvastatin 80mg, clopidogrel 75mg   - c/w home meds.    #Diabetes.   home meds; metformin 500mg, A1C: 6  - iss   - ctm fs.    #PAD (peripheral artery disease).   ·  Plan: s/p B/L fem-pop bypasses (2016) and s/p R embolectomy + angioplasty x2 for RLE (2020)   -- cont. ASA, statin.    #Alzheimer dementia.   ·  Plan: baseline AO1-2. Home meds: memantine 10, donepezil 5mg.    #BPH (benign prostatic hyperplasia).   Home med: tamsulosin 0.4mg   - c/w home med.    #Colon cancer.    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        Patient was discharged to: home     New medications: Symbicort 160/4.5, Spiriva 2.5mcg   Changes to old medications: None   Medications that were stopped: None     Items to follow up as outpatient:    Physical exam at the time of discharge:    General:NAD.   HEENT: NC/AT; PERRL, anicteric sclera; MMM  Neck: supple  Cardiovascular: +S1/S2, RRR  Respiratory: + sparse crackles in b/l lung fields; no W/R/R  Gastrointestinal: soft, NT/ND; +BSx4  Extremities: WWP; + trace lower extremity edema, clubbing or cyanosis  Vascular: 2+ radial, DP/PT pulses B/L  Neurological: AAOx1; no focal deficits  Psychiatric: pleasant mood and affect  Dermatologic: no appreciable wounds or damage to the skin

## 2025-04-29 NOTE — PROGRESS NOTE ADULT - SUBJECTIVE AND OBJECTIVE BOX
***Note in progress***    OVERNIGHT EVENTS: NAEO    SUBJECTIVE / INTERVAL HPI: Patient seen and examined at bedside. Patient denying chest pain, SOB, palpitations, cough. Patient denies fever, chills, HA, Dizziness, N/V, abdominal pain, diarrhea, constipation, hematochezia/melena, dysuria, hematuria, new onset weakness/numbness, LE pain and/or swelling.    Remaining ROS negative       PHYSICAL EXAM:    General:NAD.   HEENT: NC/AT; PERRL, anicteric sclera; MMM  Neck: supple  Cardiovascular: +S1/S2, RRR  Respiratory: CTA B/L; no W/R/R  Gastrointestinal: soft, NT/ND; +BSx4  Extremities: WWP; no edema, clubbing or cyanosis  Vascular: 2+ radial, DP/PT pulses B/L  Neurological: AAOx3; no focal deficits  Psychiatric: pleasant mood and affect  Dermatologic: no appreciable wounds or damage to the skin    VITAL SIGNS:  Vital Signs Last 24 Hrs  T(C): 37 (29 Apr 2025 05:45), Max: 37 (29 Apr 2025 05:45)  T(F): 98.6 (29 Apr 2025 05:45), Max: 98.6 (29 Apr 2025 05:45)  HR: 78 (29 Apr 2025 07:22) (77 - 93)  BP: 160/69 (29 Apr 2025 07:22) (131/63 - 182/77)  BP(mean): --  RR: 18 (29 Apr 2025 07:22) (16 - 18)  SpO2: 92% (29 Apr 2025 07:22) (89% - 98%)    Parameters below as of 29 Apr 2025 07:22  Patient On (Oxygen Delivery Method): nasal cannula  O2 Flow (L/min): 3      MEDICATIONS:  MEDICATIONS  (STANDING):  albuterol/ipratropium for Nebulization 3 milliLiter(s) Nebulizer every 4 hours  aspirin  chewable 81 milliGRAM(s) Oral every 24 hours  atorvastatin 80 milliGRAM(s) Oral at bedtime  clopidogrel Tablet 75 milliGRAM(s) Oral every 24 hours  donepezil 5 milliGRAM(s) Oral at bedtime  insulin lispro (ADMELOG) corrective regimen sliding scale   SubCutaneous Before meals and at bedtime  labetalol 200 milliGRAM(s) Oral every 24 hours  magnesium sulfate  IVPB 1 Gram(s) IV Intermittent once  memantine 10 milliGRAM(s) Oral every 24 hours  NIFEdipine XL 90 milliGRAM(s) Oral every 24 hours  potassium chloride   Powder 40 milliEquivalent(s) Oral once  predniSONE   Tablet 40 milliGRAM(s) Oral every 24 hours  tamsulosin 0.4 milliGRAM(s) Oral at bedtime    MEDICATIONS  (PRN):      ALLERGIES:  Allergies    No Known Allergies    Intolerances        LABS:                        10.2   10.64 )-----------( 209      ( 29 Apr 2025 07:45 )             33.9     04-29    143  |  103  |  20  ----------------------------<  167[H]  3.8   |  25  |  0.66    Ca    9.3      29 Apr 2025 07:45  Phos  3.9     04-29  Mg     1.7     04-29    TPro  6.9  /  Alb  3.6  /  TBili  0.2  /  DBili  x   /  AST  17  /  ALT  18  /  AlkPhos  103  04-29      Urinalysis Basic - ( 29 Apr 2025 07:45 )    Color: x / Appearance: x / SG: x / pH: x  Gluc: 167 mg/dL / Ketone: x  / Bili: x / Urobili: x   Blood: x / Protein: x / Nitrite: x   Leuk Esterase: x / RBC: x / WBC x   Sq Epi: x / Non Sq Epi: x / Bacteria: x      CAPILLARY BLOOD GLUCOSE      POCT Blood Glucose.: 215 mg/dL (28 Apr 2025 22:14)      RADIOLOGY & ADDITIONAL TESTS: Reviewed. OVERNIGHT EVENTS: NAEO    SUBJECTIVE / INTERVAL HPI: Patient seen and examined at bedside. Patient denying chest pain, SOB, palpitations, cough. Patient denies fever, chills, HA, Dizziness, N/V, abdominal pain, diarrhea, constipation, hematochezia/melena, dysuria, hematuria, new onset weakness/numbness, LE pain and/or swelling.    Remaining ROS negative       PHYSICAL EXAM:    General:NAD.   HEENT: NC/AT; PERRL, anicteric sclera; MMM  Neck: supple  Cardiovascular: +S1/S2, RRR  Respiratory: + sparse crakling ; no W/R/R  Gastrointestinal: soft, NT/ND; +BSx4  Extremities: WWP; + trace lower extremity edema, clubbing or cyanosis  Vascular: 2+ radial, DP/PT pulses B/L  Neurological: AAOx1; no focal deficits  Psychiatric: pleasant mood and affect  Dermatologic: no appreciable wounds or damage to the skin    VITAL SIGNS:  Vital Signs Last 24 Hrs  T(C): 37 (29 Apr 2025 05:45), Max: 37 (29 Apr 2025 05:45)  T(F): 98.6 (29 Apr 2025 05:45), Max: 98.6 (29 Apr 2025 05:45)  HR: 78 (29 Apr 2025 07:22) (77 - 93)  BP: 160/69 (29 Apr 2025 07:22) (131/63 - 182/77)  BP(mean): --  RR: 18 (29 Apr 2025 07:22) (16 - 18)  SpO2: 92% (29 Apr 2025 07:22) (89% - 98%)    Parameters below as of 29 Apr 2025 07:22  Patient On (Oxygen Delivery Method): nasal cannula  O2 Flow (L/min): 3      MEDICATIONS:  MEDICATIONS  (STANDING):  albuterol/ipratropium for Nebulization 3 milliLiter(s) Nebulizer every 4 hours  aspirin  chewable 81 milliGRAM(s) Oral every 24 hours  atorvastatin 80 milliGRAM(s) Oral at bedtime  clopidogrel Tablet 75 milliGRAM(s) Oral every 24 hours  donepezil 5 milliGRAM(s) Oral at bedtime  insulin lispro (ADMELOG) corrective regimen sliding scale   SubCutaneous Before meals and at bedtime  labetalol 200 milliGRAM(s) Oral every 24 hours  magnesium sulfate  IVPB 1 Gram(s) IV Intermittent once  memantine 10 milliGRAM(s) Oral every 24 hours  NIFEdipine XL 90 milliGRAM(s) Oral every 24 hours  potassium chloride   Powder 40 milliEquivalent(s) Oral once  predniSONE   Tablet 40 milliGRAM(s) Oral every 24 hours  tamsulosin 0.4 milliGRAM(s) Oral at bedtime    MEDICATIONS  (PRN):      ALLERGIES:  Allergies    No Known Allergies    Intolerances        LABS:                        10.2   10.64 )-----------( 209      ( 29 Apr 2025 07:45 )             33.9     04-29    143  |  103  |  20  ----------------------------<  167[H]  3.8   |  25  |  0.66    Ca    9.3      29 Apr 2025 07:45  Phos  3.9     04-29  Mg     1.7     04-29    TPro  6.9  /  Alb  3.6  /  TBili  0.2  /  DBili  x   /  AST  17  /  ALT  18  /  AlkPhos  103  04-29      Urinalysis Basic - ( 29 Apr 2025 07:45 )    Color: x / Appearance: x / SG: x / pH: x  Gluc: 167 mg/dL / Ketone: x  / Bili: x / Urobili: x   Blood: x / Protein: x / Nitrite: x   Leuk Esterase: x / RBC: x / WBC x   Sq Epi: x / Non Sq Epi: x / Bacteria: x      CAPILLARY BLOOD GLUCOSE      POCT Blood Glucose.: 215 mg/dL (28 Apr 2025 22:14)      RADIOLOGY & ADDITIONAL TESTS: Reviewed. OVERNIGHT EVENTS: NAEO    SUBJECTIVE / INTERVAL HPI: Patient seen and examined at bedside. Patient denying chest pain, SOB, palpitations, cough. Patient denies fever, chills, HA, Dizziness, N/V, abdominal pain, diarrhea, constipation, hematochezia/melena, dysuria, hematuria, new onset weakness/numbness, LE pain and/or swelling.    Remaining ROS negative       PHYSICAL EXAM:    General:NAD.   HEENT: NC/AT; PERRL, anicteric sclera; MMM  Neck: supple  Cardiovascular: +S1/S2, RRR  Respiratory: + sparse crackles in b/l lung fields; no W/R/R  Gastrointestinal: soft, NT/ND; +BSx4  Extremities: WWP; + trace lower extremity edema, clubbing or cyanosis  Vascular: 2+ radial, DP/PT pulses B/L  Neurological: AAOx1; no focal deficits  Psychiatric: pleasant mood and affect  Dermatologic: no appreciable wounds or damage to the skin    VITAL SIGNS:  Vital Signs Last 24 Hrs  T(C): 37 (29 Apr 2025 05:45), Max: 37 (29 Apr 2025 05:45)  T(F): 98.6 (29 Apr 2025 05:45), Max: 98.6 (29 Apr 2025 05:45)  HR: 78 (29 Apr 2025 07:22) (77 - 93)  BP: 160/69 (29 Apr 2025 07:22) (131/63 - 182/77)  BP(mean): --  RR: 18 (29 Apr 2025 07:22) (16 - 18)  SpO2: 92% (29 Apr 2025 07:22) (89% - 98%)    Parameters below as of 29 Apr 2025 07:22  Patient On (Oxygen Delivery Method): nasal cannula  O2 Flow (L/min): 3      MEDICATIONS:  MEDICATIONS  (STANDING):  albuterol/ipratropium for Nebulization 3 milliLiter(s) Nebulizer every 4 hours  aspirin  chewable 81 milliGRAM(s) Oral every 24 hours  atorvastatin 80 milliGRAM(s) Oral at bedtime  clopidogrel Tablet 75 milliGRAM(s) Oral every 24 hours  donepezil 5 milliGRAM(s) Oral at bedtime  insulin lispro (ADMELOG) corrective regimen sliding scale   SubCutaneous Before meals and at bedtime  labetalol 200 milliGRAM(s) Oral every 24 hours  magnesium sulfate  IVPB 1 Gram(s) IV Intermittent once  memantine 10 milliGRAM(s) Oral every 24 hours  NIFEdipine XL 90 milliGRAM(s) Oral every 24 hours  potassium chloride   Powder 40 milliEquivalent(s) Oral once  predniSONE   Tablet 40 milliGRAM(s) Oral every 24 hours  tamsulosin 0.4 milliGRAM(s) Oral at bedtime    MEDICATIONS  (PRN):      ALLERGIES:  Allergies    No Known Allergies    Intolerances        LABS:                        10.2   10.64 )-----------( 209      ( 29 Apr 2025 07:45 )             33.9     04-29    143  |  103  |  20  ----------------------------<  167[H]  3.8   |  25  |  0.66    Ca    9.3      29 Apr 2025 07:45  Phos  3.9     04-29  Mg     1.7     04-29    TPro  6.9  /  Alb  3.6  /  TBili  0.2  /  DBili  x   /  AST  17  /  ALT  18  /  AlkPhos  103  04-29      Urinalysis Basic - ( 29 Apr 2025 07:45 )    Color: x / Appearance: x / SG: x / pH: x  Gluc: 167 mg/dL / Ketone: x  / Bili: x / Urobili: x   Blood: x / Protein: x / Nitrite: x   Leuk Esterase: x / RBC: x / WBC x   Sq Epi: x / Non Sq Epi: x / Bacteria: x      CAPILLARY BLOOD GLUCOSE      POCT Blood Glucose.: 215 mg/dL (28 Apr 2025 22:14)      RADIOLOGY & ADDITIONAL TESTS: Reviewed.

## 2025-05-06 DIAGNOSIS — G30.9 ALZHEIMER'S DISEASE, UNSPECIFIED: ICD-10-CM

## 2025-05-06 DIAGNOSIS — Z79.82 LONG TERM (CURRENT) USE OF ASPIRIN: ICD-10-CM

## 2025-05-06 DIAGNOSIS — Z85.038 PERSONAL HISTORY OF OTHER MALIGNANT NEOPLASM OF LARGE INTESTINE: ICD-10-CM

## 2025-05-06 DIAGNOSIS — I35.0 NONRHEUMATIC AORTIC (VALVE) STENOSIS: ICD-10-CM

## 2025-05-06 DIAGNOSIS — Z79.02 LONG TERM (CURRENT) USE OF ANTITHROMBOTICS/ANTIPLATELETS: ICD-10-CM

## 2025-05-06 DIAGNOSIS — J96.01 ACUTE RESPIRATORY FAILURE WITH HYPOXIA: ICD-10-CM

## 2025-05-06 DIAGNOSIS — R06.09 OTHER FORMS OF DYSPNEA: ICD-10-CM

## 2025-05-06 DIAGNOSIS — I25.10 ATHEROSCLEROTIC HEART DISEASE OF NATIVE CORONARY ARTERY WITHOUT ANGINA PECTORIS: ICD-10-CM

## 2025-05-06 DIAGNOSIS — F02.80 DEMENTIA IN OTHER DISEASES CLASSIFIED ELSEWHERE, UNSPECIFIED SEVERITY, WITHOUT BEHAVIORAL DISTURBANCE, PSYCHOTIC DISTURBANCE, MOOD DISTURBANCE, AND ANXIETY: ICD-10-CM

## 2025-05-06 DIAGNOSIS — Z95.820 PERIPHERAL VASCULAR ANGIOPLASTY STATUS WITH IMPLANTS AND GRAFTS: ICD-10-CM

## 2025-05-06 DIAGNOSIS — Z87.891 PERSONAL HISTORY OF NICOTINE DEPENDENCE: ICD-10-CM

## 2025-05-06 DIAGNOSIS — E78.5 HYPERLIPIDEMIA, UNSPECIFIED: ICD-10-CM

## 2025-05-06 DIAGNOSIS — J43.9 EMPHYSEMA, UNSPECIFIED: ICD-10-CM

## 2025-05-06 DIAGNOSIS — Z79.899 OTHER LONG TERM (CURRENT) DRUG THERAPY: ICD-10-CM

## 2025-05-06 DIAGNOSIS — I11.0 HYPERTENSIVE HEART DISEASE WITH HEART FAILURE: ICD-10-CM

## 2025-05-06 DIAGNOSIS — E11.51 TYPE 2 DIABETES MELLITUS WITH DIABETIC PERIPHERAL ANGIOPATHY WITHOUT GANGRENE: ICD-10-CM

## 2025-05-06 DIAGNOSIS — I50.32 CHRONIC DIASTOLIC (CONGESTIVE) HEART FAILURE: ICD-10-CM

## 2025-05-06 DIAGNOSIS — I48.91 UNSPECIFIED ATRIAL FIBRILLATION: ICD-10-CM

## 2025-05-06 DIAGNOSIS — Z79.84 LONG TERM (CURRENT) USE OF ORAL HYPOGLYCEMIC DRUGS: ICD-10-CM

## 2025-05-06 DIAGNOSIS — N40.0 BENIGN PROSTATIC HYPERPLASIA WITHOUT LOWER URINARY TRACT SYMPTOMS: ICD-10-CM

## 2025-05-16 ENCOUNTER — NON-APPOINTMENT (OUTPATIENT)
Age: 75
End: 2025-05-16

## 2025-05-16 ENCOUNTER — APPOINTMENT (OUTPATIENT)
Dept: PULMONOLOGY | Facility: CLINIC | Age: 75
End: 2025-05-16
Payer: MEDICARE

## 2025-05-16 VITALS
BODY MASS INDEX: 24.32 KG/M2 | HEIGHT: 65 IN | DIASTOLIC BLOOD PRESSURE: 75 MMHG | RESPIRATION RATE: 14 BRPM | TEMPERATURE: 97.7 F | HEART RATE: 71 BPM | OXYGEN SATURATION: 95 % | SYSTOLIC BLOOD PRESSURE: 146 MMHG | WEIGHT: 146 LBS

## 2025-05-16 DIAGNOSIS — J44.9 CHRONIC OBSTRUCTIVE PULMONARY DISEASE, UNSPECIFIED: ICD-10-CM

## 2025-05-16 DIAGNOSIS — Z87.891 PERSONAL HISTORY OF NICOTINE DEPENDENCE: ICD-10-CM

## 2025-05-16 DIAGNOSIS — R06.02 SHORTNESS OF BREATH: ICD-10-CM

## 2025-05-16 PROCEDURE — G2211 COMPLEX E/M VISIT ADD ON: CPT

## 2025-05-16 PROCEDURE — 99204 OFFICE O/P NEW MOD 45 MIN: CPT

## 2025-05-16 RX ORDER — UMECLIDINIUM BROMIDE AND VILANTEROL TRIFENATATE 62.5; 25 UG/1; UG/1
62.5-25 POWDER RESPIRATORY (INHALATION) DAILY
Qty: 1 | Refills: 8 | Status: ACTIVE | COMMUNITY
Start: 2025-05-16 | End: 1900-01-01

## 2025-08-22 ENCOUNTER — APPOINTMENT (OUTPATIENT)
Dept: PULMONOLOGY | Facility: CLINIC | Age: 75
End: 2025-08-22